# Patient Record
Sex: FEMALE | Race: OTHER | HISPANIC OR LATINO | Employment: OTHER | URBAN - METROPOLITAN AREA
[De-identification: names, ages, dates, MRNs, and addresses within clinical notes are randomized per-mention and may not be internally consistent; named-entity substitution may affect disease eponyms.]

---

## 2018-09-05 ENCOUNTER — OFFICE VISIT (OUTPATIENT)
Dept: INTERNAL MEDICINE | Facility: CLINIC | Age: 68
End: 2018-09-05
Payer: MEDICARE

## 2018-09-05 ENCOUNTER — HOSPITAL ENCOUNTER (OUTPATIENT)
Dept: RADIOLOGY | Facility: HOSPITAL | Age: 68
Discharge: HOME | End: 2018-09-05
Attending: INTERNAL MEDICINE
Payer: MEDICARE

## 2018-09-05 VITALS
SYSTOLIC BLOOD PRESSURE: 130 MMHG | DIASTOLIC BLOOD PRESSURE: 76 MMHG | HEART RATE: 80 BPM | WEIGHT: 149.69 LBS | HEIGHT: 62 IN | OXYGEN SATURATION: 97 % | BODY MASS INDEX: 27.55 KG/M2

## 2018-09-05 DIAGNOSIS — I10 ESSENTIAL HYPERTENSION: ICD-10-CM

## 2018-09-05 DIAGNOSIS — E55.9 VITAMIN D INSUFFICIENCY: ICD-10-CM

## 2018-09-05 DIAGNOSIS — Z12.31 BREAST CANCER SCREENING BY MAMMOGRAM: ICD-10-CM

## 2018-09-05 DIAGNOSIS — R55 SYNCOPE, UNSPECIFIED SYNCOPE TYPE: ICD-10-CM

## 2018-09-05 DIAGNOSIS — D32.9 MENINGIOMA: ICD-10-CM

## 2018-09-05 DIAGNOSIS — M25.512 LEFT ANTERIOR SHOULDER PAIN: ICD-10-CM

## 2018-09-05 DIAGNOSIS — Z00.00 ANNUAL PHYSICAL EXAM: Primary | ICD-10-CM

## 2018-09-05 PROCEDURE — 73030 X-RAY EXAM OF SHOULDER: CPT | Mod: 26,LT,, | Performed by: RADIOLOGY

## 2018-09-05 PROCEDURE — 73030 X-RAY EXAM OF SHOULDER: CPT | Mod: TC,FY,PO,LT

## 2018-09-05 PROCEDURE — 99499 UNLISTED E&M SERVICE: CPT | Mod: S$GLB,,, | Performed by: INTERNAL MEDICINE

## 2018-09-05 PROCEDURE — 99204 OFFICE O/P NEW MOD 45 MIN: CPT | Mod: PBBFAC,25,PO | Performed by: INTERNAL MEDICINE

## 2018-09-05 PROCEDURE — 99999 PR PBB SHADOW E&M-NEW PATIENT-LVL IV: CPT | Mod: PBBFAC,,, | Performed by: INTERNAL MEDICINE

## 2018-09-05 PROCEDURE — 99214 OFFICE O/P EST MOD 30 MIN: CPT | Mod: S$PBB,,, | Performed by: INTERNAL MEDICINE

## 2018-09-05 RX ORDER — AMLODIPINE BESYLATE 5 MG/1
5 TABLET ORAL DAILY
COMMUNITY
End: 2018-09-27 | Stop reason: SDUPTHER

## 2018-09-05 RX ORDER — HYDROCHLOROTHIAZIDE 25 MG/1
25 TABLET ORAL DAILY
COMMUNITY
End: 2018-09-27 | Stop reason: SDUPTHER

## 2018-09-05 NOTE — PROGRESS NOTES
Portions of this note are generated with voice recognition software. Typographical errors may exist.       Patient Name:ARIEL MALLORY  Patient MRN:   [id number]    History of Present Illness   ================================================================  ARIEL MALLORY is a 68 y.o. female here for primary care visit for  Chief Complaint   Patient presents with    Establish Care    Shoulder Pain     Left        History reviewed. No pertinent past medical history.    History reviewed. No pertinent surgical history.    Review of patient's allergies indicates:   Allergen Reactions    Penicillins        Current Outpatient Medications on File Prior to Visit   Medication Sig Dispense Refill    amLODIPine (NORVASC) 5 MG tablet Take 5 mg by mouth once daily.      hydroCHLOROthiazide (HYDRODIURIL) 25 MG tablet Take 25 mg by mouth once daily.       No current facility-administered medications on file prior to visit.        History reviewed. No pertinent family history.    Social History     Socioeconomic History    Marital status:      Spouse name: Not on file    Number of children: Not on file    Years of education: Not on file    Highest education level: Not on file   Social Needs    Financial resource strain: Not on file    Food insecurity - worry: Not on file    Food insecurity - inability: Not on file    Transportation needs - medical: Not on file    Transportation needs - non-medical: Not on file   Occupational History    Not on file   Tobacco Use    Smoking status: Never Smoker    Smokeless tobacco: Never Used   Substance and Sexual Activity    Alcohol use: Yes     Alcohol/week: 1.2 oz     Types: 2 Cans of beer per week     Frequency: Monthly or less     Drinks per session: 1 or 2     Binge frequency: Never    Drug use: Not on file    Sexual activity: Not on file   Other Topics Concern    Not on file   Social History Narrative    Not on file       Social History      Substance and Sexual Activity   Sexual Activity Not on file         SUBJECTIVE:      About 2 months ago the patient was in her usual state of health walking from picking up her young grandson from .  She states that without any apparent warning she lost orthostatic tone and fell to the ground.  She does not remember having any associated tachycardia flushing symptoms nausea or diaphoresis.  She does not believe that she lost consciousness because she actually heard the point of impact when she hit the ground.  Patient states that she did have associated paralysis of any part of her body.  There is no dysarthria.  There is no vision problems.  She did have intense pain associated with the point of impact which included outstretched hand and her left shoulder.  She states that ever since she has had significant pain in the anterior shoulder with typical activities of daily living which was not present previously.  She now has difficulty doing any activity with her arm above the level of the shoulder or reaching backward.  She has been reluctant to use any analgesics medicine because she does not like to use medications in general.      The patient states that she has a significant history of a benign meningioma that was diagnosed around 2013 at [facility].  She denies significant problems with headaches in association with the recent event that occurred.  She does not have a history of cardiac dysrhythmias.  She does have a history of hypertension which she has been treating with 2 blood pressure medications.    The patient has a diagnosis of a possible transient ischemic attack many years ago.  She believes that this was addressed more than 15 years ago and there are no primary medical records of this incident.  Since this episode 2 months ago she has had no other symptoms of orthostasis syncope or presyncope.  The patient and the patient's daughter are very reluctant to pursue aggressive  evaluation of this event.  They want to prioritize the management of the left shoulder.      Medications Reviewed and Updated    Past medical, family, and social histories were reviewed and updated.    Review of Systems negative unless otherwise noted in history of present illness-  ROS      General ROS: negative  Psychological ROS: negative  ENT ROS: negative  Endocrine ROS: Negative  Allergy and Immunology ROS: negative  Pulmonary ROS: Negative  Cardiovascular ROS: negative  Gastrointestinal ROS: negative  Genito-Urinary ROS: negative  Musculoskeletal ROS: negative  Neurological ROS: negative  Dermatological ROS: negative      Allergic:    Review of patient's allergies indicates:   Allergen Reactions    Penicillins        OBJECTIVE:  BP: 130/76 Pulse: 80    Wt Readings from Last 3 Encounters:   09/05/18 67.9 kg (149 lb 11.1 oz)    Body mass index is 27.38 kg/m².  Previous Blood Pressure Readings :   BP Readings from Last 3 Encounters:   09/05/18 130/76       Physical Exam    GEN: healthy appearing  HEENT: sclera non-icteric, conjunctiva clear  CV: no peripheral edema.   Regular rate and rhythm.  No murmurs.  No carotid bruits.  PULM: breathing non-labored  ABD: supple. protuberant abdomen.   PSYCH: appropriate affect  MSK: able to rise from chair without assistance  Neurologic:  Cranial nerves 2-12 grossly intact.  5/5 strength bilateral extremities.  Normal tone.  Normal gait.  SKIN: normal skin turgor      Pertinent Labs Reviewed     ASSESSMENT/PLAN:    Annual physical exam    Syncope, unspecified syncope type.Etiology unclear. Not controlled. Further evaluation warranted.  Recommendations as below or as written on After Visit Summary.   -     CBC auto differential; Future; Expected date: 09/05/2018  -     MRI Brain (Tumor with Perfusion) W W/O Contrast (XPD); Future; Expected date: 09/05/2018    Meningioma.This is a Chronic problem. The problem is unclear regarding stability. Detailed evaluation of outside  radiology from 2013 and 2014 at [facility] indicating a CTA of the neck and head that was essentially normal.  There was a small meningioma located near the pituitary gland.The patient's meningioma measured at maximum dimension 1.6 cm.  The patient had pituitary labs that were within normal limits at the time.  The the patient was due to get a repeat MRI for active surveillance but this never happened.The risk of medical complications is moderate. Treatment/diagnostic recommendations are to modify the diagnostic/treatment plan as follows in addition to instructions noted on the After Visit Summary. The patient advised if symptoms change or intensify to seek medical care.   -     MRI Brain (Tumor with Perfusion) W W/O Contrast (XPD); Future; Expected date: 09/05/2018    Left anterior shoulder pain.Etiology unclear. Not controlled. Further evaluation warranted.  Recommendations as below or as written on After Visit Summary.   -     X-Ray Shoulder Trauma 3 view Left; Future; Expected date: 09/05/2018  -     Ambulatory referral to Orthopedics    Essential hypertensin.Condition stable.  Counseling given today on self-care measures. Plan to monitor clinically. Continue current medical plan.   -     Comprehensive metabolic panel; Standing  -     Lipid panel; Standing    Vitamin D insufficiency  -     Vitamin D; Standing    Breast cancer screening by mammogram  -     Mammo Digital Screening Bilat with CAD; Standing          Future Appointments   Date Time Provider Department Center   9/10/2018  3:40 PM Janet Spears PA-C [facility]THO [place] [facility]   9/14/2018  8:00 AM [facility] MAMMO1 [facility] MAMMO [place] [facility]   10/4/2018 10:00 AM Emigdio Salomon Jr., MD [facility] ORTHO [place] [facility]   10/8/2018  3:00 PM Antolin Verduzco MD [facility]       Antolin Verduzco  9/9/2018  6:59 PM

## 2018-09-06 ENCOUNTER — TELEPHONE (OUTPATIENT)
Dept: INTERNAL MEDICINE | Facility: CLINIC | Age: 68
End: 2018-09-06

## 2018-09-06 NOTE — TELEPHONE ENCOUNTER
Spoke with patient for possible brain MRI.  Patient is not claustrophobic.  Patient will call Mrs Ava Damon our referral coordinator to set this appointment up at her convenience.

## 2018-09-06 NOTE — TELEPHONE ENCOUNTER
----- Message from Valeria Allen LPN sent at 9/5/2018  7:40 PM CDT -----      ----- Message -----  From: Antolin Verduzco MD  Sent: 9/5/2018   7:18 PM  To: [facility] Staff      Please contact the patient or the patient's daughter to recommend MRI of the head.  The patient had an episode 2 months ago when she passed out.  She has a history of a small tumor in the brain that was diagnosed in 2014 and it is possible that even if the tumor is benign if it has grown it could produce a seizure and this could explain why she passed out.  The only way to know if the tumor has grown since 2014 is to repeat an MRI.  If the patient is claustrophobic please let me know so I can change the orders to include sedation.

## 2018-09-07 ENCOUNTER — TELEPHONE (OUTPATIENT)
Dept: ORTHOPEDICS | Facility: CLINIC | Age: 68
End: 2018-09-07

## 2018-09-07 NOTE — TELEPHONE ENCOUNTER
Spoke with pt regarding 9/10 appt . I was able to get pt in for 3:40 appt .   ----- Message from Angelica Best sent at 9/7/2018  4:26 PM CDT -----  Contact: [phone]/ pts daughter Julisa  Called in returning your call. Julisa would like to know if appt that's available for Monday is after 3:30, as that is the only time she's available to bring pt. Please call and advise.

## 2018-09-10 ENCOUNTER — OFFICE VISIT (OUTPATIENT)
Dept: ORTHOPEDICS | Facility: CLINIC | Age: 68
End: 2018-09-10
Payer: MEDICARE

## 2018-09-10 VITALS — HEIGHT: 62 IN | BODY MASS INDEX: 27.42 KG/M2 | WEIGHT: 149 LBS

## 2018-09-10 DIAGNOSIS — M75.41 IMPINGEMENT SYNDROME OF RIGHT SHOULDER: Primary | ICD-10-CM

## 2018-09-10 PROCEDURE — 99999 PR PBB SHADOW E&M-EST. PATIENT-LVL III: CPT | Mod: PBBFAC,,, | Performed by: ORTHOPAEDIC SURGERY

## 2018-09-10 PROCEDURE — 1101F PT FALLS ASSESS-DOCD LE1/YR: CPT | Mod: CPTII,,, | Performed by: ORTHOPAEDIC SURGERY

## 2018-09-10 PROCEDURE — 99213 OFFICE O/P EST LOW 20 MIN: CPT | Mod: PBBFAC,25,PO | Performed by: ORTHOPAEDIC SURGERY

## 2018-09-10 PROCEDURE — 20610 DRAIN/INJ JOINT/BURSA W/O US: CPT | Mod: S$PBB,RT,, | Performed by: ORTHOPAEDIC SURGERY

## 2018-09-10 PROCEDURE — 99204 OFFICE O/P NEW MOD 45 MIN: CPT | Mod: S$PBB,25,, | Performed by: ORTHOPAEDIC SURGERY

## 2018-09-10 PROCEDURE — 20610 DRAIN/INJ JOINT/BURSA W/O US: CPT | Mod: PBBFAC,PN | Performed by: ORTHOPAEDIC SURGERY

## 2018-09-10 RX ORDER — TRIAMCINOLONE ACETONIDE 40 MG/ML
40 INJECTION, SUSPENSION INTRA-ARTICULAR; INTRAMUSCULAR
Status: COMPLETED | OUTPATIENT
Start: 2018-09-10 | End: 2018-09-10

## 2018-09-10 RX ADMIN — TRIAMCINOLONE ACETONIDE 40 MG: 40 INJECTION, SUSPENSION INTRA-ARTICULAR; INTRAMUSCULAR at 05:09

## 2018-09-10 NOTE — PROCEDURES
Procedures   PROCEDURE NOTE:  I have explained the risks, benefits, and alternatives of the procedure in detail.  The patient voices understanding and all questions have been answered.  The patient agrees to proceed as planned, consents to injection. Pause for timeout. After a sterile prep of the skin performed in the normal fashion, the left shoulder is injected from the posterior approach using a 22 gauge needle with a combination of 2cc 1% lidocaine and 40 mg of kenalog. The patient is cautioned and immediate relief of pain is secondary to the local anesthetic and will be temporary.  After the anesthetic wears off there may be a increase in pain that may last for a few hours or a few days and they should use ice to help alleviate this flair up of pain.

## 2018-09-14 ENCOUNTER — HOSPITAL ENCOUNTER (OUTPATIENT)
Dept: RADIOLOGY | Facility: HOSPITAL | Age: 68
Discharge: HOME | End: 2018-09-14
Attending: INTERNAL MEDICINE
Payer: MEDICARE

## 2018-09-14 ENCOUNTER — HOSPITAL ENCOUNTER (OUTPATIENT)
Dept: RADIOLOGY | Facility: HOSPITAL | Age: 68
Discharge: HOME | End: 2018-09-14
Attending: ORTHOPAEDIC SURGERY
Payer: MEDICARE

## 2018-09-14 VITALS — WEIGHT: 149 LBS | BODY MASS INDEX: 27.42 KG/M2 | HEIGHT: 62 IN

## 2018-09-14 DIAGNOSIS — M75.41 IMPINGEMENT SYNDROME OF RIGHT SHOULDER: ICD-10-CM

## 2018-09-14 DIAGNOSIS — Z12.31 BREAST CANCER SCREENING BY MAMMOGRAM: ICD-10-CM

## 2018-09-14 PROCEDURE — 77063 BREAST TOMOSYNTHESIS BI: CPT | Mod: TC

## 2018-09-14 PROCEDURE — 73221 MRI JOINT UPR EXTREM W/O DYE: CPT | Mod: TC,LT

## 2018-09-14 PROCEDURE — 77067 SCR MAMMO BI INCL CAD: CPT | Mod: 26,,, | Performed by: RADIOLOGY

## 2018-09-14 PROCEDURE — 77063 BREAST TOMOSYNTHESIS BI: CPT | Mod: 26,,, | Performed by: RADIOLOGY

## 2018-09-14 PROCEDURE — 73221 MRI JOINT UPR EXTREM W/O DYE: CPT | Mod: 26,LT,, | Performed by: RADIOLOGY

## 2018-09-17 ENCOUNTER — TELEPHONE (OUTPATIENT)
Dept: ORTHOPEDICS | Facility: CLINIC | Age: 68
End: 2018-09-17

## 2018-09-17 ENCOUNTER — PATIENT MESSAGE (OUTPATIENT)
Dept: ORTHOPEDICS | Facility: HOSPITAL | Age: 68
End: 2018-09-17

## 2018-09-17 NOTE — TELEPHONE ENCOUNTER
----- Message from Janet Spears PA-C sent at 9/17/2018  7:56 AM CDT -----  Regarding: MRI Results  This pt has a full thickness rotator cuff tear and will need surgery.   Can you please make an apt for her with Dr. Salomon as soon as possible.     Thx

## 2018-09-17 NOTE — TELEPHONE ENCOUNTER
----- Message from Janet Spears PA-C sent at 9/17/2018  7:59 AM CDT -----  Also can you please have Soha call the pt (Wol[place] speaking only) to let her know that her MRI was positive for rotator cuff tear and will be scheduled with Dr. Salomon.    Thanks.

## 2018-09-17 NOTE — TELEPHONE ENCOUNTER
Called and spoke with daughter because pt speaks minimal english. Appt date and time set, Daughter made aware.

## 2018-09-24 ENCOUNTER — TELEPHONE (OUTPATIENT)
Dept: ORTHOPEDICS | Facility: CLINIC | Age: 68
End: 2018-09-24

## 2018-09-24 NOTE — TELEPHONE ENCOUNTER
----- Message from Yareli Laguerre sent at 9/24/2018  3:32 PM CDT -----  Contact: daughter/Julisa  [phone]  Pt daughter is returning your call   Please call and advise

## 2018-09-24 NOTE — TELEPHONE ENCOUNTER
I spoke with the patients daughter and rescheduled the patients appointment. She was made aware of date, time and location.

## 2018-09-24 NOTE — TELEPHONE ENCOUNTER
----- Message from Joyce Patrick sent at 9/24/2018 12:54 PM CDT -----  Contact: [phone]  Patient daughter requested to speak with the nurse because she needs to reschedule the appt that was for today but needs something sooner than next availability. Please call.

## 2018-09-26 ENCOUNTER — PATIENT MESSAGE (OUTPATIENT)
Dept: INTERNAL MEDICINE | Facility: CLINIC | Age: 68
End: 2018-09-26

## 2018-09-27 RX ORDER — HYDROCHLOROTHIAZIDE 25 MG/1
25 TABLET ORAL DAILY
Qty: 90 TABLET | Refills: 1 | Status: SHIPPED | OUTPATIENT
Start: 2018-09-27 | End: 2019-03-27 | Stop reason: SDUPTHER

## 2018-09-27 RX ORDER — AMLODIPINE BESYLATE 5 MG/1
5 TABLET ORAL DAILY
Qty: 90 TABLET | Refills: 3 | Status: SHIPPED | OUTPATIENT
Start: 2018-09-27 | End: 2019-03-27 | Stop reason: SDUPTHER

## 2018-10-08 ENCOUNTER — OFFICE VISIT (OUTPATIENT)
Dept: INTERNAL MEDICINE | Facility: CLINIC | Age: 68
End: 2018-10-08
Payer: MEDICARE

## 2018-10-08 VITALS
HEIGHT: 62 IN | WEIGHT: 149.5 LBS | OXYGEN SATURATION: 97 % | SYSTOLIC BLOOD PRESSURE: 136 MMHG | HEART RATE: 82 BPM | BODY MASS INDEX: 27.51 KG/M2 | DIASTOLIC BLOOD PRESSURE: 74 MMHG

## 2018-10-08 DIAGNOSIS — D32.9 BENIGN MENINGIOMA: ICD-10-CM

## 2018-10-08 DIAGNOSIS — Z23 NEED FOR IMMUNIZATION AGAINST INFLUENZA: ICD-10-CM

## 2018-10-08 DIAGNOSIS — Z11.59 ENCOUNTER FOR HEPATITIS C SCREENING TEST FOR LOW RISK PATIENT: ICD-10-CM

## 2018-10-08 DIAGNOSIS — M75.112 INCOMPLETE TEAR OF LEFT ROTATOR CUFF: ICD-10-CM

## 2018-10-08 DIAGNOSIS — Z12.11 COLON CANCER SCREENING: ICD-10-CM

## 2018-10-08 DIAGNOSIS — H54.7 DECREASED VISUAL ACUITY: Primary | ICD-10-CM

## 2018-10-08 PROCEDURE — 99213 OFFICE O/P EST LOW 20 MIN: CPT | Mod: PBBFAC,PO,25 | Performed by: INTERNAL MEDICINE

## 2018-10-08 PROCEDURE — 1101F PT FALLS ASSESS-DOCD LE1/YR: CPT | Mod: CPTII,,, | Performed by: INTERNAL MEDICINE

## 2018-10-08 PROCEDURE — 99214 OFFICE O/P EST MOD 30 MIN: CPT | Mod: S$PBB,,, | Performed by: INTERNAL MEDICINE

## 2018-10-08 PROCEDURE — 99999 PR PBB SHADOW E&M-EST. PATIENT-LVL III: CPT | Mod: PBBFAC,,, | Performed by: INTERNAL MEDICINE

## 2018-10-08 PROCEDURE — 90662 IIV NO PRSV INCREASED AG IM: CPT | Mod: PBBFAC,PO

## 2018-10-08 NOTE — PROGRESS NOTES
Portions of this note are generated with voice recognition software. Typographical errors may exist.     SUBJECTIVE:    This is a/an 68 y.o. female here for primary care visit for  Chief Complaint   Patient presents with    Shoulder Pain     Patient states that after she received injection in the shoulder she had significant resolution in pain and increased mobility in her left shoulder better than any other treatment she has received in the past.  She has a limited understanding of the result on her MRI and is requesting detailed explanation in [place].  She understands to be careful with her left shoulder and to report any significant persisting pain symptoms that may recur in the left shoulder.    The patient states that she has been reluctant to pursue MRI of the brain because she recently completed an MRI of the shoulder and is dealing with the medical payments for that MRI.  Moreover, she feels that she does not have headaches almost ever and she feels that if she is asymptomatic she would like to forego screening MRI for enlargement of meningioma.  Her last MRI was in 2014.    Although she is not having headaches she has had gradual worsening in visual acuity for over a year.  She has not been seen by an optometrist or ophthalmologist for more than 2 years.  The patient agrees that she will get this evaluated as soon as possible as a part of her evaluation for whether she needs a repeat MRI of the brain    The patient states that she has never completed colonoscopy.  Detailed informed consent and the patient agrees to pursue this testing    Medications Reviewed and Updated    Past medical, family, and social histories were reviewed and updated.    Review of Systems negative unless otherwise noted in history of present illness-  ROS    General ROS: negative  Psychological ROS: negative  ENT ROS: negative  Endocrine ROS: Negative  Allergy and Immunology ROS: negative  Cardiovascular ROS: negative  Pulmonary  ROS: Negative  Gastrointestinal ROS: negative  Genito-Urinary ROS: negative  Musculoskeletal ROS: negative  Neurological ROS: negative        Allergic:    Review of patient's allergies indicates:   Allergen Reactions    Penicillins        OBJECTIVE:  BP: 136/74 Pulse: 82    Wt Readings from Last 3 Encounters:   10/08/18 67.8 kg (149 lb 7.6 oz)   09/14/18 67.6 kg (149 lb)   09/10/18 67.6 kg (149 lb)    Body mass index is 27.34 kg/m².  Previous Blood Pressure Readings :   BP Readings from Last 3 Encounters:   10/08/18 136/74   09/05/18 130/76       Physical Exam    GEN: No apparent distress  HEENT: sclera non-icteric, conjunctiva clear  CV: no peripheral edema  PULM: breathing non-labored  ABD: Obese, protuberant abdomen.  PSYCH: appropriate affect  MSK: able to rise from chair without assistance  SKIN: normal skin turgor    Pertinent Labs Reviewed       ASSESSMENT/PLAN:    Decreased visual acuity.Etiology unclear. Not controlled. Further evaluation warranted.  Recommendations as below or as written on After Visit Summary.   -     Ambulatory Referral to Optometry    Benign meningioma.Condition stable.  Counseling given today on self-care measures. Plan to monitor clinically. Continue current medical plan.     Incomplete tear of left rotator cuff.Condition stable.  Counseling given today on self-care measures. Plan to monitor clinically. Continue current medical plan.     Need for immunization against influenza  -     Influenza - High Dose (65+) (PF) (IM)    Colon cancer screening  -     Case request GI: COLONOSCOPY    Encounter for hepatitis C screening test for low risk patient  -     Hepatitis C antibody; Future; Expected date: 10/08/2018          Future Appointments   Date Time Provider Department Center   10/11/2018  4:00 PM Emigdio Salomon Jr., MD [facility]THO [place] [facility]   10/19/2018  3:30 PM Marcell Lozano OD [facility] OPTOMTY [place]   2/8/2019 10:00 AM [facility] [place]   2/12/2019  1:40 PM  Antolin Verduzco MD [facility]       Antolin Verduzco  10/8/2018  3:38 PM

## 2018-10-11 ENCOUNTER — OFFICE VISIT (OUTPATIENT)
Dept: ORTHOPEDICS | Facility: CLINIC | Age: 68
End: 2018-10-11
Payer: MEDICARE

## 2018-10-11 VITALS — HEIGHT: 62 IN | WEIGHT: 149 LBS | BODY MASS INDEX: 27.42 KG/M2

## 2018-10-11 DIAGNOSIS — M75.122 COMPLETE TEAR OF LEFT ROTATOR CUFF: ICD-10-CM

## 2018-10-11 PROCEDURE — 99999 PR PBB SHADOW E&M-EST. PATIENT-LVL III: CPT | Mod: PBBFAC,,, | Performed by: ORTHOPAEDIC SURGERY

## 2018-10-11 PROCEDURE — 99213 OFFICE O/P EST LOW 20 MIN: CPT | Mod: PBBFAC,PN | Performed by: ORTHOPAEDIC SURGERY

## 2018-10-11 PROCEDURE — 1101F PT FALLS ASSESS-DOCD LE1/YR: CPT | Mod: CPTII,,, | Performed by: ORTHOPAEDIC SURGERY

## 2018-10-11 PROCEDURE — 99214 OFFICE O/P EST MOD 30 MIN: CPT | Mod: S$PBB,,, | Performed by: ORTHOPAEDIC SURGERY

## 2018-10-11 NOTE — LETTER
October 11, 2018      Antolin Verduzco MD  [address] [address]  [place] [address]           [facility]  [address] [place] [address] [address]  [place] [address]  Phone: [phone]          Patient: Julisa Jensen   MR Number: [id number]   YOB: 1950   Date of Visit: 10/11/2018       Dear Dr. Antolin Verduzco:    Thank you for referring Julisa Jensen to me for evaluation. Attached you will find relevant portions of my assessment and plan of care.    If you have questions, please do not hesitate to call me. I look forward to following Julisa Jensen along with you.    Sincerely,    Emigdio Salomon Jr., MD    Enclosure  CC:  No Recipients    If you would like to receive this communication electronically, please contact [email or web address] or [phone] to request more information on [employer] Link access.    For providers and/or their staff who would like to refer a patient to [facility], please contact us through our one-stop-shop provider referral line, [facility], at [phone].    If you feel you have received this communication in error or would no longer like to receive these types of communications, please e-mail [email or web address]

## 2018-10-17 ENCOUNTER — TELEPHONE (OUTPATIENT)
Dept: GASTROENTEROLOGY | Facility: CLINIC | Age: 68
End: 2018-10-17

## 2018-10-17 NOTE — TELEPHONE ENCOUNTER
Attempted to contact patient about scheduling a Colonoscopy. Patient stated that she will give office a call back.

## 2018-11-02 ENCOUNTER — OFFICE VISIT (OUTPATIENT)
Dept: OPTOMETRY | Facility: CLINIC | Age: 68
End: 2018-11-02
Payer: MEDICARE

## 2018-11-02 DIAGNOSIS — H52.4 HYPEROPIA WITH PRESBYOPIA, RIGHT: ICD-10-CM

## 2018-11-02 DIAGNOSIS — H40.013 OAG (OPEN ANGLE GLAUCOMA) SUSPECT, LOW RISK, BILATERAL: Primary | ICD-10-CM

## 2018-11-02 DIAGNOSIS — H25.13 NUCLEAR SCLEROSIS OF BOTH EYES: ICD-10-CM

## 2018-11-02 DIAGNOSIS — H52.01 HYPEROPIA WITH PRESBYOPIA, RIGHT: ICD-10-CM

## 2018-11-02 DIAGNOSIS — H52.02 HYPEROPIA WITH ASTIGMATISM AND PRESBYOPIA, LEFT: ICD-10-CM

## 2018-11-02 DIAGNOSIS — H52.4 HYPEROPIA WITH ASTIGMATISM AND PRESBYOPIA, LEFT: ICD-10-CM

## 2018-11-02 DIAGNOSIS — H52.202 HYPEROPIA WITH ASTIGMATISM AND PRESBYOPIA, LEFT: ICD-10-CM

## 2018-11-02 PROCEDURE — 92004 COMPRE OPH EXAM NEW PT 1/>: CPT | Mod: S$GLB,,, | Performed by: OPTOMETRIST

## 2018-11-02 PROCEDURE — 99999 PR PBB SHADOW E&M-EST. PATIENT-LVL II: CPT | Mod: PBBFAC,,, | Performed by: OPTOMETRIST

## 2018-11-02 PROCEDURE — 92015 DETERMINE REFRACTIVE STATE: CPT | Mod: S$GLB,,, | Performed by: OPTOMETRIST

## 2018-11-02 NOTE — PROGRESS NOTES
HPI     Pt here for concerns about ocular health.  Referred by PCP, Dr. Antolin Verduzco at [facility] for decreased VA.    Pt c/o of blurry vision at near.  Eye pain: 0/10  Pt denies double vision, tearing, itchiness, dryness, and photophobia.    Eye meds:  None    Last edited by Comfort Vogel on 11/2/2018  3:47 PM. (History)        ROS     Negative for: Constitutional, Gastrointestinal, Neurological, Skin,   Genitourinary, Musculoskeletal, HENT, Endocrine, Cardiovascular, Eyes,   Respiratory, Psychiatric, Allergic/Imm, Heme/Lymph    Last edited by Marcell Lozano, OD on 11/2/2018  4:05 PM. (History)        Assessment /Plan     For exam results, see Encounter Report.    OAG (open angle glaucoma) suspect, low risk, bilateral    Hyperopia with presbyopia, right    Hyperopia with astigmatism and presbyopia, left    Nuclear sclerosis of both eyes    1. Large C/d. RTC 2 weeks IOP/pachy/OCT/HVF.   2-3. SRx updated.   4. Mildly visually significant. Monitor.

## 2018-11-02 NOTE — LETTER
November 2, 2018      Antolin Verduzco MD  [address] [facility] [address]           [facility] [facility]  [address] [facility] [address]  Phone: [phone]  Fax: [phone]          Patient: Julisa Jensen   MR Number: [id number]   YOB: 1950   Date of Visit: 11/2/2018       Dear Dr. Antolin Verduzco:    Thank you for referring Julisa Jensen to me for evaluation. Attached you will find relevant portions of my assessment and plan of care.    If you have questions, please do not hesitate to call me. I look forward to following Julisa Jensen along with you.    Sincerely,    Marcell Lozano, OD    Enclosure  CC:  No Recipients    If you would like to receive this communication electronically, please contact [email or web address] or [phone] to request more information on [employer] Link access.    For providers and/or their staff who would like to refer a patient to [facility], please contact us through our one-stop-shop provider referral line, [facility], at [phone].    If you feel you have received this communication in error or would no longer like to receive these types of communications, please e-mail [email or web address]

## 2018-11-06 ENCOUNTER — PATIENT MESSAGE (OUTPATIENT)
Dept: INTERNAL MEDICINE | Facility: CLINIC | Age: 68
End: 2018-11-06

## 2018-11-20 ENCOUNTER — TELEPHONE (OUTPATIENT)
Dept: OPTOMETRY | Facility: CLINIC | Age: 68
End: 2018-11-20

## 2018-11-20 ENCOUNTER — CLINICAL SUPPORT (OUTPATIENT)
Dept: OPHTHALMOLOGY | Facility: CLINIC | Age: 68
End: 2018-11-20
Payer: MEDICARE

## 2018-11-20 ENCOUNTER — OFFICE VISIT (OUTPATIENT)
Dept: OPTOMETRY | Facility: CLINIC | Age: 68
End: 2018-11-20
Payer: MEDICARE

## 2018-11-20 DIAGNOSIS — H40.013 OAG (OPEN ANGLE GLAUCOMA) SUSPECT, LOW RISK, BILATERAL: Primary | ICD-10-CM

## 2018-11-20 DIAGNOSIS — H40.013 OAG (OPEN ANGLE GLAUCOMA) SUSPECT, LOW RISK, BILATERAL: ICD-10-CM

## 2018-11-20 PROCEDURE — 76514 ECHO EXAM OF EYE THICKNESS: CPT | Mod: S$GLB,,, | Performed by: OPTOMETRIST

## 2018-11-20 PROCEDURE — 92012 INTRM OPH EXAM EST PATIENT: CPT | Mod: S$GLB,,, | Performed by: OPTOMETRIST

## 2018-11-20 PROCEDURE — 99999 PR PBB SHADOW E&M-EST. PATIENT-LVL II: CPT | Mod: PBBFAC,,, | Performed by: OPTOMETRIST

## 2018-11-20 PROCEDURE — 92133 CPTRZD OPH DX IMG PST SGM ON: CPT | Mod: S$GLB,,, | Performed by: OPTOMETRIST

## 2018-11-20 PROCEDURE — 92083 EXTENDED VISUAL FIELD XM: CPT | Mod: S$GLB,,, | Performed by: OPTOMETRIST

## 2018-11-20 NOTE — PROGRESS NOTES
HPI     DLS: 11/02/2018 Dr. Lozano    Patient here for a 2 week follow up/ IOP check. Patient notes no changes   in her vision since her last visit.     Last edited by Ivy Dominguez on 11/20/2018 11:09 AM. (History)        ROS     Negative for: Constitutional, Gastrointestinal, Neurological, Skin,   Genitourinary, Musculoskeletal, HENT, Endocrine, Cardiovascular, Eyes,   Respiratory, Psychiatric, Allergic/Imm, Heme/Lymph    Last edited by Marcell Lozano, OD on 11/20/2018 11:31 AM. (History)        Assessment /Plan     For exam results, see Encounter Report.    OAG (open angle glaucoma) suspect, low risk, bilateral      1. Neg Fhx. Last IOP 18 OD, OS. Today's IOP 19 OD, 18 OS. C/d 0.75 OD, OS w/ 11/2/18 DFE. Pachy 523 OD, 529 OS.   11/20/18 OCT WNL OU  11/20/18 HVF low reliability OU, sup arcuate OD, early sup defect and inf scattered defect OS. First time test taker  Educated the pt and daughter on findings. No need for tx at this time. Pt shows understanding. Re-test VF and IOP in 3-4 mo.

## 2018-11-20 NOTE — TELEPHONE ENCOUNTER
Patient was in clinic today with her daughter. She was unable to wait to make follow up appointment. Patient needs a 3 month IOP check and HVF repeat at the [facility]. Please schedule a 24-2 HVF repeat and IOP check with Dr. Lozano when her daughter calls the clinic to schedule.

## 2018-11-21 ENCOUNTER — TELEPHONE (OUTPATIENT)
Dept: OPHTHALMOLOGY | Facility: CLINIC | Age: 68
End: 2018-11-21

## 2018-11-21 NOTE — TELEPHONE ENCOUNTER
----- Message from Lilia Savage sent at 11/21/2018  1:06 PM CST -----  Contact: Julisa Jensen(Daughter)  Needs Advice    Reason for call:Pt called to f/u with Dr. Lozano as requested.        Communication Preference:[phone]    Additional Information:

## 2018-11-28 ENCOUNTER — TELEPHONE (OUTPATIENT)
Dept: GASTROENTEROLOGY | Facility: CLINIC | Age: 68
End: 2018-11-28

## 2018-11-28 ENCOUNTER — PATIENT MESSAGE (OUTPATIENT)
Dept: SURGERY | Facility: HOSPITAL | Age: 68
End: 2018-11-28

## 2019-01-08 ENCOUNTER — TELEPHONE (OUTPATIENT)
Dept: GASTROENTEROLOGY | Facility: CLINIC | Age: 69
End: 2019-01-08

## 2019-01-08 NOTE — TELEPHONE ENCOUNTER
GOLYTELY Instructions    You are scheduled for a colonoscopy with Dr. Granados on 3/6/2019 at [facility]  To ensure that your test is accurate and complete, you MUST follow these instructions listed below.  If you have any questions, please call our office at [phone].  Plan on being at the hospital for your procedure for 3-4 hours.    1.  Follow a CLEAR LIQUID DIET for the entire day before your scheduled colonoscopy.  This means no solid food the entire day starting when you wake.  You may have as much of the clear liquids as you want throughout the day.   CLEAR LIQUID DIET:   - Avoid Red, Orange, Purple, and/or Blue food coloring   - NO DAIRY   - You can have:  Coffee with sugar (no creamer), tea, water, soda, apple or white grape juice, chicken or beef broth/bouillon (no meat, noodles, or veggies), green/yellow popsicles, green/yellow Jell-O, lemonade.    2.  MIX GOLYTELY/COLYTE/NULYTELY (all names for same product) WITH ONE (1) GALLON OF WATER.  YOU MAY ADD A FLAVOR PACKET OR YELLOW/GREEN POWDER DRINK MIX TO THIS.  PUT IN REFRIGERATOR.  This is easier to drink if this solution is cold, so you can mix the solution one day ahead of time and place in the refrigerator prior to drinking.  You have to drink the solution within 24-36 hours of mixing it.  Do NOT put this solution over ice.  It IS ok to drink with a straw.    3. AT 5 PM THE DAY BEFORE YOUR COLONOSCOPY, DRINK ONE (1) 8 OUNCE GLASS OF MIXTURE EVERY 10 MINUTES UNTIL HALF OF THE GALLON IS CONSUMED.  Keep this mixture cold and in refrigerator as much as you can while drinking it.  Place the remaining half of mixture in the refrigerator when you finish the first half.    4.  The endoscopy department will call you 2 days before your colonoscopy to tell you the exact time to arrive, AND to tell you the exact time to drink the 2nd portion of your prep (which will be FIVE HOURS BEFORE YOUR ARRIVAL TIME).  At this time given to you, DRINK ONE (1) 8 OUNCE  GLASS OF MIXTURE EVERY 10 MINUTES UNTIL THE OTHER HALF IS CONSUMED. Keep the mixture cold while you are drinking it. Once this is complete, you may not have ANYTHING else by mouth!      5.  You must have someone with you to DRIVE YOU HOME since you will be receiving IV sedation for the colonoscopy.    6.  It is ok to take your heart, blood pressure, and seizure medications in the morning of your test with a SIP of water.  Hold other medications until after your procedure.  Do NOT have anything else to eat or drink the morning of your colonoscopy.  It is ok to brush your teeth.    7.  If you are on blood thinners THAT YOU HAVE BEEN INSTRUCTED TO HOLD BY YOUR DOCTOR FOR THIS PROCEDURE, then do NOT take this the morning of your colonoscopy.  Do NOT stop these medications on your own, they must be approved to be held by your doctor.  Your colonoscopy can NOT be done if you are on these medications.  Examples of blood thinners include: Coumadin, Aggrenox, Plavix, Pradaxa, Reapro, Pletal, Xarelto, Ticagrelor, Brilinta, Eliquis, and high dose aspirin (325 mg).  You do not have to stop baby aspirin 81 mg.    8.  IF YOU ARE DIABETIC:  NO INSULIN OR ORAL MEDICATIONS THE MORNING OF THE COLONOSCOPY.  TAKE ONLY HALF THE DOSE OF YOUR INSULIN THE DAY BEFORE THE COLONOSCOPY.  DO NOT TAKE ANY ORAL DIABETIC MEDICATIONS THE DAY BEFORE THE COLONOSCOPY.  IF YOU ARE AN INSULIN DEPENDENT DIABETIC WITH UNSTABLE BLOOD SUGARDS, NOTIFY YOUR PRIMARY CARE PHYSICIAN FOR INSTRUCTIONS.

## 2019-01-11 ENCOUNTER — PATIENT MESSAGE (OUTPATIENT)
Dept: ORTHOPEDICS | Facility: CLINIC | Age: 69
End: 2019-01-11

## 2019-02-04 ENCOUNTER — OFFICE VISIT (OUTPATIENT)
Dept: ORTHOPEDICS | Facility: CLINIC | Age: 69
End: 2019-02-04
Payer: MEDICARE

## 2019-02-04 ENCOUNTER — HOSPITAL ENCOUNTER (OUTPATIENT)
Dept: RADIOLOGY | Facility: HOSPITAL | Age: 69
Discharge: HOME | End: 2019-02-04
Attending: ORTHOPAEDIC SURGERY
Payer: MEDICARE

## 2019-02-04 VITALS — WEIGHT: 149 LBS | BODY MASS INDEX: 27.42 KG/M2 | HEIGHT: 62 IN

## 2019-02-04 DIAGNOSIS — M25.511 RIGHT SHOULDER PAIN, UNSPECIFIED CHRONICITY: Primary | ICD-10-CM

## 2019-02-04 DIAGNOSIS — M25.511 ACUTE PAIN OF RIGHT SHOULDER: ICD-10-CM

## 2019-02-04 DIAGNOSIS — M25.511 RIGHT SHOULDER PAIN, UNSPECIFIED CHRONICITY: ICD-10-CM

## 2019-02-04 PROCEDURE — 20610 PR DRAIN/INJECT LARGE JOINT/BURSA: ICD-10-PCS | Mod: RT,S$GLB,, | Performed by: ORTHOPAEDIC SURGERY

## 2019-02-04 PROCEDURE — 99213 PR OFFICE/OUTPT VISIT, EST, LEVL III, 20-29 MIN: ICD-10-PCS | Mod: 25,S$GLB,, | Performed by: ORTHOPAEDIC SURGERY

## 2019-02-04 PROCEDURE — 99213 OFFICE O/P EST LOW 20 MIN: CPT | Mod: 25,S$GLB,, | Performed by: ORTHOPAEDIC SURGERY

## 2019-02-04 PROCEDURE — 73030 XR SHOULDER COMPLETE 2 OR MORE VIEWS RIGHT: ICD-10-PCS | Mod: 26,RT,, | Performed by: RADIOLOGY

## 2019-02-04 PROCEDURE — 1101F PR PT FALLS ASSESS DOC 0-1 FALLS W/OUT INJ PAST YR: ICD-10-PCS | Mod: CPTII,S$GLB,, | Performed by: ORTHOPAEDIC SURGERY

## 2019-02-04 PROCEDURE — 1101F PT FALLS ASSESS-DOCD LE1/YR: CPT | Mod: CPTII,S$GLB,, | Performed by: ORTHOPAEDIC SURGERY

## 2019-02-04 PROCEDURE — 20610 DRAIN/INJ JOINT/BURSA W/O US: CPT | Mod: RT,S$GLB,, | Performed by: ORTHOPAEDIC SURGERY

## 2019-02-04 PROCEDURE — 99999 PR PBB SHADOW E&M-EST. PATIENT-LVL III: ICD-10-PCS | Mod: PBBFAC,,, | Performed by: ORTHOPAEDIC SURGERY

## 2019-02-04 PROCEDURE — 73030 X-RAY EXAM OF SHOULDER: CPT | Mod: 26,RT,, | Performed by: RADIOLOGY

## 2019-02-04 PROCEDURE — 99999 PR PBB SHADOW E&M-EST. PATIENT-LVL III: CPT | Mod: PBBFAC,,, | Performed by: ORTHOPAEDIC SURGERY

## 2019-02-04 PROCEDURE — 73030 X-RAY EXAM OF SHOULDER: CPT | Mod: TC,PN,RT

## 2019-02-04 RX ORDER — TRIAMCINOLONE ACETONIDE 40 MG/ML
40 INJECTION, SUSPENSION INTRA-ARTICULAR; INTRAMUSCULAR
Status: COMPLETED | OUTPATIENT
Start: 2019-02-04 | End: 2019-02-04

## 2019-02-04 RX ADMIN — TRIAMCINOLONE ACETONIDE 40 MG: 40 INJECTION, SUSPENSION INTRA-ARTICULAR; INTRAMUSCULAR at 04:02

## 2019-02-04 NOTE — PROGRESS NOTES
HISTORY OF PRESENT ILLNESS:  Ms. Jensen seen previously for left shoulder   symptoms related to rotator cuff tear.  She is doing much better with the left   shoulder.  She actually canceled her surgery because she was doing so well, but   now she is back with the opposite right shoulder.  She reports pain in the   shoulder and difficulty with elevation.    No recent trauma reported, but she does do a lot of lifting with her grandchild.    PHYSICAL EXAMINATION:  LEFT SHOULDER:  No tenderness.  Full range of motion without pain.  RIGHT SHOULDER:  Demonstrates a positive impingement sign.  Rotator cuff   strength intact.    X-RAYS:  AP and lateral right shoulder demonstrates some spurring at the   anterolateral acromion.    IMPRESSION:  1.  Left shoulder rotator cuff tear, improved symptomatically.  2.  Right shoulder impingement.    PLAN:  The patient would like try an injection for the right shoulder.  After   pause for timeout, she identified the right shoulder, injected with Kenalog 40   mg, 2 mL Xylocaine, sterile technique, tolerated the procedure well without   complication.    Recommend she continue with anti-inflammatory medication by mouth and follow up   in 2-3 months.      GRISELDA  dd: 02/04/2019 16:57:50 (CST)  td: 02/05/2019 10:15:13 (CST)  Doc ID   #[id number]  Job ID #[id number]    CC:

## 2019-02-08 ENCOUNTER — LAB VISIT (OUTPATIENT)
Dept: LAB | Facility: HOSPITAL | Age: 69
End: 2019-02-08
Attending: INTERNAL MEDICINE
Payer: MEDICARE

## 2019-02-08 DIAGNOSIS — I10 ESSENTIAL HYPERTENSION: ICD-10-CM

## 2019-02-08 DIAGNOSIS — E55.9 VITAMIN D INSUFFICIENCY: ICD-10-CM

## 2019-02-08 DIAGNOSIS — Z11.59 ENCOUNTER FOR HEPATITIS C SCREENING TEST FOR LOW RISK PATIENT: ICD-10-CM

## 2019-02-08 LAB
25(OH)D3+25(OH)D2 SERPL-MCNC: 21 NG/ML
ALBUMIN SERPL BCP-MCNC: 4 G/DL
ALP SERPL-CCNC: 80 U/L
ALT SERPL W/O P-5'-P-CCNC: 27 U/L
ANION GAP SERPL CALC-SCNC: 9 MMOL/L
AST SERPL-CCNC: 21 U/L
BILIRUB SERPL-MCNC: 0.4 MG/DL
BUN SERPL-MCNC: 14 MG/DL
CALCIUM SERPL-MCNC: 9.8 MG/DL
CHLORIDE SERPL-SCNC: 103 MMOL/L
CO2 SERPL-SCNC: 30 MMOL/L
CREAT SERPL-MCNC: 0.7 MG/DL
EST. GFR  (AFRICAN AMERICAN): >60 ML/MIN/1.73 M^2
EST. GFR  (NON AFRICAN AMERICAN): >60 ML/MIN/1.73 M^2
GLUCOSE SERPL-MCNC: 78 MG/DL
POTASSIUM SERPL-SCNC: 3.6 MMOL/L
PROT SERPL-MCNC: 8.3 G/DL
SODIUM SERPL-SCNC: 142 MMOL/L

## 2019-02-08 PROCEDURE — 36415 COLL VENOUS BLD VENIPUNCTURE: CPT | Mod: PO

## 2019-02-08 PROCEDURE — 80053 COMPREHEN METABOLIC PANEL: CPT

## 2019-02-08 PROCEDURE — 82306 VITAMIN D 25 HYDROXY: CPT

## 2019-02-08 PROCEDURE — 86803 HEPATITIS C AB TEST: CPT

## 2019-02-11 ENCOUNTER — PATIENT MESSAGE (OUTPATIENT)
Dept: INTERNAL MEDICINE | Facility: CLINIC | Age: 69
End: 2019-02-11

## 2019-02-11 ENCOUNTER — OFFICE VISIT (OUTPATIENT)
Dept: FAMILY MEDICINE | Facility: CLINIC | Age: 69
End: 2019-02-11
Payer: MEDICARE

## 2019-02-11 ENCOUNTER — TELEPHONE (OUTPATIENT)
Dept: FAMILY MEDICINE | Facility: CLINIC | Age: 69
End: 2019-02-11

## 2019-02-11 VITALS
HEIGHT: 62 IN | TEMPERATURE: 99 F | SYSTOLIC BLOOD PRESSURE: 128 MMHG | HEART RATE: 95 BPM | DIASTOLIC BLOOD PRESSURE: 84 MMHG | BODY MASS INDEX: 27.99 KG/M2 | WEIGHT: 152.13 LBS | OXYGEN SATURATION: 96 %

## 2019-02-11 DIAGNOSIS — J11.1 FLU: Primary | ICD-10-CM

## 2019-02-11 DIAGNOSIS — R79.89 LOW VITAMIN D LEVEL: Primary | ICD-10-CM

## 2019-02-11 LAB — HCV AB SERPL QL IA: NEGATIVE

## 2019-02-11 PROCEDURE — 99999 PR PBB SHADOW E&M-EST. PATIENT-LVL III: ICD-10-PCS | Mod: PBBFAC,,, | Performed by: NURSE PRACTITIONER

## 2019-02-11 PROCEDURE — 3074F PR MOST RECENT SYSTOLIC BLOOD PRESSURE < 130 MM HG: ICD-10-PCS | Mod: CPTII,S$GLB,, | Performed by: NURSE PRACTITIONER

## 2019-02-11 PROCEDURE — 3079F PR MOST RECENT DIASTOLIC BLOOD PRESSURE 80-89 MM HG: ICD-10-PCS | Mod: CPTII,S$GLB,, | Performed by: NURSE PRACTITIONER

## 2019-02-11 PROCEDURE — 3074F SYST BP LT 130 MM HG: CPT | Mod: CPTII,S$GLB,, | Performed by: NURSE PRACTITIONER

## 2019-02-11 PROCEDURE — 1101F PR PT FALLS ASSESS DOC 0-1 FALLS W/OUT INJ PAST YR: ICD-10-PCS | Mod: CPTII,S$GLB,, | Performed by: NURSE PRACTITIONER

## 2019-02-11 PROCEDURE — 99213 PR OFFICE/OUTPT VISIT, EST, LEVL III, 20-29 MIN: ICD-10-PCS | Mod: S$GLB,,, | Performed by: NURSE PRACTITIONER

## 2019-02-11 PROCEDURE — 99999 PR PBB SHADOW E&M-EST. PATIENT-LVL III: CPT | Mod: PBBFAC,,, | Performed by: NURSE PRACTITIONER

## 2019-02-11 PROCEDURE — 3079F DIAST BP 80-89 MM HG: CPT | Mod: CPTII,S$GLB,, | Performed by: NURSE PRACTITIONER

## 2019-02-11 PROCEDURE — 99213 OFFICE O/P EST LOW 20 MIN: CPT | Mod: S$GLB,,, | Performed by: NURSE PRACTITIONER

## 2019-02-11 PROCEDURE — 1101F PT FALLS ASSESS-DOCD LE1/YR: CPT | Mod: CPTII,S$GLB,, | Performed by: NURSE PRACTITIONER

## 2019-02-11 RX ORDER — ERGOCALCIFEROL 1.25 MG/1
50000 CAPSULE ORAL
Qty: 12 CAPSULE | Refills: 3 | Status: SHIPPED | OUTPATIENT
Start: 2019-02-11 | End: 2019-03-27

## 2019-02-11 RX ORDER — AZITHROMYCIN 250 MG/1
250 TABLET, FILM COATED ORAL DAILY
Qty: 6 TABLET | Refills: 0 | Status: SHIPPED | OUTPATIENT
Start: 2019-02-11 | End: 2019-03-27

## 2019-02-11 RX ORDER — PROMETHAZINE HYDROCHLORIDE AND DEXTROMETHORPHAN HYDROBROMIDE 6.25; 15 MG/5ML; MG/5ML
5-10 SYRUP ORAL NIGHTLY PRN
Qty: 120 ML | Refills: 0 | Status: SHIPPED | OUTPATIENT
Start: 2019-02-11 | End: 2019-02-21

## 2019-02-11 RX ORDER — BENZONATATE 200 MG/1
200 CAPSULE ORAL NIGHTLY
Qty: 30 CAPSULE | Refills: 0 | Status: SHIPPED | OUTPATIENT
Start: 2019-02-11 | End: 2019-05-28

## 2019-02-11 NOTE — TELEPHONE ENCOUNTER
Spoke to patient inform that Dr. Verduzco does not have any appointments intel March 07, 2019. Appointment was made for 02/11/2019. Patient voices understanding.

## 2019-02-12 NOTE — TELEPHONE ENCOUNTER
Spoke to patient and inform of low vitamin D and to  medication at the pharmacy. Patient voices understanding.

## 2019-02-13 NOTE — PROGRESS NOTES
Subjective:       Patient ID: Julisa Jensen is a 68 y.o. female here for evaluation of cough, fever and chills      Chief Complaint: Sinusitis (x 4 days) and Cough    Sinusitis   This is a new problem. The current episode started in the past 7 days. The problem has been gradually worsening since onset. The maximum temperature recorded prior to her arrival was 101 - 101.9 F. The fever has been present for 3 to 4 days. The pain is moderate. Associated symptoms include chills, congestion, coughing, a hoarse voice and a sore throat. Past treatments include nothing.     Review of Systems   Constitutional: Positive for chills.   HENT: Positive for congestion, hoarse voice and sore throat.    Respiratory: Positive for cough.        Objective:      Physical Exam   Constitutional: She is oriented to person, place, and time. She appears ill. No distress.   HENT:   Head: Normocephalic and atraumatic.   Nose: Mucosal edema and rhinorrhea present. Right sinus exhibits no maxillary sinus tenderness and no frontal sinus tenderness. Left sinus exhibits no maxillary sinus tenderness and no frontal sinus tenderness.   Mouth/Throat: Mucous membranes are dry. Posterior oropharyngeal erythema present.   Eyes: Right eye exhibits no discharge. Left eye exhibits no discharge.   Cardiovascular: Normal rate and regular rhythm.   Pulmonary/Chest: Effort normal. No accessory muscle usage. No tachypnea. No respiratory distress. She has rhonchi.   Abdominal: Soft. Bowel sounds are normal.   Musculoskeletal: She exhibits no edema.   Neurological: She is alert and oriented to person, place, and time.   Skin: Skin is warm and dry. Capillary refill takes less than 2 seconds. She is not diaphoretic.   Psychiatric: She has a normal mood and affect.       Assessment:       1. Flu        Plan:       Julisa was seen today for sinusitis and cough.    Diagnoses and all orders for this visit:      Patient with influenza contact developed symptoms  of influenza 4 days ago. POCT not performed as she is out of the window for antiviral treatment.   Follow up with PCP if symptoms fail to improve or worsen in the next 7 days       Flu  -     benzonatate (TESSALON) 200 MG capsule; Take 1 capsule (200 mg total) by mouth every 8 hours as needed for cough  -     promethazine-dextromethorphan (PROMETHAZINE-DM) 6.25-15 mg/5 mL Syrp; Take 5-10 mLs by mouth nightly as needed.  -     azithromycin (Z-ELANA) 250 MG tablet; Take 1 tablet (250 mg total) by mouth once daily. 2 tabs on day 1 then 1 tab on days 2-5

## 2019-03-01 ENCOUNTER — TELEPHONE (OUTPATIENT)
Dept: ENDOSCOPY | Facility: HOSPITAL | Age: 69
End: 2019-03-01

## 2019-03-07 ENCOUNTER — TELEPHONE (OUTPATIENT)
Dept: GASTROENTEROLOGY | Facility: CLINIC | Age: 69
End: 2019-03-07

## 2019-03-07 NOTE — TELEPHONE ENCOUNTER
Attempted to contact patient about rescheduling her Colonoscopy. Left patient a message to give office a call back.

## 2019-03-27 ENCOUNTER — OFFICE VISIT (OUTPATIENT)
Dept: INTERNAL MEDICINE | Facility: CLINIC | Age: 69
End: 2019-03-27
Payer: MEDICARE

## 2019-03-27 VITALS
BODY MASS INDEX: 27.75 KG/M2 | OXYGEN SATURATION: 97 % | DIASTOLIC BLOOD PRESSURE: 80 MMHG | HEIGHT: 62 IN | WEIGHT: 150.81 LBS | SYSTOLIC BLOOD PRESSURE: 128 MMHG | HEART RATE: 79 BPM

## 2019-03-27 DIAGNOSIS — Z23 NEED FOR VACCINATION AGAINST STREPTOCOCCUS PNEUMONIAE USING PNEUMOCOCCAL CONJUGATE VACCINE 13: ICD-10-CM

## 2019-03-27 DIAGNOSIS — S76.311A STRAIN OF RIGHT HAMSTRING, INITIAL ENCOUNTER: ICD-10-CM

## 2019-03-27 DIAGNOSIS — Z12.11 ENCOUNTER FOR FECAL IMMUNOCHEMICAL TEST SCREENING: ICD-10-CM

## 2019-03-27 DIAGNOSIS — I10 ESSENTIAL HYPERTENSION: Primary | ICD-10-CM

## 2019-03-27 DIAGNOSIS — E55.9 VITAMIN D INSUFFICIENCY: ICD-10-CM

## 2019-03-27 PROCEDURE — 90670 PNEUMOCOCCAL CONJUGATE VACCINE 13-VALENT LESS THAN 5YO & GREATER THAN: ICD-10-PCS | Mod: S$GLB,,, | Performed by: INTERNAL MEDICINE

## 2019-03-27 PROCEDURE — 3079F PR MOST RECENT DIASTOLIC BLOOD PRESSURE 80-89 MM HG: ICD-10-PCS | Mod: CPTII,S$GLB,, | Performed by: INTERNAL MEDICINE

## 2019-03-27 PROCEDURE — 99999 PR PBB SHADOW E&M-EST. PATIENT-LVL IV: CPT | Mod: PBBFAC,,, | Performed by: INTERNAL MEDICINE

## 2019-03-27 PROCEDURE — 99214 PR OFFICE/OUTPT VISIT, EST, LEVL IV, 30-39 MIN: ICD-10-PCS | Mod: 25,S$GLB,, | Performed by: INTERNAL MEDICINE

## 2019-03-27 PROCEDURE — 3079F DIAST BP 80-89 MM HG: CPT | Mod: CPTII,S$GLB,, | Performed by: INTERNAL MEDICINE

## 2019-03-27 PROCEDURE — 90670 PCV13 VACCINE IM: CPT | Mod: S$GLB,,, | Performed by: INTERNAL MEDICINE

## 2019-03-27 PROCEDURE — 3074F PR MOST RECENT SYSTOLIC BLOOD PRESSURE < 130 MM HG: ICD-10-PCS | Mod: CPTII,S$GLB,, | Performed by: INTERNAL MEDICINE

## 2019-03-27 PROCEDURE — 99999 PR PBB SHADOW E&M-EST. PATIENT-LVL IV: ICD-10-PCS | Mod: PBBFAC,,, | Performed by: INTERNAL MEDICINE

## 2019-03-27 PROCEDURE — 1101F PR PT FALLS ASSESS DOC 0-1 FALLS W/OUT INJ PAST YR: ICD-10-PCS | Mod: CPTII,S$GLB,, | Performed by: INTERNAL MEDICINE

## 2019-03-27 PROCEDURE — 99214 OFFICE O/P EST MOD 30 MIN: CPT | Mod: 25,S$GLB,, | Performed by: INTERNAL MEDICINE

## 2019-03-27 PROCEDURE — G0009 ADMIN PNEUMOCOCCAL VACCINE: HCPCS | Mod: S$GLB,,, | Performed by: INTERNAL MEDICINE

## 2019-03-27 PROCEDURE — G0009 PNEUMOCOCCAL CONJUGATE VACCINE 13-VALENT LESS THAN 5YO & GREATER THAN: ICD-10-PCS | Mod: S$GLB,,, | Performed by: INTERNAL MEDICINE

## 2019-03-27 PROCEDURE — 99499 UNLISTED E&M SERVICE: CPT | Mod: S$GLB,,, | Performed by: INTERNAL MEDICINE

## 2019-03-27 PROCEDURE — 3074F SYST BP LT 130 MM HG: CPT | Mod: CPTII,S$GLB,, | Performed by: INTERNAL MEDICINE

## 2019-03-27 PROCEDURE — 99499 RISK ADDL DX/OHS AUDIT: ICD-10-PCS | Mod: S$GLB,,, | Performed by: INTERNAL MEDICINE

## 2019-03-27 PROCEDURE — 1101F PT FALLS ASSESS-DOCD LE1/YR: CPT | Mod: CPTII,S$GLB,, | Performed by: INTERNAL MEDICINE

## 2019-03-27 RX ORDER — AMLODIPINE BESYLATE 5 MG/1
5 TABLET ORAL DAILY
Qty: 90 TABLET | Refills: 1 | Status: SHIPPED | OUTPATIENT
Start: 2019-03-27 | End: 2020-03-09

## 2019-03-27 RX ORDER — HYDROCHLOROTHIAZIDE 25 MG/1
25 TABLET ORAL DAILY
Qty: 90 TABLET | Refills: 1 | Status: SHIPPED | OUTPATIENT
Start: 2019-03-27 | End: 2019-12-12 | Stop reason: SDUPTHER

## 2019-03-27 RX ORDER — ACETAMINOPHEN 500 MG
1 TABLET ORAL DAILY
COMMUNITY
Start: 2019-03-27 | End: 2019-12-10

## 2019-03-27 NOTE — PATIENT INSTRUCTIONS
Recommendations for today    We recommend that you start physical therapy to help with muscle strain.    We recommend that you take 2000 units of vitamin-D once daily.

## 2019-03-28 ENCOUNTER — LAB VISIT (OUTPATIENT)
Dept: LAB | Facility: HOSPITAL | Age: 69
End: 2019-03-28
Attending: INTERNAL MEDICINE
Payer: MEDICARE

## 2019-03-28 DIAGNOSIS — Z12.11 ENCOUNTER FOR FECAL IMMUNOCHEMICAL TEST SCREENING: ICD-10-CM

## 2019-03-28 PROCEDURE — 82274 ASSAY TEST FOR BLOOD FECAL: CPT

## 2019-03-28 NOTE — PROGRESS NOTES
Portions of this note are generated with voice recognition software. Typographical errors may exist.     SUBJECTIVE:    This is a/an 68 y.o. female here for primary care visit for  Chief Complaint   Patient presents with    Follow-up     Patient states that for at least the past 2 weeks she has been having soreness along the posterior aspect of her right thigh.  She can't think of any specific traumatic activity that might have brought this on or any other mechanical forces.  The pain is focal and does not seem to be radiating from the hip for the lower back.  She does not have a history of sciatica or significant lumbosacral spondylosis.  No significant motor sensory or coordination deficits affecting the extremity.  Self care measures have been minimal.    Patient states that she has not had any problems with unusual headaches headaches on a recurring basis nausea or progressive change in vision.  She continues to maintain that she wants to defer MRI surveillance of known meningioma opting for more conservative watchful monitoring.  Patient understands that if she develops any of the symptoms listed above that she will contact the clinic immediately for repeat neuro imaging    Patient states that she has no history of colon polyp.  She opts for doing fecal occult testing in low colonoscopy    Medications Reviewed and Updated    Past medical, family, and social histories were reviewed and updated.    Review of Systems negative unless otherwise noted in history of present illness-  ROS    General ROS: negative  Psychological ROS: negative  ENT ROS: negative  Endocrine ROS: Negative  Allergy and Immunology ROS: negative  Cardiovascular ROS: negative  Pulmonary ROS: Negative  Gastrointestinal ROS: negative  Genito-Urinary ROS: negative  Musculoskeletal ROS: negative      Allergic:    Review of patient's allergies indicates:   Allergen Reactions    Penicillins        OBJECTIVE:  BP: 128/80 Pulse: 79    Wt Readings  from Last 3 Encounters:   03/27/19 68.4 kg (150 lb 12.7 oz)   02/11/19 69 kg (152 lb 1.9 oz)   02/04/19 67.6 kg (149 lb)    Body mass index is 27.58 kg/m².  Previous Blood Pressure Readings :   BP Readings from Last 3 Encounters:   03/27/19 128/80   02/11/19 128/84   10/08/18 136/74       Physical Exam    GEN: No apparent distress  HEENT: sclera non-icteric, conjunctiva clear  CV: no peripheral edema regular rate and rhythm. No murmurs.  PULM: breathing non-labored  ABD:  protuberant abdomen.  PSYCH: appropriate affect  MSK: able to rise from chair without assistance.  Point tenderness along the structures of the right hamstring.  SKIN: normal skin turgor    Pertinent Labs Reviewed       ASSESSMENT/PLAN:    Essential hypertension.Condition stable.  Counseling given today on self-care measures. Plan to monitor clinically. Continue current medical plan.   -     amLODIPine (NORVASC) 5 MG tablet; Take 1 tablet (5 mg total) by mouth once daily.  Dispense: 90 tablet; Refill: 1  -     hydroCHLOROthiazide (HYDRODIURIL) 25 MG tablet; Take 1 tablet (25 mg total) by mouth once daily.  Dispense: 90 tablet; Refill: 1    Need for vaccination against Streptococcus pneumoniae using pneumococcal conjugate vaccine 13  -     (In Office Administered) Pneumococcal Conjugate Vaccine (13 Valent) (IM)    Strain of right hamstring, initial encounter.Condition not optimally controlled. Detailed counseling on self care measures. Plan to monitor clinically in addition to plan below or as listed on After Visit Summary.   -     Ambulatory Referral to Physical/Occupational Therapy    Vitamin D insufficiency  -     cholecalciferol, vitamin D3, (D3-2000) 2,000 unit Cap; Take 1 capsule (2,000 Units total) by mouth once daily.    Encounter for fecal immunochemical test screening  -     Fecal Immunochemical Test (iFOBT); Future; Expected date: 03/27/2019          Future Appointments   Date Time Provider Department Center   4/18/2019  3:00 PM Em[facility] RAE  Aime Oh MD [facility] ORTHO [facility]   9/26/2019  8:00 AM LAB, [facility]   9/30/2019  3:00 PM Antolin Verduzco MD [facility] [place] [place]       Antolin Verduzco  3/31/2019  2:21 PM

## 2019-03-29 LAB — HEMOCCULT STL QL IA: NEGATIVE

## 2019-04-08 ENCOUNTER — PES CALL (OUTPATIENT)
Dept: ADMINISTRATIVE | Facility: CLINIC | Age: 69
End: 2019-04-08

## 2019-05-27 ENCOUNTER — TELEPHONE (OUTPATIENT)
Dept: INTERNAL MEDICINE | Facility: CLINIC | Age: 69
End: 2019-05-27

## 2019-05-27 NOTE — TELEPHONE ENCOUNTER
Spoke to patient and inform that appointment was scheduled for 05/28/2019. Patient voices understanding.

## 2019-05-28 ENCOUNTER — HOSPITAL ENCOUNTER (OUTPATIENT)
Dept: RADIOLOGY | Facility: HOSPITAL | Age: 69
Discharge: HOME | End: 2019-05-28
Attending: INTERNAL MEDICINE
Payer: MEDICARE

## 2019-05-28 ENCOUNTER — OFFICE VISIT (OUTPATIENT)
Dept: INTERNAL MEDICINE | Facility: CLINIC | Age: 69
End: 2019-05-28
Payer: MEDICARE

## 2019-05-28 VITALS
HEIGHT: 62 IN | BODY MASS INDEX: 28.11 KG/M2 | SYSTOLIC BLOOD PRESSURE: 142 MMHG | DIASTOLIC BLOOD PRESSURE: 78 MMHG | HEART RATE: 84 BPM | WEIGHT: 152.75 LBS | OXYGEN SATURATION: 96 %

## 2019-05-28 DIAGNOSIS — E55.9 VITAMIN D INSUFFICIENCY: ICD-10-CM

## 2019-05-28 DIAGNOSIS — Z78.0 ASYMPTOMATIC POSTMENOPAUSAL ESTROGEN DEFICIENCY: ICD-10-CM

## 2019-05-28 DIAGNOSIS — S80.01XA CONTUSION OF RIGHT KNEE, INITIAL ENCOUNTER: Primary | ICD-10-CM

## 2019-05-28 DIAGNOSIS — S80.01XA CONTUSION OF RIGHT KNEE, INITIAL ENCOUNTER: ICD-10-CM

## 2019-05-28 PROCEDURE — 73564 XR KNEE COMP 4 OR MORE VIEWS RIGHT: ICD-10-PCS | Mod: 26,RT,, | Performed by: RADIOLOGY

## 2019-05-28 PROCEDURE — 99999 PR PBB SHADOW E&M-EST. PATIENT-LVL IV: ICD-10-PCS | Mod: PBBFAC,,, | Performed by: INTERNAL MEDICINE

## 2019-05-28 PROCEDURE — 3077F SYST BP >= 140 MM HG: CPT | Mod: CPTII,S$GLB,, | Performed by: INTERNAL MEDICINE

## 2019-05-28 PROCEDURE — 3077F PR MOST RECENT SYSTOLIC BLOOD PRESSURE >= 140 MM HG: ICD-10-PCS | Mod: CPTII,S$GLB,, | Performed by: INTERNAL MEDICINE

## 2019-05-28 PROCEDURE — 99999 PR PBB SHADOW E&M-EST. PATIENT-LVL IV: CPT | Mod: PBBFAC,,, | Performed by: INTERNAL MEDICINE

## 2019-05-28 PROCEDURE — 73564 X-RAY EXAM KNEE 4 OR MORE: CPT | Mod: 26,RT,, | Performed by: RADIOLOGY

## 2019-05-28 PROCEDURE — 1101F PT FALLS ASSESS-DOCD LE1/YR: CPT | Mod: CPTII,S$GLB,, | Performed by: INTERNAL MEDICINE

## 2019-05-28 PROCEDURE — 99213 PR OFFICE/OUTPT VISIT, EST, LEVL III, 20-29 MIN: ICD-10-PCS | Mod: S$GLB,,, | Performed by: INTERNAL MEDICINE

## 2019-05-28 PROCEDURE — 99213 OFFICE O/P EST LOW 20 MIN: CPT | Mod: S$GLB,,, | Performed by: INTERNAL MEDICINE

## 2019-05-28 PROCEDURE — 1101F PR PT FALLS ASSESS DOC 0-1 FALLS W/OUT INJ PAST YR: ICD-10-PCS | Mod: CPTII,S$GLB,, | Performed by: INTERNAL MEDICINE

## 2019-05-28 PROCEDURE — 73564 X-RAY EXAM KNEE 4 OR MORE: CPT | Mod: TC,FY,PO,RT

## 2019-05-28 PROCEDURE — 3078F PR MOST RECENT DIASTOLIC BLOOD PRESSURE < 80 MM HG: ICD-10-PCS | Mod: CPTII,S$GLB,, | Performed by: INTERNAL MEDICINE

## 2019-05-28 PROCEDURE — 3078F DIAST BP <80 MM HG: CPT | Mod: CPTII,S$GLB,, | Performed by: INTERNAL MEDICINE

## 2019-05-28 NOTE — PATIENT INSTRUCTIONS
Recommendations for today    Continue the ergo calciferol vitamin-D until it is completed.  We will then follow-up on vitamin-D levels at the next visit.

## 2019-05-28 NOTE — PROGRESS NOTES
Portions of this note are generated with voice recognition software. Typographical errors may exist.     SUBJECTIVE:    This is a/an 69 y.o. female here for primary care visit for  Chief Complaint   Patient presents with    Fall     x2w     Knee Pain     About 2 weeks ago the patient had a ground level fall due to mechanical factors.  She was wearing high heels and was wearing a garment that made it difficult to move.  Patient states that as a result she had a ground level fall on to heart floor.  States that there were significant bruising along the medial aspect of the calf muscle right lower extremity and bruising along the anterior aspect of the knee.  Patient was able to bear weight on the leg.  Patient states that over the last 2 weeks the bruising has improved significantly but the point tenderness along the anterior knee continues.  It does not cause pain in light she applies direct pressure to the bruise.  She is otherwise able to accomplish activities of daily living independently.    Patient states that she has not been taking a vitamin-D supplement.  She still has some remaining ergo calciferol tablets.    Medications Reviewed and Updated    Past medical, family, and social histories were reviewed and updated.    Review of Systems negative unless otherwise noted in history of present illness-  ROS    General ROS: negative  Endocrine ROS: Negative  Allergy and Immunology ROS: negative  Cardiovascular ROS: negative  Pulmonary ROS: Negative  Gastrointestinal ROS: negative  Genito-Urinary ROS: negative  Musculoskeletal ROS: negative  Neurological ROS: negative  Dermatological ROS: negative        Allergic:    Review of patient's allergies indicates:   Allergen Reactions    Penicillins        OBJECTIVE:  BP: (!) 142/78 Pulse: 84    Wt Readings from Last 3 Encounters:   05/28/19 69.3 kg (152 lb 12.5 oz)   03/27/19 68.4 kg (150 lb 12.7 oz)   02/11/19 69 kg (152 lb 1.9 oz)    Body mass index is 27.94  kg/m².  Previous Blood Pressure Readings :   BP Readings from Last 3 Encounters:   05/28/19 (!) 142/78   03/27/19 128/80   02/11/19 128/84       Physical Exam    GEN: No apparent distress  HEENT: sclera non-icteric, conjunctiva clear  CV: no peripheral edema  PULM: breathing non-labored  ABD: non, protuberant abdomen.  PSYCH: appropriate affect  MSK: able to rise from chair without assistance  - bruising right tibial plateau.  - resolving ecchymoses along the medial aspect of the calf muscle right lower extremity  - active and passive range of motion of the knee within normal limits without pain  SKIN: normal skin turgor    Pertinent Labs Reviewed       ASSESSMENT/PLAN:    Contusion of right knee, initial encounter.Condition improving.  Counseling on self-care measures today.  Continue with current plan in addition to recommendations written on After Visit Summary.   -     X-Ray Knee Complete 4 Or More Views Right; Future; Expected date: 05/28/2019    Vitamin D insufficiency.Condition not optimally controlled. Detailed counseling on self care measures. Plan to monitor clinically in addition to plan below or as listed on After Visit Summary.     Asymptomatic postmenopausal estrogen deficiencyFurther evaluation warranted.  Recommendations as below or as written on After Visit Summary.   -     DXA Bone Density Spine And Hip; Future; Expected date: 05/28/2019          Future Appointments   Date Time Provider Department Center   9/26/2019  8:30 AM APPOINTMENT LAB, [facility]   9/26/2019  9:00 AM [facility] DEXA1 LIMIT 350 LBS [facility] BMD [facility]   9/30/2019  3:00 PM Antolin Verduzco MD [facility]       Antolin Verduzco  5/28/2019  10:34 AM

## 2019-07-09 ENCOUNTER — TELEPHONE (OUTPATIENT)
Dept: INTERNAL MEDICINE | Facility: CLINIC | Age: 69
End: 2019-07-09

## 2019-07-09 NOTE — TELEPHONE ENCOUNTER
Spoke with pt's daughter who states that pt has been feeling fatigue more lately. Pt's daughter is requesting a sooner appointment. Pt's daughter was informed of recent change in clinic hours. Pt is scheduled on 7/29/19. Understanding voiced.

## 2019-07-09 NOTE — TELEPHONE ENCOUNTER
----- Message from Jayden Best sent at 7/9/2019 12:57 PM CDT -----  Contact: Julisa (daughter)/[phone]  Patient's daughter called to speak with your office about her mother's current condition.    Please call [phone] to discuss today.

## 2019-07-29 ENCOUNTER — PATIENT MESSAGE (OUTPATIENT)
Dept: INTERNAL MEDICINE | Facility: CLINIC | Age: 69
End: 2019-07-29

## 2019-07-29 ENCOUNTER — OFFICE VISIT (OUTPATIENT)
Dept: INTERNAL MEDICINE | Facility: CLINIC | Age: 69
End: 2019-07-29
Payer: MEDICARE

## 2019-07-29 VITALS
DIASTOLIC BLOOD PRESSURE: 76 MMHG | HEART RATE: 70 BPM | BODY MASS INDEX: 25.93 KG/M2 | HEIGHT: 64 IN | OXYGEN SATURATION: 96 % | SYSTOLIC BLOOD PRESSURE: 138 MMHG | WEIGHT: 151.88 LBS

## 2019-07-29 DIAGNOSIS — R10.31 RLQ ABDOMINAL PAIN: Primary | ICD-10-CM

## 2019-07-29 DIAGNOSIS — A09 TRAVELER'S DIARRHEA: ICD-10-CM

## 2019-07-29 DIAGNOSIS — K21.9 GASTROESOPHAGEAL REFLUX DISEASE WITHOUT ESOPHAGITIS: ICD-10-CM

## 2019-07-29 DIAGNOSIS — F33.9 RECURRENT DEPRESSION: ICD-10-CM

## 2019-07-29 PROCEDURE — 99999 PR PBB SHADOW E&M-EST. PATIENT-LVL III: CPT | Mod: PBBFAC,,, | Performed by: INTERNAL MEDICINE

## 2019-07-29 PROCEDURE — 1101F PT FALLS ASSESS-DOCD LE1/YR: CPT | Mod: CPTII,S$GLB,, | Performed by: INTERNAL MEDICINE

## 2019-07-29 PROCEDURE — 3075F SYST BP GE 130 - 139MM HG: CPT | Mod: CPTII,S$GLB,, | Performed by: INTERNAL MEDICINE

## 2019-07-29 PROCEDURE — 3078F DIAST BP <80 MM HG: CPT | Mod: CPTII,S$GLB,, | Performed by: INTERNAL MEDICINE

## 2019-07-29 PROCEDURE — 3075F PR MOST RECENT SYSTOLIC BLOOD PRESS GE 130-139MM HG: ICD-10-PCS | Mod: CPTII,S$GLB,, | Performed by: INTERNAL MEDICINE

## 2019-07-29 PROCEDURE — 3078F PR MOST RECENT DIASTOLIC BLOOD PRESSURE < 80 MM HG: ICD-10-PCS | Mod: CPTII,S$GLB,, | Performed by: INTERNAL MEDICINE

## 2019-07-29 PROCEDURE — 1101F PR PT FALLS ASSESS DOC 0-1 FALLS W/OUT INJ PAST YR: ICD-10-PCS | Mod: CPTII,S$GLB,, | Performed by: INTERNAL MEDICINE

## 2019-07-29 PROCEDURE — 99999 PR PBB SHADOW E&M-EST. PATIENT-LVL III: ICD-10-PCS | Mod: PBBFAC,,, | Performed by: INTERNAL MEDICINE

## 2019-07-29 PROCEDURE — 99214 OFFICE O/P EST MOD 30 MIN: CPT | Mod: S$GLB,,, | Performed by: INTERNAL MEDICINE

## 2019-07-29 PROCEDURE — 99214 PR OFFICE/OUTPT VISIT, EST, LEVL IV, 30-39 MIN: ICD-10-PCS | Mod: S$GLB,,, | Performed by: INTERNAL MEDICINE

## 2019-07-29 RX ORDER — OMEPRAZOLE 20 MG/1
20 CAPSULE, DELAYED RELEASE ORAL DAILY PRN
Qty: 30 CAPSULE | Refills: 0 | Status: SHIPPED | OUTPATIENT
Start: 2019-07-29 | End: 2019-10-09 | Stop reason: SDUPTHER

## 2019-07-29 NOTE — PROGRESS NOTES
Portions of this note are generated with voice recognition software. Typographical errors may exist.     SUBJECTIVE:    This is a/an 69 y.o. female here for primary care visit for  Chief Complaint   Patient presents with    Abdominal Pain     right side x1w    Fatigue     Patient states that for the past week she has been having problems with depressed mood and lack of interest to do most activities outside of the home.  There is no psychosocial situation that the patient can't describe that has contributed to this depressed mood.  States that she has been prone to episodes like this in the past but they have been brief.  She has anxiety about this lasting longer but has never been on pharmacotherapy for depression episodes and is very reluctant to pursue pharmacotherapy today.  She denies suicidal ideation.    The patient completed the following standardized mental health symptom questionnaire(s) PHQ9. Results where reviewed and recommendations made to the patient based on these results. The questionnaire(s) are scanned into the EPIC media tab.     Patient states that 2 weeks ago she had a few days of right lower quadrant abdominal pain. Associated with increased intestinal gas.  She does have a daily bowel movement.  Denies constipation.     Patient states that when she was visiting [facility] she had 3 days of severe gastroenteritis with nausea vomiting diarrhea and fever.  States that she did recover after the 3rd day so she did not seek medical evaluation in [facility].  States that she has had atypical course with regard to reflux since coming back home.  This is not typical for her.  Self care measures have been limited    Medications Reviewed and Updated    Past medical, family, and social histories were reviewed and updated.    Review of Systems negative unless otherwise noted in history of present illness-  ROS    General ROS: negative  Psychological ROS: negative  ENT ROS: negative  Endocrine ROS:  Negative  Allergy and Immunology ROS: negative  Cardiovascular ROS: negative  Pulmonary ROS: Negative  Gastrointestinal ROS: negative  Genito-Urinary ROS: negative  Musculoskeletal ROS: negative      Allergic:    Review of patient's allergies indicates:   Allergen Reactions    Penicillins        OBJECTIVE:  BP: 138/76 Pulse: 70    Wt Readings from Last 3 Encounters:   07/29/19 68.9 kg (151 lb 14.4 oz)   05/28/19 69.3 kg (152 lb 12.5 oz)   03/27/19 68.4 kg (150 lb 12.7 oz)    Body mass index is 26.07 kg/m².  Previous Blood Pressure Readings :   BP Readings from Last 3 Encounters:   07/29/19 138/76   05/28/19 (!) 142/78   03/27/19 128/80       Physical Exam    GEN: No apparent distress  HEENT: sclera non-icteric, conjunctiva clear  CV: no peripheral edema  PULM: breathing non-labored  ABD: non, protuberant abdomen.  Shifting tenderness.  Right lower quadrant than left upper quadrant.  Not consistent.  Supple.  PSYCH: appropriate affect  MSK: able to rise from chair without assistance  SKIN: normal skin turgor    Pertinent Labs Reviewed       ASSESSMENT/PLAN:    RLQ abdominal pain.Condition improving.  Counseling on self-care measures today.  Continue with current plan in addition to recommendations written on After Visit Summary.    - likely IBS or post-diarrhea lactose intolerance    Recurrent depression.Condition not optimally controlled. Detailed counseling on self care measures. Plan to monitor clinically in addition to plan below or as listed on After Visit Summary.    - RTC sooner for worsening symptoms    Gastroesophageal reflux disease without esophagitis.Condition not optimally controlled. Detailed counseling on self care measures. Plan to monitor clinically in addition to plan below or as listed on After Visit Summary.   -     omeprazole (PRILOSEC) 20 MG capsule; Take 1 capsule (20 mg total) by mouth daily as needed.  Dispense: 30 capsule; Refill: 0    Traveler's diarrhea.Condition improving.  Counseling on  self-care measures today.  Continue with current plan in addition to recommendations written on After Visit Summary.       Future Appointments   Date Time Provider Department Center   8/26/2019  9:30 AM LAB, [facility] [place]   8/29/2019  9:20 AM Antolin Verduzco MD [facility] [place]   9/26/2019  8:30 AM [facility]POINTMENT [facility]   9/26/2019  9:00 AM [facility] DEXA1 LIMIT 350 LBS [facility]   9/30/2019  3:00 PM Antolin Verduzco MD [facility] [place]       Antolin Verduzco  7/29/2019  12:14 PM

## 2019-08-26 ENCOUNTER — LAB VISIT (OUTPATIENT)
Dept: LAB | Facility: HOSPITAL | Age: 69
End: 2019-08-26
Attending: INTERNAL MEDICINE
Payer: MEDICARE

## 2019-08-26 DIAGNOSIS — I10 ESSENTIAL HYPERTENSION: ICD-10-CM

## 2019-08-26 LAB
ALBUMIN SERPL BCP-MCNC: 4 G/DL (ref 3.5–5.2)
ALP SERPL-CCNC: 86 U/L (ref 55–135)
ALT SERPL W/O P-5'-P-CCNC: 34 U/L (ref 10–44)
ANION GAP SERPL CALC-SCNC: 12 MMOL/L (ref 8–16)
AST SERPL-CCNC: 26 U/L (ref 10–40)
BILIRUB SERPL-MCNC: 0.3 MG/DL (ref 0.1–1)
BUN SERPL-MCNC: 11 MG/DL (ref 8–23)
CALCIUM SERPL-MCNC: 9 MG/DL (ref 8.7–10.5)
CHLORIDE SERPL-SCNC: 106 MMOL/L (ref 95–110)
CO2 SERPL-SCNC: 25 MMOL/L (ref 23–29)
CREAT SERPL-MCNC: 0.7 MG/DL (ref 0.5–1.4)
EST. GFR  (AFRICAN AMERICAN): >60 ML/MIN/1.73 M^2
EST. GFR  (NON AFRICAN AMERICAN): >60 ML/MIN/1.73 M^2
GLUCOSE SERPL-MCNC: 89 MG/DL (ref 70–110)
POTASSIUM SERPL-SCNC: 3.6 MMOL/L (ref 3.5–5.1)
PROT SERPL-MCNC: 7.8 G/DL (ref 6–8.4)
SODIUM SERPL-SCNC: 143 MMOL/L (ref 136–145)

## 2019-08-26 PROCEDURE — 80053 COMPREHEN METABOLIC PANEL: CPT

## 2019-08-26 PROCEDURE — 36415 COLL VENOUS BLD VENIPUNCTURE: CPT | Mod: PO

## 2019-08-29 ENCOUNTER — OFFICE VISIT (OUTPATIENT)
Dept: INTERNAL MEDICINE | Facility: CLINIC | Age: 69
End: 2019-08-29
Payer: MEDICARE

## 2019-08-29 ENCOUNTER — LAB VISIT (OUTPATIENT)
Dept: LAB | Facility: HOSPITAL | Age: 69
End: 2019-08-29
Attending: INTERNAL MEDICINE
Payer: MEDICARE

## 2019-08-29 VITALS
TEMPERATURE: 98 F | SYSTOLIC BLOOD PRESSURE: 118 MMHG | DIASTOLIC BLOOD PRESSURE: 74 MMHG | BODY MASS INDEX: 27.19 KG/M2 | WEIGHT: 153.44 LBS | HEIGHT: 63 IN | HEART RATE: 73 BPM

## 2019-08-29 DIAGNOSIS — R00.2 HEART PALPITATIONS: ICD-10-CM

## 2019-08-29 DIAGNOSIS — K58.9 IRRITABLE BOWEL SYNDROME, UNSPECIFIED TYPE: ICD-10-CM

## 2019-08-29 DIAGNOSIS — F32.9 REACTIVE DEPRESSION: ICD-10-CM

## 2019-08-29 DIAGNOSIS — R00.2 HEART PALPITATIONS: Primary | ICD-10-CM

## 2019-08-29 LAB
IGA SERPL-MCNC: 444 MG/DL (ref 40–350)
TSH SERPL DL<=0.005 MIU/L-ACNC: 1.62 UIU/ML (ref 0.4–4)

## 2019-08-29 PROCEDURE — 93005 EKG 12-LEAD: ICD-10-PCS | Mod: S$GLB,,, | Performed by: INTERNAL MEDICINE

## 2019-08-29 PROCEDURE — 99213 OFFICE O/P EST LOW 20 MIN: CPT | Mod: S$GLB,,, | Performed by: INTERNAL MEDICINE

## 2019-08-29 PROCEDURE — 99999 PR PBB SHADOW E&M-EST. PATIENT-LVL III: CPT | Mod: PBBFAC,,, | Performed by: INTERNAL MEDICINE

## 2019-08-29 PROCEDURE — 1101F PT FALLS ASSESS-DOCD LE1/YR: CPT | Mod: CPTII,S$GLB,, | Performed by: INTERNAL MEDICINE

## 2019-08-29 PROCEDURE — 99499 RISK ADDL DX/OHS AUDIT: ICD-10-PCS | Mod: S$GLB,,, | Performed by: INTERNAL MEDICINE

## 2019-08-29 PROCEDURE — 3078F DIAST BP <80 MM HG: CPT | Mod: CPTII,S$GLB,, | Performed by: INTERNAL MEDICINE

## 2019-08-29 PROCEDURE — 93010 EKG 12-LEAD: ICD-10-PCS | Mod: S$GLB,,, | Performed by: INTERNAL MEDICINE

## 2019-08-29 PROCEDURE — 84443 ASSAY THYROID STIM HORMONE: CPT

## 2019-08-29 PROCEDURE — 3074F SYST BP LT 130 MM HG: CPT | Mod: CPTII,S$GLB,, | Performed by: INTERNAL MEDICINE

## 2019-08-29 PROCEDURE — 93005 ELECTROCARDIOGRAM TRACING: CPT | Mod: S$GLB,,, | Performed by: INTERNAL MEDICINE

## 2019-08-29 PROCEDURE — 99213 PR OFFICE/OUTPT VISIT, EST, LEVL III, 20-29 MIN: ICD-10-PCS | Mod: S$GLB,,, | Performed by: INTERNAL MEDICINE

## 2019-08-29 PROCEDURE — 3074F PR MOST RECENT SYSTOLIC BLOOD PRESSURE < 130 MM HG: ICD-10-PCS | Mod: CPTII,S$GLB,, | Performed by: INTERNAL MEDICINE

## 2019-08-29 PROCEDURE — 3078F PR MOST RECENT DIASTOLIC BLOOD PRESSURE < 80 MM HG: ICD-10-PCS | Mod: CPTII,S$GLB,, | Performed by: INTERNAL MEDICINE

## 2019-08-29 PROCEDURE — 83516 IMMUNOASSAY NONANTIBODY: CPT

## 2019-08-29 PROCEDURE — 82784 ASSAY IGA/IGD/IGG/IGM EACH: CPT

## 2019-08-29 PROCEDURE — 99999 PR PBB SHADOW E&M-EST. PATIENT-LVL III: ICD-10-PCS | Mod: PBBFAC,,, | Performed by: INTERNAL MEDICINE

## 2019-08-29 PROCEDURE — 93010 ELECTROCARDIOGRAM REPORT: CPT | Mod: S$GLB,,, | Performed by: INTERNAL MEDICINE

## 2019-08-29 PROCEDURE — 99499 UNLISTED E&M SERVICE: CPT | Mod: S$GLB,,, | Performed by: INTERNAL MEDICINE

## 2019-08-29 PROCEDURE — 1101F PR PT FALLS ASSESS DOC 0-1 FALLS W/OUT INJ PAST YR: ICD-10-PCS | Mod: CPTII,S$GLB,, | Performed by: INTERNAL MEDICINE

## 2019-08-29 NOTE — PATIENT INSTRUCTIONS
Consejos para tratar la intolerancia a la lactosa    Muchas personas tienen intolerancia a la lactosa, es decir, tienen tom dificultad para digerir la lactosa, el azúcar que se encuentra en la leche y los productos lácteos. La lactosa sin digerir no hace daño, ravinder puede provocar síntomas molestos. Afortunadamente, es posible reducir estos síntomas si se limita la ingestión de lactosa. También puede francisco javier un suplemento de lactasa antes de consumir productos lácteos.   Encuentre yañez límite  Muchas personas con intolerancia a la lactosa piensan que no pueden consumir ningún producto lácteo. Ravinder en realidad es posible comer o beber pequeñas cantidades de lácteos sin tener síntomas. Para encontrar yañez propio límite, tome nota de lo que come y abdelrahman, así chen de los síntomas que experimenta. De esta forma podrá descubrir cuáles son los productos lácteos que tolera y en qué cantidades.  Consejos para evitar los síntomas  · Elija productos lácteos con bajo contenido de lactosa o sin lactosa.  · Consuma alimentos con cultivos activos, tales chen yogurt. Los cultivos activos facilitan la digestión de la lactosa.  · Cuando consuma productos lácteos, combínelos con otros alimentos para reducir los síntomas.  · Cuando cocine, use jugo de frutas en lugar de leche.  · [place] tabletas de enzima lactasa cuando ingiera productos lácteos.  · Evite ingerir muchos alimentos con alto contenido de lactosa (chen leche, mantequilla y helado) a la vez.  Ingiera otros alimentos ricos en calcio  Al consumir menos productos lácteos, consumirá menos calcio. Consulte a yañez médico si debe francisco javier suplementos. Además, trate de consumir más alimentos no lácteos, ricos en calcio, tales chen:  · Brócoli, col, nick caitlin, col china, hojas de nabo  · Pescados con huesos comestibles (salmón o kay enlatadas)  · Brotes de alfalfa y de soya  · Tofu, frijoles de soya, frijoles rojos y blancos  · Almendras, semillas de ajonjolí  · Jugo de naranja, bebidas  de soya y arroz, con calcio añadido  · Naranjas  Pruebe sustitutos no lácteos  Lácteos Sustitutos   Leche, crema Bebida de soya, bebida de arroz, sustituto de crema   Queso Queso de tofu (soya), algunos quesos maduros   Mantequilla, margarina Margarina sin leche, aceite vegetal   Helado Sorbetes de fruta, helados de jugo         Yañez cuerpo necesita vitamina D para utilizar el calcio. Puede obtener vitamina D comiendo alimentos que contienen vitamina D, tales chen salmón, atún o huevos. Asimismo, hable con yañez médico acerca de francisco javier un suplemento de vitamina D.  Date Last Reviewed: 3/27/2014  © 8423-8844 [employer]. [address] [place] [address], [place] [address]. Todos los derechos reservados. Esta información no pretende sustituir la atención médica profesional. Sólo yañez médico puede diagnosticar y tratar un problema de mercedez.

## 2019-08-29 NOTE — PROGRESS NOTES
Portions of this note are generated with voice recognition software. Typographical errors may exist.     SUBJECTIVE:    This is a/an 69 y.o. female here for primary care visit for  Chief Complaint   Patient presents with    Depression     Patient states that she was having intention motion all reaction at the last visit because her adult son was moving away from home.  States that she has perhaps too strong of a bond or dependence on her children for her sense of identity and self worth.  This was the 1st time that her adult son moved way from home and she took it very difficult.  Patient states that she is now concerned about a recurring situation where she feels like her heart will be pounding for a few seconds.  It has woken her up from sleep on a few occasions and has also happen during the daytime.  Patient states that she does suffer with anxiety.  Has not had other symptoms in association with these episodes such as nausea vomiting orthostasis or loss of consciousness.  No associated headache.  Patient has a history of syncope many years ago which she relates to a small CVA but there are not primary medical records of this evaluation.  She is known to have a stable meningioma and has been reluctant to pursue routine radiologic monitoring of the meningioma    Patient states that she is also frustrated with the recurrence of abdominal bloating and excess gas.  She has Florence type for bowel movements on most days.  States that she has not paid attention whether lactose containing products correspond with worsening symptoms.  It is not clear if she has been tested for celiac    Medications Reviewed and Updated    Past medical, family, and social histories were reviewed and updated.    Review of Systems negative unless otherwise noted in history of present illness-  ROS    General ROS: negative  Psychological ROS: negative  ENT ROS: negative  Endocrine ROS: Negative  Allergy and Immunology ROS:  negative  Cardiovascular ROS: negative  Pulmonary ROS: Negative  Gastrointestinal ROS: negative  Genito-Urinary ROS: negative  Musculoskeletal ROS: negative  Neurological ROS: negative  Dermatological ROS: negative        Allergic:    Review of patient's allergies indicates:   Allergen Reactions    Penicillins        OBJECTIVE:  BP: 118/74 Pulse: 73 Temp: 98 °F (36.7 °C)  Wt Readings from Last 3 Encounters:   08/29/19 69.6 kg (153 lb 7 oz)   07/29/19 68.9 kg (151 lb 14.4 oz)   05/28/19 69.3 kg (152 lb 12.5 oz)    Body mass index is 27.18 kg/m².  Previous Blood Pressure Readings :   BP Readings from Last 3 Encounters:   08/29/19 118/74   07/29/19 138/76   05/28/19 (!) 142/78       Physical Exam    GEN: No apparent distress  HEENT: sclera non-icteric, conjunctiva clear  CV: no peripheral edema regular rate and rhythm. No murmurs.  No carotid bruits.  PULM: breathing non-labored  ABD: non, protuberant abdomen.  PSYCH: appropriate affect  MSK: able to rise from chair without assistance  SKIN: normal skin turgor    Pertinent Labs Reviewed       ASSESSMENT/PLAN:    Heart palpitations.Condition stable. Likely anxiety. Risk factors for heart disease reviewed. Estimated to be low. Counseling given today on self-care measures. Plan to monitor clinically. Continue current medical plan.   -     EKG 12-lead; Future  -     TSH; Standing    Irritable bowel syndrome, unspecified type.Etiology unclear. Not controlled. Further evaluation warranted.  Recommendations as below or as written on After Visit Summary.   -     Tissue transglutaminase, IgA; Future; Expected date: 08/29/2019  -     IgA; Future; Expected date: 08/29/2019    Reactive depression.Condition improving.  Counseling on self-care measures today.  Continue with current plan in addition to recommendations written on After Visit Summary.       Future Appointments   Date Time Provider Department Center   9/26/2019  8:30 AM APPOINTMENT LAB, [facility] LAB [facility]    9/26/2019  9:00 AM [facility] DEXA1 LIMIT 350 LBS [facility] BMD [facility]   9/30/2019  3:00 PM Antolin Verduzco MD [facility]       Antolin Verduzco  8/29/2019  10:11 AM

## 2019-09-03 ENCOUNTER — PATIENT MESSAGE (OUTPATIENT)
Dept: INTERNAL MEDICINE | Facility: CLINIC | Age: 69
End: 2019-09-03

## 2019-09-03 LAB — TTG IGA SER-ACNC: 8 UNITS

## 2019-09-06 NOTE — PROGRESS NOTES
"Subjective:      Patient ID: Julisa Jensen is a 68 y.o. female.    Chief Complaint: Pain of the Left Shoulder      HPI: Julisa Jensen is here for initial visit with complaints of left shoulder pain for 6 months duration.  Patient reports she had a fall about 6 months ago onto her right shoulder and has had pain ever since.  Patient reports pain is worse at night especially with with pulling the covers.  She also has pain and limited motion with reaching behind her back.  Other than the above symptoms, the patient denies any decrease in range of motion. She has been taking 1 Aleve at night with moderate relief.  She denies any numbness and tingling.    No past medical history on file.    Current Outpatient Medications:     amLODIPine (NORVASC) 5 MG tablet, Take 5 mg by mouth once daily., Disp: , Rfl:     hydroCHLOROthiazide (HYDRODIURIL) 25 MG tablet, Take 25 mg by mouth once daily., Disp: , Rfl:   No current facility-administered medications for this visit.   Review of patient's allergies indicates:   Allergen Reactions    Penicillins        Ht 5' 2" (1.575 m)   Wt 67.6 kg (149 lb)   BMI 27.25 kg/m²     Review of Systems   Constitution: Negative for chills and fever.   Cardiovascular: Negative for chest pain and palpitations.   Respiratory: Negative for shortness of breath and wheezing.    Skin: Negative for poor wound healing and rash.   Musculoskeletal: Positive for falls (Remote) and joint pain. Negative for muscle weakness.   Gastrointestinal: Negative for nausea and vomiting.   Genitourinary: Negative for dysuria and hematuria.   Neurological: Negative for headaches, light-headedness, seizures and tremors.   Psychiatric/Behavioral: Negative for altered mental status.   Allergic/Immunologic: Negative for environmental allergies and persistent infections.         Objective:    Ortho Exam       Left shoulder  Skin: No rashes or lesions on exposed areas.  Atrophy: none noted.  Tenderness to " palpation:  Anterior.  AROM (deg): abduction-100, flexion-180, rotation- unrestricted, painful rotation- absent.  Rotator cuff: limited by pain, Impingement test- positive.  Cross arm adduction test- negative.  Instability testing: negative.   Distal neuro: normal, no muscle wasting or atrophy.  Pulses: Positive peripheral pulses.     GEN: Well developed, well nourished female. AAOX3. No acute distress.   Normocephalic, atraumatic.   ALAYNA  Breathing unlabored.  Mood and affect appropriate.     Assessment:     Imaging:  I personally reviewed the left shoulder radiographs from 09/05/2018 which reveal some irregularity at the greater tuberosity and mild degenerative changes.      1. Impingement syndrome of right shoulder          Plan:       Explained the nature of the problem to the patient and her daughter.   I recommended and performed corticosteroid injection of the left shoulder.  I also recommended we evaluate the rotator cuff for possible tear with an MRI.  I will call our use [facility] to inform the patient of her MRI results.   The patient will likely need physical therapy in the future, we will evaluate this at her next visit.  Continue Aleve at night.    Orders Placed This Encounter    MRI Shoulder Without Contrast Left    triamcinolone acetonide injection 40 mg     Follow-up in about 4 weeks (around 10/8/2018).         20

## 2019-09-25 ENCOUNTER — PATIENT MESSAGE (OUTPATIENT)
Dept: INTERNAL MEDICINE | Facility: CLINIC | Age: 69
End: 2019-09-25

## 2019-09-30 ENCOUNTER — TELEPHONE (OUTPATIENT)
Dept: INTERNAL MEDICINE | Facility: CLINIC | Age: 69
End: 2019-09-30

## 2019-09-30 NOTE — TELEPHONE ENCOUNTER
----- Message from Staci Nelson sent at 9/30/2019  2:03 PM CDT -----  Contact: Pt daughter Julisa Egan is requesting a callback at [phone] need to know if pt should keep the appt since she r/s her labs does she still need to come in for f/u visit today

## 2019-10-09 ENCOUNTER — HOSPITAL ENCOUNTER (OUTPATIENT)
Dept: RADIOLOGY | Facility: HOSPITAL | Age: 69
Discharge: HOME | End: 2019-10-09
Attending: INTERNAL MEDICINE
Payer: MEDICARE

## 2019-10-09 ENCOUNTER — OFFICE VISIT (OUTPATIENT)
Dept: INTERNAL MEDICINE | Facility: CLINIC | Age: 69
End: 2019-10-09
Payer: MEDICARE

## 2019-10-09 VITALS
WEIGHT: 150.38 LBS | HEART RATE: 86 BPM | HEIGHT: 63 IN | SYSTOLIC BLOOD PRESSURE: 130 MMHG | DIASTOLIC BLOOD PRESSURE: 70 MMHG | OXYGEN SATURATION: 98 % | BODY MASS INDEX: 26.64 KG/M2

## 2019-10-09 DIAGNOSIS — K21.9 GASTROESOPHAGEAL REFLUX DISEASE WITHOUT ESOPHAGITIS: ICD-10-CM

## 2019-10-09 DIAGNOSIS — M25.561 MEDIAL KNEE PAIN, RIGHT: ICD-10-CM

## 2019-10-09 DIAGNOSIS — M25.561 MEDIAL KNEE PAIN, RIGHT: Primary | ICD-10-CM

## 2019-10-09 PROCEDURE — 3075F SYST BP GE 130 - 139MM HG: CPT | Mod: CPTII,S$GLB,, | Performed by: INTERNAL MEDICINE

## 2019-10-09 PROCEDURE — 1101F PT FALLS ASSESS-DOCD LE1/YR: CPT | Mod: CPTII,S$GLB,, | Performed by: INTERNAL MEDICINE

## 2019-10-09 PROCEDURE — 1101F PR PT FALLS ASSESS DOC 0-1 FALLS W/OUT INJ PAST YR: ICD-10-PCS | Mod: CPTII,S$GLB,, | Performed by: INTERNAL MEDICINE

## 2019-10-09 PROCEDURE — 73564 XR KNEE COMP 4 OR MORE VIEWS RIGHT: ICD-10-PCS | Mod: 26,RT,, | Performed by: RADIOLOGY

## 2019-10-09 PROCEDURE — 99214 PR OFFICE/OUTPT VISIT, EST, LEVL IV, 30-39 MIN: ICD-10-PCS | Mod: S$GLB,,, | Performed by: INTERNAL MEDICINE

## 2019-10-09 PROCEDURE — 99214 OFFICE O/P EST MOD 30 MIN: CPT | Mod: S$GLB,,, | Performed by: INTERNAL MEDICINE

## 2019-10-09 PROCEDURE — 99999 PR PBB SHADOW E&M-EST. PATIENT-LVL IV: ICD-10-PCS | Mod: PBBFAC,,, | Performed by: INTERNAL MEDICINE

## 2019-10-09 PROCEDURE — 73564 X-RAY EXAM KNEE 4 OR MORE: CPT | Mod: TC,FY,PO,RT

## 2019-10-09 PROCEDURE — 3078F PR MOST RECENT DIASTOLIC BLOOD PRESSURE < 80 MM HG: ICD-10-PCS | Mod: CPTII,S$GLB,, | Performed by: INTERNAL MEDICINE

## 2019-10-09 PROCEDURE — 3078F DIAST BP <80 MM HG: CPT | Mod: CPTII,S$GLB,, | Performed by: INTERNAL MEDICINE

## 2019-10-09 PROCEDURE — 3075F PR MOST RECENT SYSTOLIC BLOOD PRESS GE 130-139MM HG: ICD-10-PCS | Mod: CPTII,S$GLB,, | Performed by: INTERNAL MEDICINE

## 2019-10-09 PROCEDURE — 73564 X-RAY EXAM KNEE 4 OR MORE: CPT | Mod: 26,RT,, | Performed by: RADIOLOGY

## 2019-10-09 PROCEDURE — 99999 PR PBB SHADOW E&M-EST. PATIENT-LVL IV: CPT | Mod: PBBFAC,,, | Performed by: INTERNAL MEDICINE

## 2019-10-09 RX ORDER — OMEPRAZOLE 20 MG/1
20 CAPSULE, DELAYED RELEASE ORAL DAILY PRN
Qty: 30 CAPSULE | Refills: 1 | Status: SHIPPED | OUTPATIENT
Start: 2019-10-09 | End: 2019-12-03

## 2019-10-09 RX ORDER — NAPROXEN 500 MG/1
500 TABLET ORAL 2 TIMES DAILY PRN
Qty: 60 TABLET | Refills: 1 | Status: SHIPPED | OUTPATIENT
Start: 2019-10-09 | End: 2020-06-18 | Stop reason: SDUPTHER

## 2019-10-09 NOTE — PROGRESS NOTES
Portions of this note are generated with voice recognition software. Typographical errors may exist.     SUBJECTIVE:    This is a/an 69 y.o. female here for primary care visit for  Chief Complaint   Patient presents with    Follow-up     Patient states that she was in her usual state of health until May 2019 when she had a significant injury to her right knee.  Prior to this injury she did not have very much difficulty with recurring pain in the right knee.  Patient states that she was in a parking lot and she had a twisting motion and fell to the ground.  She had significant abrasions to her extremities and the right knee was a significant point of impact.  She had significant effusion for about 20 days.  She had difficulty ambulating.  Patient states that since then she has not brought this to medical attention because recurring pain was only episodic but now it is every evening.  When she starts the day there is not significant pain but as the day goes on there is significant anterior and medial pain. In the evening time when she is recumbent the pain can sometimes be intolerable.  She denies any locking or giving out based on today's conversation.  Self care measures have been minimal.  She simply limits daytime activities when symptoms are more significant.    Patient has a history of recurring gastritis and would like to have PPI medication to better tolerate NSAID medication.      Medications Reviewed and Updated    Past medical, family, and social histories were reviewed and updated.    Review of Systems negative unless otherwise noted in history of present illness-  ROS    General ROS: negative  Psychological ROS: negative  Endocrine ROS: Negative  Allergy and Immunology ROS: negative  Cardiovascular ROS: negative  Pulmonary ROS: Negative  Gastrointestinal ROS: negative  Genito-Urinary ROS: negative  Musculoskeletal ROS: negative      Allergic:    Review of patient's allergies indicates:   Allergen  Reactions    Penicillins        OBJECTIVE:  BP: 130/70 Pulse: 86    Wt Readings from Last 3 Encounters:   10/09/19 68.2 kg (150 lb 5.7 oz)   08/29/19 69.6 kg (153 lb 7 oz)   07/29/19 68.9 kg (151 lb 14.4 oz)    Body mass index is 26.63 kg/m².  Previous Blood Pressure Readings :   BP Readings from Last 3 Encounters:   10/09/19 130/70   08/29/19 118/74   07/29/19 138/76       Physical Exam    GEN: No apparent distress  HEENT: sclera non-icteric, conjunctiva clear  CV: no peripheral edema  PULM: breathing non-labored  ABD: non protuberant abdomen.  PSYCH: appropriate affect  MSK: able to rise from chair without assistance  - no significant effusion or warmth to the joint at this time  - significant point tenderness medial joint line  - significant point tenderness overlying the anterior portion corresponding to the medial meniscus.  - passive range of motion smoothe without crepitation.  SKIN: normal skin turgor    Pertinent Labs Reviewed       ASSESSMENT/PLAN:    Medial knee pain, right.Condition not optimally controlled. Detailed counseling on self care measures. Plan to monitor clinically in addition to plan below or as listed on After Visit Summary.   -     naproxen (NAPROSYN) 500 MG tablet; Take 1 tablet (500 mg total) by mouth 2 (two) times daily as needed (arthritis).  Dispense: 60 tablet; Refill: 1  -     X-Ray Knee Complete 4 Or More Views Right; Future; Expected date: 10/09/2019  -     Ambulatory referral/consult to Orthopedics    Gastroesophageal reflux disease without esophagitis.Condition stable.  Counseling given today on self-care measures. Plan to monitor clinically. Continue current medical plan.   -     omeprazole (PRILOSEC) 20 MG capsule; Take 1 capsule (20 mg total) by mouth daily as needed (gastritis).  Dispense: 30 capsule; Refill: 1          Future Appointments   Date Time Provider Department Center   10/9/2019  9:45 AM [facility] XR1 500 LB LIMIT [facility] XRAY [place]   10/17/2019  8:30 AM Hank WASHBURN  MD [facility] ORTHO [facility]   10/17/2019 10:00 AM [facility] DEXA1 LIMIT 350 LBS [facility] BMD [place] [facility]   12/10/2019  8:40 AM Antolin Verduzco MD [facility]       Antolin Verduzco  10/9/2019  9:29 AM

## 2019-10-15 ENCOUNTER — PATIENT OUTREACH (OUTPATIENT)
Dept: ADMINISTRATIVE | Facility: OTHER | Age: 69
End: 2019-10-15

## 2019-10-17 ENCOUNTER — OFFICE VISIT (OUTPATIENT)
Dept: ORTHOPEDICS | Facility: CLINIC | Age: 69
End: 2019-10-17
Payer: MEDICARE

## 2019-10-17 ENCOUNTER — HOSPITAL ENCOUNTER (OUTPATIENT)
Dept: RADIOLOGY | Facility: HOSPITAL | Age: 69
Discharge: HOME | End: 2019-10-17
Attending: INTERNAL MEDICINE
Payer: MEDICARE

## 2019-10-17 VITALS — BODY MASS INDEX: 26.58 KG/M2 | HEIGHT: 63 IN | WEIGHT: 150 LBS

## 2019-10-17 DIAGNOSIS — Z78.0 ASYMPTOMATIC POSTMENOPAUSAL ESTROGEN DEFICIENCY: ICD-10-CM

## 2019-10-17 DIAGNOSIS — M17.11 PRIMARY OSTEOARTHRITIS OF RIGHT KNEE: Primary | ICD-10-CM

## 2019-10-17 PROCEDURE — 77080 DXA BONE DENSITY AXIAL: CPT | Mod: TC

## 2019-10-17 PROCEDURE — 77080 DEXA BONE DENSITY SPINE HIP: ICD-10-PCS | Mod: 26,,, | Performed by: RADIOLOGY

## 2019-10-17 PROCEDURE — 20610 DRAIN/INJ JOINT/BURSA W/O US: CPT | Mod: RT,S$GLB,, | Performed by: ORTHOPAEDIC SURGERY

## 2019-10-17 PROCEDURE — 77080 DXA BONE DENSITY AXIAL: CPT | Mod: 26,,, | Performed by: RADIOLOGY

## 2019-10-17 PROCEDURE — 1101F PR PT FALLS ASSESS DOC 0-1 FALLS W/OUT INJ PAST YR: ICD-10-PCS | Mod: CPTII,S$GLB,, | Performed by: ORTHOPAEDIC SURGERY

## 2019-10-17 PROCEDURE — 99213 OFFICE O/P EST LOW 20 MIN: CPT | Mod: 25,S$GLB,, | Performed by: ORTHOPAEDIC SURGERY

## 2019-10-17 PROCEDURE — 99999 PR PBB SHADOW E&M-EST. PATIENT-LVL III: CPT | Mod: PBBFAC,,, | Performed by: ORTHOPAEDIC SURGERY

## 2019-10-17 PROCEDURE — 20610 PR DRAIN/INJECT LARGE JOINT/BURSA: ICD-10-PCS | Mod: RT,S$GLB,, | Performed by: ORTHOPAEDIC SURGERY

## 2019-10-17 PROCEDURE — 1101F PT FALLS ASSESS-DOCD LE1/YR: CPT | Mod: CPTII,S$GLB,, | Performed by: ORTHOPAEDIC SURGERY

## 2019-10-17 PROCEDURE — 99999 PR PBB SHADOW E&M-EST. PATIENT-LVL III: ICD-10-PCS | Mod: PBBFAC,,, | Performed by: ORTHOPAEDIC SURGERY

## 2019-10-17 PROCEDURE — 99213 PR OFFICE/OUTPT VISIT, EST, LEVL III, 20-29 MIN: ICD-10-PCS | Mod: 25,S$GLB,, | Performed by: ORTHOPAEDIC SURGERY

## 2019-10-17 RX ORDER — TRIAMCINOLONE ACETONIDE 40 MG/ML
40 INJECTION, SUSPENSION INTRA-ARTICULAR; INTRAMUSCULAR
Status: COMPLETED | OUTPATIENT
Start: 2019-10-17 | End: 2019-10-17

## 2019-10-17 RX ADMIN — TRIAMCINOLONE ACETONIDE 40 MG: 40 INJECTION, SUSPENSION INTRA-ARTICULAR; INTRAMUSCULAR at 09:10

## 2019-10-17 NOTE — PROGRESS NOTES
Subjective:      Patient ID: Julisa Jensen is a 69 y.o. female.    Chief Complaint: Pain of the Right Knee    HPI     They have experienced problems with their right knee over the past 6 months. The patient denies relevant history of injury/aggravation. Pain is located medially Associated symptoms include NA.  Symptoms are aggravated by no particular activity.. They have been treated with NA.   Symptoms have recently stayed the same. Ambulation reportedly has not been impaired. Self care ADLs are not painful.     Review of Systems   Constitution: Negative for fever and weight loss.   HENT: Negative for congestion.    Eyes: Negative for visual disturbance.   Cardiovascular: Negative for chest pain.   Respiratory: Negative for shortness of breath.    Hematologic/Lymphatic: Negative for bleeding problem. Does not bruise/bleed easily.   Skin: Negative for poor wound healing.   Musculoskeletal: Positive for joint pain.   Gastrointestinal: Negative for abdominal pain.   Genitourinary: Negative for dysuria.   Neurological: Negative for seizures.   Psychiatric/Behavioral: Negative for altered mental status.   Allergic/Immunologic: Negative for persistent infections.         Objective:      Ortho/SPM Exam        Right knee    [unfilled]    The patient is not in acute distress.   Sclerae normal  Body habitus is normal.  Respiratory distress:  none   The patient walks without a limp.  Hip irritability  negative.   The skin over the knee is intact.  Knee effusion trace  Tendernes is located medially  Range of motion- Flexion 130 deg, Extension 0 deg,   Ligament laxity exam:   MCL 1+   Lachman 0   Post sag  0    LCL 0  Patellar apprehension negative.  Popliteal cyst negative  Patellar crepitation absent.  Flexion/pinch negative  Pulses DP present, PT present.  Motor intact  Sensory normal    Radiographic review shows moderate medial narrowing in the right knee.    Assessment:       Encounter Diagnosis   Name Primary?     Primary osteoarthritis of right knee Yes          The patient's objective symptoms and objective findings are moderate in severity.  Plan:       Julisa was seen today for pain.    Diagnoses and all orders for this visit:    Primary osteoarthritis of right knee        I explained my diagnostic impression and the reasoning behind it in detail, using layman's terms.  Models and/or pictures were used to help in the explanation.    Tylenol usage recommended    Icing recommended    Injection recommended and consent given    I explained the potential role of surgery in the treatment of this condition to the patient.  They understand that if nonsurgical measures do not adequately control symptoms, surgery will be considered in the future.

## 2019-10-17 NOTE — LETTER
October 17, 2019      Antolin Verduzco MD  [address] [facility]  [place] [address]           [place] [facility]  [address]  [facility] [address]  Phone: [phone]          Patient: Julisa Jensen   MR Number: [id number]   YOB: 1950   Date of Visit: 10/17/2019       Dear Dr. Antolin Verduzco:    Thank you for referring Julisa Jensen to me for evaluation. Attached you will find relevant portions of my assessment and plan of care.    If you have questions, please do not hesitate to call me. I look forward to following Julisa Jensen along with you.    Sincerely,    Hank Thomas MD    Enclosure  CC:  No Recipients    If you would like to receive this communication electronically, please contact [email or web address] or [phone] to request more information on [facility] access.    For providers and/or their staff who would like to refer a patient to [facility], please contact us through our one-stop-shop provider referral line, [facility], at [phone].    If you feel you have received this communication in error or would no longer like to receive these types of communications, please e-mail [email or web address]

## 2019-12-03 ENCOUNTER — OFFICE VISIT (OUTPATIENT)
Dept: INTERNAL MEDICINE | Facility: CLINIC | Age: 69
End: 2019-12-03
Payer: MEDICARE

## 2019-12-03 VITALS
DIASTOLIC BLOOD PRESSURE: 80 MMHG | SYSTOLIC BLOOD PRESSURE: 120 MMHG | WEIGHT: 150.81 LBS | HEIGHT: 61 IN | OXYGEN SATURATION: 96 % | HEART RATE: 81 BPM | BODY MASS INDEX: 28.47 KG/M2 | TEMPERATURE: 98 F

## 2019-12-03 DIAGNOSIS — J06.9 UPPER RESPIRATORY TRACT INFECTION, UNSPECIFIED TYPE: Primary | ICD-10-CM

## 2019-12-03 DIAGNOSIS — I10 ESSENTIAL HYPERTENSION: ICD-10-CM

## 2019-12-03 PROBLEM — M25.511 ACUTE PAIN OF RIGHT SHOULDER: Status: RESOLVED | Noted: 2019-02-04 | Resolved: 2019-12-03

## 2019-12-03 PROCEDURE — 1126F PR PAIN SEVERITY QUANTIFIED, NO PAIN PRESENT: ICD-10-PCS | Mod: S$GLB,,, | Performed by: INTERNAL MEDICINE

## 2019-12-03 PROCEDURE — 99214 PR OFFICE/OUTPT VISIT, EST, LEVL IV, 30-39 MIN: ICD-10-PCS | Mod: S$GLB,,, | Performed by: INTERNAL MEDICINE

## 2019-12-03 PROCEDURE — 3079F PR MOST RECENT DIASTOLIC BLOOD PRESSURE 80-89 MM HG: ICD-10-PCS | Mod: CPTII,S$GLB,, | Performed by: INTERNAL MEDICINE

## 2019-12-03 PROCEDURE — 1159F MED LIST DOCD IN RCRD: CPT | Mod: S$GLB,,, | Performed by: INTERNAL MEDICINE

## 2019-12-03 PROCEDURE — 3074F SYST BP LT 130 MM HG: CPT | Mod: CPTII,S$GLB,, | Performed by: INTERNAL MEDICINE

## 2019-12-03 PROCEDURE — 1126F AMNT PAIN NOTED NONE PRSNT: CPT | Mod: S$GLB,,, | Performed by: INTERNAL MEDICINE

## 2019-12-03 PROCEDURE — 1101F PT FALLS ASSESS-DOCD LE1/YR: CPT | Mod: CPTII,S$GLB,, | Performed by: INTERNAL MEDICINE

## 2019-12-03 PROCEDURE — 3079F DIAST BP 80-89 MM HG: CPT | Mod: CPTII,S$GLB,, | Performed by: INTERNAL MEDICINE

## 2019-12-03 PROCEDURE — 1159F PR MEDICATION LIST DOCUMENTED IN MEDICAL RECORD: ICD-10-PCS | Mod: S$GLB,,, | Performed by: INTERNAL MEDICINE

## 2019-12-03 PROCEDURE — 99999 PR PBB SHADOW E&M-EST. PATIENT-LVL III: ICD-10-PCS | Mod: PBBFAC,,, | Performed by: INTERNAL MEDICINE

## 2019-12-03 PROCEDURE — 99999 PR PBB SHADOW E&M-EST. PATIENT-LVL III: CPT | Mod: PBBFAC,,, | Performed by: INTERNAL MEDICINE

## 2019-12-03 PROCEDURE — 1101F PR PT FALLS ASSESS DOC 0-1 FALLS W/OUT INJ PAST YR: ICD-10-PCS | Mod: CPTII,S$GLB,, | Performed by: INTERNAL MEDICINE

## 2019-12-03 PROCEDURE — 99214 OFFICE O/P EST MOD 30 MIN: CPT | Mod: S$GLB,,, | Performed by: INTERNAL MEDICINE

## 2019-12-03 PROCEDURE — 3074F PR MOST RECENT SYSTOLIC BLOOD PRESSURE < 130 MM HG: ICD-10-PCS | Mod: CPTII,S$GLB,, | Performed by: INTERNAL MEDICINE

## 2019-12-03 RX ORDER — METHYLPREDNISOLONE 4 MG/1
TABLET ORAL
Qty: 1 PACKAGE | Refills: 0 | Status: SHIPPED | OUTPATIENT
Start: 2019-12-03 | End: 2019-12-10

## 2019-12-03 RX ORDER — PROMETHAZINE HYDROCHLORIDE AND CODEINE PHOSPHATE 6.25; 1 MG/5ML; MG/5ML
5 SOLUTION ORAL EVERY 4 HOURS PRN
Qty: 120 ML | Refills: 0 | Status: SHIPPED | OUTPATIENT
Start: 2019-12-03 | End: 2019-12-10

## 2019-12-04 ENCOUNTER — PATIENT MESSAGE (OUTPATIENT)
Dept: INTERNAL MEDICINE | Facility: CLINIC | Age: 69
End: 2019-12-04

## 2019-12-04 NOTE — PROGRESS NOTES
Subjective:       Patient ID: Julisa Jensen is a 69 y.o. female.    Chief Complaint: Sinusitis and Cough    HPI  Pt with 3 days of nasal congestion, facial pain, coryza, prod cough, chills, myalgias, malaise.  No SOB.  No N/V/D.   had the flu recently.  Review of Systems   All other systems reviewed and are negative.      Objective:      Physical Exam   Constitutional: She appears well-developed.   HENT:   Head: Normocephalic.   Mouth/Throat: Oropharynx is clear and moist.   Ethmoid sinuses tender   Eyes: EOM are normal.   Neck: Normal range of motion.   Cardiovascular: Normal rate, regular rhythm, normal heart sounds and intact distal pulses.   Pulmonary/Chest: Effort normal and breath sounds normal.   Abdominal: Bowel sounds are normal.   Musculoskeletal: Normal range of motion. She exhibits no edema.   Lymphadenopathy:     She has no cervical adenopathy.   Neurological: She is alert. No cranial nerve deficit. She exhibits normal muscle tone. Coordination normal.   Skin: Skin is warm and dry.   Psychiatric: She has a normal mood and affect. Her behavior is normal.   Vitals reviewed.      Assessment:       1. Upper respiratory tract infection, unspecified type    2. Essential hypertension        Plan:       Julisa was seen today for sinusitis and cough.    Diagnoses and all orders for this visit:    Upper respiratory tract infection, unspecified type  -     methylPREDNISolone (MEDROL DOSEPACK) 4 mg tablet; use as directed  -     promethazine-codeine 6.25-10 mg/5 ml (PHENERGAN WITH CODEINE) 6.25-10 mg/5 mL syrup; Take 5 mLs by mouth every 4 (four) hours as needed for Cough.    Essential hypertension   Well-cont    No follow-ups on file.

## 2019-12-05 ENCOUNTER — OFFICE VISIT (OUTPATIENT)
Dept: FAMILY MEDICINE | Facility: CLINIC | Age: 69
End: 2019-12-05
Payer: MEDICARE

## 2019-12-05 ENCOUNTER — TELEPHONE (OUTPATIENT)
Dept: INTERNAL MEDICINE | Facility: CLINIC | Age: 69
End: 2019-12-05

## 2019-12-05 VITALS
WEIGHT: 149.69 LBS | BODY MASS INDEX: 28.26 KG/M2 | HEIGHT: 61 IN | OXYGEN SATURATION: 95 % | SYSTOLIC BLOOD PRESSURE: 122 MMHG | TEMPERATURE: 100 F | HEART RATE: 92 BPM | DIASTOLIC BLOOD PRESSURE: 76 MMHG

## 2019-12-05 DIAGNOSIS — J06.9 UPPER RESPIRATORY TRACT INFECTION, UNSPECIFIED TYPE: Primary | ICD-10-CM

## 2019-12-05 DIAGNOSIS — I10 ESSENTIAL HYPERTENSION: ICD-10-CM

## 2019-12-05 PROCEDURE — 99499 RISK ADDL DX/OHS AUDIT: ICD-10-PCS | Mod: S$GLB,,, | Performed by: FAMILY MEDICINE

## 2019-12-05 PROCEDURE — 99999 PR PBB SHADOW E&M-EST. PATIENT-LVL III: ICD-10-PCS | Mod: PBBFAC,,, | Performed by: FAMILY MEDICINE

## 2019-12-05 PROCEDURE — 1125F AMNT PAIN NOTED PAIN PRSNT: CPT | Mod: S$GLB,,, | Performed by: FAMILY MEDICINE

## 2019-12-05 PROCEDURE — 1101F PR PT FALLS ASSESS DOC 0-1 FALLS W/OUT INJ PAST YR: ICD-10-PCS | Mod: CPTII,S$GLB,, | Performed by: FAMILY MEDICINE

## 2019-12-05 PROCEDURE — 99499 UNLISTED E&M SERVICE: CPT | Mod: S$GLB,,, | Performed by: FAMILY MEDICINE

## 2019-12-05 PROCEDURE — 1159F MED LIST DOCD IN RCRD: CPT | Mod: S$GLB,,, | Performed by: FAMILY MEDICINE

## 2019-12-05 PROCEDURE — 3074F SYST BP LT 130 MM HG: CPT | Mod: CPTII,S$GLB,, | Performed by: FAMILY MEDICINE

## 2019-12-05 PROCEDURE — 3078F DIAST BP <80 MM HG: CPT | Mod: CPTII,S$GLB,, | Performed by: FAMILY MEDICINE

## 2019-12-05 PROCEDURE — 3078F PR MOST RECENT DIASTOLIC BLOOD PRESSURE < 80 MM HG: ICD-10-PCS | Mod: CPTII,S$GLB,, | Performed by: FAMILY MEDICINE

## 2019-12-05 PROCEDURE — 3074F PR MOST RECENT SYSTOLIC BLOOD PRESSURE < 130 MM HG: ICD-10-PCS | Mod: CPTII,S$GLB,, | Performed by: FAMILY MEDICINE

## 2019-12-05 PROCEDURE — 1101F PT FALLS ASSESS-DOCD LE1/YR: CPT | Mod: CPTII,S$GLB,, | Performed by: FAMILY MEDICINE

## 2019-12-05 PROCEDURE — 99214 OFFICE O/P EST MOD 30 MIN: CPT | Mod: S$GLB,,, | Performed by: FAMILY MEDICINE

## 2019-12-05 PROCEDURE — 1159F PR MEDICATION LIST DOCUMENTED IN MEDICAL RECORD: ICD-10-PCS | Mod: S$GLB,,, | Performed by: FAMILY MEDICINE

## 2019-12-05 PROCEDURE — 99999 PR PBB SHADOW E&M-EST. PATIENT-LVL III: CPT | Mod: PBBFAC,,, | Performed by: FAMILY MEDICINE

## 2019-12-05 PROCEDURE — 1125F PR PAIN SEVERITY QUANTIFIED, PAIN PRESENT: ICD-10-PCS | Mod: S$GLB,,, | Performed by: FAMILY MEDICINE

## 2019-12-05 PROCEDURE — 99214 PR OFFICE/OUTPT VISIT, EST, LEVL IV, 30-39 MIN: ICD-10-PCS | Mod: S$GLB,,, | Performed by: FAMILY MEDICINE

## 2019-12-05 RX ORDER — FLUTICASONE PROPIONATE 50 MCG
2 SPRAY, SUSPENSION (ML) NASAL DAILY
Qty: 16 G | Refills: 1 | Status: SHIPPED | OUTPATIENT
Start: 2019-12-05 | End: 2020-01-04

## 2019-12-05 NOTE — TELEPHONE ENCOUNTER
----- Message from Kasia Palacios sent at 12/5/2019  9:28 AM CST -----  Contact: Daughter Julisa [phone]  Type:  Same Day Appointment Request    Caller is requesting a same day appointment.  Caller declined first available appointment listed below.    Name of Caller:Pt's daughter   When is the first available appointment? 01-28-20  Symptoms:Fever and cough got worst   Best Call Back Number:[phone]  Additional Information:

## 2019-12-05 NOTE — PROGRESS NOTES
Subjective:       Patient ID: Julisa Jensen is a 69 y.o. female.    Chief Complaint: Cough; URI; Fever; and Headache    HPI   68 yo female presents c/o cough. Pt seen by Dr. Mcneal 12/3/19 and is not improving. Pt notes cough disturbs her sleep. Pt notes cough is productive of white sputum. Notes subjective warmth and chills. No ear pain or sore throat. Pt notes chest pain with coughing. Pt has been taking Prednisone and Phenergan with codeine cough syrup. Pt is concerned that she has not improved since 12/3/19.   Review of Systems   Constitutional:        See HPI   HENT:        See HPI   Respiratory: Positive for cough. Negative for shortness of breath.    Cardiovascular: Negative for chest pain.   Gastrointestinal: Negative for abdominal pain.       Objective:      Physical Exam   Constitutional: She appears well-developed and well-nourished. No distress.   HENT:   Head: Normocephalic and atraumatic.   Mouth/Throat: No oropharyngeal exudate.   Eyes: Right eye exhibits no discharge. Left eye exhibits no discharge.   Neck: Normal range of motion. Neck supple. No JVD present. No thyromegaly present.   Cardiovascular: Normal rate, regular rhythm and normal heart sounds.   Pulmonary/Chest: Effort normal and breath sounds normal. She has no wheezes. She has no rales.   Abdominal: Soft. Bowel sounds are normal. She exhibits no mass. There is no tenderness.   Lymphadenopathy:     She has no cervical adenopathy.   Skin: Skin is warm and dry. She is not diaphoretic.   Vitals reviewed.      Assessment:       See plan  Plan:       Upper respiratory tract infection, unspecified type  -    Start  fluticasone propionate (FLONASE) 50 mcg/actuation nasal spray; 2 sprays (100 mcg total) by Each Nostril route once daily.  Dispense: 16 g; Refill: 1  -    Continue cough syrup and Prednisone as prescribed by Dr. Mcneal    Essential hypertension: Stable    F/U with Dr. Verduzco if symptoms worsen or do not improve within 7-14  days.

## 2019-12-10 ENCOUNTER — OFFICE VISIT (OUTPATIENT)
Dept: INTERNAL MEDICINE | Facility: CLINIC | Age: 69
End: 2019-12-10
Payer: MEDICARE

## 2019-12-10 ENCOUNTER — HOSPITAL ENCOUNTER (OUTPATIENT)
Dept: RADIOLOGY | Facility: HOSPITAL | Age: 69
Discharge: HOME | End: 2019-12-10
Attending: INTERNAL MEDICINE
Payer: MEDICARE

## 2019-12-10 VITALS
DIASTOLIC BLOOD PRESSURE: 70 MMHG | HEART RATE: 95 BPM | OXYGEN SATURATION: 98 % | HEIGHT: 61 IN | SYSTOLIC BLOOD PRESSURE: 110 MMHG | BODY MASS INDEX: 27.93 KG/M2 | WEIGHT: 147.94 LBS

## 2019-12-10 DIAGNOSIS — J18.9 COMMUNITY ACQUIRED PNEUMONIA, UNSPECIFIED LATERALITY: Primary | ICD-10-CM

## 2019-12-10 DIAGNOSIS — J18.9 COMMUNITY ACQUIRED PNEUMONIA, UNSPECIFIED LATERALITY: ICD-10-CM

## 2019-12-10 PROCEDURE — 1101F PR PT FALLS ASSESS DOC 0-1 FALLS W/OUT INJ PAST YR: ICD-10-PCS | Mod: CPTII,S$GLB,, | Performed by: INTERNAL MEDICINE

## 2019-12-10 PROCEDURE — 3078F DIAST BP <80 MM HG: CPT | Mod: CPTII,S$GLB,, | Performed by: INTERNAL MEDICINE

## 2019-12-10 PROCEDURE — 1126F PR PAIN SEVERITY QUANTIFIED, NO PAIN PRESENT: ICD-10-PCS | Mod: S$GLB,,, | Performed by: INTERNAL MEDICINE

## 2019-12-10 PROCEDURE — 3074F PR MOST RECENT SYSTOLIC BLOOD PRESSURE < 130 MM HG: ICD-10-PCS | Mod: CPTII,S$GLB,, | Performed by: INTERNAL MEDICINE

## 2019-12-10 PROCEDURE — 99213 OFFICE O/P EST LOW 20 MIN: CPT | Mod: 25,S$GLB,, | Performed by: INTERNAL MEDICINE

## 2019-12-10 PROCEDURE — 1159F MED LIST DOCD IN RCRD: CPT | Mod: S$GLB,,, | Performed by: INTERNAL MEDICINE

## 2019-12-10 PROCEDURE — 1126F AMNT PAIN NOTED NONE PRSNT: CPT | Mod: S$GLB,,, | Performed by: INTERNAL MEDICINE

## 2019-12-10 PROCEDURE — 3078F PR MOST RECENT DIASTOLIC BLOOD PRESSURE < 80 MM HG: ICD-10-PCS | Mod: CPTII,S$GLB,, | Performed by: INTERNAL MEDICINE

## 2019-12-10 PROCEDURE — 1159F PR MEDICATION LIST DOCUMENTED IN MEDICAL RECORD: ICD-10-PCS | Mod: S$GLB,,, | Performed by: INTERNAL MEDICINE

## 2019-12-10 PROCEDURE — 71046 X-RAY EXAM CHEST 2 VIEWS: CPT | Mod: 26,,, | Performed by: RADIOLOGY

## 2019-12-10 PROCEDURE — 94640 PR INHAL RX, AIRWAY OBST/DX SPUTUM INDUCT: ICD-10-PCS | Mod: S$GLB,,, | Performed by: INTERNAL MEDICINE

## 2019-12-10 PROCEDURE — 71046 X-RAY EXAM CHEST 2 VIEWS: CPT | Mod: TC,FY,PO

## 2019-12-10 PROCEDURE — 99999 PR PBB SHADOW E&M-EST. PATIENT-LVL III: CPT | Mod: PBBFAC,,, | Performed by: INTERNAL MEDICINE

## 2019-12-10 PROCEDURE — 71046 XR CHEST PA AND LATERAL: ICD-10-PCS | Mod: 26,,, | Performed by: RADIOLOGY

## 2019-12-10 PROCEDURE — 3074F SYST BP LT 130 MM HG: CPT | Mod: CPTII,S$GLB,, | Performed by: INTERNAL MEDICINE

## 2019-12-10 PROCEDURE — 94640 AIRWAY INHALATION TREATMENT: CPT | Mod: S$GLB,,, | Performed by: INTERNAL MEDICINE

## 2019-12-10 PROCEDURE — 1101F PT FALLS ASSESS-DOCD LE1/YR: CPT | Mod: CPTII,S$GLB,, | Performed by: INTERNAL MEDICINE

## 2019-12-10 PROCEDURE — 99999 PR PBB SHADOW E&M-EST. PATIENT-LVL III: ICD-10-PCS | Mod: PBBFAC,,, | Performed by: INTERNAL MEDICINE

## 2019-12-10 PROCEDURE — 99213 PR OFFICE/OUTPT VISIT, EST, LEVL III, 20-29 MIN: ICD-10-PCS | Mod: 25,S$GLB,, | Performed by: INTERNAL MEDICINE

## 2019-12-10 RX ORDER — ALBUTEROL SULFATE 0.83 MG/ML
2.5 SOLUTION RESPIRATORY (INHALATION)
Status: COMPLETED | OUTPATIENT
Start: 2019-12-10 | End: 2019-12-10

## 2019-12-10 RX ORDER — PROMETHAZINE HYDROCHLORIDE AND DEXTROMETHORPHAN HYDROBROMIDE 6.25; 15 MG/5ML; MG/5ML
5 SYRUP ORAL NIGHTLY PRN
Qty: 118 ML | Refills: 0 | Status: SHIPPED | OUTPATIENT
Start: 2019-12-10 | End: 2019-12-20

## 2019-12-10 RX ORDER — DOXYCYCLINE 100 MG/1
100 CAPSULE ORAL 2 TIMES DAILY
Qty: 14 CAPSULE | Refills: 0 | Status: SHIPPED | OUTPATIENT
Start: 2019-12-10 | End: 2019-12-17

## 2019-12-10 RX ADMIN — ALBUTEROL SULFATE 2.5 MG: 0.83 SOLUTION RESPIRATORY (INHALATION) at 09:12

## 2019-12-10 NOTE — PATIENT INSTRUCTIONS
Recomendaciones para hoy    Comience antibiótico doxiciclina lo antes posible. Si la tos no ha comenzado a mejorar para el viernes, debe comunicarse con la clínica de inmediato para que podamos considerar cambiar el plan de tratamiento.    Recommendations for today    Start antibiotic doxycycline as soon as possible.  If coughing has not started to improve by Friday morning you should contact the clinic immediately so that we can consider changing the treatment plan.

## 2019-12-10 NOTE — PROGRESS NOTES
Portions of this note are generated with voice recognition software. Typographical errors may exist.     SUBJECTIVE:    This is a/an 69 y.o. female here for primary care visit for  Chief Complaint   Patient presents with    Follow-up     2 month     Patient states that she has been ill since December 1st.  States that her symptoms and especially her cough have not improved despite taking prescribed medications after to urgent care visits.  States that she is still coughing severely.  Significant sputum production.  She is feeling progressive fatigue.  No fevers or chills.  No dyspnea with typical activities.      Medications Reviewed and Updated    Past medical, family, and social histories were reviewed and updated.    Review of Systems negative unless otherwise noted in history of present illness-  ROS    General ROS: negative  Psychological ROS: negative  ENT ROS: negative  Endocrine ROS: Negative  Allergy and Immunology ROS: negative  Cardiovascular ROS: negative  Pulmonary ROS: Negative  Gastrointestinal ROS: negative  Genito-Urinary ROS: negative  Musculoskeletal ROS: negative        Allergic:    Review of patient's allergies indicates:   Allergen Reactions    Penicillins        OBJECTIVE:  BP: 110/70 Pulse: 95    Wt Readings from Last 3 Encounters:   12/10/19 67.1 kg (147 lb 14.9 oz)   12/05/19 67.9 kg (149 lb 11.1 oz)   12/03/19 68.4 kg (150 lb 12.7 oz)    Body mass index is 27.95 kg/m².  Previous Blood Pressure Readings :   BP Readings from Last 3 Encounters:   12/10/19 110/70   12/05/19 122/76   12/03/19 120/80       Physical Exam    GEN: No apparent distress  HEENT: sclera non-icteric, conjunctiva clear  CV: no peripheral edema regular rate and rhythm  PULM: breathing non-labored crackles and coarse breath sounds right posterior lung base.  ABD:  protuberant abdomen.  PSYCH: appropriate affect  MSK: able to rise from chair without assistance  SKIN: normal skin turgor    Pertinent Labs Reviewed        ASSESSMENT/PLAN:    Community acquired pneumonia, unspecified laterality.Condition not optimally controlled. Detailed counseling on self care measures. Plan to monitor clinically in addition to plan below or as listed on After Visit Summary.   -     doxycycline (MONODOX) 100 MG capsule; Take 1 capsule (100 mg total) by mouth 2 (two) times daily. for 7 days  Dispense: 14 capsule; Refill: 0  -     X-Ray Chest PA And Lateral; Future; Expected date: 12/10/2019  -     albuterol nebulizer solution 2.5 mg  -     promethazine-dextromethorphan (PROMETHAZINE-DM) 6.25-15 mg/5 mL Syrp; Take 5 mLs by mouth nightly as needed (cough).  Dispense: 118 mL; Refill: 0          Future Appointments   Date Time Provider Department Center   1/17/2020  9:00 AM Hank Thomas MD [facility]THO [facility]   2/4/2020  8:40 AM Antolin Verduzco MD [facility]       Antolin Verduzco  12/10/2019  12:19 PM

## 2019-12-12 ENCOUNTER — PATIENT MESSAGE (OUTPATIENT)
Dept: INTERNAL MEDICINE | Facility: CLINIC | Age: 69
End: 2019-12-12

## 2019-12-12 DIAGNOSIS — I10 ESSENTIAL HYPERTENSION: ICD-10-CM

## 2019-12-13 ENCOUNTER — PATIENT MESSAGE (OUTPATIENT)
Dept: INTERNAL MEDICINE | Facility: CLINIC | Age: 69
End: 2019-12-13

## 2019-12-13 RX ORDER — MOXIFLOXACIN HYDROCHLORIDE 400 MG/1
400 TABLET ORAL DAILY
Qty: 5 TABLET | Refills: 0 | Status: SHIPPED | OUTPATIENT
Start: 2019-12-13 | End: 2019-12-18

## 2019-12-13 RX ORDER — HYDROCHLOROTHIAZIDE 25 MG/1
TABLET ORAL
Qty: 90 TABLET | Refills: 1 | Status: SHIPPED | OUTPATIENT
Start: 2019-12-13 | End: 2020-06-10

## 2019-12-13 NOTE — TELEPHONE ENCOUNTER
Spoke with daughter who informed me her mother has only taking three dosages of medication . I  Advised her I would send a message to  and contact her if there are to be any changes . Daughter voices understanding

## 2019-12-13 NOTE — TELEPHONE ENCOUNTER
----- Message from Roseann Valles sent at 12/13/2019  9:25 AM CST -----  Contact: Julisa(daughter)-[phone]  Julisa(daughter)-[phone] is requesting a callback concerning doxycycline (MONODOX) 100 MG capsule is making her dizzy and would like to know what else she can take. Please call

## 2020-01-29 ENCOUNTER — TELEPHONE (OUTPATIENT)
Dept: INTERNAL MEDICINE | Facility: CLINIC | Age: 70
End: 2020-01-29

## 2020-01-29 NOTE — TELEPHONE ENCOUNTER
Spoke with patient who informed me she is feeling much better . She will just wait until her upcoming appointment.

## 2020-02-04 ENCOUNTER — OFFICE VISIT (OUTPATIENT)
Dept: INTERNAL MEDICINE | Facility: CLINIC | Age: 70
End: 2020-02-04
Payer: MEDICARE

## 2020-02-04 ENCOUNTER — TELEPHONE (OUTPATIENT)
Dept: INTERNAL MEDICINE | Facility: CLINIC | Age: 70
End: 2020-02-04

## 2020-02-04 ENCOUNTER — LAB VISIT (OUTPATIENT)
Dept: LAB | Facility: HOSPITAL | Age: 70
End: 2020-02-04
Attending: INTERNAL MEDICINE
Payer: MEDICARE

## 2020-02-04 VITALS
SYSTOLIC BLOOD PRESSURE: 138 MMHG | WEIGHT: 140.88 LBS | HEIGHT: 61 IN | DIASTOLIC BLOOD PRESSURE: 68 MMHG | BODY MASS INDEX: 26.6 KG/M2 | OXYGEN SATURATION: 97 % | HEART RATE: 84 BPM

## 2020-02-04 DIAGNOSIS — Z23 NEED FOR IMMUNIZATION AGAINST INFLUENZA: ICD-10-CM

## 2020-02-04 DIAGNOSIS — R10.31 RIGHT LOWER QUADRANT ABDOMINAL PAIN: Primary | ICD-10-CM

## 2020-02-04 DIAGNOSIS — I10 ESSENTIAL HYPERTENSION: ICD-10-CM

## 2020-02-04 DIAGNOSIS — R10.31 RIGHT LOWER QUADRANT ABDOMINAL PAIN: ICD-10-CM

## 2020-02-04 LAB
ALBUMIN SERPL BCP-MCNC: 3.8 G/DL (ref 3.5–5.2)
ALP SERPL-CCNC: 86 U/L (ref 55–135)
ALT SERPL W/O P-5'-P-CCNC: 23 U/L (ref 10–44)
ANION GAP SERPL CALC-SCNC: 7 MMOL/L (ref 8–16)
AST SERPL-CCNC: 20 U/L (ref 10–40)
BASOPHILS # BLD AUTO: 0.06 K/UL (ref 0–0.2)
BASOPHILS NFR BLD: 0.9 % (ref 0–1.9)
BILIRUB SERPL-MCNC: 0.3 MG/DL (ref 0.1–1)
BUN SERPL-MCNC: 12 MG/DL (ref 8–23)
CALCIUM SERPL-MCNC: 9.5 MG/DL (ref 8.7–10.5)
CHLORIDE SERPL-SCNC: 104 MMOL/L (ref 95–110)
CHOLEST SERPL-MCNC: 166 MG/DL (ref 120–199)
CHOLEST/HDLC SERPL: 3.5 {RATIO} (ref 2–5)
CO2 SERPL-SCNC: 31 MMOL/L (ref 23–29)
CREAT SERPL-MCNC: 0.7 MG/DL (ref 0.5–1.4)
CRP SERPL-MCNC: 3.3 MG/L (ref 0–8.2)
DIFFERENTIAL METHOD: ABNORMAL
EOSINOPHIL # BLD AUTO: 0.2 K/UL (ref 0–0.5)
EOSINOPHIL NFR BLD: 3.1 % (ref 0–8)
ERYTHROCYTE [DISTWIDTH] IN BLOOD BY AUTOMATED COUNT: 13.2 % (ref 11.5–14.5)
ERYTHROCYTE [SEDIMENTATION RATE] IN BLOOD BY WESTERGREN METHOD: 17 MM/HR (ref 0–36)
EST. GFR  (AFRICAN AMERICAN): >60 ML/MIN/1.73 M^2
EST. GFR  (NON AFRICAN AMERICAN): >60 ML/MIN/1.73 M^2
GLUCOSE SERPL-MCNC: 87 MG/DL (ref 70–110)
HCT VFR BLD AUTO: 41.6 % (ref 37–48.5)
HDLC SERPL-MCNC: 47 MG/DL (ref 40–75)
HDLC SERPL: 28.3 % (ref 20–50)
HGB BLD-MCNC: 12.8 G/DL (ref 12–16)
IMM GRANULOCYTES # BLD AUTO: 0.02 K/UL (ref 0–0.04)
IMM GRANULOCYTES NFR BLD AUTO: 0.3 % (ref 0–0.5)
LDLC SERPL CALC-MCNC: 94.4 MG/DL (ref 63–159)
LYMPHOCYTES # BLD AUTO: 2.5 K/UL (ref 1–4.8)
LYMPHOCYTES NFR BLD: 37.8 % (ref 18–48)
MCH RBC QN AUTO: 28 PG (ref 27–31)
MCHC RBC AUTO-ENTMCNC: 30.8 G/DL (ref 32–36)
MCV RBC AUTO: 91 FL (ref 82–98)
MONOCYTES # BLD AUTO: 0.4 K/UL (ref 0.3–1)
MONOCYTES NFR BLD: 6 % (ref 4–15)
NEUTROPHILS # BLD AUTO: 3.4 K/UL (ref 1.8–7.7)
NEUTROPHILS NFR BLD: 51.9 % (ref 38–73)
NONHDLC SERPL-MCNC: 119 MG/DL
NRBC BLD-RTO: 0 /100 WBC
PLATELET # BLD AUTO: 343 K/UL (ref 150–350)
PMV BLD AUTO: 11.1 FL (ref 9.2–12.9)
POTASSIUM SERPL-SCNC: 3.2 MMOL/L (ref 3.5–5.1)
PROT SERPL-MCNC: 8 G/DL (ref 6–8.4)
RBC # BLD AUTO: 4.57 M/UL (ref 4–5.4)
SODIUM SERPL-SCNC: 142 MMOL/L (ref 136–145)
TRIGL SERPL-MCNC: 123 MG/DL (ref 30–150)
WBC # BLD AUTO: 6.5 K/UL (ref 3.9–12.7)

## 2020-02-04 PROCEDURE — 1159F PR MEDICATION LIST DOCUMENTED IN MEDICAL RECORD: ICD-10-PCS | Mod: S$GLB,,, | Performed by: INTERNAL MEDICINE

## 2020-02-04 PROCEDURE — 80053 COMPREHEN METABOLIC PANEL: CPT

## 2020-02-04 PROCEDURE — 99213 PR OFFICE/OUTPT VISIT, EST, LEVL III, 20-29 MIN: ICD-10-PCS | Mod: 25,S$GLB,, | Performed by: INTERNAL MEDICINE

## 2020-02-04 PROCEDURE — 85652 RBC SED RATE AUTOMATED: CPT

## 2020-02-04 PROCEDURE — 85025 COMPLETE CBC W/AUTO DIFF WBC: CPT

## 2020-02-04 PROCEDURE — 90662 IIV NO PRSV INCREASED AG IM: CPT | Mod: S$GLB,,, | Performed by: INTERNAL MEDICINE

## 2020-02-04 PROCEDURE — 3078F DIAST BP <80 MM HG: CPT | Mod: CPTII,S$GLB,, | Performed by: INTERNAL MEDICINE

## 2020-02-04 PROCEDURE — 99999 PR PBB SHADOW E&M-EST. PATIENT-LVL III: CPT | Mod: PBBFAC,,, | Performed by: INTERNAL MEDICINE

## 2020-02-04 PROCEDURE — 1159F MED LIST DOCD IN RCRD: CPT | Mod: S$GLB,,, | Performed by: INTERNAL MEDICINE

## 2020-02-04 PROCEDURE — G0008 FLU VACCINE - HIGH DOSE (65+) PRESERVATIVE FREE IM: ICD-10-PCS | Mod: S$GLB,,, | Performed by: INTERNAL MEDICINE

## 2020-02-04 PROCEDURE — 80061 LIPID PANEL: CPT

## 2020-02-04 PROCEDURE — 1101F PT FALLS ASSESS-DOCD LE1/YR: CPT | Mod: CPTII,S$GLB,, | Performed by: INTERNAL MEDICINE

## 2020-02-04 PROCEDURE — 36415 COLL VENOUS BLD VENIPUNCTURE: CPT | Mod: PO

## 2020-02-04 PROCEDURE — 99999 PR PBB SHADOW E&M-EST. PATIENT-LVL III: ICD-10-PCS | Mod: PBBFAC,,, | Performed by: INTERNAL MEDICINE

## 2020-02-04 PROCEDURE — 90662 FLU VACCINE - HIGH DOSE (65+) PRESERVATIVE FREE IM: ICD-10-PCS | Mod: S$GLB,,, | Performed by: INTERNAL MEDICINE

## 2020-02-04 PROCEDURE — 3075F PR MOST RECENT SYSTOLIC BLOOD PRESS GE 130-139MM HG: ICD-10-PCS | Mod: CPTII,S$GLB,, | Performed by: INTERNAL MEDICINE

## 2020-02-04 PROCEDURE — 99213 OFFICE O/P EST LOW 20 MIN: CPT | Mod: 25,S$GLB,, | Performed by: INTERNAL MEDICINE

## 2020-02-04 PROCEDURE — 86140 C-REACTIVE PROTEIN: CPT

## 2020-02-04 PROCEDURE — G0008 ADMIN INFLUENZA VIRUS VAC: HCPCS | Mod: S$GLB,,, | Performed by: INTERNAL MEDICINE

## 2020-02-04 PROCEDURE — 3075F SYST BP GE 130 - 139MM HG: CPT | Mod: CPTII,S$GLB,, | Performed by: INTERNAL MEDICINE

## 2020-02-04 PROCEDURE — 3078F PR MOST RECENT DIASTOLIC BLOOD PRESSURE < 80 MM HG: ICD-10-PCS | Mod: CPTII,S$GLB,, | Performed by: INTERNAL MEDICINE

## 2020-02-04 PROCEDURE — 1101F PR PT FALLS ASSESS DOC 0-1 FALLS W/OUT INJ PAST YR: ICD-10-PCS | Mod: CPTII,S$GLB,, | Performed by: INTERNAL MEDICINE

## 2020-02-04 RX ORDER — DICYCLOMINE HYDROCHLORIDE 10 MG/1
10 CAPSULE ORAL
Qty: 30 CAPSULE | Refills: 0 | Status: SHIPPED | OUTPATIENT
Start: 2020-02-04 | End: 2020-03-05

## 2020-02-04 NOTE — PROGRESS NOTES
Portions of this note are generated with voice recognition software. Typographical errors may exist.     SUBJECTIVE:    This is a/an 69 y.o. female here for primary care visit for  Chief Complaint   Patient presents with    Follow-up     1 mo       patient states that she continues to have relapsing remitting pain in the abdomen right abdomen right lower quadrant and sometimes right upper quadrant.  She reiterates that she has had a cholecystectomy many years ago.  Ovaries intact.  States that the pain at times has been severe in the past 7 days.  Seems to be relieved by bowel movements.  She has 1 bowel movement daily.  Often [place] 5.  No associated fevers chills or body aches.  Eliminating lactose from the diet has not helped her symptoms.  She understands that recent screening did not support the diagnosis of celiac disease.  She has not had abdominal imaging in many years for this relapsing and remitting problem.      Medications Reviewed and Updated    Past medical, family, and social histories were reviewed and updated.    Review of Systems negative unless otherwise noted in history of present illness-  ROS    General ROS: negative  Psychological ROS: negative  ENT ROS: negative  Endocrine ROS: Negative  Allergy and Immunology ROS: negative  Cardiovascular ROS: negative  Pulmonary ROS: Negative  Gastrointestinal ROS: negative  Genito-Urinary ROS: negative  Musculoskeletal ROS: negative    Allergic:    Review of patient's allergies indicates:   Allergen Reactions    Penicillins        OBJECTIVE:  BP: 138/68 Pulse: 84    Wt Readings from Last 3 Encounters:   02/04/20 63.9 kg (140 lb 14 oz)   12/10/19 67.1 kg (147 lb 14.9 oz)   12/05/19 67.9 kg (149 lb 11.1 oz)    Body mass index is 26.62 kg/m².  Previous Blood Pressure Readings :   BP Readings from Last 3 Encounters:   02/04/20 138/68   12/10/19 110/70   12/05/19 122/76       Physical Exam    GEN: No apparent distress  HEENT: sclera non-icteric, conjunctiva  clear  CV: no peripheral edema  PULM: breathing non-labored  ABD: non, protuberant abdomen.  Negative Herrera sign.  Moderate right lower quadrant tenderness to deep palpation.  Supple overlying abdomen  PSYCH: appropriate affect  MSK: able to rise from chair without assistance  SKIN: normal skin turgor    Pertinent Labs Reviewed       ASSESSMENT/PLAN:    Right lower quadrant abdominal pain.Etiology unclear. Not controlled. Further evaluation warranted.  Recommendations as below or as written on After Visit Summary.   -     CT Abdomen Pelvis With Contrast; Future; Expected date: 02/04/2020  -     Sedimentation rate; Future; Expected date: 02/04/2020  -     C-reactive protein; Future; Expected date: 02/04/2020  -     CBC auto differential; Future; Expected date: 02/04/2020    Need for immunization against influenza  -     Influenza - High Dose (65+) (PF) (IM)    Other orders  -     dicyclomine (BENTYL) 10 MG capsule; Take 1 capsule (10 mg total) by mouth 4 (four) times daily before meals and nightly.  Dispense: 30 capsule; Refill: 0          Future Appointments   Date Time Provider Department Center   2/5/2020  1:00 PM [facility] CT1 LIMIT 400 LBS [facility] CT SCAN [facility]   3/11/2020 11:00 AM Antolin Verduzco MD [facility]       Antolin Verduzco  2/4/2020  12:37 PM

## 2020-02-05 ENCOUNTER — HOSPITAL ENCOUNTER (OUTPATIENT)
Dept: RADIOLOGY | Facility: HOSPITAL | Age: 70
Discharge: HOME | End: 2020-02-05
Attending: INTERNAL MEDICINE
Payer: MEDICARE

## 2020-02-05 ENCOUNTER — PATIENT MESSAGE (OUTPATIENT)
Dept: INTERNAL MEDICINE | Facility: CLINIC | Age: 70
End: 2020-02-05

## 2020-02-05 DIAGNOSIS — R93.3 ABNORMAL CT SCAN, COLON: Primary | ICD-10-CM

## 2020-02-05 DIAGNOSIS — R10.31 RLQ ABDOMINAL PAIN: ICD-10-CM

## 2020-02-05 DIAGNOSIS — R10.31 RIGHT LOWER QUADRANT ABDOMINAL PAIN: ICD-10-CM

## 2020-02-05 LAB
CREAT SERPL-MCNC: 0.6 MG/DL (ref 0.5–1.4)
SAMPLE: NORMAL

## 2020-02-05 PROCEDURE — 25500020 PHARM REV CODE 255: Performed by: INTERNAL MEDICINE

## 2020-02-05 PROCEDURE — 74177 CT ABD & PELVIS W/CONTRAST: CPT | Mod: 26,,, | Performed by: RADIOLOGY

## 2020-02-05 PROCEDURE — 74177 CT ABDOMEN PELVIS WITH CONTRAST: ICD-10-PCS | Mod: 26,,, | Performed by: RADIOLOGY

## 2020-02-05 PROCEDURE — 74177 CT ABD & PELVIS W/CONTRAST: CPT | Mod: TC

## 2020-02-05 RX ADMIN — IOHEXOL 75 ML: 350 INJECTION, SOLUTION INTRAVENOUS at 03:02

## 2020-02-05 RX ADMIN — IOHEXOL 1000 ML: 9 SOLUTION ORAL at 01:02

## 2020-02-05 RX ADMIN — IOHEXOL 50 ML: 350 INJECTION, SOLUTION INTRAVENOUS at 03:02

## 2020-02-06 ENCOUNTER — PATIENT MESSAGE (OUTPATIENT)
Dept: INTERNAL MEDICINE | Facility: CLINIC | Age: 70
End: 2020-02-06

## 2020-02-07 ENCOUNTER — PATIENT MESSAGE (OUTPATIENT)
Dept: INTERNAL MEDICINE | Facility: CLINIC | Age: 70
End: 2020-02-07

## 2020-02-18 ENCOUNTER — PATIENT MESSAGE (OUTPATIENT)
Dept: INTERNAL MEDICINE | Facility: CLINIC | Age: 70
End: 2020-02-18

## 2020-03-09 DIAGNOSIS — I10 ESSENTIAL HYPERTENSION: ICD-10-CM

## 2020-03-09 RX ORDER — AMLODIPINE BESYLATE 5 MG/1
TABLET ORAL
Qty: 90 TABLET | Refills: 1 | Status: SHIPPED | OUTPATIENT
Start: 2020-03-09 | End: 2020-09-11 | Stop reason: SDUPTHER

## 2020-03-10 ENCOUNTER — PATIENT MESSAGE (OUTPATIENT)
Dept: FAMILY MEDICINE | Facility: CLINIC | Age: 70
End: 2020-03-10

## 2020-03-12 ENCOUNTER — TELEPHONE (OUTPATIENT)
Dept: GASTROENTEROLOGY | Facility: CLINIC | Age: 70
End: 2020-03-12

## 2020-05-04 ENCOUNTER — TELEPHONE (OUTPATIENT)
Dept: INTERNAL MEDICINE | Facility: CLINIC | Age: 70
End: 2020-05-04

## 2020-05-21 ENCOUNTER — PATIENT MESSAGE (OUTPATIENT)
Dept: INTERNAL MEDICINE | Facility: CLINIC | Age: 70
End: 2020-05-21

## 2020-05-28 DIAGNOSIS — Z01.812 PRE-PROCEDURE LAB EXAM: Primary | ICD-10-CM

## 2020-05-28 NOTE — TELEPHONE ENCOUNTER
Endoscopy Scheduling Questionnaire:     1. Have you been admitted overnight to the hospital in the past 3 months? no  2. Have you had a cardiac stent placed in the past 12 months? no  3. Have you had a stroke or heart attack in the past 6 months? no  4. Have you had any chest pain in the past 3 months? no      If so, have you been evaluated by your PCP or Cardiologist? no  5. Do you take any blood thinners? no  6. Have you been diagnosed with Diverticulitis within the past 3 months? no  7. Are you on dialysis or have Kidney Disease? no   8. Are you diabetic? no Do you have an insulin pump?   9. Do you have any other health issues that you feel might limit your ability to safely have the procedure and/or sedation? no  10. Is the patient over 79 yo? no     If yes, has the patient been seen by their PCP or GI in the last 6 months? yes  11. Family History of Colon Cancer? no         If yes, relationship/age?  12. Previous Colonoscopy Procedure? no         If yes, when/where  13. History of Colon Cancer? no  14.  History of Colon Polyps? no  15. Have you had a FIT Test in the last year? yes                 -I have reviewed the patient's medications and allergies.       is not on high risk medication,   A Medication /Cardiac Clearance request  has been sent to   -I have verified the pharmacy information. The prep being used is SUPREP. The patient's prep instructions were sent by [employer]/ mail.

## 2020-05-28 NOTE — LETTER
[facility]  [address]  [place] [address]  Phone: [phone]     Please have Covid 19 test done on Sunday 6/21/2020 at the [facility] on [facility]. Between 9 and 11AM    SUPREP Instructions    You are scheduled for a colonoscopy with Dr. Finch on June 23,2020 at [address] [facility]. 2nd floor Endoscopy.    To ensure that your test is accurate and complete, you MUST follow these instructions listed below.  If you have any questions, please call our office at [phone].  Plan on being at the hospital for your procedure for 3-4 hours.    1.  Follow a CLEAR LIQUID DIET for the entire day before your scheduled colonoscopy.  This means no solid food the entire day starting when you wake.  You may have as much of the clear liquids as you want throughout the day.   CLEAR LIQUID DIET:   - Avoid Red, Orange, Purple, and/or Blue food coloring   - NO DAIRY   - You can have:  Coffee with sugar (no creamer), tea, water, soda, apple or white grape juice, chicken or beef broth/bouillon (no meat, noodles, or veggies), green/yellow popsicles, green/yellow Jell-O, lemonade.    2.  AT 5 pm the evening before your colonoscopy, POUR ONE (1) BOTTLE OF SUPREP INTO THE MIXING CONTAINER, PROVIDED INSIDE THE BOX.  ADD WATER TO THE LINE ON THE CONTAINER AND MIX IT WELL.  DRINK THE ENTIRE CONTAINER AND THEN DRINK TWO (2) MORE CONTAINERS OF WATER OVER THE NEXT 1 HOUR.  This is sometimes easier to drink if this solution is cold, so you can mix the solution 20 minutes ahead of time and place in the refrigerator prior to drinking.  You have to drink the solution within 30-45 minutes of mixing it.  Do NOT put this solution over ice.  It IS ok to drink with a straw.    3.  The endoscopy department will call you 1 day before your colonoscopy to tell you the exact time to arrive, AND to tell you the exact time to drink the 2nd portion of your prep (which will be FIVE HOURS BEFORE YOUR ARRIVAL  TIME).  At this time given to you, POUR ONE (1) BOTTLE OF SUPREP INTO THE MIXING CONTAINER, PROVIDED INSIDE THE BOX.  ADD WATER TO THE LINE ON THE CONTAINER AND MIX IT WELL.  DRINK THE ENTIRE CONTAINER AND THEN DRINK TWO (2) MORE CONTAINERS OF WATER OVER THE NEXT 1 HOUR.  This is sometimes easier to drink if this solution is cold, so you can mix the solution 20 minutes ahead of time and place in the refrigerator prior to drinking.  You have to drink the solution within 30-45 minutes of mixing it.  Do NOT put this solution over ice.  It IS ok to drink with a straw.  Once this is complete, you may not have ANYTHING else by mouth!    4.  You must have someone with you to DRIVE YOU HOME since you will be receiving IV sedation for the colonoscopy.    5.  It is ok to take your heart, blood pressure, and seizure medications in the morning of your test with a SIP of water.  Hold other medications until after your procedure.  Do NOT have anything else to eat or drink the morning of your colonoscopy.  It is ok to brush your teeth.    6.  If you are on blood thinners THAT YOU HAVE BEEN INSTRUCTED TO HOLD BY YOUR DOCTOR FOR THIS PROCEDURE, then do NOT take this the morning of your colonoscopy.  Do NOT stop these medications on your own, they must be approved to be held by your doctor.  Your colonoscopy can NOT be done if you are on these medications.  Examples of blood thinners include: Coumadin, Aggrenox, Plavix, Pradaxa, Reapro, Pletal, Xarelto, Ticagrelor, Brilinta, Eliquis, and high dose aspirin (325 mg).  You do not have to stop baby aspirin 81 mg.    7.  IF YOU ARE DIABETIC:  NO INSULIN OR ORAL MEDICATIONS THE MORNING OF THE COLONOSCOPY.  TAKE ONLY HALF THE DOSE OF YOUR INSULIN THE DAY BEFORE THE COLONOSCOPY.  DO NOT TAKE ANY ORAL DIABETIC MEDICATIONS THE DAY BEFORE THE COLONOSCOPY.  IF YOU ARE AN INSULIN DEPENDENT DIABETIC WITH UNSTABLE BLOOD SUGARS, NOTIFY YOUR PRIMARY CARE PHYSICIAN FOR INSTRUCTIONS.

## 2020-05-28 NOTE — TELEPHONE ENCOUNTER
SUPREP Instructions    You are scheduled for a colonoscopy with Dr. Finch on June 23,2020 at [address] [facility]. 2nd floor Endoscopy.    To ensure that your test is accurate and complete, you MUST follow these instructions listed below.  If you have any questions, please call our office at [phone].  Plan on being at the hospital for your procedure for 3-4 hours.    1.  Follow a CLEAR LIQUID DIET for the entire day before your scheduled colonoscopy.  This means no solid food the entire day starting when you wake.  You may have as much of the clear liquids as you want throughout the day.   CLEAR LIQUID DIET:   - Avoid Red, Orange, Purple, and/or Blue food coloring   - NO DAIRY   - You can have:  Coffee with sugar (no creamer), tea, water, soda, apple or white grape juice, chicken or beef broth/bouillon (no meat, noodles, or veggies), green/yellow popsicles, green/yellow Jell-O, lemonade.    2.  AT 5 pm the evening before your colonoscopy, POUR ONE (1) BOTTLE OF SUPREP INTO THE MIXING CONTAINER, PROVIDED INSIDE THE BOX.  ADD WATER TO THE LINE ON THE CONTAINER AND MIX IT WELL.  DRINK THE ENTIRE CONTAINER AND THEN DRINK TWO (2) MORE CONTAINERS OF WATER OVER THE NEXT 1 HOUR.  This is sometimes easier to drink if this solution is cold, so you can mix the solution 20 minutes ahead of time and place in the refrigerator prior to drinking.  You have to drink the solution within 30-45 minutes of mixing it.  Do NOT put this solution over ice.  It IS ok to drink with a straw.    3.  The endoscopy department will call you 1 day before your colonoscopy to tell you the exact time to arrive, AND to tell you the exact time to drink the 2nd portion of your prep (which will be FIVE HOURS BEFORE YOUR ARRIVAL TIME).  At this time given to you, POUR ONE (1) BOTTLE OF SUPREP INTO THE MIXING CONTAINER, PROVIDED INSIDE THE BOX.  ADD WATER TO THE LINE ON THE CONTAINER AND MIX IT WELL.  DRINK THE ENTIRE CONTAINER AND THEN DRINK TWO  (2) MORE CONTAINERS OF WATER OVER THE NEXT 1 HOUR.  This is sometimes easier to drink if this solution is cold, so you can mix the solution 20 minutes ahead of time and place in the refrigerator prior to drinking.  You have to drink the solution within 30-45 minutes of mixing it.  Do NOT put this solution over ice.  It IS ok to drink with a straw.  Once this is complete, you may not have ANYTHING else by mouth!    4.  You must have someone with you to DRIVE YOU HOME since you will be receiving IV sedation for the colonoscopy.    5.  It is ok to take your heart, blood pressure, and seizure medications in the morning of your test with a SIP of water.  Hold other medications until after your procedure.  Do NOT have anything else to eat or drink the morning of your colonoscopy.  It is ok to brush your teeth.    6.  If you are on blood thinners THAT YOU HAVE BEEN INSTRUCTED TO HOLD BY YOUR DOCTOR FOR THIS PROCEDURE, then do NOT take this the morning of your colonoscopy.  Do NOT stop these medications on your own, they must be approved to be held by your doctor.  Your colonoscopy can NOT be done if you are on these medications.  Examples of blood thinners include: Coumadin, Aggrenox, Plavix, Pradaxa, Reapro, Pletal, Xarelto, Ticagrelor, Brilinta, Eliquis, and high dose aspirin (325 mg).  You do not have to stop baby aspirin 81 mg.    7.  IF YOU ARE DIABETIC:  NO INSULIN OR ORAL MEDICATIONS THE MORNING OF THE COLONOSCOPY.  TAKE ONLY HALF THE DOSE OF YOUR INSULIN THE DAY BEFORE THE COLONOSCOPY.  DO NOT TAKE ANY ORAL DIABETIC MEDICATIONS THE DAY BEFORE THE COLONOSCOPY.  IF YOU ARE AN INSULIN DEPENDENT DIABETIC WITH UNSTABLE BLOOD SUGARS, NOTIFY YOUR PRIMARY CARE PHYSICIAN FOR INSTRUCTIONS.

## 2020-06-04 ENCOUNTER — PATIENT MESSAGE (OUTPATIENT)
Dept: INTERNAL MEDICINE | Facility: CLINIC | Age: 70
End: 2020-06-04

## 2020-06-04 DIAGNOSIS — M25.569 KNEE PAIN, UNSPECIFIED CHRONICITY, UNSPECIFIED LATERALITY: Primary | ICD-10-CM

## 2020-06-10 RX ORDER — SODIUM, POTASSIUM,MAG SULFATES 17.5-3.13G
1 SOLUTION, RECONSTITUTED, ORAL ORAL DAILY
Qty: 1 KIT | Refills: 0 | Status: SHIPPED | OUTPATIENT
Start: 2020-06-10 | End: 2020-06-12

## 2020-06-11 ENCOUNTER — CLINICAL SUPPORT (OUTPATIENT)
Dept: REHABILITATION | Facility: HOSPITAL | Age: 70
End: 2020-06-11
Attending: INTERNAL MEDICINE
Payer: MEDICARE

## 2020-06-11 DIAGNOSIS — R26.89 IMPAIRED GAIT AND MOBILITY: ICD-10-CM

## 2020-06-11 DIAGNOSIS — R29.898 DECREASED STRENGTH OF LOWER EXTREMITY: ICD-10-CM

## 2020-06-11 DIAGNOSIS — M25.569 KNEE PAIN, UNSPECIFIED CHRONICITY, UNSPECIFIED LATERALITY: ICD-10-CM

## 2020-06-11 DIAGNOSIS — M25.561 ACUTE PAIN OF RIGHT KNEE: ICD-10-CM

## 2020-06-11 PROCEDURE — 97161 PT EVAL LOW COMPLEX 20 MIN: CPT | Mod: PN

## 2020-06-11 NOTE — PLAN OF CARE
"[facility]  Physical Therapy Initial Evaluation    Name: Julisa Jensen  Clinic Number: [id number]    Therapy Diagnosis:   Encounter Diagnoses   Name Primary?    Knee pain, unspecified chronicity, unspecified laterality     Acute pain of right knee     Impaired gait and mobility     Decreased strength of lower extremity      Physician: Antolin Verduzco*    Physician Orders: PT Eval and Treat  Medical Diagnosis from Referral: M25.569 (ICD-10-CM) - Knee pain, unspecified chronicity, unspecified laterality  Evaluation Date: 6/11/2020  Authorization Period Expiration: 8/11/2020  Plan of Care Expiration: 7/17/2020  Visit # / Visits authorized: 1/12  FOTO: 1/10    Visit:  82.88  Total: 82.88    Time In: 8:45 AM  Time Out: 9:30 AM  Total Billable Time: 45 minutes (Low Complexity Evaluation)    Precautions: Standard    Subjective     Translation assistance provided by Martramírez interpretor ID #: [id number] - Meena    Date of onset: DOS: ~15 days ago    History of current condition - Julisa reports: she has been exercising every morning at the park and walking ~3 miles everyday. States that since she's started exercising she's been having pain on the inside of her right knee. Describes the pain as a "very strong pain" that causes her to hear cracking in her knee when she moves to the right side or twists.        No past medical history on file.  Julisa Jensen  has no past surgical history on file.    Julisa has a current medication list which includes the following prescription(s): amlodipine, hydrochlorothiazide, naproxen, and sodium,potassium,mag sulfates.    Review of patient's allergies indicates:   Allergen Reactions    Penicillins         Imaging: None on file    Prior Therapy: No  Social History: Lives with their spouse; 1 story home with 5 steps to enter with handrails  Occupation: Retired  Prior Level of Function: Independent  Current Level of Function: " "Independent with pain, unable to continue performing exercising and walking due to pain    Pain:  Current 6/10, worst 9/10, best 4/10   Location: right medial knee   Description: Aching  Aggravating Factors: Walking and Exercising  Easing Factors: rest    Pts goals: "To get rid of the pain, because I did not have it before."    Objective     Posture: In standing patient stands with majority of her weight shifted to her left side. Knee brace donned.  Palpation: Patient tender to palpation along medial aspect of right knee, near semimembranosus and semitendinosus insertion, along medial joint line, and medial gastroc head    Knee AROM/PROM Right Left Pain/Dysfunction with Movement      Knee Flexion 133/135* 140/145 Pain at medial aspect of R knee   Knee Extension 0/-1 0/0    *denotes pain with movement    L/E Strength w/ MicroFET Muscle Ciro Dynamometer Right Left Pain/Dysfunction with Movement   (approx 4 sec hold w/ max contraction)   Hip Flexion 14.4 kg  15.3 kg     Hip Abduction 11.2 kg  14.9 kg     Quadriceps 15.6 kg  17.8 kg     Hamstrings 14.4 kg  13.9 kg         Gait Analysis: Increased stance time on RLE with limp favoring RLE, compression knee brace donned    CMS Impairment/Limitation/Restriction for Knee FOTO Survey    Therapist reviewed FOTO scores for Julisa Jensen on 6/11/2020.   FOTO documents entered into [employer] - see Media section.    Current Limitation Score: 46%  Predicted Limitation Score: 29%         TREATMENT     No treatment performed today. Patient to begin the following exercises next session:    Long-sit Hamstring stretch  Quad Sets:   SLR:   Sidelying Hip ABD:   Mini Squats  Standing Hip ABD  Standing Hip EXT  Standing Heel Raises        Home Exercises and Patient Education Provided    Education provided:   - Importance and role of physical therapy  - Proper body mechanics when performing HEP  - Importance of icing and elevation    Written Home Exercises Provided: yes.  Exercises " were reviewed and Julisa was able to demonstrate them prior to the end of the session.  Julisa demonstrated good  understanding of the education provided.     See EMR under Patient Instructions for exercises provided 6/11/2020.    Assessment     Julisa is a 70 y.o. female referred to outpatient Physical Therapy with a medical diagnosis of knee pain with unspecified chronicity and unspecified laterality. Pt presents to PT with pain, decreased right knee ROM, decreased strength of bilateral lower extremities, poor posture, impaired balance and gait, and functional deficits with walking and exercising. Patient recently began exercising and walking outside which caused her current knee pain. Currently wearing a compression knee brace that is assisting in pain alleviation. As a result of pain, patient is not participating in exercises or long walks. Pt would benefit from skilled PT consisting of manual therapy including STM/MFR right  knee, patellar mobs, knee flex/ext mobs/stretching; therapeutic exercise including LE strengthening/stretching, postural education, balance and gait training, and modalities prn to address limitations and increase functional mobility.      Pt prognosis is Good.   Pt will benefit from skilled outpatient Physical Therapy to address the deficits stated above and in the chart below, provide pt/family education, and to maximize pt's level of independence.     Plan of care discussed with patient: Yes  Pt's spiritual, cultural and educational needs considered and patient is agreeable to the plan of care and goals as stated below:     Anticipated Barriers for therapy: Language barrier    Medical Necessity is demonstrated by the following  History  Co-morbidities and personal factors that may impact the plan of care Co-morbidities:   HTN, Meningioma, Hx of left rotator cuff    Personal Factors:   no deficits     low   Examination  Body Structures and Functions, activity limitations and participation  restrictions that may impact the plan of care Body Regions:   lower extremities    Body Systems:    ROM  strength  gait    Participation Restrictions:   See co-morbidities    Activity limitations:   Learning and applying knowledge  no deficits    General Tasks and Commands  undertaking multiple tasks    Communication  communicating with/receiving spoken language    Mobility  walking  exercising    Self care  no deficits    Domestic Life  doing house work (cleaning house, washing dishes, laundry)    Interactions/Relationships  no deficits    Life Areas  no deficits    Community and Social Life  community life  recreation and leisure         low   Clinical Presentation evolving clinical presentation with changing clinical characteristics low   Decision Making/ Complexity Score: low     Goals:  Short Term Goals:    1. Pt will be independent with HEP supplement PT in improving functional mobility.  2. Pt will improve RLE strength to at least 75% of LLE strength as measured via MicroFet handheld dynamometer in order to improve functional mobility    Long Term Goals:  1. Pt will be independent with updated HEP supplement PT in improving functional mobility.  2. Pt will improve R LE strength to at least 90% of LLE strength as measured via MicroFet handheld dynamometer in order to improve functional mobility  3. Pt will improve R knee AROM to at least 140 degrees of flexion in order to improve gait and ability to perform ADLs  4. Pt will improve FOTO knee survey score to </= 29% limited in order to demo improved functional mobility  5. Pt will return to exercising outdoors and walking 3 miles with minimal pain.      Plan     Plan of care Certification: 6/11/2020 to 7/17/2020.    Outpatient Physical Therapy 2 times weekly for 4 weeks to include the following interventions: Gait Training, Manual Therapy, Moist Heat/ Ice, Neuromuscular Re-ed, Orthotic Management and Training, Patient Education, Therapeutic Activites and  Therapeutic Exercise, ASTYM, Kinesiotape, FDN prn, and Kinesiotape prn.     Mary Jo Britton, PT, DPT

## 2020-06-11 NOTE — PATIENT INSTRUCTIONS
Estiramiento de los isquiotibiales (sentado)    Siéntese sobre tom superficie plana elevada donde pueda apoyar la pierna afectada sobre meng, chen tom arce de tratamiento, un sofá o tom cama.      Mientras mantienes la rodilla extendida, inclínate lentamente hacia adelante y alcanza las ahsan hacia el pie hasta que se sienta un estiramiento suave a lo colt de la parte posterior de la rodilla/muslo. Sostenga brissa 30 segundos y luego vuelva a la posición inicial y repita 3 veces en cada pierna.       Fortalecimiento: Set de cuadriceps    Tense los músculos en la parte superior de los muslos empujando las rodillas hacia la superficie. Aguanta 5 segundos.  Repetir 10 veces right pierna por juego. Haz 2 series por sesión. Haz 2 sesiones por día.      Elevación de pierna recta        Con la pierna extendida, la otra pierna doblada, levante la pierna extendida hasta que las rodillas queden uniformes. Lentamente bajar.  Repetir 10 veces each leg per set.   Hacer dos series por sesión. Hacer dos sesiones por día.      ABDUCCIÓN: Supina - Tablero de polvo (Activo - Asistencia)         En posicion de lado derecho (Right)/Recostada a la izquierda (Left), con las piernas rectas. Mueva la pierna izquierda hacia un lado y vuelva lentamente.  Repetir 10 veces each leg per set.   Hacer dos series por sesión. Hacer dos sesiones por día.      Mini Squats    Párese en la encimera y espere por seguridad. Dobla ambas rodillas para tom mini sentadilla. Recuerda mantener el pecho arriba y aferrarte para obtener apoyo. Repita para 3 conjuntos de 10: un total de 30 repeticiones.       Fortalecimiento: Abducción de cadera        Mientras está de pie, levante la pierna hacia un lado. Mantenga la rodilla extendida y mantenga los dedos de los dedos apuntando hacia adelante todo el tiempo.    Use los brazos chen apoyo si es necesario para el equilibrio y la seguridad.   Repita 10 veces por juego. Realice 3 conjuntos por sesión. Annie 2  sesiones por día.       Fortalecimiento: Extensión de cadera        Mientras está de pie, equilibre en tom pierna y mueva la otra pierna en tom dirección hacia atrás. No balancee la pierna. Realizar movimientos suaves y controlados. Mantenga yañez tronco estable y sin arquear brissa el movimiento. Use los brazos chen apoyo si es necesario para el equilibrio y la seguridad.  Repita 10 veces por juego. Realice 3 conjuntos por sesión. Annie 2 sesiones por día.      Elevación del talón en pie: Bilateral      Apyarse en tom encimera por seguridad. Levántate sobre bolas de pies.  Repita 10 veces por juego. Realice 3 conjuntos por sesión. Annie 2 sesiones por día.

## 2020-06-15 ENCOUNTER — CLINICAL SUPPORT (OUTPATIENT)
Dept: REHABILITATION | Facility: HOSPITAL | Age: 70
End: 2020-06-15
Attending: INTERNAL MEDICINE
Payer: MEDICARE

## 2020-06-15 DIAGNOSIS — R29.898 DECREASED STRENGTH OF LOWER EXTREMITY: ICD-10-CM

## 2020-06-15 DIAGNOSIS — M25.561 ACUTE PAIN OF RIGHT KNEE: ICD-10-CM

## 2020-06-15 DIAGNOSIS — R26.89 IMPAIRED GAIT AND MOBILITY: ICD-10-CM

## 2020-06-15 PROCEDURE — 97110 THERAPEUTIC EXERCISES: CPT | Mod: PN,CQ

## 2020-06-15 NOTE — PROGRESS NOTES
"  Physical Therapy Treatment Note     Name: Julisa Jensen  Clinic Number: [id number]    Therapy Diagnosis:   Encounter Diagnoses   Name Primary?    Acute pain of right knee     Impaired gait and mobility     Decreased strength of lower extremity      Physician: Antolin Verduzco*    Visit Date: 6/15/2020    Physician Orders: PT Eval and Treat  Medical Diagnosis from Referral: M25.569 (ICD-10-CM) - Knee pain, unspecified chronicity, unspecified laterality  Evaluation Date: 6/11/2020  Authorization Period Expiration: 8/11/2020  Plan of Care Expiration: 7/17/2020  Visit # / Visits authorized: 2/12  FOTO: 2/10    Visit:  30.32  Total: 113.20     Time In: 9:25 am  Time Out: 9:55 am  Total Billable Time: 20 minutes    Precautions: Standard    Subjective     Pt reports: (through interpretor) having burning/shooting pain in the medial aspect of her right knee.  Patient reports her pain is worse at night, 9/10 but during the day it's not so bad, 6/10.  She was compliant with home exercise program.  Response to previous treatment: sore.  Functional change: not at this time.    Pain: 6/10  Location: right knee      Objective     Julisa received therapeutic exercises to develop strength, endurance and ROM for 20 minutes including:    Long-sit Hamstring stretch 5 x 10"  Quad Sets: 5" x 10  SLR: 2 x 10  Sidelying Hip ABD: 2 x 10  Mini Squats 2 x 10  Standing Hip ABD not today  Standing Hip EXT not today  Standing Heel Raises not today    Julisa received cold pack for 10 minutes to right knee.    Home Exercises Provided and Patient Education Provided     Education provided:   - apply cold pack as needed for pain.    Written Home Exercises Provided: Patient instructed to cont prior HEP.  Exercises were reviewed and Julisa was able to demonstrate them prior to the end of the session.  Julisa demonstrated good  understanding of the education provided.     See EMR under Patient Instructions for exercises provided prior " visit.    Assessment     Patient did not complete all of her exercises due to arriving 25 minutes late.  Julisa is progressing well towards her goals.   Pt prognosis is Good.     Pt will continue to benefit from skilled outpatient physical therapy to address the deficits listed in the problem list box on initial evaluation, provide pt/family education and to maximize pt's level of independence in the home and community environment.     Pt's spiritual, cultural and educational needs considered and pt agreeable to plan of care and goals.     Anticipated barriers to physical therapy: Language barrier     Goals:  Short Term Goals:     1. Pt will be independent with HEP supplement PT in improving functional mobility.  2. Pt will improve RLE strength to at least 75% of LLE strength as measured via MicroFet handheld dynamometer in order to improve functional mobility     Long Term Goals:  1. Pt will be independent with updated HEP supplement PT in improving functional mobility.  2. Pt will improve R LE strength to at least 90% of LLE strength as measured via MicroFet handheld dynamometer in order to improve functional mobility  3. Pt will improve R knee AROM to at least 140 degrees of flexion in order to improve gait and ability to perform ADLs  4. Pt will improve FOTO knee survey score to </= 29% limited in order to demo improved functional mobility  5. Pt will return to exercising outdoors and walking 3 miles with minimal pain.    Plan     Continue with Plan Of Care and progress toward PT goals.    John Beck, PTA

## 2020-06-19 ENCOUNTER — TELEPHONE (OUTPATIENT)
Dept: ENDOSCOPY | Facility: HOSPITAL | Age: 70
End: 2020-06-19

## 2020-06-19 NOTE — TELEPHONE ENCOUNTER
Spoke with patient about arrival time @. 0730    Prep instructions reviewed: the day before the procedure, follow a clear liquid diet all day, then start the first 1/2 of prep at 5pm and take 2nd 1/2 of prep @.0230  Pt must be completely NPO when prep completed @. 0430             Medications: Do not take Insulin or oral diabetic medications the day of the procedure.  Take as prescribed: heart, seizure and blood pressure medication in the morning with a sip of water (less than an ounce).  Take any breathing medications and bring inhalers to hospital with you Leave all valuables and jewelry at home.     Wear comfortable clothes to procedure to change into hospital gown You cannot drive for 24 hours after your procedure because you will receive sedation for your procedure to make you comfortable.  A ride must be provided at discharge.       Jefry Pedroza ID No  [id number]

## 2020-06-21 ENCOUNTER — CLINICAL SUPPORT (OUTPATIENT)
Dept: URGENT CARE | Facility: CLINIC | Age: 70
End: 2020-06-21
Payer: MEDICARE

## 2020-06-21 VITALS — TEMPERATURE: 99 F

## 2020-06-21 DIAGNOSIS — Z01.812 PRE-PROCEDURE LAB EXAM: ICD-10-CM

## 2020-06-21 PROCEDURE — U0003 INFECTIOUS AGENT DETECTION BY NUCLEIC ACID (DNA OR RNA); SEVERE ACUTE RESPIRATORY SYNDROME CORONAVIRUS 2 (SARS-COV-2) (CORONAVIRUS DISEASE [COVID-19]), AMPLIFIED PROBE TECHNIQUE, MAKING USE OF HIGH THROUGHPUT TECHNOLOGIES AS DESCRIBED BY CMS-2020-01-R: HCPCS

## 2020-06-21 NOTE — PROGRESS NOTES
Subjective:       Patient ID: Julisa Jensen is a 70 y.o. female.    Vitals:  temperature is 98.7 °F (37.1 °C).     Chief Complaint: Labs Only    Patient coming for covid 19    Other  This is a new problem. The current episode started today. Pertinent negatives include no arthralgias, chest pain, chills, congestion, coughing, fatigue, fever, headaches, joint swelling, myalgias, nausea, rash, sore throat, vertigo, vomiting or weakness.       Constitution: Negative for chills, fatigue and fever.   HENT: Negative for congestion and sore throat.    Neck: Negative for painful lymph nodes.   Cardiovascular: Negative for chest pain and leg swelling.   Eyes: Negative for double vision and blurred vision.   Respiratory: Negative for cough and shortness of breath.    Gastrointestinal: Negative for nausea, vomiting and diarrhea.   Genitourinary: Negative for dysuria, frequency, urgency and history of kidney stones.   Musculoskeletal: Negative for joint pain, joint swelling, muscle cramps and muscle ache.   Skin: Negative for color change, pale, rash and bruising.   Allergic/Immunologic: Negative for seasonal allergies.   Neurological: Negative for dizziness, history of vertigo, light-headedness, passing out and headaches.   Hematologic/Lymphatic: Negative for swollen lymph nodes.   Psychiatric/Behavioral: Negative for nervous/anxious, sleep disturbance and depression. The patient is not nervous/anxious.        Objective:      Physical Exam      Assessment:       1. Pre-procedure lab exam        Plan:         Pre-procedure lab exam  -     COVID-19 Routine Screening

## 2020-06-23 ENCOUNTER — ANESTHESIA (OUTPATIENT)
Dept: ENDOSCOPY | Facility: HOSPITAL | Age: 70
End: 2020-06-23
Payer: MEDICARE

## 2020-06-23 ENCOUNTER — ANESTHESIA EVENT (OUTPATIENT)
Dept: ENDOSCOPY | Facility: HOSPITAL | Age: 70
End: 2020-06-23
Payer: MEDICARE

## 2020-06-23 ENCOUNTER — HOSPITAL ENCOUNTER (OUTPATIENT)
Facility: HOSPITAL | Age: 70
Discharge: HOME | End: 2020-06-23
Attending: INTERNAL MEDICINE | Admitting: INTERNAL MEDICINE
Payer: MEDICARE

## 2020-06-23 VITALS
BODY MASS INDEX: 25.76 KG/M2 | HEART RATE: 86 BPM | HEIGHT: 62 IN | TEMPERATURE: 98 F | SYSTOLIC BLOOD PRESSURE: 115 MMHG | DIASTOLIC BLOOD PRESSURE: 62 MMHG | RESPIRATION RATE: 14 BRPM | OXYGEN SATURATION: 99 % | WEIGHT: 140 LBS

## 2020-06-23 DIAGNOSIS — Z12.11 SCREENING FOR MALIGNANT NEOPLASM OF COLON: ICD-10-CM

## 2020-06-23 LAB — SARS-COV-2 RNA RESP QL NAA+PROBE: NOT DETECTED

## 2020-06-23 PROCEDURE — 88305 TISSUE EXAM BY PATHOLOGIST: CPT | Performed by: PATHOLOGY

## 2020-06-23 PROCEDURE — 27201012 HC FORCEPS, HOT/COLD, DISP: Performed by: INTERNAL MEDICINE

## 2020-06-23 PROCEDURE — 45380 COLONOSCOPY AND BIOPSY: CPT | Mod: PT,,, | Performed by: INTERNAL MEDICINE

## 2020-06-23 PROCEDURE — 88305 TISSUE EXAM BY PATHOLOGIST: CPT | Mod: 26,,, | Performed by: PATHOLOGY

## 2020-06-23 PROCEDURE — 45380 PR COLONOSCOPY,BIOPSY: ICD-10-PCS | Mod: PT,,, | Performed by: INTERNAL MEDICINE

## 2020-06-23 PROCEDURE — 25000003 PHARM REV CODE 250: Performed by: NURSE ANESTHETIST, CERTIFIED REGISTERED

## 2020-06-23 PROCEDURE — 37000008 HC ANESTHESIA 1ST 15 MINUTES: Performed by: INTERNAL MEDICINE

## 2020-06-23 PROCEDURE — 45380 COLONOSCOPY AND BIOPSY: CPT | Performed by: INTERNAL MEDICINE

## 2020-06-23 PROCEDURE — 37000009 HC ANESTHESIA EA ADD 15 MINS: Performed by: INTERNAL MEDICINE

## 2020-06-23 PROCEDURE — 63600175 PHARM REV CODE 636 W HCPCS: Performed by: NURSE ANESTHETIST, CERTIFIED REGISTERED

## 2020-06-23 PROCEDURE — 88305 TISSUE EXAM BY PATHOLOGIST: ICD-10-PCS | Mod: 26,,, | Performed by: PATHOLOGY

## 2020-06-23 PROCEDURE — 25000003 PHARM REV CODE 250: Performed by: INTERNAL MEDICINE

## 2020-06-23 RX ORDER — SODIUM CHLORIDE 9 MG/ML
INJECTION, SOLUTION INTRAVENOUS CONTINUOUS
Status: DISCONTINUED | OUTPATIENT
Start: 2020-06-23 | End: 2020-06-23 | Stop reason: HOSPADM

## 2020-06-23 RX ORDER — SODIUM CHLORIDE 0.9 % (FLUSH) 0.9 %
10 SYRINGE (ML) INJECTION
Status: DISCONTINUED | OUTPATIENT
Start: 2020-06-23 | End: 2020-06-23 | Stop reason: HOSPADM

## 2020-06-23 RX ORDER — PROPOFOL 10 MG/ML
VIAL (ML) INTRAVENOUS
Status: DISCONTINUED | OUTPATIENT
Start: 2020-06-23 | End: 2020-06-23

## 2020-06-23 RX ORDER — PROPOFOL 10 MG/ML
INJECTION, EMULSION INTRAVENOUS CONTINUOUS PRN
Status: DISCONTINUED | OUTPATIENT
Start: 2020-06-23 | End: 2020-06-23

## 2020-06-23 RX ORDER — LIDOCAINE HYDROCHLORIDE 20 MG/ML
INJECTION, SOLUTION EPIDURAL; INFILTRATION; INTRACAUDAL; PERINEURAL
Status: DISCONTINUED | OUTPATIENT
Start: 2020-06-23 | End: 2020-06-23

## 2020-06-23 RX ADMIN — PROPOFOL 60 MG: 10 INJECTION, EMULSION INTRAVENOUS at 08:06

## 2020-06-23 RX ADMIN — PROPOFOL 150 MCG/KG/MIN: 10 INJECTION, EMULSION INTRAVENOUS at 08:06

## 2020-06-23 RX ADMIN — SODIUM CHLORIDE: 0.9 INJECTION, SOLUTION INTRAVENOUS at 08:06

## 2020-06-23 RX ADMIN — LIDOCAINE HYDROCHLORIDE 80 MG: 20 INJECTION, SOLUTION EPIDURAL; INFILTRATION; INTRACAUDAL; PERINEURAL at 08:06

## 2020-06-23 NOTE — DISCHARGE INSTRUCTIONS
Pruebas para la detección de cáncer colorrectal    El cáncer colorrectal (cáncer en el colon o el recto) es la principal causa de muerte por cáncer en los [place]. Sin embargo, no tiene por qué ser así. Cuando lucie cáncer se detecta y se extirpa tempranamente, las probabilidades de lograr tom recuperación completa son muy altas. Dado que el cáncer colorrectal no suele causar síntomas en jenelle primeras [place], es importante que se chase las pruebas para detectarlo. Es todavía más crucial si usted tiene factores de riesgo que le predisponen a tener esta enfermedad. Aprenda más sobre el cáncer colorrectal y jenelle factores de riesgo, y hable con yañez proveedor de atención médica sobre las pruebas de detección. [place] podría salvarle la shameka.  Factores de riesgo del cáncer colorrectal  Yñaez riesgo de tener cáncer colorrectal aumenta si usted:  · Tiene más de 50 años de edad  · Tiene antecedentes familiares o personales de cáncer colorrectal o pólipos colorrectales  · Tiene antecedentes personales de diabetes tipo 2, enfermedad de Crohn o colitis ulcerativa  · Tiene un síndrome genético heredado chen el síndrome de [place] (también conocido chen cáncer colorrectal hereditario sin poliposis, CCHSP) o poliposis adenomatosa familiar  · Tiene mucho exceso de peso  · No es físicamente activo  · Fuma  · Leatha gran cantidad de alcohol  · Come mucha carne michaelle o procesada  El colon y el recto  Los desechos de la comida que usted come llegan al colon después de pasar por el intestino farias. En yañez recorrido por el colon, los desperdicios (las heces) van perdiendo agua y haciéndose más sólidos. Los músculos del intestino los van empujando hacia el sigmoide, que es la última sección del colon. Desde ahí, las heces pasan al recto, donde permanecen hasta que el cuerpo está listo para expulsarlas mediante un movimiento del intestino.  ¿Cómo se desarrolla el cáncer?  Los pólipos son masas de tejido que se na en el revestimiento interior  del colon o del recto. En yañez mayoría, los pólipos son benignos, es decir, no cancerosos. Sin embargo, con el tiempo, los pólipos pueden volverse malignos (cancerosos). [place] sucede cuando las células de los pólipos comienzan a crecer de manera anormal. Con el tiempo, las células malignas van invadiendo el colon y el recto cada vez más. También es posible que el cáncer se extienda hacia los órganos cercanos o hacia los ganglios linfáticos u otras partes del cuerpo. Si se encuentran y se quitan los pólipos, es posible evitar, incluso, que el cáncer llegue a formarse.  Vale pruebas de detección  Las pruebas de detección tienen la finalidad de determinar si existe un problema de mercedez antes de que aparezcan los síntomas. Anni vael pruebas de detección del cáncer colorrectal, yañez proveedor de atención médica le hará preguntas sobre vale antecedentes clínicos y un examen, y ordenará tom o varias pruebas.  Antecedentes y examen  En los antecedentes de mercedez y el examen so hace lo siguiente:  · Historial médico. Yañez proveedor de atención médica le preguntará sobre yañez historial médico. Dígale si usted o alguien de yañez michelle carver tenido cáncer de colon o pólipos. También mencione cualquier problema de mercedez que haya tenido.  · Tacto rectal. Anni un examen de tacto rectal, el proveedor de atención médica le insertará un dedo enguantado y lubricado en el recto. El procedimiento no duele y lleva menos de un minuto. Los proveedores de atención médica concuerdan en que esta evaluación, por sí sai, no es suficiente para detectar si hay cáncer colorrectal.  Opciones de pruebas de detección  Prueba de carlos oculta en las heces o prueba inmunoquímica fecal  Estas pruebas permiten determinar si hay carlos fecal que no se ve a simple vista. La presencia de carlos oculta puede ser señal de pólipos o cáncer de colon. Se analiza tom pequeña muestra de heces en un laboratorio, para darrion si contiene carlos. La mayoría de las veces, el  paciente junta la muestra en yañez casa usando un equipo provisto por el médico. Siga atentamente las instrucciones que indican cómo usarlo. Probablemente deba evitar ciertos alimentos y medicamentos antes de la prueba, según le indiquen.  Enema de bario con medio de contraste  Esta prueba utiliza jimbo X para obtener imágenes de la totalidad del colon y el recto. Se requiere tom preparación del intestino el día anterior a la prueba para limpiar el colon y el recto. Para preparar los intestinos, deberá seguir tom dieta líquida con enemas o laxantes александр. Estará despierto brissa la prueba, amina es posible que le den medicamentos que le ayudarán a relajarse. Al comienzo de la prueba, un radiólogo (proveedor de atención médica que se especializa en pruebas de diagnóstico por imágenes) le inserta un tubo farias en el recto. Cathy tubo se usa para llenar el colon con un líquido de contraste (bario) y aire, lo que puede ser incómodo para algunas personas. El líquido ayuda a que el colon se jane claramente en las radiografías. Dado que, en esta prueba, se perfecto radiografías (jimbo X), usted quedará expuesto a tom pequeña cantidad de radiación.  Colonoscopia virtual  Lucie examen también se conoce chen colonografía por tomografía computarizada. Para lucie examen, se usa tom serie de radiografías para crear tom imagen tridimensional del colon y del recto. Se requiere tom preparación del intestino el día anterior a la prueba para limpiar yañez colon. Yañez proveedor de atención médica le indicará cómo hacerlo. Brissa el procedimiento, usted deberá acostarse en tom suzanna que forma parte de tom máquina de radiografías especial, llamada máquina de TC (tomografía computarizada). Se le colocará un tubo pequeño en el recto para llenarlo de aire junto con el colon. [place] puede ser incómodo para algunas personas. Luego la suzanna entrará en la máquina y se tomarán las radiografías de yañez colon y recto. Tom computadora combinará estas  radiografías para crear tom imagen tridimensional. Dado que, en esta prueba, se perfecto radiografías (jimbo X), usted quedará expuesto a tom pequeña cantidad de radiación.  Exámenes de exploración  Hay dos tipos de exámenes de exploración:  · Colonoscopia. Esta prueba puede usarse para detectar y remover pólipos en cualquier parte del colon o el recto. El día antes del examen, deberá preparar jenelle intestinos con tom dieta líquida más un enema o tom solución laxante vargas. [place] se hace para limpiar yañez colon. Le darán instrucciones sobre cómo hacerlo. Robert antes del examen, le darán un medicamento para adormecerle. Luego, se utilizará un tubo colt y flexible con brittany, llamado colonoscopio, que le introducirán suavemente en el recto hasta recorrer todo el colon. Las imágenes de yañez colon se verán en tom pantalla de video. Si se encuentra algún pólipo, se yury y se envía a un laboratorio para que lo analicen. Si no es posible extirparlo, se donald tom muestra del tejido y el pólipo podría extirparse más adelante con tom cirugía. Usted deberá venir acompañado de alguien que lo lleve a la casa después de la prueba.  · Sigmoidoscopia. Esta prueba es similar a la colonoscopia, amina se concentra sólo en el colon sigmoide y el recto. Al igual que con la colonoscopia, debe preparar jenelle intestinos el día anterior al examen. Podría no ser necesario que la preparación sea tan completa chen la de la colonoscopia. Usted permanecerá despierto anni el procedimiento, amina es posible que le den medicamentos para ayudarle a relajarse. Anni la prueba, el proveedor de atención médica guía un tubo farias, flexible y con brittany (llamado sigmoidoscopio) por el recto y la parte inferior del colon. Las imágenes se muestran en un monitor. Los pólipos se extirpan, si es posible, y se envían a un laboratorio para examinarlos.  La colonoscopia es la única prueba de detección que le permite a yañez proveedor de atención médica darrion todo el colon y el  recto. Esta prueba también permite que yañez proveedor de atención médica extraiga todos los trozos de tejido que necesiten analizarse en un laboratorio. Si se encuentra algo sospechoso mediante cualquiera de las  pruebas, probablemente deberá hacerse tom colonoscopia.  Cuándo debe llamar al proveedor de atención médica después de tom prueba  Llame a yañez proveedor de atención médica si tiene alguno de estos síntomas después de tom prueba de detección:  · Sangrado  · Fiebre por encima de los 101°F (38.8°C)  · Dolor abdominal  · Vómito  Date Last Reviewed: 11/4/2015  © 0031-5890 [employer]. [address] [place] [id number]. Todos los derechos reservados. Esta información no pretende sustituir la atención médica profesional. Sólo yañez médico puede diagnosticar y tratar un problema de mercedez.

## 2020-06-23 NOTE — [employer] PATIENT INSTRUCTIONS
Discharge Summary/Instructions after an Endoscopic Procedure  Patient Name: Julisa Jensen  Patient MRN: [id number]  Patient YOB: 1950 Tuesday, June 23, 2020  Madelyn Finch MD  Your health is very important to us during the Covid Crisis. Following your   procedure today, you will receive a daily text for 2 weeks asking about   signs or symptoms of Covid 19.  Please respond to this text when you   receive it so we can follow up and keep you as safe as possible.   RESTRICTIONS:  During your procedure today, you received medications for sedation.  These   medications may affect your judgment, balance and coordination.  Therefore,   for 24 hours, you have the following restrictions:   - DO NOT drive a car, operate machinery, make legal/financial decisions,   sign important papers or drink alcohol.    ACTIVITY:  Today: no heavy lifting, straining or running due to procedural   sedation/anesthesia.  The following day: return to full activity including work.  DIET:  Eat and drink normally unless instructed otherwise.     TREATMENT FOR COMMON SIDE EFFECTS:  - Mild abdominal pain, nausea, belching, bloating or excessive gas:  rest,   eat lightly and use a heating pad.  - Sore Throat: treat with throat lozenges and/or gargle with warm salt   water.  - Because air was used during the procedure, expelling large amounts of air   from your rectum or belching is normal.  - If a bowel prep was taken, you may not have a bowel movement for 1-3 days.    This is normal.  SYMPTOMS TO WATCH FOR AND REPORT TO YOUR PHYSICIAN:  1. Abdominal pain or bloating, other than gas cramps.  2. Chest pain.  3. Back pain.  4. Signs of infection such as: chills or fever occurring within 24 hours   after the procedure.  5. Rectal bleeding, which would show as bright red, maroon, or black stools.   (A tablespoon of blood from the rectum is not serious, especially if   hemorrhoids are present.)  6. Vomiting.  7. Weakness or dizziness.  GO  DIRECTLY TO THE NEAREST EMERGENCY ROOM IF YOU HAVE ANY OF THE FOLLOWING:      Difficulty breathing              Chills and/or fever over 101 F   Persistent vomiting and/or vomiting blood   Severe abdominal pain   Severe chest pain   Black, tarry stools   Bleeding- more than one tablespoon   Any other symptom or condition that you feel may need urgent attention  Your doctor recommends these additional instructions:  If any biopsies were taken, your doctors clinic will contact you in 1 to 2   weeks with any results.  - Discharge patient to home (via wheelchair).   - Patient has a contact number available for emergencies.  The signs and   symptoms of potential delayed complications were discussed with the   patient.  Return to normal activities tomorrow.  Written discharge   instructions were provided to the patient.   - Resume previous diet.   - Continue present medications.   - Await pathology results.   - Repeat colonoscopy date to be determined after pending pathology results   are reviewed for surveillance.  For questions, problems or results please call your physician - Madelyn Finch MD.  EMERGENCY PHONE NUMBER: [phone],  LAB RESULTS: [phone]  IF A COMPLICATION OR EMERGENCY SITUATION ARISES AND YOU ARE UNABLE TO REACH   YOUR PHYSICIAN - GO DIRECTLY TO THE EMERGENCY ROOM.  Madelyn Finch MD  6/23/2020 9:03:41 AM  This report has been verified and signed electronically.  [employer]

## 2020-06-23 NOTE — ANESTHESIA POSTPROCEDURE EVALUATION
Anesthesia Post Evaluation    Patient: Julisa Jensen    Procedure(s) Performed: Procedure(s) (LRB):  COLONOSCOPY (N/A)    Final Anesthesia Type: general    Patient location during evaluation: PACU  Patient participation: Yes- Able to Participate  Level of consciousness: awake and alert, oriented and awake  Post-procedure vital signs: reviewed and stable  Pain management: adequate  Airway patency: patent    PONV status at discharge: No PONV  Anesthetic complications: no      Cardiovascular status: blood pressure returned to baseline  Respiratory status: unassisted and room air  Hydration status: euvolemic  Follow-up not needed.          Vitals Value Taken Time   /62 06/23/20 0927   Temp 36.6 °C (97.9 °F) 06/23/20 0927   Pulse 86 06/23/20 0927   Resp 14 06/23/20 0927   SpO2 99 % 06/23/20 0927         Event Time   Out of Recovery 09:39:00         Pain/Milly Score: Milly Score: 10 (6/23/2020  9:27 AM)

## 2020-06-23 NOTE — H&P
[facility]  Gastroenterology  H&P    Patient Name: Julisa Jensen  MRN: [id number]  Admission Date: 6/23/2020  Code Status: Full Code    Attending Provider: Madelyn Finch MD   Primary Care Physician: Antolin Verduzco MD  Principal Problem:<principal problem not specified>    Subjective:     History of Present Illness: Colon cancer screening    Past Medical History:   Diagnosis Date    Hypertension        History reviewed. No pertinent surgical history.    Review of patient's allergies indicates:   Allergen Reactions    Penicillins      Family History     Problem Relation (Age of Onset)    Breast cancer Cousin        Tobacco Use    Smoking status: Never Smoker    Smokeless tobacco: Never Used   Substance and Sexual Activity    Alcohol use: Yes     Alcohol/week: 2.0 standard drinks     Types: 2 Cans of beer per week     Frequency: Monthly or less     Drinks per session: 1 or 2     Binge frequency: Never     Comment: occasiional    Drug use: Not on file    Sexual activity: Not on file     Review of Systems   Constitutional: Negative for appetite change, chills and fever.   HENT: Negative for postnasal drip and trouble swallowing.    Eyes: Negative for pain and redness.   Respiratory: Negative for cough, choking, chest tightness and shortness of breath.    Cardiovascular: Negative for chest pain and leg swelling.   Gastrointestinal: Negative for abdominal distention, abdominal pain, anal bleeding, blood in stool, constipation, diarrhea, nausea, rectal pain and vomiting.   Endocrine: Negative for cold intolerance and heat intolerance.   Genitourinary: Negative for difficulty urinating and hematuria.   Musculoskeletal: Negative for arthralgias and back pain.   Skin: Negative for color change and pallor.   Allergic/Immunologic: Negative for environmental allergies and food allergies.   Neurological: Negative for dizziness and light-headedness.   Hematological: Negative for  adenopathy. Does not bruise/bleed easily.   Psychiatric/Behavioral: Negative for agitation and behavioral problems.     Objective:     Vital Signs (Most Recent):  Temp: 98.1 °F (36.7 °C) (06/23/20 0806)  Pulse: 88 (06/23/20 0806)  Resp: 18 (06/23/20 0806)  BP: (!) 152/75 (06/23/20 0806)  SpO2: 98 % (06/23/20 0806) Vital Signs (24h Range):  Temp:  [98.1 °F (36.7 °C)] 98.1 °F (36.7 °C)  Pulse:  [88] 88  Resp:  [18] 18  SpO2:  [98 %] 98 %  BP: (152)/(75) 152/75     Weight: 63.5 kg (140 lb) (06/23/20 0806)  Body mass index is 25.61 kg/m².    No intake or output data in the 24 hours ending 06/23/20 0825    Lines/Drains/Airways     Peripheral Intravenous Line                 Peripheral IV - Single Lumen 06/23/20 0812 20 G Right Hand less than 1 day                Physical Exam  Vitals signs and nursing note reviewed.   Constitutional:       General: She is not in acute distress.     Appearance: She is well-developed. She is not diaphoretic.   HENT:      Head: Normocephalic and atraumatic.   Eyes:      General: No scleral icterus.     Conjunctiva/sclera: Conjunctivae normal.   Neck:      Musculoskeletal: Normal range of motion and neck supple.      Thyroid: No thyromegaly.      Trachea: No tracheal deviation.   Cardiovascular:      Rate and Rhythm: Normal rate and regular rhythm.      Heart sounds: No murmur. No friction rub. No gallop.    Pulmonary:      Effort: Pulmonary effort is normal. No respiratory distress.      Breath sounds: Normal breath sounds. No wheezing.   Abdominal:      General: Bowel sounds are normal. There is no distension.      Palpations: Abdomen is soft.      Tenderness: There is no abdominal tenderness.   Musculoskeletal:      Right wrist: She exhibits normal range of motion and no tenderness.      Left wrist: She exhibits normal range of motion and no tenderness.   Lymphadenopathy:      Head:      Right side of head: No submental or submandibular adenopathy.      Left side of head: No submental or  submandibular adenopathy.   Skin:     General: Skin is warm and dry.      Findings: No erythema or rash.   Neurological:      Mental Status: She is alert and oriented to person, place, and time.   Psychiatric:         Mood and Affect: Mood normal.         Behavior: Behavior normal.         Significant Labs: reviewed    Significant Imaging:   reviewed    Assessment/Plan:     Active Diagnoses:    Diagnosis Date Noted POA    Screening for malignant neoplasm of colon [Z12.11] 06/23/2020 Not Applicable      Problems Resolved During this Admission:     Plan  1. Colonoscopy    Madelyn Finch MD  Gastroenterology  [facility]

## 2020-06-23 NOTE — TRANSFER OF CARE
"Anesthesia Transfer of Care Note    Patient: Julisa Jensen    Procedure(s) Performed: Procedure(s) (LRB):  COLONOSCOPY (N/A)    Patient location: GI    Anesthesia Type: general    Transport from OR: Transported from OR on 6-10 L/min O2 by face mask with adequate spontaneous ventilation    Post pain: adequate analgesia    Post assessment: no apparent anesthetic complications and tolerated procedure well    Post vital signs: stable    Level of consciousness: responds to stimulation    Nausea/Vomiting: no nausea/vomiting    Complications: none    Transfer of care protocol was followed      Last vitals:   Visit Vitals  /60 (BP Location: Left arm, Patient Position: Lying)   Pulse 74   Temp 98.2 °F (36.8 °C) (Temporal)   Resp 19   Ht 5' 2" (1.575 m)   Wt 63.5 kg (140 lb)   SpO2 98%   Breastfeeding No   BMI 25.61 kg/m²     "

## 2020-06-23 NOTE — PLAN OF CARE
Yair Silva #[id number] used to review discharge instructions with the patient. Patient verbalized understanding and stated that she had no questions at this time. Patient VSS and no complaints of pain or discomfort noted at this time.

## 2020-06-23 NOTE — ANESTHESIA PREPROCEDURE EVALUATION
06/23/2020  Julisa Jensen is a 70 y.o., female.    Anesthesia Evaluation     I have reviewed the Nursing Notes.    I have reviewed the Medications.     Review of Systems  Anesthesia Hx:  No problems with previous Anesthesia Denies Hx of Anesthetic complications Denies Family Hx of Anesthesia complications.    Social:  Non-Smoker, No Alcohol Use    Hematology/Oncology:  Hematology Normal   Oncology Normal     EENT/Dental:EENT/Dental Normal   Cardiovascular:   Exercise tolerance: good Hypertension    Pulmonary:  Pulmonary Normal    Renal/:  Renal/ Normal     Hepatic/GI:  Hepatic/GI Normal    Musculoskeletal:  Musculoskeletal Normal    Neurological:  Neurology Normal    Endocrine:  Endocrine Normal        Physical Exam  General:  Well nourished    Airway/Jaw/Neck:  Airway Findings: Mouth Opening: Normal Tongue: Normal  General Airway Assessment: Adult  Mallampati: II  TM Distance: Normal, at least 6 cm  Jaw/Neck Findings:     Neck ROM: Normal ROM      Dental:  Dental Findings: In tact        Mental Status:  Mental Status Findings:  Cooperative, Alert and Oriented         Anesthesia Plan  Type of Anesthesia, risks & benefits discussed:  Anesthesia Type:  MAC  Patient's Preference: MAC  Intra-op Monitoring Plan:   Intra-op Monitoring Plan Comments:   Post Op Pain Control Plan:   Post Op Pain Control Plan Comments:   Induction:   IV  Beta Blocker:         Informed Consent: Patient understands risks and agrees with Anesthesia plan.  Questions answered. Anesthesia consent signed with patient.  ASA Score: 2     Day of Surgery Review of History & Physical:            Ready For Surgery From Anesthesia Perspective.

## 2020-06-26 LAB
FINAL PATHOLOGIC DIAGNOSIS: NORMAL
GROSS: NORMAL
MICROSCOPIC EXAM: NORMAL

## 2020-06-29 ENCOUNTER — CLINICAL SUPPORT (OUTPATIENT)
Dept: REHABILITATION | Facility: HOSPITAL | Age: 70
End: 2020-06-29
Attending: INTERNAL MEDICINE
Payer: MEDICARE

## 2020-06-29 DIAGNOSIS — R29.898 DECREASED STRENGTH OF LOWER EXTREMITY: ICD-10-CM

## 2020-06-29 DIAGNOSIS — M25.561 ACUTE PAIN OF RIGHT KNEE: ICD-10-CM

## 2020-06-29 DIAGNOSIS — R26.89 IMPAIRED GAIT AND MOBILITY: ICD-10-CM

## 2020-06-29 PROCEDURE — 97140 MANUAL THERAPY 1/> REGIONS: CPT | Mod: PN

## 2020-06-29 PROCEDURE — 97110 THERAPEUTIC EXERCISES: CPT | Mod: PN

## 2020-06-29 NOTE — PROGRESS NOTES
"  Physical Therapy Treatment Note     Name: Julisa Jensen  Clinic Number: [id number]    Therapy Diagnosis:   Encounter Diagnoses   Name Primary?    Acute pain of right knee     Impaired gait and mobility     Decreased strength of lower extremity      Physician: Antolin Verduzco*    Visit Date: 6/29/2020    Physician Orders: PT Eval and Treat  Medical Diagnosis from Referral: M25.569 (ICD-10-CM) - Knee pain, unspecified chronicity, unspecified laterality  Evaluation Date: 6/11/2020  Authorization Period Expiration: 8/11/2020  Plan of Care Expiration: 7/17/2020  Visit # / Visits authorized: 3/12  FOTO: 3/10    Visit:  88.10  Total: 201.30     Time In: 7:35AM (pt arrived 5 min late to appointment)  Time Out: 8:19AM  Total Billable Time: 44 minutes (2 TE, 1 manual)    Precautions: Standard    Subjective   Pt reports: (through interpretor, Sha) She is feeling much better since last visit. On Saturday night, she had significant pain in her medial right knee, but it has improved since then. She also reports her HEP is going well and does not have any questions about it.  She was compliant with home exercise program.  Response to previous treatment: No adverse response  Functional change: None reported    Pain: 0/10  Location: right knee      Objective     Julisa received therapeutic exercises to develop strength, endurance and ROM for 34 minutes including:    -- Quad Sets: 5" x 10  -- SLR: 2 x 10 (R only)  -- Sidelying Hip ABD: 2 x 10 (R only)  -- SL clamshells (no band) 2 x 10 (R only)  -- Mini Squats 2 x 10  -- Standing Hip ABD 2 x 10 B  -- Standing Hip EXT 2 x 10 B  -- Standing Heel Raises 3 x 10 B    Julisa received the following manual therapy techniques: Joint mobilizations, gentle PROM, and manual stretching were applied to the RLE to address pain and decreased ROM for 10 minutes, including:    -- PROM with gentle overpressure (flexion/extension)  -- Manual HS stretch 3 x 30"  -- Grade II-III " "tibiofemoral mobilizations into flexion/extension with rotational components; gentle patellar mobilizations    Home Exercises Provided and Patient Education Provided     Education provided:   - Continue HEP  - Review general POC and purpose of BLE strengthening    Written Home Exercises Provided: Patient instructed to cont prior HEP.  Exercises were reviewed and Julisa was able to demonstrate them prior to the end of the session.  Julisa demonstrated good  understanding of the education provided.     See EMR under Patient Instructions for exercises provided prior visit.    Assessment     Julisa tolerated program well today. Min-mod verbal and tactile cuing needed for correct body mechanics during exercise completion, particularly for mini squats. Pt receptive to cuing. Per self-report, pt responded well to manual therapy with no pain increase; reports manual therapy is "relaxing" through . Pt remains appropriate for PT services in order to progress toward goals and improve functional mobility.     Julisa is progressing well towards her goals.   Pt prognosis is Good.     Pt will continue to benefit from skilled outpatient physical therapy to address the deficits listed in the problem list box on initial evaluation, provide pt/family education and to maximize pt's level of independence in the home and community environment.     Pt's spiritual, cultural and educational needs considered and pt agreeable to plan of care and goals.     Anticipated barriers to physical therapy: Language barrier     Goals:  Short Term Goals:     1. Pt will be independent with HEP supplement PT in improving functional mobility. (PROGRESSING, NOT MET)  2. Pt will improve RLE strength to at least 75% of LLE strength as measured via MicroFet handheld dynamometer in order to improve functional mobility (PROGRESSING, NOT MET)     Long Term Goals:  1. Pt will be independent with updated HEP supplement PT in improving functional " mobility. (PROGRESSING, NOT MET)  2. Pt will improve R LE strength to at least 90% of LLE strength as measured via MicroFet handheld dynamometer in order to improve functional mobility (PROGRESSING, NOT MET)  3. Pt will improve R knee AROM to at least 140 degrees of flexion in order to improve gait and ability to perform ADLs (PROGRESSING, NOT MET)  4. Pt will improve FOTO knee survey score to </= 29% limited in order to demo improved functional mobility (PROGRESSING, NOT MET)  5. Pt will return to exercising outdoors and walking 3 miles with minimal pain. (PROGRESSING, NOT MET)    Plan     Continue with Plan Of Care and progress toward PT goals. Monitor response to today's session.    CLEO NORIEGA, PT

## 2020-07-01 ENCOUNTER — PATIENT MESSAGE (OUTPATIENT)
Dept: GASTROENTEROLOGY | Facility: CLINIC | Age: 70
End: 2020-07-01

## 2020-07-01 ENCOUNTER — CLINICAL SUPPORT (OUTPATIENT)
Dept: REHABILITATION | Facility: HOSPITAL | Age: 70
End: 2020-07-01
Attending: INTERNAL MEDICINE
Payer: MEDICARE

## 2020-07-01 DIAGNOSIS — R29.898 DECREASED STRENGTH OF LOWER EXTREMITY: ICD-10-CM

## 2020-07-01 DIAGNOSIS — R26.89 IMPAIRED GAIT AND MOBILITY: ICD-10-CM

## 2020-07-01 DIAGNOSIS — M25.561 ACUTE PAIN OF RIGHT KNEE: ICD-10-CM

## 2020-07-01 PROCEDURE — 97140 MANUAL THERAPY 1/> REGIONS: CPT | Mod: PN,CQ

## 2020-07-01 PROCEDURE — 97110 THERAPEUTIC EXERCISES: CPT | Mod: PN,CQ

## 2020-07-01 NOTE — PROGRESS NOTES
"  Physical Therapy Treatment Note     Name: Julisa Jensen  Clinic Number: [id number]    Therapy Diagnosis:   Encounter Diagnoses   Name Primary?    Acute pain of right knee     Impaired gait and mobility     Decreased strength of lower extremity      Physician: Antolin Verduzco*    Visit Date: 7/1/2020    Physician Orders: PT Eval and Treat  Medical Diagnosis from Referral: M25.569 (ICD-10-CM) - Knee pain, unspecified chronicity, unspecified laterality  Evaluation Date: 6/11/2020  Authorization Period Expiration: 8/11/2020  Plan of Care Expiration: 7/17/2020  Visit # / Visits authorized: 4/12  FOTO: 4/10    Visit:  88.10  Total: 289.40     Time In: 10:00 AM    Time Out: 10:45 AM  Total Billable Time: 45 minutes (2 TE, 1 manual)    Precautions: Standard    Subjective   Pt reports: Agreeable to PT session. " My right knee is the problem"  She was compliant with home exercise program.  Response to previous treatment: No adverse response  Functional change: None reported    Pain: 0/10 NOT RATED  Location: right knee , posterior/ medial aspect    Objective     Julisa received therapeutic exercises to develop strength, endurance and ROM for 35 minutes including:    -- Quad Sets: 5" x 10  -- SLR: 2 x 10 (R only)  -- Sidelying Hip ABD: 2 x 10 (R only)  -- SL clamshells (no band) 2 x 10 (R only)  -- Mini Squats 2 x 10  -- Standing Hip ABD 2 x 10 B  -- Standing Hip EXT 2 x 10 B  -- Standing Heel Raises 3 x 10 B    Julisa received the following manual therapy techniques: Joint mobilizations, gentle PROM, and manual stretching were applied to the RLE to address pain and decreased ROM for 10 minutes, including:    -- PROM with gentle overpressure (flexion/extension)  NOT PERFORMED TODAY  -- Manual HS stretch 3 x 30"  -- Grade II-III tibiofemoral mobilizations into flexion/extension with rotational components; gentle patellar mobilizations  -- IASTM (Hawk ) medial knee/ subpatellar  Home Exercises Provided " and Patient Education Provided     Education provided:   - Continue HEP  - Review general POC and purpose of BLE strengthening    Written Home Exercises Provided: Patient instructed to cont prior HEP.  Exercises were reviewed and Julisa was able to demonstrate them prior to the end of the session.  Julisa demonstrated good  understanding of the education provided.     See EMR under Patient Instructions for exercises provided prior visit.    Assessment     Pt tolerated session with no reports of increased pain in R knee. She responded well to manual therapy today. Some verbal instructions provided to keep count and for rest breaks between reps.   Julisa is progressing well towards her goals.   Pt prognosis is Good.     Pt will continue to benefit from skilled outpatient physical therapy to address the deficits listed in the problem list box on initial evaluation, provide pt/family education and to maximize pt's level of independence in the home and community environment.     Pt's spiritual, cultural and educational needs considered and pt agreeable to plan of care and goals.     Anticipated barriers to physical therapy: Language barrier     Goals:  Short Term Goals:     1. Pt will be independent with HEP supplement PT in improving functional mobility. (PROGRESSING, NOT MET)  2. Pt will improve RLE strength to at least 75% of LLE strength as measured via MicroFet handheld dynamometer in order to improve functional mobility (PROGRESSING, NOT MET)     Long Term Goals:  1. Pt will be independent with updated HEP supplement PT in improving functional mobility. (PROGRESSING, NOT MET)  2. Pt will improve R LE strength to at least 90% of LLE strength as measured via MicroFet handheld dynamometer in order to improve functional mobility (PROGRESSING, NOT MET)  3. Pt will improve R knee AROM to at least 140 degrees of flexion in order to improve gait and ability to perform ADLs (PROGRESSING, NOT MET)  4. Pt will improve FOTO  knee survey score to </= 29% limited in order to demo improved functional mobility (PROGRESSING, NOT MET)  5. Pt will return to exercising outdoors and walking 3 miles with minimal pain. (PROGRESSING, NOT MET)    Plan     Advance PT as per POC, Monitor response to session     Julien Domínguez, PTA

## 2020-07-02 ENCOUNTER — PATIENT MESSAGE (OUTPATIENT)
Dept: GASTROENTEROLOGY | Facility: CLINIC | Age: 70
End: 2020-07-02

## 2020-08-28 ENCOUNTER — OFFICE VISIT (OUTPATIENT)
Dept: FAMILY MEDICINE | Facility: CLINIC | Age: 70
End: 2020-08-28
Payer: MEDICARE

## 2020-08-28 ENCOUNTER — LAB VISIT (OUTPATIENT)
Dept: LAB | Facility: HOSPITAL | Age: 70
End: 2020-08-28
Attending: NURSE PRACTITIONER
Payer: MEDICARE

## 2020-08-28 VITALS
SYSTOLIC BLOOD PRESSURE: 124 MMHG | DIASTOLIC BLOOD PRESSURE: 60 MMHG | HEART RATE: 82 BPM | WEIGHT: 148.81 LBS | TEMPERATURE: 99 F | OXYGEN SATURATION: 96 % | BODY MASS INDEX: 27.22 KG/M2

## 2020-08-28 DIAGNOSIS — I10 ESSENTIAL HYPERTENSION: ICD-10-CM

## 2020-08-28 DIAGNOSIS — M15.2 BOUCHARD'S NODES (WITH ARTHROPATHY): ICD-10-CM

## 2020-08-28 DIAGNOSIS — M79.642 BILATERAL HAND PAIN: ICD-10-CM

## 2020-08-28 DIAGNOSIS — M25.511 BILATERAL SHOULDER PAIN, UNSPECIFIED CHRONICITY: Primary | ICD-10-CM

## 2020-08-28 DIAGNOSIS — M25.512 BILATERAL SHOULDER PAIN, UNSPECIFIED CHRONICITY: Primary | ICD-10-CM

## 2020-08-28 DIAGNOSIS — M79.641 BILATERAL HAND PAIN: ICD-10-CM

## 2020-08-28 DIAGNOSIS — E66.3 OVERWEIGHT (BMI 25.0-29.9): ICD-10-CM

## 2020-08-28 LAB
ALBUMIN SERPL BCP-MCNC: 3.8 G/DL (ref 3.5–5.2)
ALP SERPL-CCNC: 84 U/L (ref 55–135)
ALT SERPL W/O P-5'-P-CCNC: 24 U/L (ref 10–44)
ANION GAP SERPL CALC-SCNC: 9 MMOL/L (ref 8–16)
AST SERPL-CCNC: 22 U/L (ref 10–40)
BASOPHILS # BLD AUTO: 0.05 K/UL (ref 0–0.2)
BASOPHILS NFR BLD: 0.8 % (ref 0–1.9)
BILIRUB SERPL-MCNC: 0.3 MG/DL (ref 0.1–1)
BUN SERPL-MCNC: 14 MG/DL (ref 8–23)
CALCIUM SERPL-MCNC: 9.4 MG/DL (ref 8.7–10.5)
CHLORIDE SERPL-SCNC: 104 MMOL/L (ref 95–110)
CO2 SERPL-SCNC: 29 MMOL/L (ref 23–29)
CREAT SERPL-MCNC: 0.7 MG/DL (ref 0.5–1.4)
CRP SERPL-MCNC: 2 MG/L (ref 0–8.2)
DIFFERENTIAL METHOD: NORMAL
EOSINOPHIL # BLD AUTO: 0.1 K/UL (ref 0–0.5)
EOSINOPHIL NFR BLD: 1.1 % (ref 0–8)
ERYTHROCYTE [DISTWIDTH] IN BLOOD BY AUTOMATED COUNT: 12.8 % (ref 11.5–14.5)
ERYTHROCYTE [SEDIMENTATION RATE] IN BLOOD BY WESTERGREN METHOD: 21 MM/HR (ref 0–36)
EST. GFR  (AFRICAN AMERICAN): >60 ML/MIN/1.73 M^2
EST. GFR  (NON AFRICAN AMERICAN): >60 ML/MIN/1.73 M^2
GLUCOSE SERPL-MCNC: 95 MG/DL (ref 70–110)
HCT VFR BLD AUTO: 40.2 % (ref 37–48.5)
HGB BLD-MCNC: 13 G/DL (ref 12–16)
IMM GRANULOCYTES # BLD AUTO: 0.02 K/UL (ref 0–0.04)
IMM GRANULOCYTES NFR BLD AUTO: 0.3 % (ref 0–0.5)
LYMPHOCYTES # BLD AUTO: 2.6 K/UL (ref 1–4.8)
LYMPHOCYTES NFR BLD: 42.7 % (ref 18–48)
MCH RBC QN AUTO: 28.4 PG (ref 27–31)
MCHC RBC AUTO-ENTMCNC: 32.3 G/DL (ref 32–36)
MCV RBC AUTO: 88 FL (ref 82–98)
MONOCYTES # BLD AUTO: 0.3 K/UL (ref 0.3–1)
MONOCYTES NFR BLD: 5.4 % (ref 4–15)
NEUTROPHILS # BLD AUTO: 3 K/UL (ref 1.8–7.7)
NEUTROPHILS NFR BLD: 49.7 % (ref 38–73)
NRBC BLD-RTO: 0 /100 WBC
PLATELET # BLD AUTO: 320 K/UL (ref 150–350)
PMV BLD AUTO: 11.3 FL (ref 9.2–12.9)
POTASSIUM SERPL-SCNC: 3.5 MMOL/L (ref 3.5–5.1)
PROT SERPL-MCNC: 7.9 G/DL (ref 6–8.4)
RBC # BLD AUTO: 4.57 M/UL (ref 4–5.4)
RHEUMATOID FACT SERPL-ACNC: 12 IU/ML (ref 0–15)
SODIUM SERPL-SCNC: 142 MMOL/L (ref 136–145)
URATE SERPL-MCNC: 4.4 MG/DL (ref 2.4–5.7)
WBC # BLD AUTO: 6.09 K/UL (ref 3.9–12.7)

## 2020-08-28 PROCEDURE — 99999 PR PBB SHADOW E&M-EST. PATIENT-LVL IV: CPT | Mod: PBBFAC,,, | Performed by: NURSE PRACTITIONER

## 2020-08-28 PROCEDURE — 99214 OFFICE O/P EST MOD 30 MIN: CPT | Mod: S$GLB,,, | Performed by: NURSE PRACTITIONER

## 2020-08-28 PROCEDURE — 3078F PR MOST RECENT DIASTOLIC BLOOD PRESSURE < 80 MM HG: ICD-10-PCS | Mod: CPTII,S$GLB,, | Performed by: NURSE PRACTITIONER

## 2020-08-28 PROCEDURE — 1125F PR PAIN SEVERITY QUANTIFIED, PAIN PRESENT: ICD-10-PCS | Mod: S$GLB,,, | Performed by: NURSE PRACTITIONER

## 2020-08-28 PROCEDURE — 86431 RHEUMATOID FACTOR QUANT: CPT

## 2020-08-28 PROCEDURE — 84550 ASSAY OF BLOOD/URIC ACID: CPT

## 2020-08-28 PROCEDURE — 80053 COMPREHEN METABOLIC PANEL: CPT

## 2020-08-28 PROCEDURE — 36415 COLL VENOUS BLD VENIPUNCTURE: CPT | Mod: PO

## 2020-08-28 PROCEDURE — 85025 COMPLETE CBC W/AUTO DIFF WBC: CPT

## 2020-08-28 PROCEDURE — 99499 UNLISTED E&M SERVICE: CPT | Mod: S$GLB,,, | Performed by: NURSE PRACTITIONER

## 2020-08-28 PROCEDURE — 3074F SYST BP LT 130 MM HG: CPT | Mod: CPTII,S$GLB,, | Performed by: NURSE PRACTITIONER

## 2020-08-28 PROCEDURE — 3078F DIAST BP <80 MM HG: CPT | Mod: CPTII,S$GLB,, | Performed by: NURSE PRACTITIONER

## 2020-08-28 PROCEDURE — 86038 ANTINUCLEAR ANTIBODIES: CPT

## 2020-08-28 PROCEDURE — 99499 RISK ADDL DX/OHS AUDIT: ICD-10-PCS | Mod: S$GLB,,, | Performed by: NURSE PRACTITIONER

## 2020-08-28 PROCEDURE — 1159F PR MEDICATION LIST DOCUMENTED IN MEDICAL RECORD: ICD-10-PCS | Mod: S$GLB,,, | Performed by: NURSE PRACTITIONER

## 2020-08-28 PROCEDURE — 99214 PR OFFICE/OUTPT VISIT, EST, LEVL IV, 30-39 MIN: ICD-10-PCS | Mod: S$GLB,,, | Performed by: NURSE PRACTITIONER

## 2020-08-28 PROCEDURE — 1159F MED LIST DOCD IN RCRD: CPT | Mod: S$GLB,,, | Performed by: NURSE PRACTITIONER

## 2020-08-28 PROCEDURE — 3074F PR MOST RECENT SYSTOLIC BLOOD PRESSURE < 130 MM HG: ICD-10-PCS | Mod: CPTII,S$GLB,, | Performed by: NURSE PRACTITIONER

## 2020-08-28 PROCEDURE — 86140 C-REACTIVE PROTEIN: CPT

## 2020-08-28 PROCEDURE — 85652 RBC SED RATE AUTOMATED: CPT

## 2020-08-28 PROCEDURE — 1101F PT FALLS ASSESS-DOCD LE1/YR: CPT | Mod: CPTII,S$GLB,, | Performed by: NURSE PRACTITIONER

## 2020-08-28 PROCEDURE — 1125F AMNT PAIN NOTED PAIN PRSNT: CPT | Mod: S$GLB,,, | Performed by: NURSE PRACTITIONER

## 2020-08-28 PROCEDURE — 3008F PR BODY MASS INDEX (BMI) DOCUMENTED: ICD-10-PCS | Mod: CPTII,S$GLB,, | Performed by: NURSE PRACTITIONER

## 2020-08-28 PROCEDURE — 1101F PR PT FALLS ASSESS DOC 0-1 FALLS W/OUT INJ PAST YR: ICD-10-PCS | Mod: CPTII,S$GLB,, | Performed by: NURSE PRACTITIONER

## 2020-08-28 PROCEDURE — 3008F BODY MASS INDEX DOCD: CPT | Mod: CPTII,S$GLB,, | Performed by: NURSE PRACTITIONER

## 2020-08-28 PROCEDURE — 99999 PR PBB SHADOW E&M-EST. PATIENT-LVL IV: ICD-10-PCS | Mod: PBBFAC,,, | Performed by: NURSE PRACTITIONER

## 2020-08-28 RX ORDER — IBUPROFEN 400 MG/1
400 TABLET ORAL 3 TIMES DAILY
Qty: 30 TABLET | Refills: 2 | Status: SHIPPED | OUTPATIENT
Start: 2020-08-28 | End: 2021-06-17

## 2020-08-28 NOTE — LETTER
August 28, 2020      [facility]  [address]  [place] [address]  Phone: [phone]  Fax: [phone]       Patient: Julisa Jensen   YOB: 1950  Date of Visit: 08/28/2020    To Whom It May Concern:    Jorden Jensen was seen by me at [facility] on 08/28/2020. She may return to work on 8/28/2020 with no restrictions. If you have any questions or concerns, or if I can be of further assistance, please do not hesitate to contact me.    Sincerely,        Elizabeth Dickens NP

## 2020-08-28 NOTE — PROGRESS NOTES
Subjective:       Patient ID: Julisa Jensen is a 70 y.o. female.    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 She presents today for urgent care visit with c/o bilateral shoulder pain and bilateral hand pain/swelling. PMH includes    Patient Active Problem List:     Meningioma     Complete tear of left rotator cuff     OAG (open angle glaucoma) suspect, low risk, bilateral     Essential hypertension     Acute pain of right knee     Impaired gait and mobility     Decreased strength of lower extremity    VSS today. Denies fever, chills, chest pain, SOB, dyspnea, or palpitations. Denies numbness/tingling, decreased sensation or weakness to affected extremities. Patient reports she has suffered with this shoulder pain in the past and has been seen by Ortho for steroid injections. In 6/10 pain to shoulders now but worsened with activity/movement. Has not tried any home treatments. Pain radiates down both arms. Also having bilateral hand pain with mild swelling to distal IP joints. Patient requesting to see Ortho for more steroid injections. See more below.    Arm Pain   The incident occurred more than 1 week ago. There was no injury mechanism. The pain is present in the left shoulder and right shoulder. The quality of the pain is described as aching. The pain radiates to the right arm and left arm. The pain is at a severity of 6/10. The pain is moderate. The pain has been fluctuating since the incident. Pertinent negatives include no chest pain, muscle weakness, numbness or tingling. The symptoms are aggravated by movement and lifting. She has tried nothing for the symptoms.   Hand Pain   The incident occurred more than 1 week ago. There was no injury mechanism. The pain is present in the left fingers, left hand, right fingers and right hand. The quality of the pain is described as aching. The pain does not radiate.  The pain is at a severity of 6/10. The pain is moderate. The pain has been fluctuating since the incident. Pertinent negatives include no chest pain, muscle weakness, numbness or tingling. The symptoms are aggravated by palpation and movement. Treatments tried: hand exercises. The treatment provided mild relief.     Review of Systems   Constitutional: Negative for chills, fatigue and fever.   HENT: Negative for sore throat.    Eyes: Negative for visual disturbance.   Respiratory: Negative for cough, chest tightness and shortness of breath.    Cardiovascular: Negative for chest pain, palpitations and leg swelling.   Gastrointestinal: Negative for abdominal pain, constipation, diarrhea, nausea and vomiting.   Genitourinary: Negative for difficulty urinating.   Musculoskeletal: Positive for arthralgias and joint swelling. Negative for gait problem and neck stiffness.   Integumentary:  Negative for rash.   Neurological: Negative for dizziness, tingling, weakness, numbness and headaches.         Objective:      Physical Exam  Vitals signs reviewed.   Constitutional:       General: She is not in acute distress.     Appearance: Normal appearance. She is well-developed, well-groomed and overweight.   HENT:      Head: Normocephalic and atraumatic.      Right Ear: Hearing, tympanic membrane, ear canal and external ear normal. No drainage.      Left Ear: Hearing, tympanic membrane, ear canal and external ear normal. No drainage.      Nose: Nose normal. No septal deviation.      Mouth/Throat:      Mouth: Mucous membranes are not pale. No oral lesions.      Pharynx: Uvula midline. No oropharyngeal exudate.   Eyes:      General: Lids are normal.         Right eye: No discharge.         Left eye: No discharge.      Conjunctiva/sclera: Conjunctivae normal.      Pupils: Pupils are equal, round, and reactive to light.   Neck:      Musculoskeletal: Full passive range of motion without pain, normal range of motion and neck supple. No  neck rigidity.      Vascular: No carotid bruit or JVD.      Trachea: Trachea normal. No tracheal deviation.   Cardiovascular:      Rate and Rhythm: Normal rate and regular rhythm.      Pulses: Normal pulses.           Carotid pulses are 2+ on the right side and 2+ on the left side.       Radial pulses are 2+ on the right side and 2+ on the left side.        Dorsalis pedis pulses are 2+ on the right side and 2+ on the left side.        Posterior tibial pulses are 2+ on the right side and 2+ on the left side.      Heart sounds: Normal heart sounds, S1 normal and S2 normal. No murmur.   Pulmonary:      Effort: Pulmonary effort is normal. No respiratory distress.      Breath sounds: Normal breath sounds. No wheezing or rhonchi.   Abdominal:      General: Bowel sounds are normal. There is no distension.      Palpations: Abdomen is soft.      Tenderness: There is no abdominal tenderness.   Musculoskeletal:         General: Swelling and tenderness present.      Right shoulder: She exhibits decreased range of motion, tenderness and pain.      Left shoulder: She exhibits decreased range of motion, tenderness and pain. She exhibits no swelling.      Right lower leg: No edema.      Left lower leg: No edema.      Comments: Swelling and tenderness to distal IP joints of both hands   Skin:     General: Skin is warm and dry.      Capillary Refill: Capillary refill takes less than 2 seconds.      Coloration: Skin is not pale.   Neurological:      Mental Status: She is alert and oriented to person, place, and time. She is not disoriented.      Motor: No abnormal muscle tone.      Coordination: Coordination normal.      Gait: Gait normal.   Psychiatric:         Mood and Affect: Mood normal.         Speech: Speech normal.         Behavior: Behavior normal. Behavior is cooperative.         Thought Content: Thought content normal.         Assessment:       1. Bilateral shoulder pain, unspecified chronicity    2. Bilateral hand pain     3. Jane's nodes (with arthropathy)    4. Essential hypertension    5. Overweight (BMI 25.0-29.9)        Plan:       Julisa was seen today for arm pain and hand pain.    Diagnoses and all orders for this visit:    Bilateral shoulder pain, unspecified chronicity  -     Ambulatory referral/consult to Orthopedics; Future  -     ibuprofen (ADVIL,MOTRIN) 400 MG tablet; Take 1 tablet (400 mg total) by mouth 3 (three) times daily.    Bilateral hand pain  -     Uric acid; Future  -     ibuprofen (ADVIL,MOTRIN) 400 MG tablet; Take 1 tablet (400 mg total) by mouth 3 (three) times daily.    Jane's nodes (with arthropathy)  -     C-reactive protein; Future  -     Sedimentation rate; Future  -     YAMINI Screen w/Reflex; Future  -     Rheumatoid factor; Future  -     Uric acid; Future    Essential hypertension  -     CBC auto differential; Future  -     Comprehensive metabolic panel; Future    Overweight (BMI 25.0-29.9)      - Labs ordered today  - Rx sent to preferred pharmacy  - Follow up with Orthopedics  - RTC in 2 weeks for a follow-up and lab review, or sooner if needed

## 2020-08-31 LAB — ANA SER QL IF: NORMAL

## 2020-09-11 ENCOUNTER — OFFICE VISIT (OUTPATIENT)
Dept: FAMILY MEDICINE | Facility: CLINIC | Age: 70
End: 2020-09-11
Payer: MEDICARE

## 2020-09-11 VITALS
TEMPERATURE: 97 F | SYSTOLIC BLOOD PRESSURE: 120 MMHG | BODY MASS INDEX: 27.3 KG/M2 | OXYGEN SATURATION: 97 % | HEART RATE: 75 BPM | DIASTOLIC BLOOD PRESSURE: 68 MMHG | WEIGHT: 148.38 LBS | HEIGHT: 62 IN

## 2020-09-11 DIAGNOSIS — Z79.899 ENCOUNTER FOR MEDICATION REVIEW: ICD-10-CM

## 2020-09-11 DIAGNOSIS — Z71.2 ENCOUNTER TO DISCUSS TEST RESULTS: ICD-10-CM

## 2020-09-11 DIAGNOSIS — E66.3 OVERWEIGHT (BMI 25.0-29.9): ICD-10-CM

## 2020-09-11 DIAGNOSIS — I10 ESSENTIAL HYPERTENSION: Primary | ICD-10-CM

## 2020-09-11 PROCEDURE — 3078F DIAST BP <80 MM HG: CPT | Mod: CPTII,S$GLB,, | Performed by: NURSE PRACTITIONER

## 2020-09-11 PROCEDURE — 1159F PR MEDICATION LIST DOCUMENTED IN MEDICAL RECORD: ICD-10-PCS | Mod: S$GLB,,, | Performed by: NURSE PRACTITIONER

## 2020-09-11 PROCEDURE — 3008F PR BODY MASS INDEX (BMI) DOCUMENTED: ICD-10-PCS | Mod: CPTII,S$GLB,, | Performed by: NURSE PRACTITIONER

## 2020-09-11 PROCEDURE — 99214 PR OFFICE/OUTPT VISIT, EST, LEVL IV, 30-39 MIN: ICD-10-PCS | Mod: S$GLB,,, | Performed by: NURSE PRACTITIONER

## 2020-09-11 PROCEDURE — 3008F BODY MASS INDEX DOCD: CPT | Mod: CPTII,S$GLB,, | Performed by: NURSE PRACTITIONER

## 2020-09-11 PROCEDURE — 99999 PR PBB SHADOW E&M-EST. PATIENT-LVL III: ICD-10-PCS | Mod: PBBFAC,,, | Performed by: NURSE PRACTITIONER

## 2020-09-11 PROCEDURE — 1101F PT FALLS ASSESS-DOCD LE1/YR: CPT | Mod: CPTII,S$GLB,, | Performed by: NURSE PRACTITIONER

## 2020-09-11 PROCEDURE — 99999 PR PBB SHADOW E&M-EST. PATIENT-LVL III: CPT | Mod: PBBFAC,,, | Performed by: NURSE PRACTITIONER

## 2020-09-11 PROCEDURE — 1101F PR PT FALLS ASSESS DOC 0-1 FALLS W/OUT INJ PAST YR: ICD-10-PCS | Mod: CPTII,S$GLB,, | Performed by: NURSE PRACTITIONER

## 2020-09-11 PROCEDURE — 1159F MED LIST DOCD IN RCRD: CPT | Mod: S$GLB,,, | Performed by: NURSE PRACTITIONER

## 2020-09-11 PROCEDURE — 3078F PR MOST RECENT DIASTOLIC BLOOD PRESSURE < 80 MM HG: ICD-10-PCS | Mod: CPTII,S$GLB,, | Performed by: NURSE PRACTITIONER

## 2020-09-11 PROCEDURE — 99214 OFFICE O/P EST MOD 30 MIN: CPT | Mod: S$GLB,,, | Performed by: NURSE PRACTITIONER

## 2020-09-11 PROCEDURE — 1126F AMNT PAIN NOTED NONE PRSNT: CPT | Mod: S$GLB,,, | Performed by: NURSE PRACTITIONER

## 2020-09-11 PROCEDURE — 1126F PR PAIN SEVERITY QUANTIFIED, NO PAIN PRESENT: ICD-10-PCS | Mod: S$GLB,,, | Performed by: NURSE PRACTITIONER

## 2020-09-11 PROCEDURE — 3074F SYST BP LT 130 MM HG: CPT | Mod: CPTII,S$GLB,, | Performed by: NURSE PRACTITIONER

## 2020-09-11 PROCEDURE — 3074F PR MOST RECENT SYSTOLIC BLOOD PRESSURE < 130 MM HG: ICD-10-PCS | Mod: CPTII,S$GLB,, | Performed by: NURSE PRACTITIONER

## 2020-09-11 RX ORDER — HYDROCHLOROTHIAZIDE 25 MG/1
25 TABLET ORAL DAILY
Qty: 90 TABLET | Refills: 1 | Status: SHIPPED | OUTPATIENT
Start: 2020-09-11 | End: 2020-10-14

## 2020-09-11 RX ORDER — AMLODIPINE BESYLATE 5 MG/1
5 TABLET ORAL DAILY
Qty: 90 TABLET | Refills: 1 | Status: SHIPPED | OUTPATIENT
Start: 2020-09-11 | End: 2021-06-23 | Stop reason: SDUPTHER

## 2020-09-11 NOTE — PROGRESS NOTES
Subjective:       Patient ID: Julisa Jensen is a 70 y.o. female.    Chief Complaint: Results (of labs)    This is a 69 yo female patient, formerly of Dr. Verduzco, who is known to me. She presents today for medication and lab review. PMH includes    Patient Active Problem List:     Meningioma     Complete tear of left rotator cuff     OAG (open angle glaucoma) suspect, low risk, bilateral     Essential hypertension     Impaired gait and mobility     Decreased strength of lower extremity    VSS today. Patient was seen by me on 8/28/20 with c/o bilateral hand pain and bilateral shoulder pain, both of which have mostlly resolved. Reviewed labs which were all normal. Patient reports she eats a well-balanced diet and is limiting her salt intake. Also drinking plenty water and staying active. No issues or concerns today.         Review of Systems   Constitutional: Negative for chills, fatigue and fever.   HENT: Negative for sore throat and trouble swallowing.    Eyes: Negative for visual disturbance.   Respiratory: Negative for cough, chest tightness and shortness of breath.    Cardiovascular: Negative for chest pain, palpitations and leg swelling.   Gastrointestinal: Negative for diarrhea, nausea and vomiting.   Endocrine: Negative for polydipsia, polyphagia and polyuria.   Genitourinary: Negative for difficulty urinating.   Musculoskeletal: Positive for arthralgias (chronic; intermittent). Negative for gait problem and neck stiffness.   Integumentary:  Negative for rash.   Neurological: Negative for dizziness, weakness, numbness, headaches, disturbances in coordination and coordination difficulties.         Objective:      Physical Exam  Vitals signs reviewed.   Constitutional:       General: She is not in acute distress.     Appearance: Normal appearance. She is well-developed, well-groomed and overweight.   HENT:      Head: Normocephalic and atraumatic.      Right Ear: Hearing, tympanic membrane, ear canal  and external ear normal. No drainage.      Left Ear: Hearing, tympanic membrane, ear canal and external ear normal. No drainage.      Nose: Nose normal. No septal deviation.      Mouth/Throat:      Mouth: Mucous membranes are not pale. No oral lesions.      Pharynx: Uvula midline.   Eyes:      General: Lids are normal.         Right eye: No discharge.         Left eye: No discharge.   Neck:      Musculoskeletal: Full passive range of motion without pain, normal range of motion and neck supple. No neck rigidity.      Vascular: No carotid bruit or JVD.      Trachea: Trachea normal. No tracheal deviation.   Cardiovascular:      Rate and Rhythm: Normal rate and regular rhythm.      Pulses: Normal pulses.           Carotid pulses are 2+ on the right side and 2+ on the left side.       Radial pulses are 2+ on the right side and 2+ on the left side.        Dorsalis pedis pulses are 2+ on the right side and 2+ on the left side.      Heart sounds: Normal heart sounds, S1 normal and S2 normal. No murmur.   Pulmonary:      Effort: Pulmonary effort is normal. No respiratory distress.      Breath sounds: Normal breath sounds. No wheezing or rhonchi.   Abdominal:      General: Bowel sounds are normal. There is no distension.      Palpations: Abdomen is soft.      Tenderness: There is no abdominal tenderness.   Musculoskeletal: Normal range of motion.      Right lower leg: No edema.      Left lower leg: No edema.      Comments: Swelling still noted to distal IP joints of both hands, no longer TTP   Skin:     General: Skin is warm and dry.      Capillary Refill: Capillary refill takes less than 2 seconds.      Coloration: Skin is not pale.   Neurological:      Mental Status: She is alert and oriented to person, place, and time. She is not disoriented.      Motor: No abnormal muscle tone.      Coordination: Coordination normal.      Gait: Gait normal.   Psychiatric:         Mood and Affect: Mood normal.         Speech: Speech  normal.         Behavior: Behavior normal. Behavior is cooperative.         Thought Content: Thought content normal.         Assessment:       1. Essential hypertension    2. Encounter to discuss test results    3. Encounter for medication review    4. Overweight (BMI 25.0-29.9)        Plan:       Julisa was seen today for results.    Diagnoses and all orders for this visit:    Essential hypertension  -     amLODIPine (NORVASC) 5 MG tablet; Take 1 tablet (5 mg total) by mouth once daily.  -     hydroCHLOROthiazide (HYDRODIURIL) 25 MG tablet; Take 1 tablet (25 mg total) by mouth once daily.    Encounter to discuss test results    Encounter for medication review    Overweight (BMI 25.0-29.9)        - Discussed recent lab results  - Refills sent to preferred pharmacy  - Encouraged patient to continue to eat a low salt/low fat diet and discussed importance of engaging in physical activity at least 5x/week for a minimum of 30 min/day  - Will follow-up with Orthopedics  - Due to establish care with Dr. Stafford later this month  - RTC as needed

## 2020-09-16 ENCOUNTER — DOCUMENTATION ONLY (OUTPATIENT)
Dept: REHABILITATION | Facility: HOSPITAL | Age: 70
End: 2020-09-16

## 2020-09-16 DIAGNOSIS — R29.898 DECREASED STRENGTH OF LOWER EXTREMITY: ICD-10-CM

## 2020-09-16 DIAGNOSIS — R26.89 IMPAIRED GAIT AND MOBILITY: Primary | ICD-10-CM

## 2020-09-16 NOTE — PROGRESS NOTES
Pt was evaluated on 6/11/2020 and was seen 4 times for PT. Pt has not attended PT since 7/1/2020. Pt was given HEP. Current status is unknown. Pt to be d/c'd at this time.

## 2020-09-18 ENCOUNTER — TELEPHONE (OUTPATIENT)
Dept: ORTHOPEDICS | Facility: CLINIC | Age: 70
End: 2020-09-18

## 2020-09-18 DIAGNOSIS — M25.511 BILATERAL SHOULDER PAIN, UNSPECIFIED CHRONICITY: Primary | ICD-10-CM

## 2020-09-18 DIAGNOSIS — M25.512 BILATERAL SHOULDER PAIN, UNSPECIFIED CHRONICITY: Primary | ICD-10-CM

## 2020-09-30 ENCOUNTER — PATIENT OUTREACH (OUTPATIENT)
Dept: ADMINISTRATIVE | Facility: HOSPITAL | Age: 70
End: 2020-09-30

## 2020-10-14 ENCOUNTER — OFFICE VISIT (OUTPATIENT)
Dept: FAMILY MEDICINE | Facility: CLINIC | Age: 70
End: 2020-10-14
Payer: MEDICARE

## 2020-10-14 VITALS
OXYGEN SATURATION: 98 % | DIASTOLIC BLOOD PRESSURE: 80 MMHG | BODY MASS INDEX: 27.1 KG/M2 | TEMPERATURE: 98 F | SYSTOLIC BLOOD PRESSURE: 127 MMHG | HEART RATE: 64 BPM | HEIGHT: 62 IN | WEIGHT: 147.25 LBS

## 2020-10-14 DIAGNOSIS — R53.1 WEAKNESS: ICD-10-CM

## 2020-10-14 DIAGNOSIS — M25.561 MEDIAL KNEE PAIN, RIGHT: ICD-10-CM

## 2020-10-14 DIAGNOSIS — Z23 NEED FOR PNEUMOCOCCAL VACCINATION: ICD-10-CM

## 2020-10-14 DIAGNOSIS — Z12.31 ENCOUNTER FOR SCREENING MAMMOGRAM FOR MALIGNANT NEOPLASM OF BREAST: ICD-10-CM

## 2020-10-14 DIAGNOSIS — I10 ESSENTIAL HYPERTENSION: Primary | ICD-10-CM

## 2020-10-14 DIAGNOSIS — K90.9 INTESTINAL MALABSORPTION, UNSPECIFIED TYPE: ICD-10-CM

## 2020-10-14 DIAGNOSIS — Z23 NEEDS FLU SHOT: ICD-10-CM

## 2020-10-14 PROCEDURE — G0008 FLU VACCINE - QUADRIVALENT - ADJUVANTED: ICD-10-PCS | Mod: S$GLB,,, | Performed by: INTERNAL MEDICINE

## 2020-10-14 PROCEDURE — 3079F PR MOST RECENT DIASTOLIC BLOOD PRESSURE 80-89 MM HG: ICD-10-PCS | Mod: CPTII,S$GLB,, | Performed by: INTERNAL MEDICINE

## 2020-10-14 PROCEDURE — 90694 FLU VACCINE - QUADRIVALENT - ADJUVANTED: ICD-10-PCS | Mod: S$GLB,,, | Performed by: INTERNAL MEDICINE

## 2020-10-14 PROCEDURE — 99999 PR PBB SHADOW E&M-EST. PATIENT-LVL V: ICD-10-PCS | Mod: PBBFAC,,, | Performed by: INTERNAL MEDICINE

## 2020-10-14 PROCEDURE — 93005 EKG 12-LEAD: ICD-10-PCS | Mod: S$GLB,,, | Performed by: INTERNAL MEDICINE

## 2020-10-14 PROCEDURE — 3074F PR MOST RECENT SYSTOLIC BLOOD PRESSURE < 130 MM HG: ICD-10-PCS | Mod: CPTII,S$GLB,, | Performed by: INTERNAL MEDICINE

## 2020-10-14 PROCEDURE — 93010 EKG 12-LEAD: ICD-10-PCS | Mod: S$GLB,,, | Performed by: INTERNAL MEDICINE

## 2020-10-14 PROCEDURE — 99999 PR PBB SHADOW E&M-EST. PATIENT-LVL V: CPT | Mod: PBBFAC,,, | Performed by: INTERNAL MEDICINE

## 2020-10-14 PROCEDURE — 1101F PR PT FALLS ASSESS DOC 0-1 FALLS W/OUT INJ PAST YR: ICD-10-PCS | Mod: CPTII,S$GLB,, | Performed by: INTERNAL MEDICINE

## 2020-10-14 PROCEDURE — 99214 PR OFFICE/OUTPT VISIT, EST, LEVL IV, 30-39 MIN: ICD-10-PCS | Mod: 25,S$GLB,, | Performed by: INTERNAL MEDICINE

## 2020-10-14 PROCEDURE — 93005 ELECTROCARDIOGRAM TRACING: CPT | Mod: S$GLB,,, | Performed by: INTERNAL MEDICINE

## 2020-10-14 PROCEDURE — 90694 VACC AIIV4 NO PRSRV 0.5ML IM: CPT | Mod: S$GLB,,, | Performed by: INTERNAL MEDICINE

## 2020-10-14 PROCEDURE — 1159F MED LIST DOCD IN RCRD: CPT | Mod: S$GLB,,, | Performed by: INTERNAL MEDICINE

## 2020-10-14 PROCEDURE — 1159F PR MEDICATION LIST DOCUMENTED IN MEDICAL RECORD: ICD-10-PCS | Mod: S$GLB,,, | Performed by: INTERNAL MEDICINE

## 2020-10-14 PROCEDURE — 3008F BODY MASS INDEX DOCD: CPT | Mod: CPTII,S$GLB,, | Performed by: INTERNAL MEDICINE

## 2020-10-14 PROCEDURE — 99499 RISK ADDL DX/OHS AUDIT: ICD-10-PCS | Mod: S$GLB,,, | Performed by: INTERNAL MEDICINE

## 2020-10-14 PROCEDURE — 99214 OFFICE O/P EST MOD 30 MIN: CPT | Mod: 25,S$GLB,, | Performed by: INTERNAL MEDICINE

## 2020-10-14 PROCEDURE — 3008F PR BODY MASS INDEX (BMI) DOCUMENTED: ICD-10-PCS | Mod: CPTII,S$GLB,, | Performed by: INTERNAL MEDICINE

## 2020-10-14 PROCEDURE — 1101F PT FALLS ASSESS-DOCD LE1/YR: CPT | Mod: CPTII,S$GLB,, | Performed by: INTERNAL MEDICINE

## 2020-10-14 PROCEDURE — G0008 ADMIN INFLUENZA VIRUS VAC: HCPCS | Mod: S$GLB,,, | Performed by: INTERNAL MEDICINE

## 2020-10-14 PROCEDURE — 93010 ELECTROCARDIOGRAM REPORT: CPT | Mod: S$GLB,,, | Performed by: INTERNAL MEDICINE

## 2020-10-14 PROCEDURE — 1126F PR PAIN SEVERITY QUANTIFIED, NO PAIN PRESENT: ICD-10-PCS | Mod: S$GLB,,, | Performed by: INTERNAL MEDICINE

## 2020-10-14 PROCEDURE — 99499 UNLISTED E&M SERVICE: CPT | Mod: S$GLB,,, | Performed by: INTERNAL MEDICINE

## 2020-10-14 PROCEDURE — 1126F AMNT PAIN NOTED NONE PRSNT: CPT | Mod: S$GLB,,, | Performed by: INTERNAL MEDICINE

## 2020-10-14 PROCEDURE — 3079F DIAST BP 80-89 MM HG: CPT | Mod: CPTII,S$GLB,, | Performed by: INTERNAL MEDICINE

## 2020-10-14 PROCEDURE — 3074F SYST BP LT 130 MM HG: CPT | Mod: CPTII,S$GLB,, | Performed by: INTERNAL MEDICINE

## 2020-10-14 RX ORDER — ASPIRIN 81 MG/1
81 TABLET ORAL DAILY
Qty: 30 TABLET | Refills: 11 | Status: SHIPPED | OUTPATIENT
Start: 2020-10-14 | End: 2023-05-09

## 2020-10-14 RX ORDER — NAPROXEN 500 MG/1
500 TABLET ORAL 2 TIMES DAILY PRN
Qty: 60 TABLET | Refills: 1 | Status: SHIPPED | OUTPATIENT
Start: 2020-10-14 | End: 2022-02-15

## 2020-10-14 RX ORDER — HYDROCHLOROTHIAZIDE 25 MG/1
12.5 TABLET ORAL DAILY
Qty: 90 TABLET | Refills: 1 | Status: SHIPPED | OUTPATIENT
Start: 2020-10-14 | End: 2021-06-23 | Stop reason: SDUPTHER

## 2020-10-14 NOTE — PROGRESS NOTES
Subjective:       Patient ID: Julisa Jensen is a 70 y.o. female.    Chief Complaint: Establish Care      The patient is here to get establishe with new PCP. Reports that yesterday presented and episode of sudden weakness, feeling tired, no focalizing signs, chest pain, palpitations, or nausea. She lay down and drank water with improvement. Later in the morning felt she was getting tired again and drank more water. She had breakfast before symptoms appeared. Reports she is active and walks routinely at a faster pace, around 3 miles,  with no limitation other than knee pains. Denies fever, chills, weight changes, leg swelling, SOB, or changes in urine. Takes her medications every morning. Also reports knees and hand pains associated to arthritis and antiinflammatories usually help.     Hypertension  This is a chronic problem. The current episode started more than 1 year ago. The problem is unchanged. The problem is controlled. Pertinent negatives include no chest pain, headaches, palpitations or shortness of breath. Agents associated with hypertension include NSAIDs. Risk factors for coronary artery disease include obesity. Past treatments include calcium channel blockers and diuretics. The current treatment provides significant improvement. Hypertensive end-organ damage includes CVA.     Review of Systems   Constitutional: Negative for activity change, appetite change, chills, fatigue, fever and unexpected weight change.   HENT: Negative for nasal congestion, ear pain, hearing loss, rhinorrhea and sore throat.    Eyes: Negative for redness and visual disturbance.   Respiratory: Negative for cough and shortness of breath.    Cardiovascular: Negative for chest pain, palpitations, leg swelling and claudication.   Gastrointestinal: Negative for abdominal pain, change in bowel habit, constipation, diarrhea, nausea, vomiting and change in bowel habit.   Endocrine: Negative for cold intolerance, heat intolerance,  polydipsia, polyphagia and polyuria.   Genitourinary: Negative for bladder incontinence, difficulty urinating and dysuria.   Neurological: Positive for weakness (Generalized tiredness.) and light-headedness. Negative for dizziness, numbness, headaches and memory loss.   Hematological: Does not bruise/bleed easily.   Psychiatric/Behavioral: Negative for sleep disturbance and suicidal ideas. The patient is not nervous/anxious.       Past Medical History:   Diagnosis Date    Hypertension     Stroke        Past Surgical History:   Procedure Laterality Date    COLONOSCOPY N/A 6/23/2020    Procedure: COLONOSCOPY;  Surgeon: Madelyn Finch MD;  Location: [facility];  Service: Endoscopy;  Laterality: N/A;       Family History   Problem Relation Age of Onset    Breast cancer Cousin        Social History     Socioeconomic History    Marital status:      Spouse name: Not on file    Number of children: Not on file    Years of education: Not on file    Highest education level: Not on file   Occupational History    Not on file   Social Needs    Financial resource strain: Not on file    Food insecurity     Worry: Not on file     Inability: Not on file    Transportation needs     Medical: Not on file     Non-medical: Not on file   Tobacco Use    Smoking status: Never Smoker    Smokeless tobacco: Never Used   Substance and Sexual Activity    Alcohol use: Yes     Alcohol/week: 2.0 standard drinks     Types: 2 Cans of beer per week     Frequency: Monthly or less     Drinks per session: 1 or 2     Binge frequency: Never     Comment: occasiional    Drug use: Not on file    Sexual activity: Not on file   Lifestyle    Physical activity     Days per week: Not on file     Minutes per session: Not on file    Stress: Not on file   Relationships    Social connections     Talks on phone: Not on file     Gets together: Not on file     Attends Orthodoxy service: Not on file     Active member of club or organization: Not  "on file     Attends meetings of clubs or organizations: Not on file     Relationship status: Not on file   Other Topics Concern    Not on file   Social History Narrative    Not on file       Current Outpatient Medications   Medication Sig Dispense Refill    amLODIPine (NORVASC) 5 MG tablet Take 1 tablet (5 mg total) by mouth once daily. 90 tablet 1    hydroCHLOROthiazide (HYDRODIURIL) 25 MG tablet Take 1 tablet (25 mg total) by mouth once daily. 90 tablet 1    aspirin (ECOTRIN) 81 MG EC tablet Take 1 tablet (81 mg total) by mouth once daily. 30 tablet 11    ibuprofen (ADVIL,MOTRIN) 400 MG tablet Take 1 tablet (400 mg total) by mouth 3 (three) times daily. (Patient not taking: Reported on 10/14/2020) 30 tablet 2    naproxen (NAPROSYN) 500 MG tablet Take 1 tablet (500 mg total) by mouth 2 (two) times daily as needed (arthritis). (Patient not taking: Reported on 10/14/2020) 60 tablet 1     No current facility-administered medications for this visit.        Review of patient's allergies indicates:   Allergen Reactions    Penicillins          Objective:       Last 3 sets of Vitals    Vitals - 1 value per visit 8/28/2020 9/11/2020 10/14/2020   SYSTOLIC 124 120 110   DIASTOLIC 60 68 66   PULSE 82 75 73   TEMPERATURE 98.6 97.2 98.3   RESPIRATIONS - - -   SPO2 96 97 98   Weight (lb) 148.81 148.37 147.27   Weight (kg) 67.5 67.3 66.8   HEIGHT - 5' 2" 5' 2"   BODY MASS INDEX 27.22 27.14 26.94   VISIT REPORT - - -   Pain Score  6 0 0   Physical Exam  Constitutional:       General: She is not in acute distress.     Appearance: Normal appearance. She is obese.   HENT:      Head: Normocephalic.      Right Ear: Tympanic membrane, ear canal and external ear normal.      Left Ear: Tympanic membrane, ear canal and external ear normal.      Nose: Nose normal.      Mouth/Throat:      Mouth: Mucous membranes are moist.      Pharynx: Oropharynx is clear.   Eyes:      General: No scleral icterus.     Extraocular Movements: " Extraocular movements intact.      Conjunctiva/sclera: Conjunctivae normal.      Pupils: Pupils are equal, round, and reactive to light.   Neck:      Musculoskeletal: Neck supple.      Vascular: No carotid bruit.   Cardiovascular:      Rate and Rhythm: Normal rate and regular rhythm.      Pulses: Normal pulses.      Heart sounds: Normal heart sounds.   Pulmonary:      Effort: Pulmonary effort is normal.      Breath sounds: Normal breath sounds.   Abdominal:      General: Bowel sounds are normal. There is no distension.      Palpations: Abdomen is soft. There is no mass.      Tenderness: There is no abdominal tenderness.   Musculoskeletal: Normal range of motion.         General: No swelling.      Right lower leg: No edema.      Left lower leg: No edema.   Lymphadenopathy:      Cervical: No cervical adenopathy.   Skin:     General: Skin is warm.      Findings: Bruising (Right medial ankle from trauma yesterday.) present.   Neurological:      General: No focal deficit present.      Mental Status: She is alert and oriented to person, place, and time.      Cranial Nerves: No cranial nerve deficit.      Motor: No weakness.      Gait: Gait normal.   Psychiatric:         Mood and Affect: Mood normal.         Behavior: Behavior normal.           CBC:  Recent Labs   Lab Result Units 08/28/20  1028   WBC K/uL 6.09   RBC M/uL 4.57   Hemoglobin g/dL 13.0   Hematocrit % 40.2   Platelets K/uL 320   Mean Corpuscular Volume fL 88   Mean Corpuscular Hemoglobin pg 28.4   Mean Corpuscular Hemoglobin Conc g/dL 32.3     CMP:  Recent Labs   Lab Result Units 08/28/20  1028   Glucose mg/dL 95   Calcium mg/dL 9.4   Albumin g/dL 3.8   Total Protein g/dL 7.9   Sodium mmol/L 142   Potassium mmol/L 3.5   CO2 mmol/L 29   Chloride mmol/L 104   BUN, Bld mg/dL 14   Alkaline Phosphatase U/L 84   ALT U/L 24   AST U/L 22   Total Bilirubin mg/dL 0.3       ECG. Normal sinus rhythm with no acute ischemic changes.  Orthostatic VS.   BP supine- 144/86,  sitting- 135/89, standing 127/89 (HR with no significant changes).  Assessment:       1. Essential hypertension    2. Weakness    3. Needs flu shot    4. Need for pneumococcal vaccination    5. Encounter for screening mammogram for malignant neoplasm of breast    6. Intestinal malabsorption, unspecified type         Plan:       Julisa was seen today for establish care.    Diagnoses and all orders for this visit:    Essential hypertension  -     Orthostatic vital signs  -     Lipid Panel; Future  -     Ambulatory referral/consult to Ophthalmology; Future  -     hydroCHLOROthiazide (HYDRODIURIL) 25 MG tablet; Take 0.5 tablets (12.5 mg total) by mouth once daily.    Weakness  -     Possibly associated to mild volume depletion. Electrolytes could be associated as labs with mild low potassium.  - Noted hx of stroke in the past and meningioma. On Aspirin. No statin as lipids appeared stable in the past.  - IN OFFICE EKG 12-LEAD (to Muse)-  Wnl. No exertional symptoms.  -     TSH; Future  -     Vitamin D; Future  -     CBC auto differential; Future  -     Lipid Panel; Future  -     Comprehensive Metabolic Panel; Future- Noted low normal potassium in the past.  -     Magnesium; Future  - Mildly orthostatic and will decrease HCTZ to 1/2 the tablet.   - Reevaluate in 3 weeks.    Needs flu shot- Ordered.    Need for pneumococcal vaccination- Ordered.    Encounter for screening mammogram for malignant neoplasm of breast  -     Mammo Digital Screening Bilat; Future    Intestinal malabsorption, unspecified type   -     Vitamin D; Future- Hx of low vit D.    Medial knee pain, right  -     naproxen (NAPROSYN) 500 MG tablet; Take 1 tablet (500 mg total) by mouth 2 (two) times daily as needed (arthritis pain).    Other orders  -     aspirin (ECOTRIN) 81 MG EC tablet; Take 1 tablet (81 mg total) by mouth once daily.  -     Influenza (FLUAD) - Quadrivalent (Adjuvanted) *Preferred* (65+) (PF)

## 2020-10-15 ENCOUNTER — LAB VISIT (OUTPATIENT)
Dept: LAB | Facility: HOSPITAL | Age: 70
End: 2020-10-15
Attending: INTERNAL MEDICINE
Payer: MEDICARE

## 2020-10-15 DIAGNOSIS — R53.1 WEAKNESS: ICD-10-CM

## 2020-10-15 DIAGNOSIS — K90.9 INTESTINAL MALABSORPTION, UNSPECIFIED TYPE: ICD-10-CM

## 2020-10-15 DIAGNOSIS — I10 ESSENTIAL HYPERTENSION: ICD-10-CM

## 2020-10-15 LAB
25(OH)D3+25(OH)D2 SERPL-MCNC: 25 NG/ML (ref 30–96)
ALBUMIN SERPL BCP-MCNC: 4.1 G/DL (ref 3.5–5.2)
ALP SERPL-CCNC: 85 U/L (ref 55–135)
ALT SERPL W/O P-5'-P-CCNC: 29 U/L (ref 10–44)
ANION GAP SERPL CALC-SCNC: 7 MMOL/L (ref 8–16)
AST SERPL-CCNC: 25 U/L (ref 10–40)
BASOPHILS # BLD AUTO: 0.05 K/UL (ref 0–0.2)
BASOPHILS NFR BLD: 0.9 % (ref 0–1.9)
BILIRUB SERPL-MCNC: 0.6 MG/DL (ref 0.1–1)
BUN SERPL-MCNC: 13 MG/DL (ref 8–23)
CALCIUM SERPL-MCNC: 9.2 MG/DL (ref 8.7–10.5)
CHLORIDE SERPL-SCNC: 103 MMOL/L (ref 95–110)
CHOLEST SERPL-MCNC: 165 MG/DL (ref 120–199)
CHOLEST/HDLC SERPL: 3.2 {RATIO} (ref 2–5)
CO2 SERPL-SCNC: 30 MMOL/L (ref 23–29)
CREAT SERPL-MCNC: 0.7 MG/DL (ref 0.5–1.4)
DIFFERENTIAL METHOD: NORMAL
EOSINOPHIL # BLD AUTO: 0.1 K/UL (ref 0–0.5)
EOSINOPHIL NFR BLD: 2 % (ref 0–8)
ERYTHROCYTE [DISTWIDTH] IN BLOOD BY AUTOMATED COUNT: 12.9 % (ref 11.5–14.5)
EST. GFR  (AFRICAN AMERICAN): >60 ML/MIN/1.73 M^2
EST. GFR  (NON AFRICAN AMERICAN): >60 ML/MIN/1.73 M^2
GLUCOSE SERPL-MCNC: 98 MG/DL (ref 70–110)
HCT VFR BLD AUTO: 39.1 % (ref 37–48.5)
HDLC SERPL-MCNC: 52 MG/DL (ref 40–75)
HDLC SERPL: 31.5 % (ref 20–50)
HGB BLD-MCNC: 12.9 G/DL (ref 12–16)
IMM GRANULOCYTES # BLD AUTO: 0.02 K/UL (ref 0–0.04)
IMM GRANULOCYTES NFR BLD AUTO: 0.4 % (ref 0–0.5)
LDLC SERPL CALC-MCNC: 95.8 MG/DL (ref 63–159)
LYMPHOCYTES # BLD AUTO: 1.9 K/UL (ref 1–4.8)
LYMPHOCYTES NFR BLD: 34.6 % (ref 18–48)
MAGNESIUM SERPL-MCNC: 2.1 MG/DL (ref 1.6–2.6)
MCH RBC QN AUTO: 28.5 PG (ref 27–31)
MCHC RBC AUTO-ENTMCNC: 33 G/DL (ref 32–36)
MCV RBC AUTO: 87 FL (ref 82–98)
MONOCYTES # BLD AUTO: 0.5 K/UL (ref 0.3–1)
MONOCYTES NFR BLD: 8.5 % (ref 4–15)
NEUTROPHILS # BLD AUTO: 2.9 K/UL (ref 1.8–7.7)
NEUTROPHILS NFR BLD: 53.6 % (ref 38–73)
NONHDLC SERPL-MCNC: 113 MG/DL
NRBC BLD-RTO: 0 /100 WBC
PLATELET # BLD AUTO: 306 K/UL (ref 150–350)
PMV BLD AUTO: 11.2 FL (ref 9.2–12.9)
POTASSIUM SERPL-SCNC: 3.3 MMOL/L (ref 3.5–5.1)
PROT SERPL-MCNC: 8 G/DL (ref 6–8.4)
RBC # BLD AUTO: 4.52 M/UL (ref 4–5.4)
SODIUM SERPL-SCNC: 140 MMOL/L (ref 136–145)
TRIGL SERPL-MCNC: 86 MG/DL (ref 30–150)
TSH SERPL DL<=0.005 MIU/L-ACNC: 2.38 UIU/ML (ref 0.4–4)
WBC # BLD AUTO: 5.4 K/UL (ref 3.9–12.7)

## 2020-10-15 PROCEDURE — 83735 ASSAY OF MAGNESIUM: CPT

## 2020-10-15 PROCEDURE — 80053 COMPREHEN METABOLIC PANEL: CPT

## 2020-10-15 PROCEDURE — 85025 COMPLETE CBC W/AUTO DIFF WBC: CPT

## 2020-10-15 PROCEDURE — 82306 VITAMIN D 25 HYDROXY: CPT

## 2020-10-15 PROCEDURE — 36415 COLL VENOUS BLD VENIPUNCTURE: CPT | Mod: PO

## 2020-10-15 PROCEDURE — 80061 LIPID PANEL: CPT

## 2020-10-15 PROCEDURE — 84443 ASSAY THYROID STIM HORMONE: CPT

## 2020-10-27 ENCOUNTER — PATIENT OUTREACH (OUTPATIENT)
Dept: ADMINISTRATIVE | Facility: HOSPITAL | Age: 70
End: 2020-10-27

## 2020-11-10 ENCOUNTER — PATIENT OUTREACH (OUTPATIENT)
Dept: ADMINISTRATIVE | Facility: OTHER | Age: 70
End: 2020-11-10

## 2020-11-10 NOTE — PROGRESS NOTES
Care Everywhere: updated  Immunization: updated, links delay   Health Maintenance: updated  Media Review:   Legacy Review:   Order placed:   Upcoming appts:mammogram 11/12

## 2020-11-12 ENCOUNTER — OFFICE VISIT (OUTPATIENT)
Dept: OPTOMETRY | Facility: CLINIC | Age: 70
End: 2020-11-12
Payer: MEDICARE

## 2020-11-12 ENCOUNTER — HOSPITAL ENCOUNTER (OUTPATIENT)
Dept: RADIOLOGY | Facility: HOSPITAL | Age: 70
Discharge: HOME | End: 2020-11-12
Attending: INTERNAL MEDICINE
Payer: MEDICARE

## 2020-11-12 DIAGNOSIS — Z12.31 ENCOUNTER FOR SCREENING MAMMOGRAM FOR MALIGNANT NEOPLASM OF BREAST: ICD-10-CM

## 2020-11-12 DIAGNOSIS — I10 ESSENTIAL HYPERTENSION: ICD-10-CM

## 2020-11-12 DIAGNOSIS — H40.013 OAG (OPEN ANGLE GLAUCOMA) SUSPECT, LOW RISK, BILATERAL: Primary | ICD-10-CM

## 2020-11-12 DIAGNOSIS — H52.03 HYPEROPIA WITH ASTIGMATISM AND PRESBYOPIA, BILATERAL: ICD-10-CM

## 2020-11-12 DIAGNOSIS — H52.203 HYPEROPIA WITH ASTIGMATISM AND PRESBYOPIA, BILATERAL: ICD-10-CM

## 2020-11-12 DIAGNOSIS — H25.13 SENILE NUCLEAR SCLEROSIS, BILATERAL: ICD-10-CM

## 2020-11-12 DIAGNOSIS — H52.4 HYPEROPIA WITH ASTIGMATISM AND PRESBYOPIA, BILATERAL: ICD-10-CM

## 2020-11-12 PROCEDURE — 92014 PR EYE EXAM, EST PATIENT,COMPREHESV: ICD-10-PCS | Mod: S$GLB,,, | Performed by: OPTOMETRIST

## 2020-11-12 PROCEDURE — 1101F PR PT FALLS ASSESS DOC 0-1 FALLS W/OUT INJ PAST YR: ICD-10-PCS | Mod: CPTII,S$GLB,, | Performed by: OPTOMETRIST

## 2020-11-12 PROCEDURE — 1126F PR PAIN SEVERITY QUANTIFIED, NO PAIN PRESENT: ICD-10-PCS | Mod: S$GLB,,, | Performed by: OPTOMETRIST

## 2020-11-12 PROCEDURE — 99999 PR PBB SHADOW E&M-EST. PATIENT-LVL III: CPT | Mod: PBBFAC,,, | Performed by: OPTOMETRIST

## 2020-11-12 PROCEDURE — 92014 COMPRE OPH EXAM EST PT 1/>: CPT | Mod: S$GLB,,, | Performed by: OPTOMETRIST

## 2020-11-12 PROCEDURE — 1101F PT FALLS ASSESS-DOCD LE1/YR: CPT | Mod: CPTII,S$GLB,, | Performed by: OPTOMETRIST

## 2020-11-12 PROCEDURE — 92015 DETERMINE REFRACTIVE STATE: CPT | Mod: S$GLB,,, | Performed by: OPTOMETRIST

## 2020-11-12 PROCEDURE — 1126F AMNT PAIN NOTED NONE PRSNT: CPT | Mod: S$GLB,,, | Performed by: OPTOMETRIST

## 2020-11-12 PROCEDURE — 92015 PR REFRACTION: ICD-10-PCS | Mod: S$GLB,,, | Performed by: OPTOMETRIST

## 2020-11-12 PROCEDURE — 3288F FALL RISK ASSESSMENT DOCD: CPT | Mod: CPTII,S$GLB,, | Performed by: OPTOMETRIST

## 2020-11-12 PROCEDURE — 99999 PR PBB SHADOW E&M-EST. PATIENT-LVL III: ICD-10-PCS | Mod: PBBFAC,,, | Performed by: OPTOMETRIST

## 2020-11-12 PROCEDURE — 3288F PR FALLS RISK ASSESSMENT DOCUMENTED: ICD-10-PCS | Mod: CPTII,S$GLB,, | Performed by: OPTOMETRIST

## 2020-11-12 RX ORDER — AMLODIPINE AND BENAZEPRIL HYDROCHLORIDE 5; 10 MG/1; MG/1
1 CAPSULE ORAL
Status: ON HOLD | COMMUNITY
End: 2021-03-08 | Stop reason: HOSPADM

## 2020-11-12 NOTE — LETTER
November 12, 2020      Sandra Stafford MD  [address]  [address]  [place] [address]           [facility] 1st Fl  [address] [facility].  [place] [address]  Phone: [phone]  Fax: [phone]          Patient: Julisa Jensen   MR Number: [id number]   YOB: 1950   Date of Visit: 11/12/2020       Dear Dr. Sandra Stafford:    Thank you for referring Julisa Jensen to me for evaluation. Attached you will find relevant portions of my assessment and plan of care.    If you have questions, please do not hesitate to call me. I look forward to following Julisa Jensen along with you.    Sincerely,    Marcell Lozano, OD    Enclosure  CC:  No Recipients    If you would like to receive this communication electronically, please contact [email or web address] or [phone] to request more information on [employer] Link access.    For providers and/or their staff who would like to refer a patient to [facility], please contact us through our one-stop-shop provider referral line, [facility], at [phone].    If you feel you have received this communication in error or would no longer like to receive these types of communications, please e-mail [email or web address]

## 2020-11-12 NOTE — PROGRESS NOTES
HPI     BESSY 11/2018  Patient was due to come back in 3 or 4 months for HVF and   IOP check but did not return.      BESSY: 11/2018  Chief complaint (CC): Glasses about 2 yr. Old and patient has some trouble   reading but distance seems fine.  Glasses? +  Contacts? -  H/o eye surgery, injections or laser: -  H/o eye injury: -  Known eye conditions? Cataracts, glaucoma suspect  Family h/o eye conditions? -  Eye gtts? -      (-) Flashes (-)  Floaters (-) Mucous   (-)  Tearing (-) Itching (-) Burning   (-) Headaches (-) Eye Pain/discomfort (-) Irritation   (-)  Redness (-) Double vision (+) Blurry vision    Diabetic?no  A1c? no      Last edited by Marcell Lozano, OD on 11/12/2020  9:52 AM. (History)            Assessment /Plan     For exam results, see Encounter Report.    OAG (open angle glaucoma) suspect, low risk, bilateral  -     Ofrd Visual Field - OU - Extended - Both Eyes; Future  -     Posterior Segment OCT Optic Nerve- Both eyes; Future    Essential hypertension  -     Ambulatory referral/consult to Ophthalmology    Senile nuclear sclerosis, bilateral    Hyperopia with astigmatism and presbyopia, bilateral      1. Neg Fhx. IOP 20 OD, 19 OS. Last IOP 19 OD, 18 OS. C/d 0.75 OD, OS.  Pachy 523 OD, 529 OS. Pt has failed to f/u from 11/2018 to 11/2020 11/20/18 OCT WNL OU  11/20/18 HVF low reliability OU, sup arcuate OD, early sup defect and inf scattered defect OS. First time test taker  Educated the pt and daughter on findings. No need for tx at this time. Pt shows understanding.   RTC 1 mo IOP/OCt/HVF.   2. BP control.   3. Visually significant. Pt reports no improvement in VA w/SRx. No night glare complaints. Referral to Dr Shaikh.  4. Hold SRx.          3/5/2021 Pt would like Rx released.

## 2020-11-16 ENCOUNTER — OFFICE VISIT (OUTPATIENT)
Dept: FAMILY MEDICINE | Facility: CLINIC | Age: 70
End: 2020-11-16
Payer: MEDICARE

## 2020-11-16 ENCOUNTER — PATIENT MESSAGE (OUTPATIENT)
Dept: FAMILY MEDICINE | Facility: CLINIC | Age: 70
End: 2020-11-16

## 2020-11-16 VITALS
HEIGHT: 62 IN | BODY MASS INDEX: 27.26 KG/M2 | HEART RATE: 84 BPM | DIASTOLIC BLOOD PRESSURE: 68 MMHG | TEMPERATURE: 98 F | WEIGHT: 148.13 LBS | OXYGEN SATURATION: 98 % | SYSTOLIC BLOOD PRESSURE: 120 MMHG

## 2020-11-16 DIAGNOSIS — I10 ESSENTIAL HYPERTENSION: ICD-10-CM

## 2020-11-16 DIAGNOSIS — Z23 NEED FOR PNEUMOCOCCAL VACCINATION: ICD-10-CM

## 2020-11-16 DIAGNOSIS — Z12.31 ENCOUNTER FOR SCREENING MAMMOGRAM FOR MALIGNANT NEOPLASM OF BREAST: ICD-10-CM

## 2020-11-16 DIAGNOSIS — Z23 NEED FOR DIPHTHERIA-TETANUS-PERTUSSIS (TDAP) VACCINE: ICD-10-CM

## 2020-11-16 DIAGNOSIS — Z23 NEED FOR SHINGLES VACCINE: ICD-10-CM

## 2020-11-16 DIAGNOSIS — E55.9 VITAMIN D DEFICIENCY: ICD-10-CM

## 2020-11-16 DIAGNOSIS — E87.6 HYPOKALEMIA: ICD-10-CM

## 2020-11-16 DIAGNOSIS — R53.1 WEAKNESS: Primary | ICD-10-CM

## 2020-11-16 PROCEDURE — 1101F PT FALLS ASSESS-DOCD LE1/YR: CPT | Mod: CPTII,S$GLB,, | Performed by: INTERNAL MEDICINE

## 2020-11-16 PROCEDURE — G0009 TDAP VACCINE GREATER THAN OR EQUAL TO 7YO IM: ICD-10-PCS | Mod: 59,S$GLB,, | Performed by: INTERNAL MEDICINE

## 2020-11-16 PROCEDURE — 3288F FALL RISK ASSESSMENT DOCD: CPT | Mod: CPTII,S$GLB,, | Performed by: INTERNAL MEDICINE

## 2020-11-16 PROCEDURE — 3074F PR MOST RECENT SYSTOLIC BLOOD PRESSURE < 130 MM HG: ICD-10-PCS | Mod: CPTII,S$GLB,, | Performed by: INTERNAL MEDICINE

## 2020-11-16 PROCEDURE — 90732 PNEUMOCOCCAL POLYSACCHARIDE VACCINE 23-VALENT =>2YO SQ IM: ICD-10-PCS | Mod: S$GLB,,, | Performed by: INTERNAL MEDICINE

## 2020-11-16 PROCEDURE — 99999 PR PBB SHADOW E&M-EST. PATIENT-LVL IV: ICD-10-PCS | Mod: PBBFAC,,, | Performed by: INTERNAL MEDICINE

## 2020-11-16 PROCEDURE — 3008F BODY MASS INDEX DOCD: CPT | Mod: CPTII,S$GLB,, | Performed by: INTERNAL MEDICINE

## 2020-11-16 PROCEDURE — G0009 ADMIN PNEUMOCOCCAL VACCINE: HCPCS | Mod: 59,S$GLB,, | Performed by: INTERNAL MEDICINE

## 2020-11-16 PROCEDURE — 99214 PR OFFICE/OUTPT VISIT, EST, LEVL IV, 30-39 MIN: ICD-10-PCS | Mod: 25,S$GLB,, | Performed by: INTERNAL MEDICINE

## 2020-11-16 PROCEDURE — 1126F AMNT PAIN NOTED NONE PRSNT: CPT | Mod: S$GLB,,, | Performed by: INTERNAL MEDICINE

## 2020-11-16 PROCEDURE — 3288F PR FALLS RISK ASSESSMENT DOCUMENTED: ICD-10-PCS | Mod: CPTII,S$GLB,, | Performed by: INTERNAL MEDICINE

## 2020-11-16 PROCEDURE — 3078F PR MOST RECENT DIASTOLIC BLOOD PRESSURE < 80 MM HG: ICD-10-PCS | Mod: CPTII,S$GLB,, | Performed by: INTERNAL MEDICINE

## 2020-11-16 PROCEDURE — 1159F PR MEDICATION LIST DOCUMENTED IN MEDICAL RECORD: ICD-10-PCS | Mod: S$GLB,,, | Performed by: INTERNAL MEDICINE

## 2020-11-16 PROCEDURE — 3078F DIAST BP <80 MM HG: CPT | Mod: CPTII,S$GLB,, | Performed by: INTERNAL MEDICINE

## 2020-11-16 PROCEDURE — 1126F PR PAIN SEVERITY QUANTIFIED, NO PAIN PRESENT: ICD-10-PCS | Mod: S$GLB,,, | Performed by: INTERNAL MEDICINE

## 2020-11-16 PROCEDURE — 1101F PR PT FALLS ASSESS DOC 0-1 FALLS W/OUT INJ PAST YR: ICD-10-PCS | Mod: CPTII,S$GLB,, | Performed by: INTERNAL MEDICINE

## 2020-11-16 PROCEDURE — 1159F MED LIST DOCD IN RCRD: CPT | Mod: S$GLB,,, | Performed by: INTERNAL MEDICINE

## 2020-11-16 PROCEDURE — 90732 PPSV23 VACC 2 YRS+ SUBQ/IM: CPT | Mod: S$GLB,,, | Performed by: INTERNAL MEDICINE

## 2020-11-16 PROCEDURE — 90471 IMMUNIZATION ADMIN: CPT | Mod: S$GLB,,, | Performed by: INTERNAL MEDICINE

## 2020-11-16 PROCEDURE — 90715 TDAP VACCINE GREATER THAN OR EQUAL TO 7YO IM: ICD-10-PCS | Mod: S$GLB,,, | Performed by: INTERNAL MEDICINE

## 2020-11-16 PROCEDURE — 99214 OFFICE O/P EST MOD 30 MIN: CPT | Mod: 25,S$GLB,, | Performed by: INTERNAL MEDICINE

## 2020-11-16 PROCEDURE — 3008F PR BODY MASS INDEX (BMI) DOCUMENTED: ICD-10-PCS | Mod: CPTII,S$GLB,, | Performed by: INTERNAL MEDICINE

## 2020-11-16 PROCEDURE — 90471 PNEUMOCOCCAL POLYSACCHARIDE VACCINE 23-VALENT =>2YO SQ IM: ICD-10-PCS | Mod: S$GLB,,, | Performed by: INTERNAL MEDICINE

## 2020-11-16 PROCEDURE — 90715 TDAP VACCINE 7 YRS/> IM: CPT | Mod: S$GLB,,, | Performed by: INTERNAL MEDICINE

## 2020-11-16 PROCEDURE — 3074F SYST BP LT 130 MM HG: CPT | Mod: CPTII,S$GLB,, | Performed by: INTERNAL MEDICINE

## 2020-11-16 PROCEDURE — 99999 PR PBB SHADOW E&M-EST. PATIENT-LVL IV: CPT | Mod: PBBFAC,,, | Performed by: INTERNAL MEDICINE

## 2020-11-16 RX ORDER — ERGOCALCIFEROL 1.25 MG/1
50000 CAPSULE ORAL
Qty: 30 CAPSULE | Refills: 3 | Status: SHIPPED | OUTPATIENT
Start: 2020-11-16 | End: 2021-06-23 | Stop reason: SDUPTHER

## 2020-11-16 RX ORDER — ZOSTER VACCINE RECOMBINANT, ADJUVANTED 50 MCG/0.5
0.5 KIT INTRAMUSCULAR ONCE
Qty: 1 EACH | Refills: 0 | Status: SHIPPED | OUTPATIENT
Start: 2020-11-16 | End: 2020-11-16

## 2020-11-16 NOTE — PATIENT INSTRUCTIONS
"  Hipocalemia [Hypokalemia]  "Hipocalemia" significa que tiene un bajo nivel de potasio en la carlos. [place] ocurre con mayor frecuencia en los pacientes que perfecto diuréticos (diuretics [pastillas para orinar más] [water pills]). También puede presentarse debido a episodios александр de vómito (vomiting) o diarrea (diarrhea).  Un damian leve no suele causar síntomas. Sólo se lo detecta mediante allyn prueba de carlos. Las pérdidas más graves de potasio provocan allyn debilidad generalizada, calambres en los músculos o el abdomen, palpitaciones (el corazón late en forma rápida o irregular) y presión arterial baja.  Cuidados En [place]:  1) [place] los suplementos de potasio que le hayan recetado.  2) Coma alimentos ricos en potasio. El contenido más alto de potasio se encuentra en la alcachofa, las marjorie asadas, la espinaca, el melón [place], el melón honeydew, el bacalao, el hipogloso (halibut), el salmón y los ostiones (vieiras). Los frijoles perez, los frijoles blancos y los frijoles rojos son también buenas swift de potasio. Allyn cantidad crescencio se puede encontrar en el jugo de naranja, los plátanos (bananas), las zanahorias y el jugo de tomate.  3) Hay ciertos tipos de diuréticos (diurectics), jake chen Lasix (flurosemida [flurosemide]), con los que debe francisco javier suplementos de potasio todo el tiempo que esté tomando las pastillas diuréticas. Si está tomando un diurético, hable con yañez médico sobre la necesidad de francisco javier suplementos de potasio.  Programe allyn VISITA DE CONTROL con yañez médico, o según le indique nuestro personal médico, para repetir la prueba de carlos dentro de la próxima semana.  Busque Prontamente Atención Médica  si algo de lo siguiente ocurre:  -- Mayor debilidad.  -- Se siente mareado.  -- El corazón le late de manera irregular, tiene latidos adicionales o un ritmo cardíaco muy rápido.  -- Desmayo.  Date Last Reviewed: 7/26/2015  © 8872-2241 [employer] [address] [place] [address], [place] " [address]. Todos los derechos reservados. Esta información no pretende sustituir la atención médica profesional. Sólo yañez médico puede diagnosticar y tratar un problema de mercedez.

## 2020-11-16 NOTE — PROGRESS NOTES
Subjective:       Patient ID: Julisa Jensen is a 70 y.o. female.    Chief Complaint: Follow-up (3 f/u), Hypertension, and Dizziness      This is a 70-year-old female with history of hypertension that is here for follow-up of episodes of weakness.  Her HCTZ was decreased as she was reporting changes of orthostatic hypotension.  The patient reports with the changes in medication she has had no other episodes of weakness or dizziness.  She feels okay except she often feels tired.  She walks for exercising and eats and sleeps well.  She came some weight and finds it difficult to lose.  She watches her diet but does has late snacks.    Review of Systems   Constitutional: Positive for fatigue. Negative for appetite change and unexpected weight change.   HENT: Negative for nasal congestion.    Respiratory: Negative for shortness of breath.    Gastrointestinal: Negative for change in bowel habit and change in bowel habit.   Genitourinary: Negative for difficulty urinating.   Neurological: Negative for weakness and headaches.   Psychiatric/Behavioral: Negative for sleep disturbance.   All other systems reviewed and are negative.     Past Medical History:   Diagnosis Date    Cataract     Hypertension     Stroke        Past Surgical History:   Procedure Laterality Date    COLONOSCOPY N/A 6/23/2020    Procedure: COLONOSCOPY;  Surgeon: Madelyn Finch MD;  Location: [facility];  Service: Endoscopy;  Laterality: N/A;       Family History   Problem Relation Age of Onset    Breast cancer Cousin        Social History     Socioeconomic History    Marital status:      Spouse name: Not on file    Number of children: Not on file    Years of education: Not on file    Highest education level: Not on file   Occupational History    Not on file   Social Needs    Financial resource strain: Not on file    Food insecurity     Worry: Not on file     Inability: Not on file    Transportation needs     Medical: Not on  file     Non-medical: Not on file   Tobacco Use    Smoking status: Never Smoker    Smokeless tobacco: Never Used   Substance and Sexual Activity    Alcohol use: Yes     Alcohol/week: 2.0 standard drinks     Types: 2 Cans of beer per week     Frequency: Monthly or less     Drinks per session: 1 or 2     Binge frequency: Never     Comment: occasiional    Drug use: Not on file    Sexual activity: Not on file   Lifestyle    Physical activity     Days per week: Not on file     Minutes per session: Not on file    Stress: Not on file   Relationships    Social connections     Talks on phone: Not on file     Gets together: Not on file     Attends Synagogue service: Not on file     Active member of club or organization: Not on file     Attends meetings of clubs or organizations: Not on file     Relationship status: Not on file   Other Topics Concern    Not on file   Social History Narrative    Not on file       Current Outpatient Medications   Medication Sig Dispense Refill    amLODIPine (NORVASC) 5 MG tablet Take 1 tablet (5 mg total) by mouth once daily. 90 tablet 1    amlodipine-benazepril 5-10 mg (LOTREL) 5-10 mg per capsule Take 1 capsule by mouth.      aspirin (ECOTRIN) 81 MG EC tablet Take 1 tablet (81 mg total) by mouth once daily. 30 tablet 11    hydroCHLOROthiazide (HYDRODIURIL) 25 MG tablet Take 0.5 tablets (12.5 mg total) by mouth once daily. 90 tablet 1    ibuprofen (ADVIL,MOTRIN) 400 MG tablet Take 1 tablet (400 mg total) by mouth 3 (three) times daily. 30 tablet 2    naproxen (NAPROSYN) 500 MG tablet Take 1 tablet (500 mg total) by mouth 2 (two) times daily as needed (arthritis pain). 60 tablet 1    ergocalciferol (ERGOCALCIFEROL) 50,000 unit Cap Take 1 capsule (50,000 Units total) by mouth every 7 days. 30 capsule 3    varicella-zoster gE-AS01B, PF, (SHINGRIX, PF,) 50 mcg/0.5 mL injection Inject 0.5 mLs into the muscle once. for 1 dose 1 each 0     No current facility-administered medications  "for this visit.        Review of patient's allergies indicates:   Allergen Reactions    Penicillins          Objective:       Last 3 sets of Vitals    Vitals - 1 value per visit 10/14/2020 11/12/2020 11/16/2020   SYSTOLIC 127 - 120   DIASTOLIC 80 - 68   PULSE 64 - 84   TEMPERATURE 98.3 - 98.3   RESPIRATIONS - - -   SPO2 98 - 98   Weight (lb) 147.27 - 148.15   Weight (kg) 66.8 - 67.2   HEIGHT 5' 2" - 5' 2"   BODY MASS INDEX 26.94 - 27.1   VISIT REPORT - - -   Pain Score  0 0 0   Physical Exam  Constitutional:       General: She is not in acute distress.     Appearance: Normal appearance.   HENT:      Head: Normocephalic.      Nose: Nose normal.   Eyes:      General: No scleral icterus.     Extraocular Movements: Extraocular movements intact.      Conjunctiva/sclera: Conjunctivae normal.      Pupils: Pupils are equal, round, and reactive to light.   Neck:      Musculoskeletal: Neck supple.      Vascular: No carotid bruit.   Cardiovascular:      Rate and Rhythm: Normal rate and regular rhythm.      Pulses: Normal pulses.      Heart sounds: Normal heart sounds.   Pulmonary:      Effort: Pulmonary effort is normal.      Breath sounds: Normal breath sounds.   Abdominal:      General: Bowel sounds are normal. There is no distension.      Palpations: Abdomen is soft.   Musculoskeletal: Normal range of motion.         General: No swelling.   Lymphadenopathy:      Cervical: No cervical adenopathy.   Skin:     General: Skin is warm and dry.   Neurological:      General: No focal deficit present.      Mental Status: She is alert and oriented to person, place, and time.   Psychiatric:         Mood and Affect: Mood normal.         Behavior: Behavior normal.           CBC:  Recent Labs   Lab Result Units 08/28/20  1028 10/15/20  0817   WBC K/uL 6.09 5.40   RBC M/uL 4.57 4.52   Hemoglobin g/dL 13.0 12.9   Hematocrit % 40.2 39.1   Platelets K/uL 320 306   MCV fL 88 87   MCH pg 28.4 28.5   MCHC g/dL 32.3 33.0     CMP:  Recent Labs "   Lab Result Units 08/28/20  1028 10/15/20  0817   Glucose mg/dL 95 98   Calcium mg/dL 9.4 9.2   Albumin g/dL 3.8 4.1   Total Protein g/dL 7.9 8.0   Sodium mmol/L 142 140   Potassium mmol/L 3.5 3.3*   CO2 mmol/L 29 30*   Chloride mmol/L 104 103   BUN mg/dL 14 13   Alkaline Phosphatase U/L 84 85   ALT U/L 24 29   AST U/L 22 25   Total Bilirubin mg/dL 0.3 0.6       LIPIDS:  Recent Labs   Lab Result Units 10/15/20  0817   TSH uIU/mL 2.375   HDL mg/dL 52   Cholesterol mg/dL 165   Triglycerides mg/dL 86   LDL Cholesterol mg/dL 95.8   HDL/Cholesterol Ratio % 31.5   Non-HDL Cholesterol mg/dL 113   Total Cholesterol/HDL Ratio  3.2     TSH:  Recent Labs   Lab Result Units 10/15/20  0817   TSH uIU/mL 2.375             Assessment:       1. Weakness    2. Essential hypertension    3. Hypokalemia    4. Vitamin D deficiency    5. Encounter for screening mammogram for malignant neoplasm of breast    6. Need for diphtheria-tetanus-pertussis (Tdap) vaccine    7. Need for shingles vaccine    8. Need for pneumococcal vaccination        Plan:       Julisa was seen today for follow-up, hypertension and dizziness.    Diagnoses and all orders for this visit:    Weakness   Improved with changes in medication.  Likely orthostatic changes with diuretics.  Noted hypokalemia but her labs were done before that changes in medications.  Encouraged exercise and and decreasing simple sugars or carbs.    Essential hypertension   Stable with changes in medications.  Continue amlodipine, benazepril, and HCTZ.    Hypokalemia  -     increase high potassium content foods in your diet.  -     Comprehensive Metabolic Panel; Future    Vitamin D deficiency   Start vitamin-D supplement.    Encounter for screening mammogram for malignant neoplasm of breast  -     Mammo Digital Screening Bilat; Future.  Missed appointment and prefers to have it at [place].    Need for diphtheria-tetanus-pertussis (Tdap) vaccine  -     (In Office Administered) Tdap  Vaccine    Need for shingles vaccine  -     varicella-zoster gE-AS01B, PF, (SHINGRIX, PF,) 50 mcg/0.5 mL injection; Inject 0.5 mLs into the muscle once. for 1 dose    Need for pneumococcal vaccination  -     (In Office Administered) Pneumococcal Polysaccharide Vaccine (23 Valent) (SQ/IM)    Other orders  -     ergocalciferol (ERGOCALCIFEROL) 50,000 unit Cap; Take 1 capsule (50,000 Units total) by mouth every 7 days.    Return to clinic in 8 weeks.

## 2020-11-16 NOTE — PROGRESS NOTES
Subjective:       Patient ID: Julisa Jensen is a 70 y.o. female.    Chief Complaint: Follow-up (3 f/u), Hypertension, and Dizziness      HPI  Review of Systems   Past Medical History:   Diagnosis Date    Cataract     Hypertension     Stroke        Past Surgical History:   Procedure Laterality Date    COLONOSCOPY N/A 6/23/2020    Procedure: COLONOSCOPY;  Surgeon: Madelyn Finch MD;  Location: [facility];  Service: Endoscopy;  Laterality: N/A;       Family History   Problem Relation Age of Onset    Breast cancer Cousin        Social History     Socioeconomic History    Marital status:      Spouse name: Not on file    Number of children: Not on file    Years of education: Not on file    Highest education level: Not on file   Occupational History    Not on file   Social Needs    Financial resource strain: Not on file    Food insecurity     Worry: Not on file     Inability: Not on file    Transportation needs     Medical: Not on file     Non-medical: Not on file   Tobacco Use    Smoking status: Never Smoker    Smokeless tobacco: Never Used   Substance and Sexual Activity    Alcohol use: Yes     Alcohol/week: 2.0 standard drinks     Types: 2 Cans of beer per week     Frequency: Monthly or less     Drinks per session: 1 or 2     Binge frequency: Never     Comment: occasiional    Drug use: Not on file    Sexual activity: Not on file   Lifestyle    Physical activity     Days per week: Not on file     Minutes per session: Not on file    Stress: Not on file   Relationships    Social connections     Talks on phone: Not on file     Gets together: Not on file     Attends Sikhism service: Not on file     Active member of club or organization: Not on file     Attends meetings of clubs or organizations: Not on file     Relationship status: Not on file   Other Topics Concern    Not on file   Social History Narrative    Not on file       Current Outpatient Medications   Medication Sig  "Dispense Refill    amLODIPine (NORVASC) 5 MG tablet Take 1 tablet (5 mg total) by mouth once daily. 90 tablet 1    amlodipine-benazepril 5-10 mg (LOTREL) 5-10 mg per capsule Take 1 capsule by mouth.      aspirin (ECOTRIN) 81 MG EC tablet Take 1 tablet (81 mg total) by mouth once daily. 30 tablet 11    hydroCHLOROthiazide (HYDRODIURIL) 25 MG tablet Take 0.5 tablets (12.5 mg total) by mouth once daily. 90 tablet 1    ibuprofen (ADVIL,MOTRIN) 400 MG tablet Take 1 tablet (400 mg total) by mouth 3 (three) times daily. 30 tablet 2    naproxen (NAPROSYN) 500 MG tablet Take 1 tablet (500 mg total) by mouth 2 (two) times daily as needed (arthritis pain). 60 tablet 1     No current facility-administered medications for this visit.        Review of patient's allergies indicates:   Allergen Reactions    Penicillins          Objective:       Last 3 sets of Vitals    Vitals - 1 value per visit 10/14/2020 11/12/2020 11/16/2020   SYSTOLIC 127 - 120   DIASTOLIC 80 - 68   PULSE 64 - 84   TEMPERATURE 98.3 - 98.3   RESPIRATIONS - - -   SPO2 98 - 98   Weight (lb) 147.27 - 148.15   Weight (kg) 66.8 - 67.2   HEIGHT 5' 2" - 5' 2"   BODY MASS INDEX 26.94 - 27.1   VISIT REPORT - - -   Pain Score  0 0 0   Physical Exam      CBC:  Recent Labs   Lab Result Units 08/28/20  1028 10/15/20  0817   WBC K/uL 6.09 5.40   RBC M/uL 4.57 4.52   Hemoglobin g/dL 13.0 12.9   Hematocrit % 40.2 39.1   Platelets K/uL 320 306   MCV fL 88 87   MCH pg 28.4 28.5   MCHC g/dL 32.3 33.0     CMP:  Recent Labs   Lab Result Units 08/28/20  1028 10/15/20  0817   Glucose mg/dL 95 98   Calcium mg/dL 9.4 9.2   Albumin g/dL 3.8 4.1   Total Protein g/dL 7.9 8.0   Sodium mmol/L 142 140   Potassium mmol/L 3.5 3.3*   CO2 mmol/L 29 30*   Chloride mmol/L 104 103   BUN mg/dL 14 13   Alkaline Phosphatase U/L 84 85   ALT U/L 24 29   AST U/L 22 25   Total Bilirubin mg/dL 0.3 0.6     URINALYSIS:  No results for input(s): COLORU, CLARITYU, SPECGRAV, PHUR, PROTEINUA, GLUCOSEU, " BILIRUBINCON, BLOODU, WBCU, RBCU, BACTERIA, MUCUS, NITRITE, LEUKOCYTESUR, UROBILINOGEN, HYALINECASTS in the last 2160 hours.   LIPIDS:  Recent Labs   Lab Result Units 10/15/20  0817   TSH uIU/mL 2.375   HDL mg/dL 52   Cholesterol mg/dL 165   Triglycerides mg/dL 86   LDL Cholesterol mg/dL 95.8   HDL/Cholesterol Ratio % 31.5   Non-HDL Cholesterol mg/dL 113   Total Cholesterol/HDL Ratio  3.2     TSH:  Recent Labs   Lab Result Units 10/15/20  0817   TSH uIU/mL 2.375       A1C:            Assessment:       1. Essential hypertension    2. Weakness        Plan:       Julisa was seen today for follow-up, hypertension and dizziness.    Diagnoses and all orders for this visit:    Essential hypertension    Weakness

## 2020-11-18 ENCOUNTER — PATIENT MESSAGE (OUTPATIENT)
Dept: FAMILY MEDICINE | Facility: CLINIC | Age: 70
End: 2020-11-18

## 2020-11-24 ENCOUNTER — TELEPHONE (OUTPATIENT)
Dept: FAMILY MEDICINE | Facility: CLINIC | Age: 70
End: 2020-11-24

## 2020-11-24 DIAGNOSIS — Z12.31 SCREENING MAMMOGRAM FOR HIGH-RISK PATIENT: Primary | ICD-10-CM

## 2020-11-27 ENCOUNTER — LAB VISIT (OUTPATIENT)
Dept: LAB | Facility: HOSPITAL | Age: 70
End: 2020-11-27
Attending: INTERNAL MEDICINE
Payer: MEDICARE

## 2020-11-27 DIAGNOSIS — E87.6 HYPOKALEMIA: ICD-10-CM

## 2020-11-27 LAB
ALBUMIN SERPL BCP-MCNC: 4 G/DL (ref 3.5–5.2)
ALP SERPL-CCNC: 87 U/L (ref 55–135)
ALT SERPL W/O P-5'-P-CCNC: 26 U/L (ref 10–44)
ANION GAP SERPL CALC-SCNC: 11 MMOL/L (ref 8–16)
AST SERPL-CCNC: 23 U/L (ref 10–40)
BILIRUB SERPL-MCNC: 0.3 MG/DL (ref 0.1–1)
BUN SERPL-MCNC: 11 MG/DL (ref 8–23)
CALCIUM SERPL-MCNC: 8.9 MG/DL (ref 8.7–10.5)
CHLORIDE SERPL-SCNC: 104 MMOL/L (ref 95–110)
CO2 SERPL-SCNC: 27 MMOL/L (ref 23–29)
CREAT SERPL-MCNC: 0.7 MG/DL (ref 0.5–1.4)
EST. GFR  (AFRICAN AMERICAN): >60 ML/MIN/1.73 M^2
EST. GFR  (NON AFRICAN AMERICAN): >60 ML/MIN/1.73 M^2
GLUCOSE SERPL-MCNC: 91 MG/DL (ref 70–110)
POTASSIUM SERPL-SCNC: 3.7 MMOL/L (ref 3.5–5.1)
PROT SERPL-MCNC: 8.1 G/DL (ref 6–8.4)
SODIUM SERPL-SCNC: 142 MMOL/L (ref 136–145)

## 2020-11-27 PROCEDURE — 36415 COLL VENOUS BLD VENIPUNCTURE: CPT

## 2020-11-27 PROCEDURE — 80053 COMPREHEN METABOLIC PANEL: CPT

## 2020-12-10 ENCOUNTER — HOSPITAL ENCOUNTER (OUTPATIENT)
Dept: RADIOLOGY | Facility: HOSPITAL | Age: 70
Discharge: HOME | End: 2020-12-10
Attending: INTERNAL MEDICINE
Payer: MEDICARE

## 2020-12-10 DIAGNOSIS — Z12.31 SCREENING MAMMOGRAM FOR HIGH-RISK PATIENT: ICD-10-CM

## 2020-12-10 PROCEDURE — 77067 MAMMO DIGITAL SCREENING BILAT WITH TOMO: ICD-10-PCS | Mod: 26,,, | Performed by: RADIOLOGY

## 2020-12-10 PROCEDURE — 77067 SCR MAMMO BI INCL CAD: CPT | Mod: TC

## 2020-12-10 PROCEDURE — 77067 SCR MAMMO BI INCL CAD: CPT | Mod: 26,,, | Performed by: RADIOLOGY

## 2020-12-10 PROCEDURE — 77063 BREAST TOMOSYNTHESIS BI: CPT | Mod: 26,,, | Performed by: RADIOLOGY

## 2020-12-10 PROCEDURE — 77063 MAMMO DIGITAL SCREENING BILAT WITH TOMO: ICD-10-PCS | Mod: 26,,, | Performed by: RADIOLOGY

## 2020-12-20 ENCOUNTER — CLINICAL SUPPORT (OUTPATIENT)
Dept: URGENT CARE | Facility: CLINIC | Age: 70
End: 2020-12-20
Payer: MEDICARE

## 2020-12-20 DIAGNOSIS — Z20.822 ENCOUNTER FOR LABORATORY TESTING FOR COVID-19 VIRUS: Primary | ICD-10-CM

## 2020-12-20 LAB
CTP QC/QA: YES
SARS-COV-2 RDRP RESP QL NAA+PROBE: NEGATIVE

## 2020-12-20 PROCEDURE — U0002: ICD-10-PCS | Mod: QW,S$GLB,, | Performed by: FAMILY MEDICINE

## 2020-12-20 PROCEDURE — U0002 COVID-19 LAB TEST NON-CDC: HCPCS | Mod: QW,S$GLB,, | Performed by: FAMILY MEDICINE

## 2021-01-05 ENCOUNTER — PATIENT MESSAGE (OUTPATIENT)
Dept: FAMILY MEDICINE | Facility: CLINIC | Age: 71
End: 2021-01-05

## 2021-01-26 ENCOUNTER — TELEPHONE (OUTPATIENT)
Dept: OPTOMETRY | Facility: CLINIC | Age: 71
End: 2021-01-26

## 2021-01-26 ENCOUNTER — PATIENT MESSAGE (OUTPATIENT)
Dept: OPTOMETRY | Facility: CLINIC | Age: 71
End: 2021-01-26

## 2021-01-27 ENCOUNTER — TELEPHONE (OUTPATIENT)
Dept: OPTOMETRY | Facility: CLINIC | Age: 71
End: 2021-01-27

## 2021-02-10 ENCOUNTER — PATIENT MESSAGE (OUTPATIENT)
Dept: OPTOMETRY | Facility: CLINIC | Age: 71
End: 2021-02-10

## 2021-02-26 ENCOUNTER — IMMUNIZATION (OUTPATIENT)
Dept: PHARMACY | Facility: CLINIC | Age: 71
End: 2021-02-26
Payer: MEDICARE

## 2021-02-26 DIAGNOSIS — Z23 NEED FOR VACCINATION: Primary | ICD-10-CM

## 2021-03-01 ENCOUNTER — PATIENT MESSAGE (OUTPATIENT)
Dept: FAMILY MEDICINE | Facility: CLINIC | Age: 71
End: 2021-03-01

## 2021-03-01 ENCOUNTER — NURSE TRIAGE (OUTPATIENT)
Dept: ADMINISTRATIVE | Facility: CLINIC | Age: 71
End: 2021-03-01

## 2021-03-03 ENCOUNTER — OFFICE VISIT (OUTPATIENT)
Dept: URGENT CARE | Facility: CLINIC | Age: 71
End: 2021-03-03
Payer: MEDICARE

## 2021-03-03 ENCOUNTER — PATIENT MESSAGE (OUTPATIENT)
Dept: ADMINISTRATIVE | Facility: OTHER | Age: 71
End: 2021-03-03

## 2021-03-03 VITALS
RESPIRATION RATE: 16 BRPM | HEIGHT: 62 IN | WEIGHT: 140 LBS | HEART RATE: 90 BPM | DIASTOLIC BLOOD PRESSURE: 74 MMHG | OXYGEN SATURATION: 95 % | BODY MASS INDEX: 25.76 KG/M2 | SYSTOLIC BLOOD PRESSURE: 114 MMHG | TEMPERATURE: 98 F

## 2021-03-03 DIAGNOSIS — U07.1 COVID-19: Primary | ICD-10-CM

## 2021-03-03 LAB
CTP QC/QA: YES
SARS-COV-2 RDRP RESP QL NAA+PROBE: POSITIVE

## 2021-03-03 PROCEDURE — 3008F BODY MASS INDEX DOCD: CPT | Mod: CPTII,S$GLB,, | Performed by: PHYSICIAN ASSISTANT

## 2021-03-03 PROCEDURE — U0002 COVID-19 LAB TEST NON-CDC: HCPCS | Mod: QW,S$GLB,, | Performed by: PHYSICIAN ASSISTANT

## 2021-03-03 PROCEDURE — 99214 OFFICE O/P EST MOD 30 MIN: CPT | Mod: S$GLB,,, | Performed by: PHYSICIAN ASSISTANT

## 2021-03-03 PROCEDURE — U0002: ICD-10-PCS | Mod: QW,S$GLB,, | Performed by: PHYSICIAN ASSISTANT

## 2021-03-03 PROCEDURE — 3008F PR BODY MASS INDEX (BMI) DOCUMENTED: ICD-10-PCS | Mod: CPTII,S$GLB,, | Performed by: PHYSICIAN ASSISTANT

## 2021-03-03 PROCEDURE — 99214 PR OFFICE/OUTPT VISIT, EST, LEVL IV, 30-39 MIN: ICD-10-PCS | Mod: S$GLB,,, | Performed by: PHYSICIAN ASSISTANT

## 2021-03-03 RX ORDER — ONDANSETRON 4 MG/1
4 TABLET, ORALLY DISINTEGRATING ORAL EVERY 6 HOURS PRN
Qty: 15 TABLET | Refills: 0 | Status: SHIPPED | OUTPATIENT
Start: 2021-03-03 | End: 2021-06-17

## 2021-03-04 ENCOUNTER — PATIENT MESSAGE (OUTPATIENT)
Dept: ADMINISTRATIVE | Facility: CLINIC | Age: 71
End: 2021-03-04

## 2021-03-04 ENCOUNTER — NURSE TRIAGE (OUTPATIENT)
Dept: ADMINISTRATIVE | Facility: CLINIC | Age: 71
End: 2021-03-04

## 2021-03-04 ENCOUNTER — PATIENT MESSAGE (OUTPATIENT)
Dept: ADMINISTRATIVE | Facility: OTHER | Age: 71
End: 2021-03-04

## 2021-03-04 ENCOUNTER — INFUSION (OUTPATIENT)
Dept: INFECTIOUS DISEASES | Facility: HOSPITAL | Age: 71
End: 2021-03-04
Attending: PHYSICIAN ASSISTANT
Payer: MEDICARE

## 2021-03-04 ENCOUNTER — OFFICE VISIT (OUTPATIENT)
Dept: URGENT CARE | Facility: CLINIC | Age: 71
End: 2021-03-04
Payer: MEDICARE

## 2021-03-04 ENCOUNTER — TELEPHONE (OUTPATIENT)
Dept: ADMINISTRATIVE | Facility: CLINIC | Age: 71
End: 2021-03-04

## 2021-03-04 ENCOUNTER — HOSPITAL ENCOUNTER (OUTPATIENT)
Facility: HOSPITAL | Age: 71
Discharge: HOME | End: 2021-03-08
Attending: EMERGENCY MEDICINE | Admitting: INTERNAL MEDICINE
Payer: MEDICARE

## 2021-03-04 VITALS
BODY MASS INDEX: 27.48 KG/M2 | RESPIRATION RATE: 16 BRPM | DIASTOLIC BLOOD PRESSURE: 59 MMHG | HEART RATE: 103 BPM | OXYGEN SATURATION: 90 % | WEIGHT: 140 LBS | SYSTOLIC BLOOD PRESSURE: 77 MMHG | HEIGHT: 60 IN | TEMPERATURE: 103 F

## 2021-03-04 VITALS
HEIGHT: 60 IN | DIASTOLIC BLOOD PRESSURE: 60 MMHG | OXYGEN SATURATION: 94 % | TEMPERATURE: 101 F | WEIGHT: 140 LBS | HEART RATE: 93 BPM | RESPIRATION RATE: 20 BRPM | SYSTOLIC BLOOD PRESSURE: 134 MMHG | BODY MASS INDEX: 27.48 KG/M2

## 2021-03-04 DIAGNOSIS — R09.02 HYPOXIA: ICD-10-CM

## 2021-03-04 DIAGNOSIS — J96.01 ACUTE HYPOXEMIC RESPIRATORY FAILURE DUE TO COVID-19: Primary | ICD-10-CM

## 2021-03-04 DIAGNOSIS — U07.1 ACUTE HYPOXEMIC RESPIRATORY FAILURE DUE TO COVID-19: Primary | ICD-10-CM

## 2021-03-04 DIAGNOSIS — U07.1 LAB TEST POSITIVE FOR DETECTION OF COVID-19 VIRUS: Primary | ICD-10-CM

## 2021-03-04 DIAGNOSIS — U07.1 SEPSIS DUE TO COVID-19: ICD-10-CM

## 2021-03-04 DIAGNOSIS — R50.9 FEVER, UNSPECIFIED FEVER CAUSE: ICD-10-CM

## 2021-03-04 DIAGNOSIS — I10 ESSENTIAL HYPERTENSION: ICD-10-CM

## 2021-03-04 DIAGNOSIS — U07.1 COVID-19: ICD-10-CM

## 2021-03-04 DIAGNOSIS — A41.89 SEPSIS DUE TO COVID-19: ICD-10-CM

## 2021-03-04 DIAGNOSIS — U07.1 COVID-19: Primary | ICD-10-CM

## 2021-03-04 DIAGNOSIS — E87.5 HYPERKALEMIA: ICD-10-CM

## 2021-03-04 LAB
ALBUMIN SERPL BCP-MCNC: 3.5 G/DL (ref 3.5–5.2)
ALP SERPL-CCNC: 53 U/L (ref 55–135)
ALT SERPL W/O P-5'-P-CCNC: 26 U/L (ref 10–44)
ANION GAP SERPL CALC-SCNC: 12 MMOL/L (ref 8–16)
AST SERPL-CCNC: 29 U/L (ref 10–40)
BASOPHILS # BLD AUTO: 0.02 K/UL (ref 0–0.2)
BASOPHILS NFR BLD: 0.4 % (ref 0–1.9)
BILIRUB SERPL-MCNC: 0.5 MG/DL (ref 0.1–1)
BUN SERPL-MCNC: 11 MG/DL (ref 8–23)
CALCIUM SERPL-MCNC: 8 MG/DL (ref 8.7–10.5)
CHLORIDE SERPL-SCNC: 95 MMOL/L (ref 95–110)
CK SERPL-CCNC: 153 U/L (ref 20–180)
CO2 SERPL-SCNC: 27 MMOL/L (ref 23–29)
CREAT SERPL-MCNC: 0.9 MG/DL (ref 0.5–1.4)
CRP SERPL-MCNC: 30.5 MG/L (ref 0–8.2)
DIFFERENTIAL METHOD: ABNORMAL
EOSINOPHIL # BLD AUTO: 0 K/UL (ref 0–0.5)
EOSINOPHIL NFR BLD: 0 % (ref 0–8)
ERYTHROCYTE [DISTWIDTH] IN BLOOD BY AUTOMATED COUNT: 12.2 % (ref 11.5–14.5)
EST. GFR  (AFRICAN AMERICAN): >60 ML/MIN/1.73 M^2
EST. GFR  (NON AFRICAN AMERICAN): >60 ML/MIN/1.73 M^2
FERRITIN SERPL-MCNC: 566 NG/ML (ref 20–300)
GLUCOSE SERPL-MCNC: 132 MG/DL (ref 70–110)
HCT VFR BLD AUTO: 34.4 % (ref 37–48.5)
HGB BLD-MCNC: 11.9 G/DL (ref 12–16)
IMM GRANULOCYTES # BLD AUTO: 0.01 K/UL (ref 0–0.04)
IMM GRANULOCYTES NFR BLD AUTO: 0.2 % (ref 0–0.5)
LACTATE SERPL-SCNC: 0.8 MMOL/L (ref 0.5–2.2)
LDH SERPL L TO P-CCNC: 208 U/L (ref 110–260)
LYMPHOCYTES # BLD AUTO: 0.8 K/UL (ref 1–4.8)
LYMPHOCYTES NFR BLD: 14.6 % (ref 18–48)
MAGNESIUM SERPL-MCNC: 2.1 MG/DL (ref 1.6–2.6)
MCH RBC QN AUTO: 28.5 PG (ref 27–31)
MCHC RBC AUTO-ENTMCNC: 34.6 G/DL (ref 32–36)
MCV RBC AUTO: 82 FL (ref 82–98)
MONOCYTES # BLD AUTO: 0.5 K/UL (ref 0.3–1)
MONOCYTES NFR BLD: 8.2 % (ref 4–15)
NEUTROPHILS # BLD AUTO: 4.2 K/UL (ref 1.8–7.7)
NEUTROPHILS NFR BLD: 76.6 % (ref 38–73)
NRBC BLD-RTO: 0 /100 WBC
PLATELET # BLD AUTO: 235 K/UL (ref 150–350)
PMV BLD AUTO: 10.2 FL (ref 9.2–12.9)
POTASSIUM SERPL-SCNC: 2.7 MMOL/L (ref 3.5–5.1)
PROCALCITONIN SERPL IA-MCNC: 0.09 NG/ML
PROT SERPL-MCNC: 7.5 G/DL (ref 6–8.4)
RBC # BLD AUTO: 4.18 M/UL (ref 4–5.4)
SODIUM SERPL-SCNC: 134 MMOL/L (ref 136–145)
TROPONIN I SERPL DL<=0.01 NG/ML-MCNC: <0.006 NG/ML (ref 0–0.03)
WBC # BLD AUTO: 5.47 K/UL (ref 3.9–12.7)

## 2021-03-04 PROCEDURE — 3008F BODY MASS INDEX DOCD: CPT | Mod: CPTII,S$GLB,, | Performed by: FAMILY MEDICINE

## 2021-03-04 PROCEDURE — 84484 ASSAY OF TROPONIN QUANT: CPT | Performed by: NURSE PRACTITIONER

## 2021-03-04 PROCEDURE — 93010 EKG 12-LEAD: ICD-10-PCS | Mod: ,,, | Performed by: INTERNAL MEDICINE

## 2021-03-04 PROCEDURE — 25000003 PHARM REV CODE 250

## 2021-03-04 PROCEDURE — 93010 ELECTROCARDIOGRAM REPORT: CPT | Mod: ,,, | Performed by: INTERNAL MEDICINE

## 2021-03-04 PROCEDURE — 3008F PR BODY MASS INDEX (BMI) DOCUMENTED: ICD-10-PCS | Mod: CPTII,S$GLB,, | Performed by: FAMILY MEDICINE

## 2021-03-04 PROCEDURE — 63600175 PHARM REV CODE 636 W HCPCS: Performed by: NURSE PRACTITIONER

## 2021-03-04 PROCEDURE — 25000003 PHARM REV CODE 250: Performed by: NURSE PRACTITIONER

## 2021-03-04 PROCEDURE — 99213 PR OFFICE/OUTPT VISIT, EST, LEVL III, 20-29 MIN: ICD-10-PCS | Mod: S$GLB,,, | Performed by: FAMILY MEDICINE

## 2021-03-04 PROCEDURE — 96361 HYDRATE IV INFUSION ADD-ON: CPT

## 2021-03-04 PROCEDURE — 82728 ASSAY OF FERRITIN: CPT | Performed by: NURSE PRACTITIONER

## 2021-03-04 PROCEDURE — 87040 BLOOD CULTURE FOR BACTERIA: CPT | Performed by: NURSE PRACTITIONER

## 2021-03-04 PROCEDURE — 96365 THER/PROPH/DIAG IV INF INIT: CPT

## 2021-03-04 PROCEDURE — 82607 VITAMIN B-12: CPT | Performed by: NURSE PRACTITIONER

## 2021-03-04 PROCEDURE — 63600175 PHARM REV CODE 636 W HCPCS

## 2021-03-04 PROCEDURE — 83605 ASSAY OF LACTIC ACID: CPT | Performed by: NURSE PRACTITIONER

## 2021-03-04 PROCEDURE — 85025 COMPLETE CBC W/AUTO DIFF WBC: CPT | Performed by: NURSE PRACTITIONER

## 2021-03-04 PROCEDURE — 93005 ELECTROCARDIOGRAM TRACING: CPT

## 2021-03-04 PROCEDURE — 80053 COMPREHEN METABOLIC PANEL: CPT | Performed by: NURSE PRACTITIONER

## 2021-03-04 PROCEDURE — 99213 OFFICE O/P EST LOW 20 MIN: CPT | Mod: S$GLB,,, | Performed by: FAMILY MEDICINE

## 2021-03-04 PROCEDURE — 86140 C-REACTIVE PROTEIN: CPT | Performed by: NURSE PRACTITIONER

## 2021-03-04 PROCEDURE — 83540 ASSAY OF IRON: CPT | Performed by: NURSE PRACTITIONER

## 2021-03-04 PROCEDURE — 82550 ASSAY OF CK (CPK): CPT | Performed by: NURSE PRACTITIONER

## 2021-03-04 PROCEDURE — M0239 BAMLANIVIMAB-XXXX INFUSION: HCPCS

## 2021-03-04 PROCEDURE — 83615 LACTATE (LD) (LDH) ENZYME: CPT | Performed by: NURSE PRACTITIONER

## 2021-03-04 PROCEDURE — 83735 ASSAY OF MAGNESIUM: CPT | Performed by: NURSE PRACTITIONER

## 2021-03-04 PROCEDURE — 99285 EMERGENCY DEPT VISIT HI MDM: CPT | Mod: 25

## 2021-03-04 PROCEDURE — 82746 ASSAY OF FOLIC ACID SERUM: CPT | Performed by: NURSE PRACTITIONER

## 2021-03-04 PROCEDURE — 84145 PROCALCITONIN (PCT): CPT | Performed by: NURSE PRACTITIONER

## 2021-03-04 RX ORDER — POTASSIUM CHLORIDE 7.45 MG/ML
10 INJECTION INTRAVENOUS ONCE
Status: COMPLETED | OUTPATIENT
Start: 2021-03-04 | End: 2021-03-04

## 2021-03-04 RX ORDER — ACETAMINOPHEN 325 MG/1
650 TABLET ORAL ONCE AS NEEDED
Status: DISCONTINUED | OUTPATIENT
Start: 2021-03-04 | End: 2021-06-23

## 2021-03-04 RX ORDER — SODIUM CHLORIDE 0.9 % (FLUSH) 0.9 %
10 SYRINGE (ML) INJECTION
Status: DISCONTINUED | OUTPATIENT
Start: 2021-03-04 | End: 2021-06-23

## 2021-03-04 RX ORDER — DIPHENHYDRAMINE HYDROCHLORIDE 50 MG/ML
25 INJECTION INTRAMUSCULAR; INTRAVENOUS ONCE AS NEEDED
Status: DISCONTINUED | OUTPATIENT
Start: 2021-03-04 | End: 2021-03-08 | Stop reason: HOSPADM

## 2021-03-04 RX ORDER — EPINEPHRINE 0.3 MG/.3ML
0.3 INJECTION SUBCUTANEOUS
Status: DISCONTINUED | OUTPATIENT
Start: 2021-03-04 | End: 2021-06-23

## 2021-03-04 RX ORDER — ONDANSETRON 4 MG/1
4 TABLET, ORALLY DISINTEGRATING ORAL ONCE AS NEEDED
Status: DISCONTINUED | OUTPATIENT
Start: 2021-03-04 | End: 2021-06-23

## 2021-03-04 RX ORDER — ALBUTEROL SULFATE 90 UG/1
2 AEROSOL, METERED RESPIRATORY (INHALATION)
Status: DISCONTINUED | OUTPATIENT
Start: 2021-03-04 | End: 2021-07-28 | Stop reason: CLARIF

## 2021-03-04 RX ADMIN — SODIUM CHLORIDE 700 MG: 9 INJECTION, SOLUTION INTRAVENOUS at 08:03

## 2021-03-04 RX ADMIN — POTASSIUM CHLORIDE 10 MEQ: 7.46 INJECTION, SOLUTION INTRAVENOUS at 10:03

## 2021-03-04 RX ADMIN — SODIUM CHLORIDE 500 ML: 0.9 INJECTION, SOLUTION INTRAVENOUS at 08:03

## 2021-03-04 RX ADMIN — POTASSIUM BICARBONATE 20 MEQ: 391 TABLET, EFFERVESCENT ORAL at 10:03

## 2021-03-05 ENCOUNTER — TELEPHONE (OUTPATIENT)
Dept: FAMILY MEDICINE | Facility: CLINIC | Age: 71
End: 2021-03-05

## 2021-03-05 ENCOUNTER — PATIENT MESSAGE (OUTPATIENT)
Dept: OPTOMETRY | Facility: CLINIC | Age: 71
End: 2021-03-05

## 2021-03-05 PROBLEM — A41.89 SEPSIS DUE TO COVID-19: Status: ACTIVE | Noted: 2021-03-04

## 2021-03-05 PROBLEM — U07.1 COVID-19: Status: ACTIVE | Noted: 2021-03-05

## 2021-03-05 PROBLEM — U07.1 SEPSIS DUE TO COVID-19: Status: ACTIVE | Noted: 2021-03-04

## 2021-03-05 PROBLEM — U07.1 ACUTE HYPOXEMIC RESPIRATORY FAILURE DUE TO COVID-19: Status: ACTIVE | Noted: 2021-03-05

## 2021-03-05 PROBLEM — J96.01 ACUTE HYPOXEMIC RESPIRATORY FAILURE DUE TO COVID-19: Status: ACTIVE | Noted: 2021-03-05

## 2021-03-05 LAB
25(OH)D3+25(OH)D2 SERPL-MCNC: 18 NG/ML (ref 30–96)
ALBUMIN SERPL BCP-MCNC: 3.1 G/DL (ref 3.5–5.2)
ALBUMIN SERPL BCP-MCNC: 3.1 G/DL (ref 3.5–5.2)
ALP SERPL-CCNC: 46 U/L (ref 55–135)
ALP SERPL-CCNC: 46 U/L (ref 55–135)
ALT SERPL W/O P-5'-P-CCNC: 30 U/L (ref 10–44)
ALT SERPL W/O P-5'-P-CCNC: 30 U/L (ref 10–44)
ANION GAP SERPL CALC-SCNC: 10 MMOL/L (ref 8–16)
ANION GAP SERPL CALC-SCNC: 10 MMOL/L (ref 8–16)
AST SERPL-CCNC: 30 U/L (ref 10–40)
AST SERPL-CCNC: 30 U/L (ref 10–40)
BASOPHILS # BLD AUTO: 0.01 K/UL (ref 0–0.2)
BASOPHILS NFR BLD: 0.2 % (ref 0–1.9)
BILIRUB SERPL-MCNC: 0.3 MG/DL (ref 0.1–1)
BILIRUB SERPL-MCNC: 0.3 MG/DL (ref 0.1–1)
BNP SERPL-MCNC: 20 PG/ML (ref 0–99)
BUN SERPL-MCNC: 8 MG/DL (ref 8–23)
BUN SERPL-MCNC: 8 MG/DL (ref 8–23)
CALCIUM SERPL-MCNC: 7.7 MG/DL (ref 8.7–10.5)
CALCIUM SERPL-MCNC: 7.7 MG/DL (ref 8.7–10.5)
CHLORIDE SERPL-SCNC: 103 MMOL/L (ref 95–110)
CHLORIDE SERPL-SCNC: 103 MMOL/L (ref 95–110)
CO2 SERPL-SCNC: 25 MMOL/L (ref 23–29)
CO2 SERPL-SCNC: 25 MMOL/L (ref 23–29)
CREAT SERPL-MCNC: 0.7 MG/DL (ref 0.5–1.4)
CREAT SERPL-MCNC: 0.7 MG/DL (ref 0.5–1.4)
CRP SERPL-MCNC: 52.1 MG/L (ref 0–8.2)
DIFFERENTIAL METHOD: ABNORMAL
EOSINOPHIL # BLD AUTO: 0 K/UL (ref 0–0.5)
EOSINOPHIL NFR BLD: 0 % (ref 0–8)
ERYTHROCYTE [DISTWIDTH] IN BLOOD BY AUTOMATED COUNT: 12.2 % (ref 11.5–14.5)
EST. GFR  (AFRICAN AMERICAN): >60 ML/MIN/1.73 M^2
EST. GFR  (AFRICAN AMERICAN): >60 ML/MIN/1.73 M^2
EST. GFR  (NON AFRICAN AMERICAN): >60 ML/MIN/1.73 M^2
EST. GFR  (NON AFRICAN AMERICAN): >60 ML/MIN/1.73 M^2
FOLATE SERPL-MCNC: 13.8 NG/ML (ref 4–24)
GLUCOSE SERPL-MCNC: 143 MG/DL (ref 70–110)
GLUCOSE SERPL-MCNC: 143 MG/DL (ref 70–110)
HCT VFR BLD AUTO: 34.4 % (ref 37–48.5)
HGB BLD-MCNC: 11.7 G/DL (ref 12–16)
IMM GRANULOCYTES # BLD AUTO: 0.02 K/UL (ref 0–0.04)
IMM GRANULOCYTES NFR BLD AUTO: 0.4 % (ref 0–0.5)
IRON SERPL-MCNC: 15 UG/DL (ref 30–160)
LYMPHOCYTES # BLD AUTO: 0.7 K/UL (ref 1–4.8)
LYMPHOCYTES NFR BLD: 15.5 % (ref 18–48)
MAGNESIUM SERPL-MCNC: 2.2 MG/DL (ref 1.6–2.6)
MCH RBC QN AUTO: 28.3 PG (ref 27–31)
MCHC RBC AUTO-ENTMCNC: 34 G/DL (ref 32–36)
MCV RBC AUTO: 83 FL (ref 82–98)
MONOCYTES # BLD AUTO: 0.1 K/UL (ref 0.3–1)
MONOCYTES NFR BLD: 2.7 % (ref 4–15)
NEUTROPHILS # BLD AUTO: 3.9 K/UL (ref 1.8–7.7)
NEUTROPHILS NFR BLD: 81.2 % (ref 38–73)
NRBC BLD-RTO: 0 /100 WBC
PHOSPHATE SERPL-MCNC: 1.5 MG/DL (ref 2.7–4.5)
PLATELET # BLD AUTO: 239 K/UL (ref 150–350)
PMV BLD AUTO: 10 FL (ref 9.2–12.9)
POCT GLUCOSE: 115 MG/DL (ref 70–110)
POCT GLUCOSE: 125 MG/DL (ref 70–110)
POCT GLUCOSE: 135 MG/DL (ref 70–110)
POCT GLUCOSE: 138 MG/DL (ref 70–110)
POTASSIUM SERPL-SCNC: 3.8 MMOL/L (ref 3.5–5.1)
POTASSIUM SERPL-SCNC: 3.8 MMOL/L (ref 3.5–5.1)
PROT SERPL-MCNC: 7.1 G/DL (ref 6–8.4)
PROT SERPL-MCNC: 7.1 G/DL (ref 6–8.4)
RBC # BLD AUTO: 4.14 M/UL (ref 4–5.4)
SATURATED IRON: 5 % (ref 20–50)
SODIUM SERPL-SCNC: 138 MMOL/L (ref 136–145)
SODIUM SERPL-SCNC: 138 MMOL/L (ref 136–145)
TOTAL IRON BINDING CAPACITY: 312 UG/DL (ref 250–450)
TRANSFERRIN SERPL-MCNC: 211 MG/DL (ref 200–375)
VIT B12 SERPL-MCNC: 308 PG/ML (ref 210–950)
WBC # BLD AUTO: 4.78 K/UL (ref 3.9–12.7)

## 2021-03-05 PROCEDURE — 94761 N-INVAS EAR/PLS OXIMETRY MLT: CPT

## 2021-03-05 PROCEDURE — 25000003 PHARM REV CODE 250: Performed by: STUDENT IN AN ORGANIZED HEALTH CARE EDUCATION/TRAINING PROGRAM

## 2021-03-05 PROCEDURE — 84100 ASSAY OF PHOSPHORUS: CPT | Performed by: STUDENT IN AN ORGANIZED HEALTH CARE EDUCATION/TRAINING PROGRAM

## 2021-03-05 PROCEDURE — 83735 ASSAY OF MAGNESIUM: CPT | Performed by: STUDENT IN AN ORGANIZED HEALTH CARE EDUCATION/TRAINING PROGRAM

## 2021-03-05 PROCEDURE — 97161 PT EVAL LOW COMPLEX 20 MIN: CPT

## 2021-03-05 PROCEDURE — 27100098 HC SPACER

## 2021-03-05 PROCEDURE — 25000242 PHARM REV CODE 250 ALT 637 W/ HCPCS: Performed by: STUDENT IN AN ORGANIZED HEALTH CARE EDUCATION/TRAINING PROGRAM

## 2021-03-05 PROCEDURE — G0378 HOSPITAL OBSERVATION PER HR: HCPCS

## 2021-03-05 PROCEDURE — 82306 VITAMIN D 25 HYDROXY: CPT | Performed by: STUDENT IN AN ORGANIZED HEALTH CARE EDUCATION/TRAINING PROGRAM

## 2021-03-05 PROCEDURE — 86140 C-REACTIVE PROTEIN: CPT | Performed by: STUDENT IN AN ORGANIZED HEALTH CARE EDUCATION/TRAINING PROGRAM

## 2021-03-05 PROCEDURE — 63600175 PHARM REV CODE 636 W HCPCS: Performed by: INTERNAL MEDICINE

## 2021-03-05 PROCEDURE — 94640 AIRWAY INHALATION TREATMENT: CPT

## 2021-03-05 PROCEDURE — 36415 COLL VENOUS BLD VENIPUNCTURE: CPT | Performed by: STUDENT IN AN ORGANIZED HEALTH CARE EDUCATION/TRAINING PROGRAM

## 2021-03-05 PROCEDURE — 83880 ASSAY OF NATRIURETIC PEPTIDE: CPT | Performed by: STUDENT IN AN ORGANIZED HEALTH CARE EDUCATION/TRAINING PROGRAM

## 2021-03-05 PROCEDURE — 96376 TX/PRO/DX INJ SAME DRUG ADON: CPT

## 2021-03-05 PROCEDURE — 96372 THER/PROPH/DIAG INJ SC/IM: CPT | Mod: 59

## 2021-03-05 PROCEDURE — 85025 COMPLETE CBC W/AUTO DIFF WBC: CPT | Performed by: STUDENT IN AN ORGANIZED HEALTH CARE EDUCATION/TRAINING PROGRAM

## 2021-03-05 PROCEDURE — 80053 COMPREHEN METABOLIC PANEL: CPT | Performed by: STUDENT IN AN ORGANIZED HEALTH CARE EDUCATION/TRAINING PROGRAM

## 2021-03-05 PROCEDURE — 96375 TX/PRO/DX INJ NEW DRUG ADDON: CPT

## 2021-03-05 PROCEDURE — 63600175 PHARM REV CODE 636 W HCPCS: Performed by: STUDENT IN AN ORGANIZED HEALTH CARE EDUCATION/TRAINING PROGRAM

## 2021-03-05 PROCEDURE — 25000003 PHARM REV CODE 250: Performed by: EMERGENCY MEDICINE

## 2021-03-05 PROCEDURE — 97165 OT EVAL LOW COMPLEX 30 MIN: CPT

## 2021-03-05 RX ORDER — ONDANSETRON HCL IN 0.9 % NACL 8 MG/50 ML
8 INTRAVENOUS SOLUTION, PIGGYBACK (ML) INTRAVENOUS EVERY 8 HOURS PRN
Status: DISCONTINUED | OUTPATIENT
Start: 2021-03-05 | End: 2021-03-05

## 2021-03-05 RX ORDER — LEVOFLOXACIN 750 MG/1
750 TABLET ORAL DAILY
Status: DISCONTINUED | OUTPATIENT
Start: 2021-03-06 | End: 2021-03-07

## 2021-03-05 RX ORDER — IBUPROFEN 200 MG
16 TABLET ORAL
Status: DISCONTINUED | OUTPATIENT
Start: 2021-03-05 | End: 2021-03-08 | Stop reason: HOSPADM

## 2021-03-05 RX ORDER — ALBUTEROL SULFATE 90 UG/1
2 AEROSOL, METERED RESPIRATORY (INHALATION) EVERY 6 HOURS
Status: DISCONTINUED | OUTPATIENT
Start: 2021-03-05 | End: 2021-03-05

## 2021-03-05 RX ORDER — LEVOFLOXACIN 500 MG/1
500 TABLET, FILM COATED ORAL DAILY
Status: DISCONTINUED | OUTPATIENT
Start: 2021-03-05 | End: 2021-03-05

## 2021-03-05 RX ORDER — GLUCAGON 1 MG
1 KIT INJECTION
Status: DISCONTINUED | OUTPATIENT
Start: 2021-03-05 | End: 2021-03-08 | Stop reason: HOSPADM

## 2021-03-05 RX ORDER — ASPIRIN 81 MG/1
81 TABLET ORAL DAILY
Status: DISCONTINUED | OUTPATIENT
Start: 2021-03-05 | End: 2021-03-08 | Stop reason: HOSPADM

## 2021-03-05 RX ORDER — INSULIN ASPART 100 [IU]/ML
1-10 INJECTION, SOLUTION INTRAVENOUS; SUBCUTANEOUS
Status: DISCONTINUED | OUTPATIENT
Start: 2021-03-05 | End: 2021-03-08 | Stop reason: HOSPADM

## 2021-03-05 RX ORDER — DEXAMETHASONE SODIUM PHOSPHATE 4 MG/ML
6 INJECTION, SOLUTION INTRA-ARTICULAR; INTRALESIONAL; INTRAMUSCULAR; INTRAVENOUS; SOFT TISSUE DAILY
Status: DISCONTINUED | OUTPATIENT
Start: 2021-03-05 | End: 2021-03-08 | Stop reason: HOSPADM

## 2021-03-05 RX ORDER — HYDROCHLOROTHIAZIDE 12.5 MG/1
12.5 TABLET ORAL DAILY
Status: DISCONTINUED | OUTPATIENT
Start: 2021-03-05 | End: 2021-03-08 | Stop reason: HOSPADM

## 2021-03-05 RX ORDER — LEVOFLOXACIN 750 MG/1
750 TABLET ORAL EVERY OTHER DAY
Status: DISCONTINUED | OUTPATIENT
Start: 2021-03-05 | End: 2021-03-05

## 2021-03-05 RX ORDER — ATORVASTATIN CALCIUM 40 MG/1
40 TABLET, FILM COATED ORAL NIGHTLY
Status: DISCONTINUED | OUTPATIENT
Start: 2021-03-05 | End: 2021-03-08 | Stop reason: HOSPADM

## 2021-03-05 RX ORDER — SODIUM CHLORIDE 0.9 % (FLUSH) 0.9 %
10 SYRINGE (ML) INJECTION
Status: DISCONTINUED | OUTPATIENT
Start: 2021-03-05 | End: 2021-03-08 | Stop reason: HOSPADM

## 2021-03-05 RX ORDER — ENOXAPARIN SODIUM 100 MG/ML
1 INJECTION SUBCUTANEOUS EVERY 12 HOURS
Status: DISCONTINUED | OUTPATIENT
Start: 2021-03-05 | End: 2021-03-08 | Stop reason: HOSPADM

## 2021-03-05 RX ORDER — ONDANSETRON 2 MG/ML
8 INJECTION INTRAMUSCULAR; INTRAVENOUS EVERY 8 HOURS PRN
Status: DISCONTINUED | OUTPATIENT
Start: 2021-03-05 | End: 2021-03-08 | Stop reason: HOSPADM

## 2021-03-05 RX ORDER — ALBUTEROL SULFATE 90 UG/1
2 AEROSOL, METERED RESPIRATORY (INHALATION) EVERY 6 HOURS
Status: DISCONTINUED | OUTPATIENT
Start: 2021-03-05 | End: 2021-03-08 | Stop reason: HOSPADM

## 2021-03-05 RX ORDER — AMLODIPINE BESYLATE 5 MG/1
5 TABLET ORAL DAILY
Status: DISCONTINUED | OUTPATIENT
Start: 2021-03-05 | End: 2021-03-08 | Stop reason: HOSPADM

## 2021-03-05 RX ORDER — IBUPROFEN 200 MG
24 TABLET ORAL
Status: DISCONTINUED | OUTPATIENT
Start: 2021-03-05 | End: 2021-03-08 | Stop reason: HOSPADM

## 2021-03-05 RX ORDER — POTASSIUM CHLORIDE 20 MEQ/1
20 TABLET, EXTENDED RELEASE ORAL
Status: COMPLETED | OUTPATIENT
Start: 2021-03-05 | End: 2021-03-05

## 2021-03-05 RX ADMIN — POTASSIUM CHLORIDE 20 MEQ: 1500 TABLET, EXTENDED RELEASE ORAL at 06:03

## 2021-03-05 RX ADMIN — ATORVASTATIN CALCIUM 40 MG: 40 TABLET, FILM COATED ORAL at 10:03

## 2021-03-05 RX ADMIN — REMDESIVIR 200 MG: 100 INJECTION, POWDER, LYOPHILIZED, FOR SOLUTION INTRAVENOUS at 02:03

## 2021-03-05 RX ADMIN — ALBUTEROL SULFATE 2 PUFF: 90 AEROSOL, METERED RESPIRATORY (INHALATION) at 09:03

## 2021-03-05 RX ADMIN — POTASSIUM PHOSPHATE, MONOBASIC AND POTASSIUM PHOSPHATE, DIBASIC 30 MMOL: 224; 236 INJECTION, SOLUTION, CONCENTRATE INTRAVENOUS at 08:03

## 2021-03-05 RX ADMIN — POTASSIUM CHLORIDE 20 MEQ: 1500 TABLET, EXTENDED RELEASE ORAL at 02:03

## 2021-03-05 RX ADMIN — HYDROCHLOROTHIAZIDE 12.5 MG: 25 TABLET ORAL at 10:03

## 2021-03-05 RX ADMIN — TIOTROPIUM BROMIDE INHALATION SPRAY 2 PUFF: 3.12 SPRAY, METERED RESPIRATORY (INHALATION) at 10:03

## 2021-03-05 RX ADMIN — ONDANSETRON 8 MG: 2 INJECTION INTRAMUSCULAR; INTRAVENOUS at 06:03

## 2021-03-05 RX ADMIN — LEVOFLOXACIN 750 MG: 750 TABLET, FILM COATED ORAL at 02:03

## 2021-03-05 RX ADMIN — ENOXAPARIN SODIUM 60 MG: 60 INJECTION SUBCUTANEOUS at 01:03

## 2021-03-05 RX ADMIN — POTASSIUM CHLORIDE 20 MEQ: 1500 TABLET, EXTENDED RELEASE ORAL at 04:03

## 2021-03-05 RX ADMIN — Medication 81 MG: at 10:03

## 2021-03-05 RX ADMIN — AMLODIPINE BESYLATE 5 MG: 5 TABLET ORAL at 10:03

## 2021-03-05 RX ADMIN — ENOXAPARIN SODIUM 60 MG: 60 INJECTION SUBCUTANEOUS at 08:03

## 2021-03-05 RX ADMIN — DEXAMETHASONE SODIUM PHOSPHATE 6 MG: 4 INJECTION, SOLUTION INTRA-ARTICULAR; INTRALESIONAL; INTRAMUSCULAR; INTRAVENOUS; SOFT TISSUE at 12:03

## 2021-03-05 RX ADMIN — ALBUTEROL SULFATE 2 PUFF: 90 AEROSOL, METERED RESPIRATORY (INHALATION) at 02:03

## 2021-03-05 RX ADMIN — DEXAMETHASONE SODIUM PHOSPHATE 6 MG: 4 INJECTION, SOLUTION INTRA-ARTICULAR; INTRALESIONAL; INTRAMUSCULAR; INTRAVENOUS; SOFT TISSUE at 10:03

## 2021-03-05 RX ADMIN — ENOXAPARIN SODIUM 60 MG: 60 INJECTION SUBCUTANEOUS at 10:03

## 2021-03-06 LAB
ALBUMIN SERPL BCP-MCNC: 3.2 G/DL (ref 3.5–5.2)
ALP SERPL-CCNC: 53 U/L (ref 55–135)
ALT SERPL W/O P-5'-P-CCNC: 38 U/L (ref 10–44)
ANION GAP SERPL CALC-SCNC: 9 MMOL/L (ref 8–16)
AST SERPL-CCNC: 40 U/L (ref 10–40)
BASOPHILS # BLD AUTO: 0.01 K/UL (ref 0–0.2)
BASOPHILS NFR BLD: 0.2 % (ref 0–1.9)
BILIRUB SERPL-MCNC: 0.3 MG/DL (ref 0.1–1)
BUN SERPL-MCNC: 10 MG/DL (ref 8–23)
CALCIUM SERPL-MCNC: 8.6 MG/DL (ref 8.7–10.5)
CHLORIDE SERPL-SCNC: 103 MMOL/L (ref 95–110)
CO2 SERPL-SCNC: 28 MMOL/L (ref 23–29)
CREAT SERPL-MCNC: 0.7 MG/DL (ref 0.5–1.4)
DIFFERENTIAL METHOD: ABNORMAL
EOSINOPHIL # BLD AUTO: 0 K/UL (ref 0–0.5)
EOSINOPHIL NFR BLD: 0 % (ref 0–8)
ERYTHROCYTE [DISTWIDTH] IN BLOOD BY AUTOMATED COUNT: 12.2 % (ref 11.5–14.5)
EST. GFR  (AFRICAN AMERICAN): >60 ML/MIN/1.73 M^2
EST. GFR  (NON AFRICAN AMERICAN): >60 ML/MIN/1.73 M^2
GLUCOSE SERPL-MCNC: 125 MG/DL (ref 70–110)
HCT VFR BLD AUTO: 36.4 % (ref 37–48.5)
HGB BLD-MCNC: 12.2 G/DL (ref 12–16)
IMM GRANULOCYTES # BLD AUTO: 0.01 K/UL (ref 0–0.04)
IMM GRANULOCYTES NFR BLD AUTO: 0.2 % (ref 0–0.5)
LYMPHOCYTES # BLD AUTO: 1.3 K/UL (ref 1–4.8)
LYMPHOCYTES NFR BLD: 31.1 % (ref 18–48)
MCH RBC QN AUTO: 28.1 PG (ref 27–31)
MCHC RBC AUTO-ENTMCNC: 33.5 G/DL (ref 32–36)
MCV RBC AUTO: 84 FL (ref 82–98)
MONOCYTES # BLD AUTO: 0.4 K/UL (ref 0.3–1)
MONOCYTES NFR BLD: 10.3 % (ref 4–15)
NEUTROPHILS # BLD AUTO: 2.4 K/UL (ref 1.8–7.7)
NEUTROPHILS NFR BLD: 58.2 % (ref 38–73)
NRBC BLD-RTO: 0 /100 WBC
PHOSPHATE SERPL-MCNC: 4 MG/DL (ref 2.7–4.5)
PLATELET # BLD AUTO: 269 K/UL (ref 150–350)
PMV BLD AUTO: 9.7 FL (ref 9.2–12.9)
POCT GLUCOSE: 127 MG/DL (ref 70–110)
POCT GLUCOSE: 134 MG/DL (ref 70–110)
POCT GLUCOSE: 134 MG/DL (ref 70–110)
POCT GLUCOSE: 143 MG/DL (ref 70–110)
POTASSIUM SERPL-SCNC: 3.9 MMOL/L (ref 3.5–5.1)
PROT SERPL-MCNC: 7.5 G/DL (ref 6–8.4)
RBC # BLD AUTO: 4.34 M/UL (ref 4–5.4)
SODIUM SERPL-SCNC: 140 MMOL/L (ref 136–145)
WBC # BLD AUTO: 4.09 K/UL (ref 3.9–12.7)

## 2021-03-06 PROCEDURE — 96372 THER/PROPH/DIAG INJ SC/IM: CPT | Mod: 59

## 2021-03-06 PROCEDURE — 94761 N-INVAS EAR/PLS OXIMETRY MLT: CPT

## 2021-03-06 PROCEDURE — 96376 TX/PRO/DX INJ SAME DRUG ADON: CPT

## 2021-03-06 PROCEDURE — 25000003 PHARM REV CODE 250: Performed by: STUDENT IN AN ORGANIZED HEALTH CARE EDUCATION/TRAINING PROGRAM

## 2021-03-06 PROCEDURE — 80053 COMPREHEN METABOLIC PANEL: CPT | Performed by: STUDENT IN AN ORGANIZED HEALTH CARE EDUCATION/TRAINING PROGRAM

## 2021-03-06 PROCEDURE — 25000003 PHARM REV CODE 250: Performed by: INTERNAL MEDICINE

## 2021-03-06 PROCEDURE — 36415 COLL VENOUS BLD VENIPUNCTURE: CPT | Performed by: STUDENT IN AN ORGANIZED HEALTH CARE EDUCATION/TRAINING PROGRAM

## 2021-03-06 PROCEDURE — G0378 HOSPITAL OBSERVATION PER HR: HCPCS

## 2021-03-06 PROCEDURE — 94640 AIRWAY INHALATION TREATMENT: CPT

## 2021-03-06 PROCEDURE — 63600175 PHARM REV CODE 636 W HCPCS: Performed by: STUDENT IN AN ORGANIZED HEALTH CARE EDUCATION/TRAINING PROGRAM

## 2021-03-06 PROCEDURE — 84100 ASSAY OF PHOSPHORUS: CPT | Performed by: STUDENT IN AN ORGANIZED HEALTH CARE EDUCATION/TRAINING PROGRAM

## 2021-03-06 PROCEDURE — 99900035 HC TECH TIME PER 15 MIN (STAT)

## 2021-03-06 PROCEDURE — 85025 COMPLETE CBC W/AUTO DIFF WBC: CPT | Performed by: STUDENT IN AN ORGANIZED HEALTH CARE EDUCATION/TRAINING PROGRAM

## 2021-03-06 PROCEDURE — 25000242 PHARM REV CODE 250 ALT 637 W/ HCPCS: Performed by: STUDENT IN AN ORGANIZED HEALTH CARE EDUCATION/TRAINING PROGRAM

## 2021-03-06 RX ORDER — POLYETHYLENE GLYCOL 3350 17 G/17G
17 POWDER, FOR SOLUTION ORAL DAILY PRN
Status: DISCONTINUED | OUTPATIENT
Start: 2021-03-06 | End: 2021-03-07

## 2021-03-06 RX ORDER — BISACODYL 5 MG
5 TABLET, DELAYED RELEASE (ENTERIC COATED) ORAL DAILY PRN
Status: DISCONTINUED | OUTPATIENT
Start: 2021-03-06 | End: 2021-03-07

## 2021-03-06 RX ADMIN — DEXAMETHASONE SODIUM PHOSPHATE 6 MG: 4 INJECTION, SOLUTION INTRA-ARTICULAR; INTRALESIONAL; INTRAMUSCULAR; INTRAVENOUS; SOFT TISSUE at 08:03

## 2021-03-06 RX ADMIN — LEVOFLOXACIN 750 MG: 750 TABLET, FILM COATED ORAL at 08:03

## 2021-03-06 RX ADMIN — TIOTROPIUM BROMIDE INHALATION SPRAY 2 PUFF: 3.12 SPRAY, METERED RESPIRATORY (INHALATION) at 08:03

## 2021-03-06 RX ADMIN — ALBUTEROL SULFATE 2 PUFF: 90 AEROSOL, METERED RESPIRATORY (INHALATION) at 12:03

## 2021-03-06 RX ADMIN — ENOXAPARIN SODIUM 60 MG: 60 INJECTION SUBCUTANEOUS at 08:03

## 2021-03-06 RX ADMIN — ALBUTEROL SULFATE 2 PUFF: 90 AEROSOL, METERED RESPIRATORY (INHALATION) at 07:03

## 2021-03-06 RX ADMIN — BISACODYL 5 MG: 5 TABLET, COATED ORAL at 08:03

## 2021-03-06 RX ADMIN — REMDESIVIR 100 MG: 100 INJECTION, POWDER, LYOPHILIZED, FOR SOLUTION INTRAVENOUS at 04:03

## 2021-03-06 RX ADMIN — ATORVASTATIN CALCIUM 40 MG: 40 TABLET, FILM COATED ORAL at 08:03

## 2021-03-06 RX ADMIN — POLYETHYLENE GLYCOL (3350) 17 G: 17 POWDER, FOR SOLUTION ORAL at 08:03

## 2021-03-06 RX ADMIN — ALBUTEROL SULFATE 2 PUFF: 90 AEROSOL, METERED RESPIRATORY (INHALATION) at 08:03

## 2021-03-06 RX ADMIN — ALBUTEROL SULFATE 2 PUFF: 90 AEROSOL, METERED RESPIRATORY (INHALATION) at 01:03

## 2021-03-06 RX ADMIN — AMLODIPINE BESYLATE 5 MG: 5 TABLET ORAL at 08:03

## 2021-03-06 RX ADMIN — HYDROCHLOROTHIAZIDE 12.5 MG: 25 TABLET ORAL at 08:03

## 2021-03-06 RX ADMIN — Medication 81 MG: at 08:03

## 2021-03-07 LAB
ALBUMIN SERPL BCP-MCNC: 3.1 G/DL (ref 3.5–5.2)
ALP SERPL-CCNC: 45 U/L (ref 55–135)
ALT SERPL W/O P-5'-P-CCNC: 42 U/L (ref 10–44)
ANION GAP SERPL CALC-SCNC: 10 MMOL/L (ref 8–16)
ANISOCYTOSIS BLD QL SMEAR: SLIGHT
AST SERPL-CCNC: 37 U/L (ref 10–40)
BASOPHILS # BLD AUTO: 0 K/UL (ref 0–0.2)
BASOPHILS NFR BLD: 0 % (ref 0–1.9)
BILIRUB SERPL-MCNC: 0.4 MG/DL (ref 0.1–1)
BUN SERPL-MCNC: 15 MG/DL (ref 8–23)
CALCIUM SERPL-MCNC: 8.1 MG/DL (ref 8.7–10.5)
CHLORIDE SERPL-SCNC: 105 MMOL/L (ref 95–110)
CO2 SERPL-SCNC: 26 MMOL/L (ref 23–29)
CREAT SERPL-MCNC: 0.6 MG/DL (ref 0.5–1.4)
DIFFERENTIAL METHOD: ABNORMAL
EOSINOPHIL # BLD AUTO: 0 K/UL (ref 0–0.5)
EOSINOPHIL NFR BLD: 0 % (ref 0–8)
ERYTHROCYTE [DISTWIDTH] IN BLOOD BY AUTOMATED COUNT: 12.2 % (ref 11.5–14.5)
EST. GFR  (AFRICAN AMERICAN): >60 ML/MIN/1.73 M^2
EST. GFR  (NON AFRICAN AMERICAN): >60 ML/MIN/1.73 M^2
GLUCOSE SERPL-MCNC: 103 MG/DL (ref 70–110)
HCT VFR BLD AUTO: 36.2 % (ref 37–48.5)
HGB BLD-MCNC: 12.2 G/DL (ref 12–16)
IMM GRANULOCYTES # BLD AUTO: 0.01 K/UL (ref 0–0.04)
IMM GRANULOCYTES NFR BLD AUTO: 0.2 % (ref 0–0.5)
LYMPHOCYTES # BLD AUTO: 1.5 K/UL (ref 1–4.8)
LYMPHOCYTES NFR BLD: 33 % (ref 18–48)
MCH RBC QN AUTO: 28.3 PG (ref 27–31)
MCHC RBC AUTO-ENTMCNC: 33.7 G/DL (ref 32–36)
MCV RBC AUTO: 84 FL (ref 82–98)
MONOCYTES # BLD AUTO: 0.5 K/UL (ref 0.3–1)
MONOCYTES NFR BLD: 9.9 % (ref 4–15)
NEUTROPHILS # BLD AUTO: 2.6 K/UL (ref 1.8–7.7)
NEUTROPHILS NFR BLD: 56.9 % (ref 38–73)
NRBC BLD-RTO: 0 /100 WBC
OVALOCYTES BLD QL SMEAR: ABNORMAL
PLATELET # BLD AUTO: 298 K/UL (ref 150–350)
PMV BLD AUTO: 9.8 FL (ref 9.2–12.9)
POCT GLUCOSE: 109 MG/DL (ref 70–110)
POCT GLUCOSE: 112 MG/DL (ref 70–110)
POCT GLUCOSE: 128 MG/DL (ref 70–110)
POCT GLUCOSE: 147 MG/DL (ref 70–110)
POIKILOCYTOSIS BLD QL SMEAR: SLIGHT
POTASSIUM SERPL-SCNC: 3.8 MMOL/L (ref 3.5–5.1)
PROCALCITONIN SERPL IA-MCNC: 0.06 NG/ML
PROT SERPL-MCNC: 7 G/DL (ref 6–8.4)
RBC # BLD AUTO: 4.31 M/UL (ref 4–5.4)
SODIUM SERPL-SCNC: 141 MMOL/L (ref 136–145)
WBC # BLD AUTO: 4.54 K/UL (ref 3.9–12.7)

## 2021-03-07 PROCEDURE — 25000242 PHARM REV CODE 250 ALT 637 W/ HCPCS: Performed by: STUDENT IN AN ORGANIZED HEALTH CARE EDUCATION/TRAINING PROGRAM

## 2021-03-07 PROCEDURE — 63600175 PHARM REV CODE 636 W HCPCS: Performed by: STUDENT IN AN ORGANIZED HEALTH CARE EDUCATION/TRAINING PROGRAM

## 2021-03-07 PROCEDURE — 96376 TX/PRO/DX INJ SAME DRUG ADON: CPT

## 2021-03-07 PROCEDURE — 36415 COLL VENOUS BLD VENIPUNCTURE: CPT | Performed by: STUDENT IN AN ORGANIZED HEALTH CARE EDUCATION/TRAINING PROGRAM

## 2021-03-07 PROCEDURE — 94640 AIRWAY INHALATION TREATMENT: CPT

## 2021-03-07 PROCEDURE — 99900035 HC TECH TIME PER 15 MIN (STAT)

## 2021-03-07 PROCEDURE — 25000003 PHARM REV CODE 250: Performed by: INTERNAL MEDICINE

## 2021-03-07 PROCEDURE — 84145 PROCALCITONIN (PCT): CPT | Performed by: STUDENT IN AN ORGANIZED HEALTH CARE EDUCATION/TRAINING PROGRAM

## 2021-03-07 PROCEDURE — 25000003 PHARM REV CODE 250: Performed by: STUDENT IN AN ORGANIZED HEALTH CARE EDUCATION/TRAINING PROGRAM

## 2021-03-07 PROCEDURE — 94761 N-INVAS EAR/PLS OXIMETRY MLT: CPT

## 2021-03-07 PROCEDURE — G0378 HOSPITAL OBSERVATION PER HR: HCPCS

## 2021-03-07 PROCEDURE — 85025 COMPLETE CBC W/AUTO DIFF WBC: CPT | Performed by: STUDENT IN AN ORGANIZED HEALTH CARE EDUCATION/TRAINING PROGRAM

## 2021-03-07 PROCEDURE — 96372 THER/PROPH/DIAG INJ SC/IM: CPT | Mod: 59

## 2021-03-07 PROCEDURE — 80053 COMPREHEN METABOLIC PANEL: CPT | Performed by: STUDENT IN AN ORGANIZED HEALTH CARE EDUCATION/TRAINING PROGRAM

## 2021-03-07 RX ORDER — POLYETHYLENE GLYCOL 3350 17 G/17G
17 POWDER, FOR SOLUTION ORAL DAILY
Status: DISCONTINUED | OUTPATIENT
Start: 2021-03-08 | End: 2021-03-08 | Stop reason: HOSPADM

## 2021-03-07 RX ORDER — BISACODYL 5 MG
5 TABLET, DELAYED RELEASE (ENTERIC COATED) ORAL ONCE
Status: DISCONTINUED | OUTPATIENT
Start: 2021-03-07 | End: 2021-03-08 | Stop reason: HOSPADM

## 2021-03-07 RX ORDER — POTASSIUM CHLORIDE 20 MEQ/1
20 TABLET, EXTENDED RELEASE ORAL ONCE
Status: COMPLETED | OUTPATIENT
Start: 2021-03-07 | End: 2021-03-07

## 2021-03-07 RX ADMIN — ALBUTEROL SULFATE 2 PUFF: 90 AEROSOL, METERED RESPIRATORY (INHALATION) at 07:03

## 2021-03-07 RX ADMIN — ENOXAPARIN SODIUM 60 MG: 60 INJECTION SUBCUTANEOUS at 08:03

## 2021-03-07 RX ADMIN — TIOTROPIUM BROMIDE INHALATION SPRAY 2 PUFF: 3.12 SPRAY, METERED RESPIRATORY (INHALATION) at 08:03

## 2021-03-07 RX ADMIN — AMLODIPINE BESYLATE 5 MG: 5 TABLET ORAL at 08:03

## 2021-03-07 RX ADMIN — PSYLLIUM HUSK 1 PACKET: 3.4 POWDER ORAL at 12:03

## 2021-03-07 RX ADMIN — ALBUTEROL SULFATE 2 PUFF: 90 AEROSOL, METERED RESPIRATORY (INHALATION) at 12:03

## 2021-03-07 RX ADMIN — POTASSIUM CHLORIDE 20 MEQ: 1500 TABLET, EXTENDED RELEASE ORAL at 08:03

## 2021-03-07 RX ADMIN — DEXAMETHASONE SODIUM PHOSPHATE 6 MG: 4 INJECTION, SOLUTION INTRA-ARTICULAR; INTRALESIONAL; INTRAMUSCULAR; INTRAVENOUS; SOFT TISSUE at 08:03

## 2021-03-07 RX ADMIN — ALBUTEROL SULFATE 2 PUFF: 90 AEROSOL, METERED RESPIRATORY (INHALATION) at 08:03

## 2021-03-07 RX ADMIN — HYDROCHLOROTHIAZIDE 12.5 MG: 25 TABLET ORAL at 08:03

## 2021-03-07 RX ADMIN — REMDESIVIR 100 MG: 100 INJECTION, POWDER, LYOPHILIZED, FOR SOLUTION INTRAVENOUS at 03:03

## 2021-03-07 RX ADMIN — BISACODYL 5 MG: 5 TABLET, COATED ORAL at 08:03

## 2021-03-07 RX ADMIN — LEVOFLOXACIN 750 MG: 750 TABLET, FILM COATED ORAL at 08:03

## 2021-03-07 RX ADMIN — Medication 81 MG: at 08:03

## 2021-03-07 RX ADMIN — ATORVASTATIN CALCIUM 40 MG: 40 TABLET, FILM COATED ORAL at 08:03

## 2021-03-08 ENCOUNTER — NURSE TRIAGE (OUTPATIENT)
Dept: ADMINISTRATIVE | Facility: CLINIC | Age: 71
End: 2021-03-08

## 2021-03-08 VITALS
DIASTOLIC BLOOD PRESSURE: 66 MMHG | SYSTOLIC BLOOD PRESSURE: 123 MMHG | HEIGHT: 60 IN | OXYGEN SATURATION: 93 % | BODY MASS INDEX: 28.79 KG/M2 | RESPIRATION RATE: 18 BRPM | WEIGHT: 146.63 LBS | TEMPERATURE: 98 F | HEART RATE: 73 BPM

## 2021-03-08 LAB
ALBUMIN SERPL BCP-MCNC: 3.2 G/DL (ref 3.5–5.2)
ALP SERPL-CCNC: 46 U/L (ref 55–135)
ALT SERPL W/O P-5'-P-CCNC: 66 U/L (ref 10–44)
ANION GAP SERPL CALC-SCNC: 9 MMOL/L (ref 8–16)
ANISOCYTOSIS BLD QL SMEAR: SLIGHT
AST SERPL-CCNC: 56 U/L (ref 10–40)
BASOPHILS # BLD AUTO: 0 K/UL (ref 0–0.2)
BASOPHILS NFR BLD: 0 % (ref 0–1.9)
BILIRUB SERPL-MCNC: 0.4 MG/DL (ref 0.1–1)
BUN SERPL-MCNC: 16 MG/DL (ref 8–23)
BURR CELLS BLD QL SMEAR: NORMAL
CALCIUM SERPL-MCNC: 8.5 MG/DL (ref 8.7–10.5)
CHLORIDE SERPL-SCNC: 105 MMOL/L (ref 95–110)
CO2 SERPL-SCNC: 27 MMOL/L (ref 23–29)
CREAT SERPL-MCNC: 0.7 MG/DL (ref 0.5–1.4)
D DIMER PPP IA.FEU-MCNC: <0.19 MG/L FEU
DIFFERENTIAL METHOD: NORMAL
EOSINOPHIL # BLD AUTO: 0 K/UL (ref 0–0.5)
EOSINOPHIL NFR BLD: 0 % (ref 0–8)
ERYTHROCYTE [DISTWIDTH] IN BLOOD BY AUTOMATED COUNT: 12.2 % (ref 11.5–14.5)
EST. GFR  (AFRICAN AMERICAN): >60 ML/MIN/1.73 M^2
EST. GFR  (NON AFRICAN AMERICAN): >60 ML/MIN/1.73 M^2
GLUCOSE SERPL-MCNC: 103 MG/DL (ref 70–110)
HCT VFR BLD AUTO: 38.2 % (ref 37–48.5)
HGB BLD-MCNC: 12.6 G/DL (ref 12–16)
IMM GRANULOCYTES # BLD AUTO: 0.01 K/UL (ref 0–0.04)
IMM GRANULOCYTES NFR BLD AUTO: 0.2 % (ref 0–0.5)
LYMPHOCYTES # BLD AUTO: 1.7 K/UL (ref 1–4.8)
LYMPHOCYTES NFR BLD: 32 % (ref 18–48)
MCH RBC QN AUTO: 28.1 PG (ref 27–31)
MCHC RBC AUTO-ENTMCNC: 33 G/DL (ref 32–36)
MCV RBC AUTO: 85 FL (ref 82–98)
MONOCYTES # BLD AUTO: 0.5 K/UL (ref 0.3–1)
MONOCYTES NFR BLD: 9.6 % (ref 4–15)
NEUTROPHILS # BLD AUTO: 3.1 K/UL (ref 1.8–7.7)
NEUTROPHILS NFR BLD: 58.2 % (ref 38–73)
NRBC BLD-RTO: 0 /100 WBC
OVALOCYTES BLD QL SMEAR: NORMAL
PLATELET # BLD AUTO: 329 K/UL (ref 150–350)
PLATELET BLD QL SMEAR: NORMAL
PMV BLD AUTO: 10 FL (ref 9.2–12.9)
POCT GLUCOSE: 91 MG/DL (ref 70–110)
POIKILOCYTOSIS BLD QL SMEAR: SLIGHT
POTASSIUM SERPL-SCNC: 4 MMOL/L (ref 3.5–5.1)
PROT SERPL-MCNC: 7.2 G/DL (ref 6–8.4)
RBC # BLD AUTO: 4.49 M/UL (ref 4–5.4)
SODIUM SERPL-SCNC: 141 MMOL/L (ref 136–145)
WBC # BLD AUTO: 5.31 K/UL (ref 3.9–12.7)

## 2021-03-08 PROCEDURE — G0378 HOSPITAL OBSERVATION PER HR: HCPCS

## 2021-03-08 PROCEDURE — 25000003 PHARM REV CODE 250: Performed by: STUDENT IN AN ORGANIZED HEALTH CARE EDUCATION/TRAINING PROGRAM

## 2021-03-08 PROCEDURE — 80053 COMPREHEN METABOLIC PANEL: CPT | Performed by: STUDENT IN AN ORGANIZED HEALTH CARE EDUCATION/TRAINING PROGRAM

## 2021-03-08 PROCEDURE — 96376 TX/PRO/DX INJ SAME DRUG ADON: CPT

## 2021-03-08 PROCEDURE — 94761 N-INVAS EAR/PLS OXIMETRY MLT: CPT

## 2021-03-08 PROCEDURE — 94640 AIRWAY INHALATION TREATMENT: CPT

## 2021-03-08 PROCEDURE — 25000242 PHARM REV CODE 250 ALT 637 W/ HCPCS: Performed by: STUDENT IN AN ORGANIZED HEALTH CARE EDUCATION/TRAINING PROGRAM

## 2021-03-08 PROCEDURE — 96372 THER/PROPH/DIAG INJ SC/IM: CPT | Mod: 59

## 2021-03-08 PROCEDURE — 85379 FIBRIN DEGRADATION QUANT: CPT | Performed by: STUDENT IN AN ORGANIZED HEALTH CARE EDUCATION/TRAINING PROGRAM

## 2021-03-08 PROCEDURE — 85025 COMPLETE CBC W/AUTO DIFF WBC: CPT | Performed by: STUDENT IN AN ORGANIZED HEALTH CARE EDUCATION/TRAINING PROGRAM

## 2021-03-08 PROCEDURE — 63600175 PHARM REV CODE 636 W HCPCS: Performed by: STUDENT IN AN ORGANIZED HEALTH CARE EDUCATION/TRAINING PROGRAM

## 2021-03-08 RX ORDER — ERGOCALCIFEROL 1.25 MG/1
50000 CAPSULE ORAL
Status: DISCONTINUED | OUTPATIENT
Start: 2021-03-08 | End: 2021-03-08 | Stop reason: HOSPADM

## 2021-03-08 RX ADMIN — Medication 81 MG: at 08:03

## 2021-03-08 RX ADMIN — PSYLLIUM HUSK 1 PACKET: 3.4 POWDER ORAL at 08:03

## 2021-03-08 RX ADMIN — ERGOCALCIFEROL 50000 UNITS: 1.25 CAPSULE ORAL at 09:03

## 2021-03-08 RX ADMIN — AMLODIPINE BESYLATE 5 MG: 5 TABLET ORAL at 08:03

## 2021-03-08 RX ADMIN — HYDROCHLOROTHIAZIDE 12.5 MG: 25 TABLET ORAL at 08:03

## 2021-03-08 RX ADMIN — DEXAMETHASONE SODIUM PHOSPHATE 6 MG: 4 INJECTION, SOLUTION INTRA-ARTICULAR; INTRALESIONAL; INTRAMUSCULAR; INTRAVENOUS; SOFT TISSUE at 08:03

## 2021-03-08 RX ADMIN — ENOXAPARIN SODIUM 60 MG: 60 INJECTION SUBCUTANEOUS at 08:03

## 2021-03-08 RX ADMIN — TIOTROPIUM BROMIDE INHALATION SPRAY 2 PUFF: 3.12 SPRAY, METERED RESPIRATORY (INHALATION) at 07:03

## 2021-03-08 RX ADMIN — ALBUTEROL SULFATE 2 PUFF: 90 AEROSOL, METERED RESPIRATORY (INHALATION) at 12:03

## 2021-03-08 RX ADMIN — ALBUTEROL SULFATE 2 PUFF: 90 AEROSOL, METERED RESPIRATORY (INHALATION) at 07:03

## 2021-03-08 RX ADMIN — POLYETHYLENE GLYCOL (3350) 17 G: 17 POWDER, FOR SOLUTION ORAL at 08:03

## 2021-03-09 ENCOUNTER — NURSE TRIAGE (OUTPATIENT)
Dept: ADMINISTRATIVE | Facility: CLINIC | Age: 71
End: 2021-03-09

## 2021-03-09 ENCOUNTER — PATIENT MESSAGE (OUTPATIENT)
Dept: ADMINISTRATIVE | Facility: OTHER | Age: 71
End: 2021-03-09

## 2021-03-09 ENCOUNTER — PATIENT MESSAGE (OUTPATIENT)
Dept: ADMINISTRATIVE | Facility: CLINIC | Age: 71
End: 2021-03-09

## 2021-03-10 ENCOUNTER — NURSE TRIAGE (OUTPATIENT)
Dept: ADMINISTRATIVE | Facility: CLINIC | Age: 71
End: 2021-03-10

## 2021-03-10 ENCOUNTER — PATIENT MESSAGE (OUTPATIENT)
Dept: ADMINISTRATIVE | Facility: CLINIC | Age: 71
End: 2021-03-10

## 2021-03-10 ENCOUNTER — PATIENT MESSAGE (OUTPATIENT)
Dept: ADMINISTRATIVE | Facility: OTHER | Age: 71
End: 2021-03-10

## 2021-03-10 LAB
BACTERIA BLD CULT: NORMAL
BACTERIA BLD CULT: NORMAL

## 2021-03-11 ENCOUNTER — PATIENT MESSAGE (OUTPATIENT)
Dept: ADMINISTRATIVE | Facility: OTHER | Age: 71
End: 2021-03-11

## 2021-03-12 ENCOUNTER — PATIENT MESSAGE (OUTPATIENT)
Dept: ADMINISTRATIVE | Facility: OTHER | Age: 71
End: 2021-03-12

## 2021-03-12 ENCOUNTER — NURSE TRIAGE (OUTPATIENT)
Dept: ADMINISTRATIVE | Facility: CLINIC | Age: 71
End: 2021-03-12

## 2021-03-13 ENCOUNTER — PATIENT MESSAGE (OUTPATIENT)
Dept: ADMINISTRATIVE | Facility: OTHER | Age: 71
End: 2021-03-13

## 2021-03-13 ENCOUNTER — PATIENT MESSAGE (OUTPATIENT)
Dept: ADMINISTRATIVE | Facility: CLINIC | Age: 71
End: 2021-03-13

## 2021-03-14 ENCOUNTER — PATIENT MESSAGE (OUTPATIENT)
Dept: ADMINISTRATIVE | Facility: OTHER | Age: 71
End: 2021-03-14

## 2021-03-15 ENCOUNTER — PATIENT MESSAGE (OUTPATIENT)
Dept: ADMINISTRATIVE | Facility: OTHER | Age: 71
End: 2021-03-15

## 2021-03-15 ENCOUNTER — NURSE TRIAGE (OUTPATIENT)
Dept: ADMINISTRATIVE | Facility: CLINIC | Age: 71
End: 2021-03-15

## 2021-03-16 ENCOUNTER — PATIENT MESSAGE (OUTPATIENT)
Dept: ADMINISTRATIVE | Facility: OTHER | Age: 71
End: 2021-03-16

## 2021-03-16 ENCOUNTER — PATIENT MESSAGE (OUTPATIENT)
Dept: OPTOMETRY | Facility: CLINIC | Age: 71
End: 2021-03-16

## 2021-03-17 ENCOUNTER — PATIENT MESSAGE (OUTPATIENT)
Dept: ADMINISTRATIVE | Facility: OTHER | Age: 71
End: 2021-03-17

## 2021-03-17 ENCOUNTER — NURSE TRIAGE (OUTPATIENT)
Dept: ADMINISTRATIVE | Facility: CLINIC | Age: 71
End: 2021-03-17

## 2021-03-18 ENCOUNTER — PATIENT MESSAGE (OUTPATIENT)
Dept: ADMINISTRATIVE | Facility: OTHER | Age: 71
End: 2021-03-18

## 2021-03-19 ENCOUNTER — PATIENT MESSAGE (OUTPATIENT)
Dept: ADMINISTRATIVE | Facility: OTHER | Age: 71
End: 2021-03-19

## 2021-03-19 ENCOUNTER — NURSE TRIAGE (OUTPATIENT)
Dept: ADMINISTRATIVE | Facility: CLINIC | Age: 71
End: 2021-03-19

## 2021-03-19 ENCOUNTER — PATIENT MESSAGE (OUTPATIENT)
Dept: ADMINISTRATIVE | Facility: CLINIC | Age: 71
End: 2021-03-19

## 2021-03-19 ENCOUNTER — OFFICE VISIT (OUTPATIENT)
Dept: FAMILY MEDICINE | Facility: CLINIC | Age: 71
End: 2021-03-19
Payer: MEDICARE

## 2021-03-19 VITALS
TEMPERATURE: 99 F | HEIGHT: 60 IN | DIASTOLIC BLOOD PRESSURE: 60 MMHG | OXYGEN SATURATION: 97 % | WEIGHT: 143.06 LBS | BODY MASS INDEX: 28.09 KG/M2 | SYSTOLIC BLOOD PRESSURE: 128 MMHG | HEART RATE: 88 BPM

## 2021-03-19 DIAGNOSIS — I10 ESSENTIAL HYPERTENSION: Primary | ICD-10-CM

## 2021-03-19 DIAGNOSIS — T73.2XXD FATIGUE DUE TO EXPOSURE, SUBSEQUENT ENCOUNTER: ICD-10-CM

## 2021-03-19 DIAGNOSIS — Z20.822 ENCOUNTER FOR LABORATORY TESTING FOR COVID-19 VIRUS: ICD-10-CM

## 2021-03-19 PROCEDURE — 1126F PR PAIN SEVERITY QUANTIFIED, NO PAIN PRESENT: ICD-10-PCS | Mod: S$GLB,,, | Performed by: INTERNAL MEDICINE

## 2021-03-19 PROCEDURE — 3008F BODY MASS INDEX DOCD: CPT | Mod: CPTII,S$GLB,, | Performed by: INTERNAL MEDICINE

## 2021-03-19 PROCEDURE — 1101F PR PT FALLS ASSESS DOC 0-1 FALLS W/OUT INJ PAST YR: ICD-10-PCS | Mod: CPTII,S$GLB,, | Performed by: INTERNAL MEDICINE

## 2021-03-19 PROCEDURE — 3008F PR BODY MASS INDEX (BMI) DOCUMENTED: ICD-10-PCS | Mod: CPTII,S$GLB,, | Performed by: INTERNAL MEDICINE

## 2021-03-19 PROCEDURE — 3288F FALL RISK ASSESSMENT DOCD: CPT | Mod: CPTII,S$GLB,, | Performed by: INTERNAL MEDICINE

## 2021-03-19 PROCEDURE — 99213 OFFICE O/P EST LOW 20 MIN: CPT | Mod: S$GLB,,, | Performed by: INTERNAL MEDICINE

## 2021-03-19 PROCEDURE — 3078F PR MOST RECENT DIASTOLIC BLOOD PRESSURE < 80 MM HG: ICD-10-PCS | Mod: CPTII,S$GLB,, | Performed by: INTERNAL MEDICINE

## 2021-03-19 PROCEDURE — 1101F PT FALLS ASSESS-DOCD LE1/YR: CPT | Mod: CPTII,S$GLB,, | Performed by: INTERNAL MEDICINE

## 2021-03-19 PROCEDURE — 3074F PR MOST RECENT SYSTOLIC BLOOD PRESSURE < 130 MM HG: ICD-10-PCS | Mod: CPTII,S$GLB,, | Performed by: INTERNAL MEDICINE

## 2021-03-19 PROCEDURE — 1159F MED LIST DOCD IN RCRD: CPT | Mod: S$GLB,,, | Performed by: INTERNAL MEDICINE

## 2021-03-19 PROCEDURE — 99999 PR PBB SHADOW E&M-EST. PATIENT-LVL IV: CPT | Mod: PBBFAC,,, | Performed by: INTERNAL MEDICINE

## 2021-03-19 PROCEDURE — 3074F SYST BP LT 130 MM HG: CPT | Mod: CPTII,S$GLB,, | Performed by: INTERNAL MEDICINE

## 2021-03-19 PROCEDURE — 1159F PR MEDICATION LIST DOCUMENTED IN MEDICAL RECORD: ICD-10-PCS | Mod: S$GLB,,, | Performed by: INTERNAL MEDICINE

## 2021-03-19 PROCEDURE — 99999 PR PBB SHADOW E&M-EST. PATIENT-LVL IV: ICD-10-PCS | Mod: PBBFAC,,, | Performed by: INTERNAL MEDICINE

## 2021-03-19 PROCEDURE — 3078F DIAST BP <80 MM HG: CPT | Mod: CPTII,S$GLB,, | Performed by: INTERNAL MEDICINE

## 2021-03-19 PROCEDURE — 3288F PR FALLS RISK ASSESSMENT DOCUMENTED: ICD-10-PCS | Mod: CPTII,S$GLB,, | Performed by: INTERNAL MEDICINE

## 2021-03-19 PROCEDURE — 1126F AMNT PAIN NOTED NONE PRSNT: CPT | Mod: S$GLB,,, | Performed by: INTERNAL MEDICINE

## 2021-03-19 PROCEDURE — 99213 PR OFFICE/OUTPT VISIT, EST, LEVL III, 20-29 MIN: ICD-10-PCS | Mod: S$GLB,,, | Performed by: INTERNAL MEDICINE

## 2021-03-19 RX ORDER — VIT C/E/ZN/COPPR/LUTEIN/ZEAXAN 250MG-90MG
1000 CAPSULE ORAL DAILY
Qty: 30 CAPSULE | Refills: 11 | Status: SHIPPED | OUTPATIENT
Start: 2021-03-19 | End: 2021-06-23 | Stop reason: SDUPTHER

## 2021-03-19 RX ORDER — CHOLECALCIFEROL (VITAMIN D3) 25 MCG
1 TABLET,CHEWABLE ORAL DAILY
Qty: 30 CAPSULE | Refills: 3 | Status: SHIPPED | OUTPATIENT
Start: 2021-03-19 | End: 2021-06-23 | Stop reason: SDUPTHER

## 2021-03-20 ENCOUNTER — NURSE TRIAGE (OUTPATIENT)
Dept: ADMINISTRATIVE | Facility: CLINIC | Age: 71
End: 2021-03-20

## 2021-03-20 ENCOUNTER — PATIENT MESSAGE (OUTPATIENT)
Dept: ADMINISTRATIVE | Facility: CLINIC | Age: 71
End: 2021-03-20

## 2021-03-20 ENCOUNTER — PATIENT MESSAGE (OUTPATIENT)
Dept: ADMINISTRATIVE | Facility: OTHER | Age: 71
End: 2021-03-20

## 2021-03-21 ENCOUNTER — PATIENT MESSAGE (OUTPATIENT)
Dept: ADMINISTRATIVE | Facility: CLINIC | Age: 71
End: 2021-03-21

## 2021-03-21 ENCOUNTER — NURSE TRIAGE (OUTPATIENT)
Dept: ADMINISTRATIVE | Facility: CLINIC | Age: 71
End: 2021-03-21

## 2021-03-24 ENCOUNTER — PATIENT MESSAGE (OUTPATIENT)
Dept: OPTOMETRY | Facility: CLINIC | Age: 71
End: 2021-03-24

## 2021-05-12 ENCOUNTER — PATIENT MESSAGE (OUTPATIENT)
Dept: OPTOMETRY | Facility: CLINIC | Age: 71
End: 2021-05-12

## 2021-05-17 ENCOUNTER — OFFICE VISIT (OUTPATIENT)
Dept: OPHTHALMOLOGY | Facility: CLINIC | Age: 71
End: 2021-05-17
Payer: MEDICARE

## 2021-05-17 DIAGNOSIS — H52.7 REFRACTIVE ERROR: ICD-10-CM

## 2021-05-17 DIAGNOSIS — H25.13 NUCLEAR SCLEROSIS OF BOTH EYES: Primary | ICD-10-CM

## 2021-05-17 DIAGNOSIS — I10 ESSENTIAL HYPERTENSION: ICD-10-CM

## 2021-05-17 DIAGNOSIS — H40.013 OAG (OPEN ANGLE GLAUCOMA) SUSPECT, LOW RISK, BILATERAL: ICD-10-CM

## 2021-05-17 PROCEDURE — 92136 OPHTHALMIC BIOMETRY: CPT | Mod: LT,S$GLB,, | Performed by: OPHTHALMOLOGY

## 2021-05-17 PROCEDURE — 92136 BIOMETRY: ICD-10-PCS | Mod: LT,S$GLB,, | Performed by: OPHTHALMOLOGY

## 2021-05-17 PROCEDURE — 1101F PT FALLS ASSESS-DOCD LE1/YR: CPT | Mod: CPTII,S$GLB,, | Performed by: OPHTHALMOLOGY

## 2021-05-17 PROCEDURE — 92004 PR EYE EXAM, NEW PATIENT,COMPREHESV: ICD-10-PCS | Mod: S$GLB,,, | Performed by: OPHTHALMOLOGY

## 2021-05-17 PROCEDURE — 1126F AMNT PAIN NOTED NONE PRSNT: CPT | Mod: S$GLB,,, | Performed by: OPHTHALMOLOGY

## 2021-05-17 PROCEDURE — 3288F PR FALLS RISK ASSESSMENT DOCUMENTED: ICD-10-PCS | Mod: CPTII,S$GLB,, | Performed by: OPHTHALMOLOGY

## 2021-05-17 PROCEDURE — 92004 COMPRE OPH EXAM NEW PT 1/>: CPT | Mod: S$GLB,,, | Performed by: OPHTHALMOLOGY

## 2021-05-17 PROCEDURE — 1101F PR PT FALLS ASSESS DOC 0-1 FALLS W/OUT INJ PAST YR: ICD-10-PCS | Mod: CPTII,S$GLB,, | Performed by: OPHTHALMOLOGY

## 2021-05-17 PROCEDURE — 99499 RISK ADDL DX/OHS AUDIT: ICD-10-PCS | Mod: S$GLB,,, | Performed by: OPHTHALMOLOGY

## 2021-05-17 PROCEDURE — 99999 PR PBB SHADOW E&M-EST. PATIENT-LVL III: ICD-10-PCS | Mod: PBBFAC,,, | Performed by: OPHTHALMOLOGY

## 2021-05-17 PROCEDURE — 99999 PR PBB SHADOW E&M-EST. PATIENT-LVL III: CPT | Mod: PBBFAC,,, | Performed by: OPHTHALMOLOGY

## 2021-05-17 PROCEDURE — 3288F FALL RISK ASSESSMENT DOCD: CPT | Mod: CPTII,S$GLB,, | Performed by: OPHTHALMOLOGY

## 2021-05-17 PROCEDURE — 1126F PR PAIN SEVERITY QUANTIFIED, NO PAIN PRESENT: ICD-10-PCS | Mod: S$GLB,,, | Performed by: OPHTHALMOLOGY

## 2021-05-17 PROCEDURE — 99499 UNLISTED E&M SERVICE: CPT | Mod: S$GLB,,, | Performed by: OPHTHALMOLOGY

## 2021-05-17 RX ORDER — OFLOXACIN 3 MG/ML
1 SOLUTION/ DROPS OPHTHALMIC 4 TIMES DAILY
Qty: 5 ML | Refills: 1 | Status: SHIPPED | OUTPATIENT
Start: 2021-07-31 | End: 2021-06-23

## 2021-05-17 RX ORDER — NEPAFENAC 3 MG/ML
1 SUSPENSION/ DROPS OPHTHALMIC DAILY
Qty: 3 ML | Refills: 1 | Status: SHIPPED | OUTPATIENT
Start: 2021-07-31 | End: 2021-08-02 | Stop reason: SDUPTHER

## 2021-05-17 RX ORDER — PREDNISOLONE ACETATE 10 MG/ML
1 SUSPENSION/ DROPS OPHTHALMIC 4 TIMES DAILY
Qty: 5 ML | Refills: 1 | Status: SHIPPED | OUTPATIENT
Start: 2021-08-03 | End: 2021-06-23

## 2021-06-17 ENCOUNTER — CLINICAL SUPPORT (OUTPATIENT)
Dept: OPHTHALMOLOGY | Facility: CLINIC | Age: 71
End: 2021-06-17
Payer: MEDICARE

## 2021-06-17 ENCOUNTER — PATIENT MESSAGE (OUTPATIENT)
Dept: OPHTHALMOLOGY | Facility: CLINIC | Age: 71
End: 2021-06-17

## 2021-06-17 ENCOUNTER — OFFICE VISIT (OUTPATIENT)
Dept: OPTOMETRY | Facility: CLINIC | Age: 71
End: 2021-06-17
Payer: MEDICARE

## 2021-06-17 DIAGNOSIS — H40.013 OAG (OPEN ANGLE GLAUCOMA) SUSPECT, LOW RISK, BILATERAL: ICD-10-CM

## 2021-06-17 DIAGNOSIS — H40.013 OAG (OPEN ANGLE GLAUCOMA) SUSPECT, LOW RISK, BILATERAL: Primary | ICD-10-CM

## 2021-06-17 PROCEDURE — 92012 PR EYE EXAM, EST PATIENT,INTERMED: ICD-10-PCS | Mod: S$GLB,,, | Performed by: OPTOMETRIST

## 2021-06-17 PROCEDURE — 1126F AMNT PAIN NOTED NONE PRSNT: CPT | Mod: S$GLB,,, | Performed by: OPTOMETRIST

## 2021-06-17 PROCEDURE — 3288F PR FALLS RISK ASSESSMENT DOCUMENTED: ICD-10-PCS | Mod: CPTII,S$GLB,, | Performed by: OPTOMETRIST

## 2021-06-17 PROCEDURE — 3288F FALL RISK ASSESSMENT DOCD: CPT | Mod: CPTII,S$GLB,, | Performed by: OPTOMETRIST

## 2021-06-17 PROCEDURE — 92012 INTRM OPH EXAM EST PATIENT: CPT | Mod: S$GLB,,, | Performed by: OPTOMETRIST

## 2021-06-17 PROCEDURE — 99999 PR PBB SHADOW E&M-EST. PATIENT-LVL III: ICD-10-PCS | Mod: PBBFAC,,, | Performed by: OPTOMETRIST

## 2021-06-17 PROCEDURE — 1101F PR PT FALLS ASSESS DOC 0-1 FALLS W/OUT INJ PAST YR: ICD-10-PCS | Mod: CPTII,S$GLB,, | Performed by: OPTOMETRIST

## 2021-06-17 PROCEDURE — 1101F PT FALLS ASSESS-DOCD LE1/YR: CPT | Mod: CPTII,S$GLB,, | Performed by: OPTOMETRIST

## 2021-06-17 PROCEDURE — 1126F PR PAIN SEVERITY QUANTIFIED, NO PAIN PRESENT: ICD-10-PCS | Mod: S$GLB,,, | Performed by: OPTOMETRIST

## 2021-06-17 PROCEDURE — 99999 PR PBB SHADOW E&M-EST. PATIENT-LVL III: CPT | Mod: PBBFAC,,, | Performed by: OPTOMETRIST

## 2021-06-23 ENCOUNTER — OFFICE VISIT (OUTPATIENT)
Dept: FAMILY MEDICINE | Facility: CLINIC | Age: 71
End: 2021-06-23
Payer: MEDICARE

## 2021-06-23 VITALS
SYSTOLIC BLOOD PRESSURE: 110 MMHG | WEIGHT: 144.19 LBS | TEMPERATURE: 98 F | HEIGHT: 60 IN | BODY MASS INDEX: 28.31 KG/M2 | OXYGEN SATURATION: 97 % | DIASTOLIC BLOOD PRESSURE: 62 MMHG | HEART RATE: 79 BPM

## 2021-06-23 DIAGNOSIS — I10 ESSENTIAL HYPERTENSION: Primary | ICD-10-CM

## 2021-06-23 DIAGNOSIS — E53.8 B12 DEFICIENCY: ICD-10-CM

## 2021-06-23 DIAGNOSIS — R89.9 ABNORMAL LABORATORY TEST: ICD-10-CM

## 2021-06-23 DIAGNOSIS — R73.01 ELEVATED FASTING GLUCOSE: ICD-10-CM

## 2021-06-23 DIAGNOSIS — E55.9 VITAMIN D DEFICIENCY: ICD-10-CM

## 2021-06-23 PROCEDURE — 1126F PR PAIN SEVERITY QUANTIFIED, NO PAIN PRESENT: ICD-10-PCS | Mod: S$GLB,,, | Performed by: INTERNAL MEDICINE

## 2021-06-23 PROCEDURE — 3288F PR FALLS RISK ASSESSMENT DOCUMENTED: ICD-10-PCS | Mod: CPTII,S$GLB,, | Performed by: INTERNAL MEDICINE

## 2021-06-23 PROCEDURE — 3078F PR MOST RECENT DIASTOLIC BLOOD PRESSURE < 80 MM HG: ICD-10-PCS | Mod: CPTII,S$GLB,, | Performed by: INTERNAL MEDICINE

## 2021-06-23 PROCEDURE — 99999 PR PBB SHADOW E&M-EST. PATIENT-LVL III: ICD-10-PCS | Mod: PBBFAC,,, | Performed by: INTERNAL MEDICINE

## 2021-06-23 PROCEDURE — 1159F MED LIST DOCD IN RCRD: CPT | Mod: S$GLB,,, | Performed by: INTERNAL MEDICINE

## 2021-06-23 PROCEDURE — 3008F PR BODY MASS INDEX (BMI) DOCUMENTED: ICD-10-PCS | Mod: CPTII,S$GLB,, | Performed by: INTERNAL MEDICINE

## 2021-06-23 PROCEDURE — 1101F PR PT FALLS ASSESS DOC 0-1 FALLS W/OUT INJ PAST YR: ICD-10-PCS | Mod: CPTII,S$GLB,, | Performed by: INTERNAL MEDICINE

## 2021-06-23 PROCEDURE — 1126F AMNT PAIN NOTED NONE PRSNT: CPT | Mod: S$GLB,,, | Performed by: INTERNAL MEDICINE

## 2021-06-23 PROCEDURE — 99999 PR PBB SHADOW E&M-EST. PATIENT-LVL III: CPT | Mod: PBBFAC,,, | Performed by: INTERNAL MEDICINE

## 2021-06-23 PROCEDURE — 99214 PR OFFICE/OUTPT VISIT, EST, LEVL IV, 30-39 MIN: ICD-10-PCS | Mod: S$GLB,,, | Performed by: INTERNAL MEDICINE

## 2021-06-23 PROCEDURE — 99214 OFFICE O/P EST MOD 30 MIN: CPT | Mod: S$GLB,,, | Performed by: INTERNAL MEDICINE

## 2021-06-23 PROCEDURE — 3078F DIAST BP <80 MM HG: CPT | Mod: CPTII,S$GLB,, | Performed by: INTERNAL MEDICINE

## 2021-06-23 PROCEDURE — 3008F BODY MASS INDEX DOCD: CPT | Mod: CPTII,S$GLB,, | Performed by: INTERNAL MEDICINE

## 2021-06-23 PROCEDURE — 3288F FALL RISK ASSESSMENT DOCD: CPT | Mod: CPTII,S$GLB,, | Performed by: INTERNAL MEDICINE

## 2021-06-23 PROCEDURE — 1101F PT FALLS ASSESS-DOCD LE1/YR: CPT | Mod: CPTII,S$GLB,, | Performed by: INTERNAL MEDICINE

## 2021-06-23 PROCEDURE — 1159F PR MEDICATION LIST DOCUMENTED IN MEDICAL RECORD: ICD-10-PCS | Mod: S$GLB,,, | Performed by: INTERNAL MEDICINE

## 2021-06-23 PROCEDURE — 3074F SYST BP LT 130 MM HG: CPT | Mod: CPTII,S$GLB,, | Performed by: INTERNAL MEDICINE

## 2021-06-23 PROCEDURE — 3074F PR MOST RECENT SYSTOLIC BLOOD PRESSURE < 130 MM HG: ICD-10-PCS | Mod: CPTII,S$GLB,, | Performed by: INTERNAL MEDICINE

## 2021-06-23 RX ORDER — VIT C/E/ZN/COPPR/LUTEIN/ZEAXAN 250MG-90MG
1000 CAPSULE ORAL DAILY
Qty: 30 CAPSULE | Refills: 11 | Status: SHIPPED | OUTPATIENT
Start: 2021-06-23 | End: 2021-09-21

## 2021-06-23 RX ORDER — AMLODIPINE BESYLATE 5 MG/1
5 TABLET ORAL DAILY
Qty: 90 TABLET | Refills: 1 | Status: SHIPPED | OUTPATIENT
Start: 2021-06-23 | End: 2021-12-20

## 2021-06-23 RX ORDER — CHOLECALCIFEROL (VITAMIN D3) 25 MCG
1 TABLET,CHEWABLE ORAL DAILY
Qty: 30 CAPSULE | Refills: 3 | Status: SHIPPED | OUTPATIENT
Start: 2021-06-23 | End: 2021-09-21 | Stop reason: SDUPTHER

## 2021-06-23 RX ORDER — HYDROCHLOROTHIAZIDE 25 MG/1
12.5 TABLET ORAL DAILY
Qty: 90 TABLET | Refills: 1 | Status: SHIPPED | OUTPATIENT
Start: 2021-06-23 | End: 2021-12-20 | Stop reason: SDUPTHER

## 2021-07-09 ENCOUNTER — IMMUNIZATION (OUTPATIENT)
Dept: INTERNAL MEDICINE | Facility: CLINIC | Age: 71
End: 2021-07-09
Payer: MEDICARE

## 2021-07-09 DIAGNOSIS — Z23 NEED FOR VACCINATION: Primary | ICD-10-CM

## 2021-07-09 PROCEDURE — 0011A COVID-19, MRNA, LNP-S, PF, 100 MCG/0.5 ML DOSE VACCINE: CPT | Mod: PBBFAC | Performed by: INTERNAL MEDICINE

## 2021-07-12 ENCOUNTER — TELEPHONE (OUTPATIENT)
Dept: OPHTHALMOLOGY | Facility: CLINIC | Age: 71
End: 2021-07-12

## 2021-07-12 DIAGNOSIS — H25.12 NS (NUCLEAR SCLEROSIS), LEFT: Primary | ICD-10-CM

## 2021-07-29 ENCOUNTER — TELEPHONE (OUTPATIENT)
Dept: OPHTHALMOLOGY | Facility: CLINIC | Age: 71
End: 2021-07-29

## 2021-07-29 DIAGNOSIS — H25.11 NS (NUCLEAR SCLEROSIS), RIGHT: Primary | ICD-10-CM

## 2021-07-30 ENCOUNTER — TELEPHONE (OUTPATIENT)
Dept: OPHTHALMOLOGY | Facility: CLINIC | Age: 71
End: 2021-07-30

## 2021-07-30 DIAGNOSIS — Z13.9 SCREENING PROCEDURE: Primary | ICD-10-CM

## 2021-07-31 ENCOUNTER — LAB VISIT (OUTPATIENT)
Dept: SPORTS MEDICINE | Facility: CLINIC | Age: 71
End: 2021-07-31
Payer: MEDICARE

## 2021-07-31 DIAGNOSIS — Z13.9 SCREENING PROCEDURE: ICD-10-CM

## 2021-07-31 PROCEDURE — U0003 INFECTIOUS AGENT DETECTION BY NUCLEIC ACID (DNA OR RNA); SEVERE ACUTE RESPIRATORY SYNDROME CORONAVIRUS 2 (SARS-COV-2) (CORONAVIRUS DISEASE [COVID-19]), AMPLIFIED PROBE TECHNIQUE, MAKING USE OF HIGH THROUGHPUT TECHNOLOGIES AS DESCRIBED BY CMS-2020-01-R: HCPCS | Performed by: OPHTHALMOLOGY

## 2021-07-31 PROCEDURE — U0005 INFEC AGEN DETEC AMPLI PROBE: HCPCS | Performed by: OPHTHALMOLOGY

## 2021-08-02 ENCOUNTER — TELEPHONE (OUTPATIENT)
Dept: OPHTHALMOLOGY | Facility: CLINIC | Age: 71
End: 2021-08-02

## 2021-08-02 DIAGNOSIS — H25.13 NUCLEAR SCLEROSIS OF BOTH EYES: ICD-10-CM

## 2021-08-02 LAB
SARS-COV-2 RNA RESP QL NAA+PROBE: NOT DETECTED
SARS-COV-2- CYCLE NUMBER: -1

## 2021-08-02 RX ORDER — PREDNISOLONE ACETATE 10 MG/ML
1 SUSPENSION/ DROPS OPHTHALMIC 4 TIMES DAILY
Qty: 5 ML | Refills: 1 | Status: SHIPPED | OUTPATIENT
Start: 2021-08-03 | End: 2021-09-02

## 2021-08-02 RX ORDER — OFLOXACIN 3 MG/ML
1 SOLUTION/ DROPS OPHTHALMIC 4 TIMES DAILY
Qty: 5 ML | Refills: 1 | Status: SHIPPED | OUTPATIENT
Start: 2021-08-02 | End: 2021-08-12

## 2021-08-02 RX ORDER — NEPAFENAC 3 MG/ML
1 SUSPENSION/ DROPS OPHTHALMIC DAILY
Qty: 3 ML | Refills: 1 | Status: SHIPPED | OUTPATIENT
Start: 2021-08-02 | End: 2021-09-01

## 2021-08-03 ENCOUNTER — ANESTHESIA EVENT (OUTPATIENT)
Dept: SURGERY | Facility: OTHER | Age: 71
End: 2021-08-03
Payer: MEDICARE

## 2021-08-03 ENCOUNTER — HOSPITAL ENCOUNTER (OUTPATIENT)
Facility: OTHER | Age: 71
Discharge: HOME | End: 2021-08-03
Attending: OPHTHALMOLOGY | Admitting: OPHTHALMOLOGY
Payer: MEDICARE

## 2021-08-03 ENCOUNTER — ANESTHESIA (OUTPATIENT)
Dept: SURGERY | Facility: OTHER | Age: 71
End: 2021-08-03
Payer: MEDICARE

## 2021-08-03 VITALS
BODY MASS INDEX: 26.68 KG/M2 | HEIGHT: 62 IN | RESPIRATION RATE: 16 BRPM | HEART RATE: 82 BPM | TEMPERATURE: 98 F | DIASTOLIC BLOOD PRESSURE: 66 MMHG | WEIGHT: 145 LBS | SYSTOLIC BLOOD PRESSURE: 136 MMHG | OXYGEN SATURATION: 98 %

## 2021-08-03 DIAGNOSIS — H25.12 NUCLEAR SCLEROTIC CATARACT OF LEFT EYE: Primary | ICD-10-CM

## 2021-08-03 PROCEDURE — 66984 XCAPSL CTRC RMVL W/O ECP: CPT | Mod: LT,,, | Performed by: OPHTHALMOLOGY

## 2021-08-03 PROCEDURE — 25000003 PHARM REV CODE 250: Performed by: OPHTHALMOLOGY

## 2021-08-03 PROCEDURE — 36000707: Performed by: OPHTHALMOLOGY

## 2021-08-03 PROCEDURE — 71000015 HC POSTOP RECOV 1ST HR: Performed by: OPHTHALMOLOGY

## 2021-08-03 PROCEDURE — 36000706: Performed by: OPHTHALMOLOGY

## 2021-08-03 PROCEDURE — 37000009 HC ANESTHESIA EA ADD 15 MINS: Performed by: OPHTHALMOLOGY

## 2021-08-03 PROCEDURE — 66984 PR REMOVAL, CATARACT, W/INSRT INTRAOC LENS, W/O ENDO CYCLO: ICD-10-PCS | Mod: LT,,, | Performed by: OPHTHALMOLOGY

## 2021-08-03 PROCEDURE — 63600175 PHARM REV CODE 636 W HCPCS: Performed by: OPHTHALMOLOGY

## 2021-08-03 PROCEDURE — 63600175 PHARM REV CODE 636 W HCPCS: Performed by: STUDENT IN AN ORGANIZED HEALTH CARE EDUCATION/TRAINING PROGRAM

## 2021-08-03 PROCEDURE — 37000008 HC ANESTHESIA 1ST 15 MINUTES: Performed by: OPHTHALMOLOGY

## 2021-08-03 PROCEDURE — V2632 POST CHMBR INTRAOCULAR LENS: HCPCS | Performed by: OPHTHALMOLOGY

## 2021-08-03 DEVICE — IMPLANTABLE DEVICE: Type: IMPLANTABLE DEVICE | Site: EYE | Status: FUNCTIONAL

## 2021-08-03 RX ORDER — PREDNISOLONE ACETATE 10 MG/ML
SUSPENSION/ DROPS OPHTHALMIC
Status: DISCONTINUED | OUTPATIENT
Start: 2021-08-03 | End: 2021-08-03 | Stop reason: HOSPADM

## 2021-08-03 RX ORDER — SODIUM CHLORIDE 0.9 % (FLUSH) 0.9 %
10 SYRINGE (ML) INJECTION
Status: DISCONTINUED | OUTPATIENT
Start: 2021-08-03 | End: 2021-08-03 | Stop reason: HOSPADM

## 2021-08-03 RX ORDER — TETRACAINE HYDROCHLORIDE 5 MG/ML
1 SOLUTION OPHTHALMIC
Status: COMPLETED | OUTPATIENT
Start: 2021-08-03 | End: 2021-08-03

## 2021-08-03 RX ORDER — HYDROCODONE BITARTRATE AND ACETAMINOPHEN 5; 325 MG/1; MG/1
1 TABLET ORAL EVERY 4 HOURS PRN
Status: DISCONTINUED | OUTPATIENT
Start: 2021-08-03 | End: 2021-08-03 | Stop reason: HOSPADM

## 2021-08-03 RX ORDER — LIDOCAINE HYDROCHLORIDE 40 MG/ML
INJECTION, SOLUTION RETROBULBAR
Status: DISCONTINUED | OUTPATIENT
Start: 2021-08-03 | End: 2021-08-03 | Stop reason: HOSPADM

## 2021-08-03 RX ORDER — TROPICAMIDE 10 MG/ML
1 SOLUTION/ DROPS OPHTHALMIC
Status: COMPLETED | OUTPATIENT
Start: 2021-08-03 | End: 2021-08-03

## 2021-08-03 RX ORDER — OFLOXACIN 3 MG/ML
1 SOLUTION/ DROPS OPHTHALMIC
Status: DISCONTINUED | OUTPATIENT
Start: 2021-08-03 | End: 2021-08-03 | Stop reason: HOSPADM

## 2021-08-03 RX ORDER — CYCLOPENTOLATE HYDROCHLORIDE 10 MG/ML
1 SOLUTION/ DROPS OPHTHALMIC
Status: DISCONTINUED | OUTPATIENT
Start: 2021-08-03 | End: 2021-08-03 | Stop reason: HOSPADM

## 2021-08-03 RX ORDER — EPINEPHRINE 1 MG/ML
INJECTION, SOLUTION INTRACARDIAC; INTRAMUSCULAR; INTRAVENOUS; SUBCUTANEOUS
Status: DISCONTINUED | OUTPATIENT
Start: 2021-08-03 | End: 2021-08-03 | Stop reason: HOSPADM

## 2021-08-03 RX ORDER — ACETAMINOPHEN 325 MG/1
650 TABLET ORAL EVERY 4 HOURS PRN
Status: DISCONTINUED | OUTPATIENT
Start: 2021-08-03 | End: 2021-08-03 | Stop reason: HOSPADM

## 2021-08-03 RX ORDER — PHENYLEPHRINE HYDROCHLORIDE 25 MG/ML
1 SOLUTION/ DROPS OPHTHALMIC
Status: COMPLETED | OUTPATIENT
Start: 2021-08-03 | End: 2021-08-03

## 2021-08-03 RX ORDER — MIDAZOLAM HYDROCHLORIDE 1 MG/ML
INJECTION INTRAMUSCULAR; INTRAVENOUS
Status: DISCONTINUED | OUTPATIENT
Start: 2021-08-03 | End: 2021-08-03

## 2021-08-03 RX ADMIN — TROPICAMIDE 1 DROP: 10 SOLUTION/ DROPS OPHTHALMIC at 06:08

## 2021-08-03 RX ADMIN — TROPICAMIDE 1 DROP: 10 SOLUTION/ DROPS OPHTHALMIC at 07:08

## 2021-08-03 RX ADMIN — CYCLOPENTOLATE HYDROCHLORIDE 1 DROP: 10 SOLUTION/ DROPS OPHTHALMIC at 07:08

## 2021-08-03 RX ADMIN — PHENYLEPHRINE HYDROCHLORIDE 1 DROP: 25 SOLUTION/ DROPS OPHTHALMIC at 06:08

## 2021-08-03 RX ADMIN — OFLOXACIN 1 DROP: 3 SOLUTION OPHTHALMIC at 07:08

## 2021-08-03 RX ADMIN — MIDAZOLAM HYDROCHLORIDE 1 MG: 1 INJECTION, SOLUTION INTRAMUSCULAR; INTRAVENOUS at 08:08

## 2021-08-03 RX ADMIN — OFLOXACIN 1 DROP: 3 SOLUTION OPHTHALMIC at 06:08

## 2021-08-03 RX ADMIN — PHENYLEPHRINE HYDROCHLORIDE 1 DROP: 25 SOLUTION/ DROPS OPHTHALMIC at 07:08

## 2021-08-03 RX ADMIN — TETRACAINE HYDROCHLORIDE 1 DROP: 5 SOLUTION OPHTHALMIC at 06:08

## 2021-08-03 RX ADMIN — MIDAZOLAM HYDROCHLORIDE 1 MG: 1 INJECTION, SOLUTION INTRAMUSCULAR; INTRAVENOUS at 07:08

## 2021-08-03 RX ADMIN — TETRACAINE HYDROCHLORIDE 1 DROP: 5 SOLUTION OPHTHALMIC at 07:08

## 2021-08-03 RX ADMIN — CYCLOPENTOLATE HYDROCHLORIDE 1 DROP: 10 SOLUTION/ DROPS OPHTHALMIC at 06:08

## 2021-08-04 ENCOUNTER — OFFICE VISIT (OUTPATIENT)
Dept: OPHTHALMOLOGY | Facility: CLINIC | Age: 71
End: 2021-08-04
Payer: MEDICARE

## 2021-08-04 VITALS — DIASTOLIC BLOOD PRESSURE: 78 MMHG | SYSTOLIC BLOOD PRESSURE: 140 MMHG | HEART RATE: 68 BPM

## 2021-08-04 DIAGNOSIS — Z98.890 POST-OPERATIVE STATE: Primary | ICD-10-CM

## 2021-08-04 PROCEDURE — 3077F PR MOST RECENT SYSTOLIC BLOOD PRESSURE >= 140 MM HG: ICD-10-PCS | Mod: CPTII,S$GLB,, | Performed by: OPHTHALMOLOGY

## 2021-08-04 PROCEDURE — 99999 PR PBB SHADOW E&M-EST. PATIENT-LVL III: CPT | Mod: PBBFAC,,, | Performed by: OPHTHALMOLOGY

## 2021-08-04 PROCEDURE — 1101F PR PT FALLS ASSESS DOC 0-1 FALLS W/OUT INJ PAST YR: ICD-10-PCS | Mod: CPTII,S$GLB,, | Performed by: OPHTHALMOLOGY

## 2021-08-04 PROCEDURE — 99024 POSTOP FOLLOW-UP VISIT: CPT | Mod: S$GLB,,, | Performed by: OPHTHALMOLOGY

## 2021-08-04 PROCEDURE — 1159F PR MEDICATION LIST DOCUMENTED IN MEDICAL RECORD: ICD-10-PCS | Mod: CPTII,S$GLB,, | Performed by: OPHTHALMOLOGY

## 2021-08-04 PROCEDURE — 1159F MED LIST DOCD IN RCRD: CPT | Mod: CPTII,S$GLB,, | Performed by: OPHTHALMOLOGY

## 2021-08-04 PROCEDURE — 1101F PT FALLS ASSESS-DOCD LE1/YR: CPT | Mod: CPTII,S$GLB,, | Performed by: OPHTHALMOLOGY

## 2021-08-04 PROCEDURE — 1126F AMNT PAIN NOTED NONE PRSNT: CPT | Mod: CPTII,S$GLB,, | Performed by: OPHTHALMOLOGY

## 2021-08-04 PROCEDURE — 3077F SYST BP >= 140 MM HG: CPT | Mod: CPTII,S$GLB,, | Performed by: OPHTHALMOLOGY

## 2021-08-04 PROCEDURE — 1126F PR PAIN SEVERITY QUANTIFIED, NO PAIN PRESENT: ICD-10-PCS | Mod: CPTII,S$GLB,, | Performed by: OPHTHALMOLOGY

## 2021-08-04 PROCEDURE — 1160F PR REVIEW ALL MEDS BY PRESCRIBER/CLIN PHARMACIST DOCUMENTED: ICD-10-PCS | Mod: CPTII,S$GLB,, | Performed by: OPHTHALMOLOGY

## 2021-08-04 PROCEDURE — 3288F FALL RISK ASSESSMENT DOCD: CPT | Mod: CPTII,S$GLB,, | Performed by: OPHTHALMOLOGY

## 2021-08-04 PROCEDURE — 3288F PR FALLS RISK ASSESSMENT DOCUMENTED: ICD-10-PCS | Mod: CPTII,S$GLB,, | Performed by: OPHTHALMOLOGY

## 2021-08-04 PROCEDURE — 1160F RVW MEDS BY RX/DR IN RCRD: CPT | Mod: CPTII,S$GLB,, | Performed by: OPHTHALMOLOGY

## 2021-08-04 PROCEDURE — 99999 PR PBB SHADOW E&M-EST. PATIENT-LVL III: ICD-10-PCS | Mod: PBBFAC,,, | Performed by: OPHTHALMOLOGY

## 2021-08-04 PROCEDURE — 3078F PR MOST RECENT DIASTOLIC BLOOD PRESSURE < 80 MM HG: ICD-10-PCS | Mod: CPTII,S$GLB,, | Performed by: OPHTHALMOLOGY

## 2021-08-04 PROCEDURE — 99024 PR POST-OP FOLLOW-UP VISIT: ICD-10-PCS | Mod: S$GLB,,, | Performed by: OPHTHALMOLOGY

## 2021-08-04 PROCEDURE — 3078F DIAST BP <80 MM HG: CPT | Mod: CPTII,S$GLB,, | Performed by: OPHTHALMOLOGY

## 2021-08-04 RX ORDER — CLINDAMYCIN HYDROCHLORIDE 150 MG/1
CAPSULE ORAL
COMMUNITY
Start: 2021-05-03 | End: 2021-11-01

## 2021-08-08 ENCOUNTER — PATIENT MESSAGE (OUTPATIENT)
Dept: OPHTHALMOLOGY | Facility: CLINIC | Age: 71
End: 2021-08-08

## 2021-08-11 ENCOUNTER — OFFICE VISIT (OUTPATIENT)
Dept: OPHTHALMOLOGY | Facility: CLINIC | Age: 71
End: 2021-08-11
Payer: MEDICARE

## 2021-08-11 DIAGNOSIS — Z98.890 POST-OPERATIVE STATE: Primary | ICD-10-CM

## 2021-08-11 DIAGNOSIS — H25.11 NS (NUCLEAR SCLEROSIS), RIGHT: ICD-10-CM

## 2021-08-11 PROCEDURE — 3288F FALL RISK ASSESSMENT DOCD: CPT | Mod: CPTII,S$GLB,, | Performed by: OPHTHALMOLOGY

## 2021-08-11 PROCEDURE — 1126F AMNT PAIN NOTED NONE PRSNT: CPT | Mod: CPTII,S$GLB,, | Performed by: OPHTHALMOLOGY

## 2021-08-11 PROCEDURE — 1160F RVW MEDS BY RX/DR IN RCRD: CPT | Mod: CPTII,S$GLB,, | Performed by: OPHTHALMOLOGY

## 2021-08-11 PROCEDURE — 1159F MED LIST DOCD IN RCRD: CPT | Mod: CPTII,S$GLB,, | Performed by: OPHTHALMOLOGY

## 2021-08-11 PROCEDURE — 3288F PR FALLS RISK ASSESSMENT DOCUMENTED: ICD-10-PCS | Mod: CPTII,S$GLB,, | Performed by: OPHTHALMOLOGY

## 2021-08-11 PROCEDURE — 99999 PR PBB SHADOW E&M-EST. PATIENT-LVL III: CPT | Mod: PBBFAC,,, | Performed by: OPHTHALMOLOGY

## 2021-08-11 PROCEDURE — 99024 PR POST-OP FOLLOW-UP VISIT: ICD-10-PCS | Mod: S$GLB,,, | Performed by: OPHTHALMOLOGY

## 2021-08-11 PROCEDURE — 1101F PR PT FALLS ASSESS DOC 0-1 FALLS W/OUT INJ PAST YR: ICD-10-PCS | Mod: CPTII,S$GLB,, | Performed by: OPHTHALMOLOGY

## 2021-08-11 PROCEDURE — 1101F PT FALLS ASSESS-DOCD LE1/YR: CPT | Mod: CPTII,S$GLB,, | Performed by: OPHTHALMOLOGY

## 2021-08-11 PROCEDURE — 1160F PR REVIEW ALL MEDS BY PRESCRIBER/CLIN PHARMACIST DOCUMENTED: ICD-10-PCS | Mod: CPTII,S$GLB,, | Performed by: OPHTHALMOLOGY

## 2021-08-11 PROCEDURE — 99024 POSTOP FOLLOW-UP VISIT: CPT | Mod: S$GLB,,, | Performed by: OPHTHALMOLOGY

## 2021-08-11 PROCEDURE — 99999 PR PBB SHADOW E&M-EST. PATIENT-LVL III: ICD-10-PCS | Mod: PBBFAC,,, | Performed by: OPHTHALMOLOGY

## 2021-08-11 PROCEDURE — 1159F PR MEDICATION LIST DOCUMENTED IN MEDICAL RECORD: ICD-10-PCS | Mod: CPTII,S$GLB,, | Performed by: OPHTHALMOLOGY

## 2021-08-11 PROCEDURE — 1126F PR PAIN SEVERITY QUANTIFIED, NO PAIN PRESENT: ICD-10-PCS | Mod: CPTII,S$GLB,, | Performed by: OPHTHALMOLOGY

## 2021-09-07 ENCOUNTER — PATIENT MESSAGE (OUTPATIENT)
Dept: OPHTHALMOLOGY | Facility: CLINIC | Age: 71
End: 2021-09-07

## 2021-09-08 ENCOUNTER — PATIENT MESSAGE (OUTPATIENT)
Dept: FAMILY MEDICINE | Facility: CLINIC | Age: 71
End: 2021-09-08

## 2021-09-12 ENCOUNTER — OFFICE VISIT (OUTPATIENT)
Dept: FAMILY MEDICINE | Facility: CLINIC | Age: 71
End: 2021-09-12
Payer: MEDICARE

## 2021-09-12 ENCOUNTER — HOSPITAL ENCOUNTER (EMERGENCY)
Facility: HOSPITAL | Age: 71
Discharge: HOME | End: 2021-09-12
Attending: EMERGENCY MEDICINE
Payer: MEDICARE

## 2021-09-12 VITALS
OXYGEN SATURATION: 99 % | HEART RATE: 60 BPM | DIASTOLIC BLOOD PRESSURE: 80 MMHG | SYSTOLIC BLOOD PRESSURE: 140 MMHG | TEMPERATURE: 98 F | RESPIRATION RATE: 17 BRPM

## 2021-09-12 VITALS
DIASTOLIC BLOOD PRESSURE: 84 MMHG | SYSTOLIC BLOOD PRESSURE: 143 MMHG | TEMPERATURE: 98 F | HEART RATE: 76 BPM | RESPIRATION RATE: 16 BRPM | OXYGEN SATURATION: 98 %

## 2021-09-12 DIAGNOSIS — S62.92XA CLOSED FRACTURE OF LEFT HAND, INITIAL ENCOUNTER: Primary | ICD-10-CM

## 2021-09-12 DIAGNOSIS — M79.642 PAIN OF LEFT HAND: ICD-10-CM

## 2021-09-12 DIAGNOSIS — R52 PAIN: ICD-10-CM

## 2021-09-12 DIAGNOSIS — M25.532 ACUTE WRIST PAIN, LEFT: Primary | ICD-10-CM

## 2021-09-12 DIAGNOSIS — M25.562 ACUTE PAIN OF LEFT KNEE: ICD-10-CM

## 2021-09-12 PROCEDURE — 99999 PR PBB SHADOW E&M-EST. PATIENT-LVL II: CPT | Mod: PBBFAC,,,

## 2021-09-12 PROCEDURE — 99999 PR PBB SHADOW E&M-EST. PATIENT-LVL II: ICD-10-PCS | Mod: PBBFAC,,,

## 2021-09-12 PROCEDURE — 25000003 PHARM REV CODE 250: Performed by: EMERGENCY MEDICINE

## 2021-09-12 PROCEDURE — 99283 EMERGENCY DEPT VISIT LOW MDM: CPT

## 2021-09-12 RX ORDER — IBUPROFEN 600 MG/1
600 TABLET ORAL EVERY 6 HOURS PRN
Qty: 20 TABLET | Refills: 0 | Status: SHIPPED | OUTPATIENT
Start: 2021-09-12 | End: 2021-11-01

## 2021-09-12 RX ORDER — HYDROCODONE BITARTRATE AND ACETAMINOPHEN 5; 325 MG/1; MG/1
1 TABLET ORAL EVERY 4 HOURS PRN
Qty: 18 TABLET | Refills: 0 | Status: SHIPPED | OUTPATIENT
Start: 2021-09-12 | End: 2021-11-01

## 2021-09-12 RX ORDER — ACETAMINOPHEN 500 MG
1000 TABLET ORAL
Status: COMPLETED | OUTPATIENT
Start: 2021-09-12 | End: 2021-09-12

## 2021-09-12 RX ADMIN — ACETAMINOPHEN 1000 MG: 500 TABLET ORAL at 12:09

## 2021-09-21 ENCOUNTER — TELEPHONE (OUTPATIENT)
Dept: ORTHOPEDICS | Facility: CLINIC | Age: 71
End: 2021-09-21

## 2021-09-21 ENCOUNTER — OFFICE VISIT (OUTPATIENT)
Dept: FAMILY MEDICINE | Facility: CLINIC | Age: 71
End: 2021-09-21
Payer: MEDICARE

## 2021-09-21 VITALS
WEIGHT: 148.38 LBS | OXYGEN SATURATION: 96 % | SYSTOLIC BLOOD PRESSURE: 128 MMHG | HEART RATE: 75 BPM | BODY MASS INDEX: 27.3 KG/M2 | DIASTOLIC BLOOD PRESSURE: 68 MMHG | HEIGHT: 62 IN | TEMPERATURE: 99 F

## 2021-09-21 DIAGNOSIS — S62.307D CLOSED DISPLACED FRACTURE OF FIFTH METACARPAL BONE OF LEFT HAND WITH ROUTINE HEALING, UNSPECIFIED PORTION OF METACARPAL, SUBSEQUENT ENCOUNTER: Primary | ICD-10-CM

## 2021-09-21 DIAGNOSIS — D50.0 IRON DEFICIENCY ANEMIA DUE TO CHRONIC BLOOD LOSS: ICD-10-CM

## 2021-09-21 DIAGNOSIS — Z79.899 MEDICATION MANAGEMENT: ICD-10-CM

## 2021-09-21 DIAGNOSIS — I10 ESSENTIAL HYPERTENSION: ICD-10-CM

## 2021-09-21 DIAGNOSIS — E53.8 B12 DEFICIENCY: ICD-10-CM

## 2021-09-21 DIAGNOSIS — E55.9 VITAMIN D DEFICIENCY: ICD-10-CM

## 2021-09-21 PROCEDURE — 3078F PR MOST RECENT DIASTOLIC BLOOD PRESSURE < 80 MM HG: ICD-10-PCS | Mod: CPTII,S$GLB,, | Performed by: INTERNAL MEDICINE

## 2021-09-21 PROCEDURE — 3074F PR MOST RECENT SYSTOLIC BLOOD PRESSURE < 130 MM HG: ICD-10-PCS | Mod: CPTII,S$GLB,, | Performed by: INTERNAL MEDICINE

## 2021-09-21 PROCEDURE — 1126F PR PAIN SEVERITY QUANTIFIED, NO PAIN PRESENT: ICD-10-PCS | Mod: CPTII,S$GLB,, | Performed by: INTERNAL MEDICINE

## 2021-09-21 PROCEDURE — 99999 PR PBB SHADOW E&M-EST. PATIENT-LVL IV: ICD-10-PCS | Mod: PBBFAC,,, | Performed by: INTERNAL MEDICINE

## 2021-09-21 PROCEDURE — 3288F FALL RISK ASSESSMENT DOCD: CPT | Mod: CPTII,S$GLB,, | Performed by: INTERNAL MEDICINE

## 2021-09-21 PROCEDURE — 1126F AMNT PAIN NOTED NONE PRSNT: CPT | Mod: CPTII,S$GLB,, | Performed by: INTERNAL MEDICINE

## 2021-09-21 PROCEDURE — 99214 PR OFFICE/OUTPT VISIT, EST, LEVL IV, 30-39 MIN: ICD-10-PCS | Mod: S$GLB,,, | Performed by: INTERNAL MEDICINE

## 2021-09-21 PROCEDURE — 3008F PR BODY MASS INDEX (BMI) DOCUMENTED: ICD-10-PCS | Mod: CPTII,S$GLB,, | Performed by: INTERNAL MEDICINE

## 2021-09-21 PROCEDURE — 3288F PR FALLS RISK ASSESSMENT DOCUMENTED: ICD-10-PCS | Mod: CPTII,S$GLB,, | Performed by: INTERNAL MEDICINE

## 2021-09-21 PROCEDURE — 99999 PR PBB SHADOW E&M-EST. PATIENT-LVL IV: CPT | Mod: PBBFAC,,, | Performed by: INTERNAL MEDICINE

## 2021-09-21 PROCEDURE — 3078F DIAST BP <80 MM HG: CPT | Mod: CPTII,S$GLB,, | Performed by: INTERNAL MEDICINE

## 2021-09-21 PROCEDURE — 3074F SYST BP LT 130 MM HG: CPT | Mod: CPTII,S$GLB,, | Performed by: INTERNAL MEDICINE

## 2021-09-21 PROCEDURE — 1100F PTFALLS ASSESS-DOCD GE2>/YR: CPT | Mod: CPTII,S$GLB,, | Performed by: INTERNAL MEDICINE

## 2021-09-21 PROCEDURE — 1159F PR MEDICATION LIST DOCUMENTED IN MEDICAL RECORD: ICD-10-PCS | Mod: CPTII,S$GLB,, | Performed by: INTERNAL MEDICINE

## 2021-09-21 PROCEDURE — 1159F MED LIST DOCD IN RCRD: CPT | Mod: CPTII,S$GLB,, | Performed by: INTERNAL MEDICINE

## 2021-09-21 PROCEDURE — 99214 OFFICE O/P EST MOD 30 MIN: CPT | Mod: S$GLB,,, | Performed by: INTERNAL MEDICINE

## 2021-09-21 PROCEDURE — 3008F BODY MASS INDEX DOCD: CPT | Mod: CPTII,S$GLB,, | Performed by: INTERNAL MEDICINE

## 2021-09-21 PROCEDURE — 1160F PR REVIEW ALL MEDS BY PRESCRIBER/CLIN PHARMACIST DOCUMENTED: ICD-10-PCS | Mod: CPTII,S$GLB,, | Performed by: INTERNAL MEDICINE

## 2021-09-21 PROCEDURE — 1100F PR PT FALLS ASSESS DOC 2+ FALLS/FALL W/INJURY/YR: ICD-10-PCS | Mod: CPTII,S$GLB,, | Performed by: INTERNAL MEDICINE

## 2021-09-21 PROCEDURE — 1160F RVW MEDS BY RX/DR IN RCRD: CPT | Mod: CPTII,S$GLB,, | Performed by: INTERNAL MEDICINE

## 2021-09-21 RX ORDER — ACETAMINOPHEN 500 MG
1 TABLET ORAL DAILY
Qty: 90 CAPSULE | Refills: 3 | Status: SHIPPED | OUTPATIENT
Start: 2021-09-21 | End: 2022-02-15 | Stop reason: SDUPTHER

## 2021-09-21 RX ORDER — CHOLECALCIFEROL (VITAMIN D3) 25 MCG
1 TABLET,CHEWABLE ORAL DAILY
Qty: 90 CAPSULE | Refills: 3 | Status: SHIPPED | OUTPATIENT
Start: 2021-09-21 | End: 2023-08-11

## 2021-09-22 PROBLEM — D50.0 IRON DEFICIENCY ANEMIA DUE TO CHRONIC BLOOD LOSS: Status: ACTIVE | Noted: 2021-09-22

## 2021-09-22 PROBLEM — E55.9 VITAMIN D DEFICIENCY: Status: ACTIVE | Noted: 2021-09-22

## 2021-09-30 ENCOUNTER — PATIENT OUTREACH (OUTPATIENT)
Dept: ADMINISTRATIVE | Facility: OTHER | Age: 71
End: 2021-09-30

## 2021-09-30 DIAGNOSIS — M79.642 LEFT HAND PAIN: Primary | ICD-10-CM

## 2021-10-01 ENCOUNTER — OFFICE VISIT (OUTPATIENT)
Dept: ORTHOPEDICS | Facility: CLINIC | Age: 71
End: 2021-10-01
Payer: MEDICARE

## 2021-10-01 VITALS
SYSTOLIC BLOOD PRESSURE: 126 MMHG | WEIGHT: 148 LBS | BODY MASS INDEX: 27.07 KG/M2 | RESPIRATION RATE: 20 BRPM | HEART RATE: 84 BPM | DIASTOLIC BLOOD PRESSURE: 60 MMHG

## 2021-10-01 DIAGNOSIS — S62.317A CLOSED DISPLACED FRACTURE OF BASE OF FIFTH METACARPAL BONE OF LEFT HAND, INITIAL ENCOUNTER: ICD-10-CM

## 2021-10-01 PROCEDURE — 99999 PR PBB SHADOW E&M-EST. PATIENT-LVL IV: ICD-10-PCS | Mod: PBBFAC,,, | Performed by: PHYSICIAN ASSISTANT

## 2021-10-01 PROCEDURE — 3008F PR BODY MASS INDEX (BMI) DOCUMENTED: ICD-10-PCS | Mod: CPTII,S$GLB,, | Performed by: PHYSICIAN ASSISTANT

## 2021-10-01 PROCEDURE — 1125F PR PAIN SEVERITY QUANTIFIED, PAIN PRESENT: ICD-10-PCS | Mod: CPTII,S$GLB,, | Performed by: PHYSICIAN ASSISTANT

## 2021-10-01 PROCEDURE — 3288F PR FALLS RISK ASSESSMENT DOCUMENTED: ICD-10-PCS | Mod: CPTII,S$GLB,, | Performed by: PHYSICIAN ASSISTANT

## 2021-10-01 PROCEDURE — 1160F RVW MEDS BY RX/DR IN RCRD: CPT | Mod: CPTII,S$GLB,, | Performed by: PHYSICIAN ASSISTANT

## 2021-10-01 PROCEDURE — 99999 PR PBB SHADOW E&M-EST. PATIENT-LVL IV: CPT | Mod: PBBFAC,,, | Performed by: PHYSICIAN ASSISTANT

## 2021-10-01 PROCEDURE — 3078F DIAST BP <80 MM HG: CPT | Mod: CPTII,S$GLB,, | Performed by: PHYSICIAN ASSISTANT

## 2021-10-01 PROCEDURE — 3288F FALL RISK ASSESSMENT DOCD: CPT | Mod: CPTII,S$GLB,, | Performed by: PHYSICIAN ASSISTANT

## 2021-10-01 PROCEDURE — 1125F AMNT PAIN NOTED PAIN PRSNT: CPT | Mod: CPTII,S$GLB,, | Performed by: PHYSICIAN ASSISTANT

## 2021-10-01 PROCEDURE — 3074F SYST BP LT 130 MM HG: CPT | Mod: CPTII,S$GLB,, | Performed by: PHYSICIAN ASSISTANT

## 2021-10-01 PROCEDURE — 99213 PR OFFICE/OUTPT VISIT, EST, LEVL III, 20-29 MIN: ICD-10-PCS | Mod: S$GLB,,, | Performed by: PHYSICIAN ASSISTANT

## 2021-10-01 PROCEDURE — 3074F PR MOST RECENT SYSTOLIC BLOOD PRESSURE < 130 MM HG: ICD-10-PCS | Mod: CPTII,S$GLB,, | Performed by: PHYSICIAN ASSISTANT

## 2021-10-01 PROCEDURE — 1159F MED LIST DOCD IN RCRD: CPT | Mod: CPTII,S$GLB,, | Performed by: PHYSICIAN ASSISTANT

## 2021-10-01 PROCEDURE — 3078F PR MOST RECENT DIASTOLIC BLOOD PRESSURE < 80 MM HG: ICD-10-PCS | Mod: CPTII,S$GLB,, | Performed by: PHYSICIAN ASSISTANT

## 2021-10-01 PROCEDURE — 3008F BODY MASS INDEX DOCD: CPT | Mod: CPTII,S$GLB,, | Performed by: PHYSICIAN ASSISTANT

## 2021-10-01 PROCEDURE — 1159F PR MEDICATION LIST DOCUMENTED IN MEDICAL RECORD: ICD-10-PCS | Mod: CPTII,S$GLB,, | Performed by: PHYSICIAN ASSISTANT

## 2021-10-01 PROCEDURE — 99213 OFFICE O/P EST LOW 20 MIN: CPT | Mod: S$GLB,,, | Performed by: PHYSICIAN ASSISTANT

## 2021-10-01 PROCEDURE — 1101F PR PT FALLS ASSESS DOC 0-1 FALLS W/OUT INJ PAST YR: ICD-10-PCS | Mod: CPTII,S$GLB,, | Performed by: PHYSICIAN ASSISTANT

## 2021-10-01 PROCEDURE — 1101F PT FALLS ASSESS-DOCD LE1/YR: CPT | Mod: CPTII,S$GLB,, | Performed by: PHYSICIAN ASSISTANT

## 2021-10-01 PROCEDURE — 1160F PR REVIEW ALL MEDS BY PRESCRIBER/CLIN PHARMACIST DOCUMENTED: ICD-10-PCS | Mod: CPTII,S$GLB,, | Performed by: PHYSICIAN ASSISTANT

## 2021-10-05 ENCOUNTER — PATIENT MESSAGE (OUTPATIENT)
Dept: ORTHOPEDICS | Facility: CLINIC | Age: 71
End: 2021-10-05

## 2021-10-06 ENCOUNTER — CLINICAL SUPPORT (OUTPATIENT)
Dept: REHABILITATION | Facility: HOSPITAL | Age: 71
End: 2021-10-06
Attending: PHYSICIAN ASSISTANT
Payer: MEDICARE

## 2021-10-06 DIAGNOSIS — R52 PAIN: ICD-10-CM

## 2021-10-06 DIAGNOSIS — S62.317A CLOSED DISPLACED FRACTURE OF BASE OF FIFTH METACARPAL BONE OF LEFT HAND, INITIAL ENCOUNTER: ICD-10-CM

## 2021-10-06 DIAGNOSIS — R53.1 WEAKNESS: ICD-10-CM

## 2021-10-06 DIAGNOSIS — M25.60 STIFFNESS IN JOINT: ICD-10-CM

## 2021-10-06 PROCEDURE — 97110 THERAPEUTIC EXERCISES: CPT | Mod: PN

## 2021-10-06 PROCEDURE — 97165 OT EVAL LOW COMPLEX 30 MIN: CPT | Mod: PN

## 2021-10-11 ENCOUNTER — CLINICAL SUPPORT (OUTPATIENT)
Dept: REHABILITATION | Facility: HOSPITAL | Age: 71
End: 2021-10-11
Payer: MEDICARE

## 2021-10-11 DIAGNOSIS — M25.60 STIFFNESS IN JOINT: ICD-10-CM

## 2021-10-11 DIAGNOSIS — R52 PAIN: ICD-10-CM

## 2021-10-11 DIAGNOSIS — R53.1 WEAKNESS: ICD-10-CM

## 2021-10-11 PROCEDURE — 97140 MANUAL THERAPY 1/> REGIONS: CPT | Mod: PN

## 2021-10-11 PROCEDURE — 97018 PARAFFIN BATH THERAPY: CPT | Mod: 59,PN

## 2021-10-11 PROCEDURE — 97110 THERAPEUTIC EXERCISES: CPT | Mod: PN

## 2021-10-14 ENCOUNTER — CLINICAL SUPPORT (OUTPATIENT)
Dept: REHABILITATION | Facility: HOSPITAL | Age: 71
End: 2021-10-14
Payer: MEDICARE

## 2021-10-14 ENCOUNTER — PES CALL (OUTPATIENT)
Dept: ADMINISTRATIVE | Facility: CLINIC | Age: 71
End: 2021-10-14

## 2021-10-14 DIAGNOSIS — R53.1 WEAKNESS: ICD-10-CM

## 2021-10-14 DIAGNOSIS — R52 PAIN: ICD-10-CM

## 2021-10-14 DIAGNOSIS — M25.60 STIFFNESS IN JOINT: ICD-10-CM

## 2021-10-14 PROCEDURE — 97018 PARAFFIN BATH THERAPY: CPT | Mod: 59,PN

## 2021-10-14 PROCEDURE — 97140 MANUAL THERAPY 1/> REGIONS: CPT | Mod: PN

## 2021-10-14 PROCEDURE — 97110 THERAPEUTIC EXERCISES: CPT | Mod: PN

## 2021-10-18 ENCOUNTER — CLINICAL SUPPORT (OUTPATIENT)
Dept: REHABILITATION | Facility: HOSPITAL | Age: 71
End: 2021-10-18
Payer: MEDICARE

## 2021-10-18 DIAGNOSIS — R53.1 WEAKNESS: ICD-10-CM

## 2021-10-18 DIAGNOSIS — M25.60 STIFFNESS IN JOINT: ICD-10-CM

## 2021-10-18 DIAGNOSIS — R52 PAIN: ICD-10-CM

## 2021-10-18 PROCEDURE — 97110 THERAPEUTIC EXERCISES: CPT | Mod: PN

## 2021-10-18 PROCEDURE — 97018 PARAFFIN BATH THERAPY: CPT | Mod: PN

## 2021-10-21 ENCOUNTER — CLINICAL SUPPORT (OUTPATIENT)
Dept: REHABILITATION | Facility: HOSPITAL | Age: 71
End: 2021-10-21
Payer: MEDICARE

## 2021-10-21 DIAGNOSIS — R52 PAIN: ICD-10-CM

## 2021-10-21 DIAGNOSIS — M25.60 STIFFNESS IN JOINT: ICD-10-CM

## 2021-10-21 DIAGNOSIS — R53.1 WEAKNESS: ICD-10-CM

## 2021-10-21 PROCEDURE — 97110 THERAPEUTIC EXERCISES: CPT | Mod: PN

## 2021-10-25 ENCOUNTER — CLINICAL SUPPORT (OUTPATIENT)
Dept: REHABILITATION | Facility: HOSPITAL | Age: 71
End: 2021-10-25
Payer: MEDICARE

## 2021-10-25 DIAGNOSIS — M25.60 STIFFNESS IN JOINT: ICD-10-CM

## 2021-10-25 DIAGNOSIS — R52 PAIN: ICD-10-CM

## 2021-10-25 DIAGNOSIS — R53.1 WEAKNESS: ICD-10-CM

## 2021-10-25 PROCEDURE — 97110 THERAPEUTIC EXERCISES: CPT | Mod: PN

## 2021-10-31 ENCOUNTER — PATIENT OUTREACH (OUTPATIENT)
Dept: ADMINISTRATIVE | Facility: OTHER | Age: 71
End: 2021-10-31
Payer: MEDICARE

## 2021-11-01 ENCOUNTER — DOCUMENTATION ONLY (OUTPATIENT)
Dept: REHABILITATION | Facility: HOSPITAL | Age: 71
End: 2021-11-01
Payer: MEDICARE

## 2021-11-01 ENCOUNTER — OFFICE VISIT (OUTPATIENT)
Dept: ORTHOPEDICS | Facility: CLINIC | Age: 71
End: 2021-11-01
Payer: MEDICARE

## 2021-11-01 VITALS
SYSTOLIC BLOOD PRESSURE: 132 MMHG | WEIGHT: 148 LBS | RESPIRATION RATE: 18 BRPM | BODY MASS INDEX: 27.07 KG/M2 | DIASTOLIC BLOOD PRESSURE: 72 MMHG | HEART RATE: 68 BPM

## 2021-11-01 DIAGNOSIS — S62.317S: Primary | ICD-10-CM

## 2021-11-01 DIAGNOSIS — M25.532 LEFT WRIST PAIN: ICD-10-CM

## 2021-11-01 PROCEDURE — 1160F PR REVIEW ALL MEDS BY PRESCRIBER/CLIN PHARMACIST DOCUMENTED: ICD-10-PCS | Mod: CPTII,S$GLB,, | Performed by: PHYSICIAN ASSISTANT

## 2021-11-01 PROCEDURE — 1101F PT FALLS ASSESS-DOCD LE1/YR: CPT | Mod: CPTII,S$GLB,, | Performed by: PHYSICIAN ASSISTANT

## 2021-11-01 PROCEDURE — 1159F MED LIST DOCD IN RCRD: CPT | Mod: CPTII,S$GLB,, | Performed by: PHYSICIAN ASSISTANT

## 2021-11-01 PROCEDURE — 3008F BODY MASS INDEX DOCD: CPT | Mod: CPTII,S$GLB,, | Performed by: PHYSICIAN ASSISTANT

## 2021-11-01 PROCEDURE — 1125F AMNT PAIN NOTED PAIN PRSNT: CPT | Mod: CPTII,S$GLB,, | Performed by: PHYSICIAN ASSISTANT

## 2021-11-01 PROCEDURE — 3008F PR BODY MASS INDEX (BMI) DOCUMENTED: ICD-10-PCS | Mod: CPTII,S$GLB,, | Performed by: PHYSICIAN ASSISTANT

## 2021-11-01 PROCEDURE — 99213 PR OFFICE/OUTPT VISIT, EST, LEVL III, 20-29 MIN: ICD-10-PCS | Mod: S$GLB,,, | Performed by: PHYSICIAN ASSISTANT

## 2021-11-01 PROCEDURE — 3288F PR FALLS RISK ASSESSMENT DOCUMENTED: ICD-10-PCS | Mod: CPTII,S$GLB,, | Performed by: PHYSICIAN ASSISTANT

## 2021-11-01 PROCEDURE — 3075F SYST BP GE 130 - 139MM HG: CPT | Mod: CPTII,S$GLB,, | Performed by: PHYSICIAN ASSISTANT

## 2021-11-01 PROCEDURE — 99999 PR PBB SHADOW E&M-EST. PATIENT-LVL III: ICD-10-PCS | Mod: PBBFAC,,, | Performed by: PHYSICIAN ASSISTANT

## 2021-11-01 PROCEDURE — 1160F RVW MEDS BY RX/DR IN RCRD: CPT | Mod: CPTII,S$GLB,, | Performed by: PHYSICIAN ASSISTANT

## 2021-11-01 PROCEDURE — 1159F PR MEDICATION LIST DOCUMENTED IN MEDICAL RECORD: ICD-10-PCS | Mod: CPTII,S$GLB,, | Performed by: PHYSICIAN ASSISTANT

## 2021-11-01 PROCEDURE — 99999 PR PBB SHADOW E&M-EST. PATIENT-LVL III: CPT | Mod: PBBFAC,,, | Performed by: PHYSICIAN ASSISTANT

## 2021-11-01 PROCEDURE — 3078F PR MOST RECENT DIASTOLIC BLOOD PRESSURE < 80 MM HG: ICD-10-PCS | Mod: CPTII,S$GLB,, | Performed by: PHYSICIAN ASSISTANT

## 2021-11-01 PROCEDURE — 3075F PR MOST RECENT SYSTOLIC BLOOD PRESS GE 130-139MM HG: ICD-10-PCS | Mod: CPTII,S$GLB,, | Performed by: PHYSICIAN ASSISTANT

## 2021-11-01 PROCEDURE — 3288F FALL RISK ASSESSMENT DOCD: CPT | Mod: CPTII,S$GLB,, | Performed by: PHYSICIAN ASSISTANT

## 2021-11-01 PROCEDURE — 1125F PR PAIN SEVERITY QUANTIFIED, PAIN PRESENT: ICD-10-PCS | Mod: CPTII,S$GLB,, | Performed by: PHYSICIAN ASSISTANT

## 2021-11-01 PROCEDURE — 1101F PR PT FALLS ASSESS DOC 0-1 FALLS W/OUT INJ PAST YR: ICD-10-PCS | Mod: CPTII,S$GLB,, | Performed by: PHYSICIAN ASSISTANT

## 2021-11-01 PROCEDURE — 3078F DIAST BP <80 MM HG: CPT | Mod: CPTII,S$GLB,, | Performed by: PHYSICIAN ASSISTANT

## 2021-11-01 PROCEDURE — 99213 OFFICE O/P EST LOW 20 MIN: CPT | Mod: S$GLB,,, | Performed by: PHYSICIAN ASSISTANT

## 2021-11-08 ENCOUNTER — CLINICAL SUPPORT (OUTPATIENT)
Dept: REHABILITATION | Facility: HOSPITAL | Age: 71
End: 2021-11-08
Payer: MEDICARE

## 2021-11-08 DIAGNOSIS — R52 PAIN: ICD-10-CM

## 2021-11-08 DIAGNOSIS — R53.1 WEAKNESS: ICD-10-CM

## 2021-11-08 DIAGNOSIS — M25.60 STIFFNESS IN JOINT: ICD-10-CM

## 2021-11-08 PROCEDURE — 97110 THERAPEUTIC EXERCISES: CPT | Mod: PN

## 2021-11-12 ENCOUNTER — TELEPHONE (OUTPATIENT)
Dept: OPHTHALMOLOGY | Facility: CLINIC | Age: 71
End: 2021-11-12
Payer: MEDICARE

## 2021-11-12 ENCOUNTER — PATIENT MESSAGE (OUTPATIENT)
Dept: OPHTHALMOLOGY | Facility: CLINIC | Age: 71
End: 2021-11-12
Payer: MEDICARE

## 2021-11-15 ENCOUNTER — CLINICAL SUPPORT (OUTPATIENT)
Dept: REHABILITATION | Facility: HOSPITAL | Age: 71
End: 2021-11-15
Payer: MEDICARE

## 2021-11-15 DIAGNOSIS — M25.60 STIFFNESS IN JOINT: ICD-10-CM

## 2021-11-15 DIAGNOSIS — R52 PAIN: ICD-10-CM

## 2021-11-15 DIAGNOSIS — R53.1 WEAKNESS: ICD-10-CM

## 2021-11-15 PROCEDURE — 97110 THERAPEUTIC EXERCISES: CPT | Mod: PN

## 2021-11-15 PROCEDURE — 97018 PARAFFIN BATH THERAPY: CPT | Mod: PN

## 2021-11-18 ENCOUNTER — CLINICAL SUPPORT (OUTPATIENT)
Dept: REHABILITATION | Facility: HOSPITAL | Age: 71
End: 2021-11-18
Payer: MEDICARE

## 2021-11-18 DIAGNOSIS — R53.1 WEAKNESS: ICD-10-CM

## 2021-11-18 DIAGNOSIS — R52 PAIN: ICD-10-CM

## 2021-11-18 DIAGNOSIS — M25.60 STIFFNESS IN JOINT: ICD-10-CM

## 2021-11-18 PROCEDURE — 97110 THERAPEUTIC EXERCISES: CPT | Mod: PN

## 2021-12-01 ENCOUNTER — OFFICE VISIT (OUTPATIENT)
Dept: OPHTHALMOLOGY | Facility: CLINIC | Age: 71
End: 2021-12-01
Payer: MEDICARE

## 2021-12-01 DIAGNOSIS — Z98.890 POST-OPERATIVE STATE: Primary | ICD-10-CM

## 2021-12-01 DIAGNOSIS — H25.11 NS (NUCLEAR SCLEROSIS), RIGHT: ICD-10-CM

## 2021-12-01 PROCEDURE — 99024 PR POST-OP FOLLOW-UP VISIT: ICD-10-PCS | Mod: S$GLB,,, | Performed by: OPHTHALMOLOGY

## 2021-12-01 PROCEDURE — 99999 PR PBB SHADOW E&M-EST. PATIENT-LVL III: CPT | Mod: PBBFAC,,, | Performed by: OPHTHALMOLOGY

## 2021-12-01 PROCEDURE — 99024 POSTOP FOLLOW-UP VISIT: CPT | Mod: S$GLB,,, | Performed by: OPHTHALMOLOGY

## 2021-12-01 PROCEDURE — 99999 PR PBB SHADOW E&M-EST. PATIENT-LVL III: ICD-10-PCS | Mod: PBBFAC,,, | Performed by: OPHTHALMOLOGY

## 2021-12-02 ENCOUNTER — TELEPHONE (OUTPATIENT)
Dept: OPHTHALMOLOGY | Facility: CLINIC | Age: 71
End: 2021-12-02
Payer: MEDICARE

## 2021-12-10 ENCOUNTER — PES CALL (OUTPATIENT)
Dept: ADMINISTRATIVE | Facility: CLINIC | Age: 71
End: 2021-12-10
Payer: MEDICARE

## 2021-12-10 ENCOUNTER — TELEPHONE (OUTPATIENT)
Dept: OPHTHALMOLOGY | Facility: CLINIC | Age: 71
End: 2021-12-10
Payer: MEDICARE

## 2021-12-13 ENCOUNTER — TELEPHONE (OUTPATIENT)
Dept: OPHTHALMOLOGY | Facility: CLINIC | Age: 71
End: 2021-12-13
Payer: MEDICARE

## 2021-12-13 PROBLEM — I70.0 AORTIC ATHEROSCLEROSIS: Status: ACTIVE | Noted: 2021-12-13

## 2021-12-16 DIAGNOSIS — I10 ESSENTIAL HYPERTENSION: ICD-10-CM

## 2021-12-20 ENCOUNTER — PATIENT MESSAGE (OUTPATIENT)
Dept: FAMILY MEDICINE | Facility: CLINIC | Age: 71
End: 2021-12-20
Payer: MEDICARE

## 2021-12-20 DIAGNOSIS — I10 ESSENTIAL HYPERTENSION: ICD-10-CM

## 2021-12-20 RX ORDER — HYDROCHLOROTHIAZIDE 25 MG/1
12.5 TABLET ORAL DAILY
Qty: 90 TABLET | Refills: 1 | Status: SHIPPED | OUTPATIENT
Start: 2021-12-20 | End: 2023-02-06

## 2021-12-20 RX ORDER — AMLODIPINE BESYLATE 5 MG/1
TABLET ORAL
Qty: 90 TABLET | Refills: 3 | Status: SHIPPED | OUTPATIENT
Start: 2021-12-20 | End: 2023-02-06

## 2022-01-07 ENCOUNTER — LAB VISIT (OUTPATIENT)
Dept: PRIMARY CARE CLINIC | Facility: CLINIC | Age: 72
End: 2022-01-07
Payer: MEDICARE

## 2022-01-07 DIAGNOSIS — Z20.822 CONTACT WITH AND (SUSPECTED) EXPOSURE TO COVID-19: ICD-10-CM

## 2022-01-07 LAB
CTP QC/QA: YES
SARS-COV-2 AG RESP QL IA.RAPID: POSITIVE

## 2022-01-07 PROCEDURE — 87811 SARS-COV-2 COVID19 W/OPTIC: CPT

## 2022-01-10 ENCOUNTER — PATIENT MESSAGE (OUTPATIENT)
Dept: ADMINISTRATIVE | Facility: HOSPITAL | Age: 72
End: 2022-01-10
Payer: MEDICARE

## 2022-01-13 ENCOUNTER — APPOINTMENT (OUTPATIENT)
Dept: RADIOLOGY | Facility: OTHER | Age: 72
End: 2022-01-13
Attending: INTERNAL MEDICINE
Payer: MEDICARE

## 2022-01-13 VITALS — HEIGHT: 62 IN | BODY MASS INDEX: 27.22 KG/M2 | WEIGHT: 147.94 LBS

## 2022-01-13 DIAGNOSIS — Z12.31 ENCOUNTER FOR SCREENING MAMMOGRAM FOR MALIGNANT NEOPLASM OF BREAST: ICD-10-CM

## 2022-01-13 PROCEDURE — 77063 BREAST TOMOSYNTHESIS BI: CPT | Mod: TC,PN

## 2022-01-13 PROCEDURE — 77063 BREAST TOMOSYNTHESIS BI: CPT | Mod: 26,,, | Performed by: RADIOLOGY

## 2022-01-13 PROCEDURE — 77067 MAMMO DIGITAL SCREENING BILAT WITH TOMO: ICD-10-PCS | Mod: 26,,, | Performed by: RADIOLOGY

## 2022-01-13 PROCEDURE — 77063 MAMMO DIGITAL SCREENING BILAT WITH TOMO: ICD-10-PCS | Mod: 26,,, | Performed by: RADIOLOGY

## 2022-01-13 PROCEDURE — 77067 SCR MAMMO BI INCL CAD: CPT | Mod: 26,,, | Performed by: RADIOLOGY

## 2022-01-19 ENCOUNTER — PES CALL (OUTPATIENT)
Dept: ADMINISTRATIVE | Facility: CLINIC | Age: 72
End: 2022-01-19
Payer: MEDICARE

## 2022-02-15 ENCOUNTER — OFFICE VISIT (OUTPATIENT)
Dept: FAMILY MEDICINE | Facility: CLINIC | Age: 72
End: 2022-02-15
Payer: MEDICARE

## 2022-02-15 VITALS
HEIGHT: 62 IN | WEIGHT: 149.06 LBS | OXYGEN SATURATION: 98 % | SYSTOLIC BLOOD PRESSURE: 140 MMHG | HEART RATE: 77 BPM | BODY MASS INDEX: 27.43 KG/M2 | DIASTOLIC BLOOD PRESSURE: 82 MMHG

## 2022-02-15 DIAGNOSIS — E66.3 OVERWEIGHT (BMI 25.0-29.9): ICD-10-CM

## 2022-02-15 DIAGNOSIS — E53.8 B12 DEFICIENCY: ICD-10-CM

## 2022-02-15 DIAGNOSIS — E55.9 VITAMIN D DEFICIENCY: ICD-10-CM

## 2022-02-15 DIAGNOSIS — Z23 NEED FOR INFLUENZA VACCINATION: ICD-10-CM

## 2022-02-15 DIAGNOSIS — D32.9 MENINGIOMA: ICD-10-CM

## 2022-02-15 DIAGNOSIS — Z00.00 ENCOUNTER FOR PREVENTIVE HEALTH EXAMINATION: Primary | ICD-10-CM

## 2022-02-15 DIAGNOSIS — I70.0 AORTIC ATHEROSCLEROSIS: ICD-10-CM

## 2022-02-15 DIAGNOSIS — I10 ESSENTIAL HYPERTENSION: ICD-10-CM

## 2022-02-15 PROBLEM — U07.1 ACUTE HYPOXEMIC RESPIRATORY FAILURE DUE TO COVID-19: Status: RESOLVED | Noted: 2021-03-05 | Resolved: 2022-02-15

## 2022-02-15 PROBLEM — J96.01 ACUTE HYPOXEMIC RESPIRATORY FAILURE DUE TO COVID-19: Status: RESOLVED | Noted: 2021-03-05 | Resolved: 2022-02-15

## 2022-02-15 PROCEDURE — 3008F PR BODY MASS INDEX (BMI) DOCUMENTED: ICD-10-PCS | Mod: CPTII,S$GLB,, | Performed by: NURSE PRACTITIONER

## 2022-02-15 PROCEDURE — 1159F MED LIST DOCD IN RCRD: CPT | Mod: CPTII,S$GLB,, | Performed by: NURSE PRACTITIONER

## 2022-02-15 PROCEDURE — G0439 PR MEDICARE ANNUAL WELLNESS SUBSEQUENT VISIT: ICD-10-PCS | Mod: S$GLB,,, | Performed by: NURSE PRACTITIONER

## 2022-02-15 PROCEDURE — 1170F FXNL STATUS ASSESSED: CPT | Mod: CPTII,S$GLB,, | Performed by: NURSE PRACTITIONER

## 2022-02-15 PROCEDURE — 1126F AMNT PAIN NOTED NONE PRSNT: CPT | Mod: CPTII,S$GLB,, | Performed by: NURSE PRACTITIONER

## 2022-02-15 PROCEDURE — 90694 VACC AIIV4 NO PRSRV 0.5ML IM: CPT | Mod: S$GLB,,, | Performed by: NURSE PRACTITIONER

## 2022-02-15 PROCEDURE — 1170F PR FUNCTIONAL STATUS ASSESSED: ICD-10-PCS | Mod: CPTII,S$GLB,, | Performed by: NURSE PRACTITIONER

## 2022-02-15 PROCEDURE — 1100F PTFALLS ASSESS-DOCD GE2>/YR: CPT | Mod: CPTII,S$GLB,, | Performed by: NURSE PRACTITIONER

## 2022-02-15 PROCEDURE — G0008 ADMIN INFLUENZA VIRUS VAC: HCPCS | Mod: S$GLB,,, | Performed by: NURSE PRACTITIONER

## 2022-02-15 PROCEDURE — 99999 PR PBB SHADOW E&M-EST. PATIENT-LVL III: ICD-10-PCS | Mod: PBBFAC,,, | Performed by: NURSE PRACTITIONER

## 2022-02-15 PROCEDURE — 1100F PR PT FALLS ASSESS DOC 2+ FALLS/FALL W/INJURY/YR: ICD-10-PCS | Mod: CPTII,S$GLB,, | Performed by: NURSE PRACTITIONER

## 2022-02-15 PROCEDURE — 3008F BODY MASS INDEX DOCD: CPT | Mod: CPTII,S$GLB,, | Performed by: NURSE PRACTITIONER

## 2022-02-15 PROCEDURE — 3077F SYST BP >= 140 MM HG: CPT | Mod: CPTII,S$GLB,, | Performed by: NURSE PRACTITIONER

## 2022-02-15 PROCEDURE — 1160F RVW MEDS BY RX/DR IN RCRD: CPT | Mod: CPTII,S$GLB,, | Performed by: NURSE PRACTITIONER

## 2022-02-15 PROCEDURE — 99999 PR PBB SHADOW E&M-EST. PATIENT-LVL III: CPT | Mod: PBBFAC,,, | Performed by: NURSE PRACTITIONER

## 2022-02-15 PROCEDURE — 3288F PR FALLS RISK ASSESSMENT DOCUMENTED: ICD-10-PCS | Mod: CPTII,S$GLB,, | Performed by: NURSE PRACTITIONER

## 2022-02-15 PROCEDURE — 1159F PR MEDICATION LIST DOCUMENTED IN MEDICAL RECORD: ICD-10-PCS | Mod: CPTII,S$GLB,, | Performed by: NURSE PRACTITIONER

## 2022-02-15 PROCEDURE — G0008 FLU VACCINE - QUADRIVALENT - ADJUVANTED: ICD-10-PCS | Mod: S$GLB,,, | Performed by: NURSE PRACTITIONER

## 2022-02-15 PROCEDURE — 3079F DIAST BP 80-89 MM HG: CPT | Mod: CPTII,S$GLB,, | Performed by: NURSE PRACTITIONER

## 2022-02-15 PROCEDURE — 90694 FLU VACCINE - QUADRIVALENT - ADJUVANTED: ICD-10-PCS | Mod: S$GLB,,, | Performed by: NURSE PRACTITIONER

## 2022-02-15 PROCEDURE — 3079F PR MOST RECENT DIASTOLIC BLOOD PRESSURE 80-89 MM HG: ICD-10-PCS | Mod: CPTII,S$GLB,, | Performed by: NURSE PRACTITIONER

## 2022-02-15 PROCEDURE — G0439 PPPS, SUBSEQ VISIT: HCPCS | Mod: S$GLB,,, | Performed by: NURSE PRACTITIONER

## 2022-02-15 PROCEDURE — 1160F PR REVIEW ALL MEDS BY PRESCRIBER/CLIN PHARMACIST DOCUMENTED: ICD-10-PCS | Mod: CPTII,S$GLB,, | Performed by: NURSE PRACTITIONER

## 2022-02-15 PROCEDURE — 99499 UNLISTED E&M SERVICE: CPT | Mod: S$GLB,,, | Performed by: NURSE PRACTITIONER

## 2022-02-15 PROCEDURE — 3288F FALL RISK ASSESSMENT DOCD: CPT | Mod: CPTII,S$GLB,, | Performed by: NURSE PRACTITIONER

## 2022-02-15 PROCEDURE — 3077F PR MOST RECENT SYSTOLIC BLOOD PRESSURE >= 140 MM HG: ICD-10-PCS | Mod: CPTII,S$GLB,, | Performed by: NURSE PRACTITIONER

## 2022-02-15 PROCEDURE — 1126F PR PAIN SEVERITY QUANTIFIED, NO PAIN PRESENT: ICD-10-PCS | Mod: CPTII,S$GLB,, | Performed by: NURSE PRACTITIONER

## 2022-02-15 PROCEDURE — 99499 RISK ADDL DX/OHS AUDIT: ICD-10-PCS | Mod: S$GLB,,, | Performed by: NURSE PRACTITIONER

## 2022-02-15 RX ORDER — ACETAMINOPHEN 500 MG
1 TABLET ORAL DAILY
Qty: 90 CAPSULE | Refills: 3 | Status: SHIPPED | OUTPATIENT
Start: 2022-02-15 | End: 2023-08-11

## 2022-02-15 NOTE — PATIENT INSTRUCTIONS
Counseling and Referral of Other Preventative  (Italic type indicates deductible and co-insurance are waived)    Patient Name: Julisa Jensen  Today's Date: 2/15/2022    Health Maintenance       Date Due Completion Date    Influenza Vaccine (1) 09/01/2021 10/14/2020    Shingles Vaccine (2 of 2) 03/04/2022 (Originally 1/11/2021) 11/16/2020    COVID-19 Vaccine (3 - Booster for Moderna series) 03/04/2022 (Originally 1/9/2022) 7/9/2021    DEXA SCAN 10/17/2022 10/17/2019    Mammogram 01/13/2023 1/13/2022    Lipid Panel 10/15/2025 10/15/2020    Colorectal Cancer Screening 06/23/2030 6/23/2020    TETANUS VACCINE 11/16/2030 11/16/2020        Orders Placed This Encounter   Procedures    Influenza (FLUAD) - Quadrivalent (Adjuvanted) *Preferred* (65+) (PF)     The following information is provided to all patients.  This information is to help you find resources for any of the problems found today that may be affecting your health:                Living healthy guide: www.[facility].[place].gov      Understanding Diabetes: www.diabetes.org      Eating healthy: www.cdc.gov/healthyweight      CDC home safety checklist: www.cdc.gov/steadi/patient.html      Agency on Aging: www.goea.[place].[facility]      Alcoholics anonymous (AA): www.aa.org      Physical Activity: www.elvira.nih.gov/dh0qcbu      Tobacco use: www.quitwithusla.org

## 2022-02-15 NOTE — PROGRESS NOTES
"  Julisa Jensen presented for a  Medicare AWV and comprehensive Health Risk Assessment today. The following components were reviewed and updated:    · Medical history  · Family History  · Social history  · Allergies and Current Medications  · Health Risk Assessment  · Health Maintenance  · Care Team         ** See Completed Assessments for Annual Wellness Visit within the encounter summary.**         The following assessments were completed:  · Living Situation  · CAGE  · Depression Screening  · Timed Get Up and Go  · Whisper Test  · Cognitive Function Screening      · Nutrition Screening  · ADL Screening  · PAQ Screening        Vitals:    02/15/22 1108   BP: (!) 140/82   BP Location: Right arm   Patient Position: Sitting   BP Method: Small (Manual)   Pulse: 77   SpO2: 98%   Weight: 67.6 kg (149 lb 0.5 oz)   Height: 5' 2" (1.575 m)     Body mass index is 27.26 kg/m².     Physical Exam  Vitals reviewed.   Constitutional:       General: She is not in acute distress.     Appearance: Normal appearance. She is well-developed and well-groomed.   HENT:      Head: Normocephalic.   Cardiovascular:      Rate and Rhythm: Normal rate.   Pulmonary:      Effort: Pulmonary effort is normal. No respiratory distress.   Skin:     General: Skin is warm and dry.      Coloration: Skin is not pale.   Neurological:      Mental Status: She is alert and oriented to person, place, and time.      Coordination: Coordination normal.      Gait: Gait normal.   Psychiatric:         Attention and Perception: Attention normal.         Mood and Affect: Mood and affect normal.         Speech: Speech normal.         Behavior: Behavior normal. Behavior is cooperative.         Thought Content: Thought content normal.             Diagnoses and health risks identified today and associated recommendations/orders:    1. Encounter for preventive health examination    2. Essential hypertension  Chronic; stable on medication. Follow up with PCP.    3. Aortic " atherosclerosis  Chronic; stable. As seen on previous imaging. Follow up with PCP.    4. Meningioma  Chronic; stable. Follow up with PCP.    5. Vitamin D deficiency  Chronic; stable on medication. Follow up with PCP.  - cholecalciferol, vitamin D3, (VITAMIN D3) 50 mcg (2,000 unit) Cap; Take 1 capsule (2,000 Units total) by mouth once daily.  Dispense: 90 capsule; Refill: 3    6. B12 deficiency  Chronic; stable on medication. Follow up with PCP.    7. Need for influenza vaccination  - Influenza (FLUAD) - Quadrivalent (Adjuvanted) *Preferred* (65+) (PF)    8. Overweight (BMI 25.0-29.9)  Encouraged patient to continue to eat a low salt/low fat diet and discussed importance of engaging in physical activity at least 5x/week for a minimum of 30 min/day.      Provided Julisa with a 5-10 year written screening schedule and personal prevention plan. Recommendations were developed using the USPSTF age appropriate recommendations. Education, counseling, and referrals were provided as needed. After Visit Summary printed and given to patient which includes a list of additional screenings/tests needed.    Follow up for your next annual wellness visit.    Elizabeth Dickens NP    Advance Care Planning     I offered to discuss advanced care planning, including how to pick a person who would make decisions for you if you were unable to make them for yourself, called a health care power of , and what kind of decisions you might make such as use of life sustaining treatments such as ventilators and tube feeding when faced with a life limiting illness recorded on a living will that they will need to know. (How you want to be cared for as you near the end of your natural life)     X Patient is interested in learning more about how to make advanced directives.  I provided them paperwork and offered to discuss this with them.

## 2022-02-18 ENCOUNTER — PATIENT MESSAGE (OUTPATIENT)
Dept: FAMILY MEDICINE | Facility: CLINIC | Age: 72
End: 2022-02-18
Payer: MEDICARE

## 2022-02-27 ENCOUNTER — OFFICE VISIT (OUTPATIENT)
Dept: URGENT CARE | Facility: CLINIC | Age: 72
End: 2022-02-27
Payer: MEDICARE

## 2022-02-27 VITALS
DIASTOLIC BLOOD PRESSURE: 81 MMHG | WEIGHT: 149 LBS | RESPIRATION RATE: 18 BRPM | BODY MASS INDEX: 27.42 KG/M2 | TEMPERATURE: 98 F | HEART RATE: 83 BPM | HEIGHT: 62 IN | OXYGEN SATURATION: 96 % | SYSTOLIC BLOOD PRESSURE: 134 MMHG

## 2022-02-27 DIAGNOSIS — J02.9 PHARYNGITIS, UNSPECIFIED ETIOLOGY: ICD-10-CM

## 2022-02-27 DIAGNOSIS — J02.9 SORE THROAT: Primary | ICD-10-CM

## 2022-02-27 DIAGNOSIS — J30.2 SEASONAL ALLERGIES: ICD-10-CM

## 2022-02-27 DIAGNOSIS — R09.81 NASAL CONGESTION: ICD-10-CM

## 2022-02-27 LAB
CTP QC/QA: YES
MOLECULAR STREP A: NEGATIVE

## 2022-02-27 PROCEDURE — 1160F RVW MEDS BY RX/DR IN RCRD: CPT | Mod: CPTII,S$GLB,, | Performed by: NURSE PRACTITIONER

## 2022-02-27 PROCEDURE — 3079F DIAST BP 80-89 MM HG: CPT | Mod: CPTII,S$GLB,, | Performed by: NURSE PRACTITIONER

## 2022-02-27 PROCEDURE — 1159F MED LIST DOCD IN RCRD: CPT | Mod: CPTII,S$GLB,, | Performed by: NURSE PRACTITIONER

## 2022-02-27 PROCEDURE — 3075F SYST BP GE 130 - 139MM HG: CPT | Mod: CPTII,S$GLB,, | Performed by: NURSE PRACTITIONER

## 2022-02-27 PROCEDURE — 1159F PR MEDICATION LIST DOCUMENTED IN MEDICAL RECORD: ICD-10-PCS | Mod: CPTII,S$GLB,, | Performed by: NURSE PRACTITIONER

## 2022-02-27 PROCEDURE — 3075F PR MOST RECENT SYSTOLIC BLOOD PRESS GE 130-139MM HG: ICD-10-PCS | Mod: CPTII,S$GLB,, | Performed by: NURSE PRACTITIONER

## 2022-02-27 PROCEDURE — 87651 POCT STREP A MOLECULAR: ICD-10-PCS | Mod: QW,S$GLB,, | Performed by: NURSE PRACTITIONER

## 2022-02-27 PROCEDURE — 1125F AMNT PAIN NOTED PAIN PRSNT: CPT | Mod: CPTII,S$GLB,, | Performed by: NURSE PRACTITIONER

## 2022-02-27 PROCEDURE — 1160F PR REVIEW ALL MEDS BY PRESCRIBER/CLIN PHARMACIST DOCUMENTED: ICD-10-PCS | Mod: CPTII,S$GLB,, | Performed by: NURSE PRACTITIONER

## 2022-02-27 PROCEDURE — 99213 OFFICE O/P EST LOW 20 MIN: CPT | Mod: S$GLB,,, | Performed by: NURSE PRACTITIONER

## 2022-02-27 PROCEDURE — 1125F PR PAIN SEVERITY QUANTIFIED, PAIN PRESENT: ICD-10-PCS | Mod: CPTII,S$GLB,, | Performed by: NURSE PRACTITIONER

## 2022-02-27 PROCEDURE — 99213 PR OFFICE/OUTPT VISIT, EST, LEVL III, 20-29 MIN: ICD-10-PCS | Mod: S$GLB,,, | Performed by: NURSE PRACTITIONER

## 2022-02-27 PROCEDURE — 3008F BODY MASS INDEX DOCD: CPT | Mod: CPTII,S$GLB,, | Performed by: NURSE PRACTITIONER

## 2022-02-27 PROCEDURE — 3008F PR BODY MASS INDEX (BMI) DOCUMENTED: ICD-10-PCS | Mod: CPTII,S$GLB,, | Performed by: NURSE PRACTITIONER

## 2022-02-27 PROCEDURE — 3079F PR MOST RECENT DIASTOLIC BLOOD PRESSURE 80-89 MM HG: ICD-10-PCS | Mod: CPTII,S$GLB,, | Performed by: NURSE PRACTITIONER

## 2022-02-27 PROCEDURE — 87651 STREP A DNA AMP PROBE: CPT | Mod: QW,S$GLB,, | Performed by: NURSE PRACTITIONER

## 2022-02-27 RX ORDER — CETIRIZINE HYDROCHLORIDE 10 MG/1
10 TABLET ORAL DAILY
Qty: 30 TABLET | Refills: 0 | Status: SHIPPED | OUTPATIENT
Start: 2022-02-27 | End: 2023-08-11

## 2022-02-27 RX ORDER — FLUTICASONE PROPIONATE 50 MCG
1 SPRAY, SUSPENSION (ML) NASAL DAILY
Qty: 18.2 ML | Refills: 0 | Status: SHIPPED | OUTPATIENT
Start: 2022-02-27 | End: 2022-03-31 | Stop reason: SDUPTHER

## 2022-02-27 NOTE — PROGRESS NOTES
"Subjective:       Patient ID: Julisa Jensen is a 71 y.o. female.    Vitals:  height is 5' 2" (1.575 m) and weight is 67.6 kg (149 lb). Her oral temperature is 97.9 °F (36.6 °C). Her blood pressure is 134/81 and her pulse is 83. Her respiration is 18 and oxygen saturation is 96%.     Chief Complaint: Sore Throat    72 y/o female c/o sore throat, with trouble swallowing x 1 week.  When she swallows, she feels like she has a lump in throat. She noticed symptoms, after she got a recent flu vaccine.  Denies fever. Denies neck pain. Denies lymph node tenderness. Appetite ok. Denies nausea, vomiting, or diarrhea. Denies chest pain, shortness of breath, or wheeze.  Reports some nasal congestion, post nasal drip, and mild cough.  Symptoms are worse at night.     Sore Throat   This is a new problem. The current episode started 1 to 4 weeks ago. The problem has been unchanged. Neither side of throat is experiencing more pain than the other. There has been no fever. The pain is at a severity of 8/10. The pain is moderate. Associated symptoms include congestion, coughing, swollen glands and trouble swallowing. She has tried gargles (honey) for the symptoms. The treatment provided mild relief.       HENT: Positive for congestion, postnasal drip, sore throat and trouble swallowing.    Respiratory: Positive for cough.        Objective:      Physical Exam   Constitutional: She is oriented to person, place, and time. She appears well-developed. She is cooperative.  Non-toxic appearance. She does not appear ill. No distress.   HENT:   Head: Normocephalic and atraumatic.   Ears:   Right Ear: Hearing, tympanic membrane, external ear and ear canal normal.   Left Ear: Hearing, tympanic membrane, external ear and ear canal normal.   Nose: Nose normal. No mucosal edema (has nasal congestion), rhinorrhea or nasal deformity. No epistaxis. Right sinus exhibits no maxillary sinus tenderness and no frontal sinus tenderness. Left sinus exhibits " no maxillary sinus tenderness and no frontal sinus tenderness.   Mouth/Throat: Uvula is midline and mucous membranes are normal. No trismus in the jaw. Normal dentition. No uvula swelling (uvula midline). Posterior oropharyngeal erythema present. No oropharyngeal exudate or posterior oropharyngeal edema.   Eyes: Conjunctivae and lids are normal. No scleral icterus.   Neck: Trachea normal and phonation normal. Neck supple. No edema present. No erythema present. No neck rigidity present.   Cardiovascular: Normal rate, regular rhythm, normal heart sounds and normal pulses.   Pulmonary/Chest: Effort normal and breath sounds normal. No respiratory distress. She has no decreased breath sounds. She has no rhonchi.   Abdominal: Normal appearance.   Musculoskeletal: Normal range of motion.         General: No deformity. Normal range of motion.      Cervical back: She exhibits no tenderness.   Lymphadenopathy:     She has no cervical adenopathy.   Neurological: She is alert and oriented to person, place, and time. She exhibits normal muscle tone. Coordination normal.   Skin: Skin is warm, dry, intact, not diaphoretic and not pale.   Psychiatric: Her speech is normal and behavior is normal. Judgment and thought content normal.   Nursing note and vitals reviewed.        Assessment:       1. Sore throat    2. Pharyngitis, unspecified etiology    3. Seasonal allergies    4. Nasal congestion          Plan:         Sore throat  -     POCT Strep A, Molecular    Pharyngitis, unspecified etiology    Seasonal allergies    Nasal congestion    Other orders  -     cetirizine (ZYRTEC) 10 MG tablet; Take 1 tablet (10 mg total) by mouth once daily.  Dispense: 30 tablet; Refill: 0  -     fluticasone propionate (FLONASE) 50 mcg/actuation nasal spray; 1 spray (50 mcg total) by Each Nostril route once daily.  Dispense: 18.2 mL; Refill: 0      Patient Instructions   Oral fluids (hot tea)  Rest   Blow nose often  OTC: cough drops, ibuprofen or  tylenol, chloraseptic  Avoid allergens

## 2022-02-27 NOTE — PATIENT INSTRUCTIONS
Oral fluids (hot tea)  Rest   Blow nose often  OTC: cough drops, ibuprofen or tylenol, chloraseptic  Avoid allergens

## 2022-03-21 ENCOUNTER — LAB VISIT (OUTPATIENT)
Dept: LAB | Facility: HOSPITAL | Age: 72
End: 2022-03-21
Attending: INTERNAL MEDICINE
Payer: MEDICARE

## 2022-03-21 DIAGNOSIS — E55.9 VITAMIN D DEFICIENCY: ICD-10-CM

## 2022-03-21 DIAGNOSIS — I10 ESSENTIAL HYPERTENSION: ICD-10-CM

## 2022-03-21 DIAGNOSIS — D50.0 IRON DEFICIENCY ANEMIA DUE TO CHRONIC BLOOD LOSS: ICD-10-CM

## 2022-03-21 DIAGNOSIS — S62.307D CLOSED DISPLACED FRACTURE OF FIFTH METACARPAL BONE OF LEFT HAND WITH ROUTINE HEALING, UNSPECIFIED PORTION OF METACARPAL, SUBSEQUENT ENCOUNTER: ICD-10-CM

## 2022-03-21 DIAGNOSIS — Z79.899 MEDICATION MANAGEMENT: ICD-10-CM

## 2022-03-21 DIAGNOSIS — E53.8 B12 DEFICIENCY: ICD-10-CM

## 2022-03-21 LAB
25(OH)D3+25(OH)D2 SERPL-MCNC: 38 NG/ML (ref 30–96)
ALBUMIN SERPL BCP-MCNC: 3.9 G/DL (ref 3.5–5.2)
ALP SERPL-CCNC: 88 U/L (ref 55–135)
ALT SERPL W/O P-5'-P-CCNC: 25 U/L (ref 10–44)
ANION GAP SERPL CALC-SCNC: 7 MMOL/L (ref 8–16)
AST SERPL-CCNC: 22 U/L (ref 10–40)
BASOPHILS # BLD AUTO: 0.05 K/UL (ref 0–0.2)
BASOPHILS NFR BLD: 0.8 % (ref 0–1.9)
BILIRUB SERPL-MCNC: 0.6 MG/DL (ref 0.1–1)
BUN SERPL-MCNC: 13 MG/DL (ref 8–23)
CALCIUM SERPL-MCNC: 9.5 MG/DL (ref 8.7–10.5)
CHLORIDE SERPL-SCNC: 105 MMOL/L (ref 95–110)
CHOLEST SERPL-MCNC: 174 MG/DL (ref 120–199)
CHOLEST/HDLC SERPL: 3.9 {RATIO} (ref 2–5)
CO2 SERPL-SCNC: 28 MMOL/L (ref 23–29)
CREAT SERPL-MCNC: 0.7 MG/DL (ref 0.5–1.4)
DIFFERENTIAL METHOD: NORMAL
EOSINOPHIL # BLD AUTO: 0.1 K/UL (ref 0–0.5)
EOSINOPHIL NFR BLD: 2 % (ref 0–8)
ERYTHROCYTE [DISTWIDTH] IN BLOOD BY AUTOMATED COUNT: 13.1 % (ref 11.5–14.5)
EST. GFR  (AFRICAN AMERICAN): >60 ML/MIN/1.73 M^2
EST. GFR  (NON AFRICAN AMERICAN): >60 ML/MIN/1.73 M^2
FERRITIN SERPL-MCNC: 156 NG/ML (ref 20–300)
FOLATE SERPL-MCNC: 11 NG/ML (ref 4–24)
GLUCOSE SERPL-MCNC: 97 MG/DL (ref 70–110)
HCT VFR BLD AUTO: 38.9 % (ref 37–48.5)
HDLC SERPL-MCNC: 45 MG/DL (ref 40–75)
HDLC SERPL: 25.9 % (ref 20–50)
HGB BLD-MCNC: 13.1 G/DL (ref 12–16)
IMM GRANULOCYTES # BLD AUTO: 0.02 K/UL (ref 0–0.04)
IMM GRANULOCYTES NFR BLD AUTO: 0.3 % (ref 0–0.5)
IRON SERPL-MCNC: 103 UG/DL (ref 30–160)
LDLC SERPL CALC-MCNC: 106 MG/DL (ref 63–159)
LYMPHOCYTES # BLD AUTO: 2.4 K/UL (ref 1–4.8)
LYMPHOCYTES NFR BLD: 41.4 % (ref 18–48)
MCH RBC QN AUTO: 28.5 PG (ref 27–31)
MCHC RBC AUTO-ENTMCNC: 33.7 G/DL (ref 32–36)
MCV RBC AUTO: 85 FL (ref 82–98)
MONOCYTES # BLD AUTO: 0.4 K/UL (ref 0.3–1)
MONOCYTES NFR BLD: 7.5 % (ref 4–15)
NEUTROPHILS # BLD AUTO: 2.8 K/UL (ref 1.8–7.7)
NEUTROPHILS NFR BLD: 48 % (ref 38–73)
NONHDLC SERPL-MCNC: 129 MG/DL
NRBC BLD-RTO: 0 /100 WBC
PLATELET # BLD AUTO: 309 K/UL (ref 150–450)
PMV BLD AUTO: 11 FL (ref 9.2–12.9)
POTASSIUM SERPL-SCNC: 3.6 MMOL/L (ref 3.5–5.1)
PROT SERPL-MCNC: 8.3 G/DL (ref 6–8.4)
RBC # BLD AUTO: 4.59 M/UL (ref 4–5.4)
SATURATED IRON: 26 % (ref 20–50)
SODIUM SERPL-SCNC: 140 MMOL/L (ref 136–145)
TOTAL IRON BINDING CAPACITY: 394 UG/DL (ref 250–450)
TRANSFERRIN SERPL-MCNC: 266 MG/DL (ref 200–375)
TRIGL SERPL-MCNC: 115 MG/DL (ref 30–150)
TSH SERPL DL<=0.005 MIU/L-ACNC: 3.6 UIU/ML (ref 0.4–4)
VIT B12 SERPL-MCNC: 755 PG/ML (ref 210–950)
WBC # BLD AUTO: 5.9 K/UL (ref 3.9–12.7)

## 2022-03-21 PROCEDURE — 82746 ASSAY OF FOLIC ACID SERUM: CPT | Performed by: INTERNAL MEDICINE

## 2022-03-21 PROCEDURE — 80053 COMPREHEN METABOLIC PANEL: CPT | Performed by: INTERNAL MEDICINE

## 2022-03-21 PROCEDURE — 82728 ASSAY OF FERRITIN: CPT | Performed by: INTERNAL MEDICINE

## 2022-03-21 PROCEDURE — 85025 COMPLETE CBC W/AUTO DIFF WBC: CPT | Performed by: INTERNAL MEDICINE

## 2022-03-21 PROCEDURE — 80061 LIPID PANEL: CPT | Performed by: INTERNAL MEDICINE

## 2022-03-21 PROCEDURE — 82607 VITAMIN B-12: CPT | Performed by: INTERNAL MEDICINE

## 2022-03-21 PROCEDURE — 84443 ASSAY THYROID STIM HORMONE: CPT | Performed by: INTERNAL MEDICINE

## 2022-03-21 PROCEDURE — 84466 ASSAY OF TRANSFERRIN: CPT | Performed by: INTERNAL MEDICINE

## 2022-03-21 PROCEDURE — 82306 VITAMIN D 25 HYDROXY: CPT | Performed by: INTERNAL MEDICINE

## 2022-03-21 PROCEDURE — 36415 COLL VENOUS BLD VENIPUNCTURE: CPT | Performed by: INTERNAL MEDICINE

## 2022-03-25 ENCOUNTER — PATIENT MESSAGE (OUTPATIENT)
Dept: FAMILY MEDICINE | Facility: CLINIC | Age: 72
End: 2022-03-25
Payer: MEDICARE

## 2022-03-31 ENCOUNTER — OFFICE VISIT (OUTPATIENT)
Dept: FAMILY MEDICINE | Facility: CLINIC | Age: 72
End: 2022-03-31
Payer: MEDICARE

## 2022-03-31 VITALS
BODY MASS INDEX: 27.51 KG/M2 | HEART RATE: 67 BPM | TEMPERATURE: 99 F | DIASTOLIC BLOOD PRESSURE: 72 MMHG | SYSTOLIC BLOOD PRESSURE: 120 MMHG | WEIGHT: 149.5 LBS | HEIGHT: 62 IN | OXYGEN SATURATION: 96 %

## 2022-03-31 DIAGNOSIS — R53.83 FATIGUE, UNSPECIFIED TYPE: ICD-10-CM

## 2022-03-31 DIAGNOSIS — E55.9 VITAMIN D DEFICIENCY: ICD-10-CM

## 2022-03-31 DIAGNOSIS — I10 ESSENTIAL HYPERTENSION: Primary | ICD-10-CM

## 2022-03-31 DIAGNOSIS — E53.8 B12 DEFICIENCY: ICD-10-CM

## 2022-03-31 DIAGNOSIS — D50.0 IRON DEFICIENCY ANEMIA DUE TO CHRONIC BLOOD LOSS: ICD-10-CM

## 2022-03-31 PROCEDURE — 3008F BODY MASS INDEX DOCD: CPT | Mod: CPTII,S$GLB,, | Performed by: INTERNAL MEDICINE

## 2022-03-31 PROCEDURE — 3008F PR BODY MASS INDEX (BMI) DOCUMENTED: ICD-10-PCS | Mod: CPTII,S$GLB,, | Performed by: INTERNAL MEDICINE

## 2022-03-31 PROCEDURE — 3288F PR FALLS RISK ASSESSMENT DOCUMENTED: ICD-10-PCS | Mod: CPTII,S$GLB,, | Performed by: INTERNAL MEDICINE

## 2022-03-31 PROCEDURE — 99214 PR OFFICE/OUTPT VISIT, EST, LEVL IV, 30-39 MIN: ICD-10-PCS | Mod: S$GLB,,, | Performed by: INTERNAL MEDICINE

## 2022-03-31 PROCEDURE — 99999 PR PBB SHADOW E&M-EST. PATIENT-LVL III: CPT | Mod: PBBFAC,,, | Performed by: INTERNAL MEDICINE

## 2022-03-31 PROCEDURE — 1160F RVW MEDS BY RX/DR IN RCRD: CPT | Mod: CPTII,S$GLB,, | Performed by: INTERNAL MEDICINE

## 2022-03-31 PROCEDURE — 1126F AMNT PAIN NOTED NONE PRSNT: CPT | Mod: CPTII,S$GLB,, | Performed by: INTERNAL MEDICINE

## 2022-03-31 PROCEDURE — 99214 OFFICE O/P EST MOD 30 MIN: CPT | Mod: S$GLB,,, | Performed by: INTERNAL MEDICINE

## 2022-03-31 PROCEDURE — 3074F PR MOST RECENT SYSTOLIC BLOOD PRESSURE < 130 MM HG: ICD-10-PCS | Mod: CPTII,S$GLB,, | Performed by: INTERNAL MEDICINE

## 2022-03-31 PROCEDURE — 3078F DIAST BP <80 MM HG: CPT | Mod: CPTII,S$GLB,, | Performed by: INTERNAL MEDICINE

## 2022-03-31 PROCEDURE — 1160F PR REVIEW ALL MEDS BY PRESCRIBER/CLIN PHARMACIST DOCUMENTED: ICD-10-PCS | Mod: CPTII,S$GLB,, | Performed by: INTERNAL MEDICINE

## 2022-03-31 PROCEDURE — 3078F PR MOST RECENT DIASTOLIC BLOOD PRESSURE < 80 MM HG: ICD-10-PCS | Mod: CPTII,S$GLB,, | Performed by: INTERNAL MEDICINE

## 2022-03-31 PROCEDURE — 1126F PR PAIN SEVERITY QUANTIFIED, NO PAIN PRESENT: ICD-10-PCS | Mod: CPTII,S$GLB,, | Performed by: INTERNAL MEDICINE

## 2022-03-31 PROCEDURE — 99999 PR PBB SHADOW E&M-EST. PATIENT-LVL III: ICD-10-PCS | Mod: PBBFAC,,, | Performed by: INTERNAL MEDICINE

## 2022-03-31 PROCEDURE — 3074F SYST BP LT 130 MM HG: CPT | Mod: CPTII,S$GLB,, | Performed by: INTERNAL MEDICINE

## 2022-03-31 PROCEDURE — 1101F PR PT FALLS ASSESS DOC 0-1 FALLS W/OUT INJ PAST YR: ICD-10-PCS | Mod: CPTII,S$GLB,, | Performed by: INTERNAL MEDICINE

## 2022-03-31 PROCEDURE — 1159F MED LIST DOCD IN RCRD: CPT | Mod: CPTII,S$GLB,, | Performed by: INTERNAL MEDICINE

## 2022-03-31 PROCEDURE — 1101F PT FALLS ASSESS-DOCD LE1/YR: CPT | Mod: CPTII,S$GLB,, | Performed by: INTERNAL MEDICINE

## 2022-03-31 PROCEDURE — 1159F PR MEDICATION LIST DOCUMENTED IN MEDICAL RECORD: ICD-10-PCS | Mod: CPTII,S$GLB,, | Performed by: INTERNAL MEDICINE

## 2022-03-31 PROCEDURE — 3288F FALL RISK ASSESSMENT DOCD: CPT | Mod: CPTII,S$GLB,, | Performed by: INTERNAL MEDICINE

## 2022-03-31 RX ORDER — FLUTICASONE PROPIONATE 50 MCG
1 SPRAY, SUSPENSION (ML) NASAL DAILY
Qty: 18.2 ML | Refills: 0 | Status: SHIPPED | OUTPATIENT
Start: 2022-03-31 | End: 2022-08-12 | Stop reason: SDUPTHER

## 2022-03-31 NOTE — PROGRESS NOTES
Subjective:       Patient ID: Julisa Jensen is a 71 y.o. female.    Chief Complaint: Follow-up      HPI  Julisa Jensen is a 71 y.o. female with chronic conditions of HTN, vit D and B12 deficiency, and anemia who presents today for routine health maintenance visit.    Reports has been feeling tired.  She sleeps well from 10:00 p.m. to 6:00 a.m. her weight has been stable.  However her appetite has decreased and has been having denture problems. Has been following with a dentist after to the extractions and prosthesis made but has been having persistent pain of the lower teeth.  Has about time to heal but planning to follow-up with the dentist and as for referral if no further treatment possible.  With her dental problems feels  her sinuses have been congested.    Reports compliance with medications and her blood pressure has been controlled.  Last labs shows no anemia, improved iron and B12, normal metabolic panel and lipid profile.  Vitamin-D has improved.  TSH was normal.    She is not routinely exercising.  Has no toxic habits.    Health Maintenance:  Health Maintenance   Topic Date Due    High Dose Statin  Never done    DEXA Scan  10/17/2022    Mammogram  01/13/2023    Lipid Panel  03/21/2027    TETANUS VACCINE  11/16/2030    Hepatitis C Screening  Completed       Review of Systems   Constitutional: Positive for fatigue.   HENT: Positive for dental problem, ear pain and sinus pressure/congestion.    Respiratory: Negative.    Cardiovascular: Negative.    Gastrointestinal: Negative.    Genitourinary: Negative.    Neurological: Negative for weakness and headaches.   Psychiatric/Behavioral: Negative for dysphoric mood (But feeling down) and sleep disturbance.      Past Medical History:   Diagnosis Date    Cataract     COVID-19 03/2021    Hypertension     Stroke        Past Surgical History:   Procedure Laterality Date    CATARACT EXTRACTION W/  INTRAOCULAR LENS IMPLANT Left 8/3/2021    Procedure:  EXTRACTION, CATARACT, WITH IOL INSERTION;  Surgeon: Ji Shaikh MD;  Location: [facility], [place] OR;  Service: Ophthalmology;  Laterality: Left;    CHOLECYSTECTOMY      COLONOSCOPY N/A 6/23/2020    Procedure: COLONOSCOPY;  Surgeon: Madelyn Finch MD;  Location: [facility] ENDO;  Service: Endoscopy;  Laterality: N/A;       Family History   Problem Relation Age of Onset    Breast cancer Maternal Cousin     Cataracts Mother     Heart disease Mother     No Known Problems Father     Diabetes Sister     Diabetes Brother     No Known Problems Daughter     No Known Problems Son     Amblyopia Neg Hx     Blindness Neg Hx     Glaucoma Neg Hx     Macular degeneration Neg Hx     Retinal detachment Neg Hx     Strabismus Neg Hx        Social History     Socioeconomic History    Marital status:    Tobacco Use    Smoking status: Never Smoker    Smokeless tobacco: Never Used   Substance and Sexual Activity    Alcohol use: Yes     Comment: occasionally    Drug use: Never    Sexual activity: Not Currently     Partners: Male     Social Determinants of Health     Financial Resource Strain: High Risk    Difficulty of Paying Living Expenses: Hard   Food Insecurity: Food Insecurity Present    Worried About Running Out of Food in the Last Year: Sometimes true    Ran Out of Food in the Last Year: Never true   Transportation Needs: No Transportation Needs    Lack of Transportation (Medical): No    Lack of Transportation (Non-Medical): No   Physical Activity: Inactive    Days of Exercise per Week: 0 days    Minutes of Exercise per Session: 0 min   Stress: Stress Concern Present    Feeling of Stress : To some extent   Social Connections: Moderately Integrated    Frequency of Communication with Friends and Family: More than three times a week    Frequency of Social Gatherings with Friends and Family: More than three times a week    Attends Roman [employer] Services: More than 4 times per year    Active Member of Clubs  or Organizations: No    Attends Club or Organization Meetings: Never    Marital Status:    Housing Stability: Low Risk     Unable to Pay for Housing in the Last Year: No    Number of Places Lived in the Last Year: 1    Unstable Housing in the Last Year: No       Current Outpatient Medications   Medication Sig Dispense Refill    amLODIPine (NORVASC) 5 MG tablet TAKE 1 TABLET BY MOUTH EVERY DAY 90 tablet 3    cetirizine (ZYRTEC) 10 MG tablet Take 1 tablet (10 mg total) by mouth once daily. 30 tablet 0    cholecalciferol, vitamin D3, (VITAMIN D3) 50 mcg (2,000 unit) Cap Take 1 capsule (2,000 Units total) by mouth once daily. 90 capsule 3    cyanocobalamin, vitamin B-12, 1,000 mcg Cap Take 1 capsule by mouth once daily. 90 capsule 3    hydroCHLOROthiazide (HYDRODIURIL) 25 MG tablet Take 0.5 tablets (12.5 mg total) by mouth once daily. 90 tablet 1    aspirin (ECOTRIN) 81 MG EC tablet Take 1 tablet (81 mg total) by mouth once daily. 30 tablet 11    fluticasone propionate (FLONASE) 50 mcg/actuation nasal spray 1 spray (50 mcg total) by Each Nostril route once daily. 18.2 mL 0     No current facility-administered medications for this visit.       Review of patient's allergies indicates:   Allergen Reactions    Penicillins          Objective:       Last 3 sets of Vitals    Vitals - 1 value per visit 2/27/2022 3/31/2022 3/31/2022   SYSTOLIC 134 - 120   DIASTOLIC 81 - 72   Pulse 83 - 67   Temp 97.9 - 98.5   Resp 18 - -   SPO2 96 - 96   Weight (lb) 149 - 149.47   Weight (kg) 67.586 - 67.8   Height 62 - 62   BMI (Calculated) 27.2 - 27.3   VISIT REPORT - - -   Pain Score  - 0 -   Some recent data might be hidden   Physical Exam  Constitutional:       Appearance: Normal appearance.   HENT:      Head: Normocephalic.      Right Ear: Tympanic membrane, ear canal and external ear normal.      Left Ear: Tympanic membrane, ear canal and external ear normal.      Nose: Nose normal.      Mouth/Throat:      Mouth:  Mucous membranes are moist.      Pharynx: Oropharynx is clear.   Eyes:      General: No scleral icterus.     Extraocular Movements: Extraocular movements intact.      Conjunctiva/sclera: Conjunctivae normal.      Pupils: Pupils are equal, round, and reactive to light.   Neck:      Vascular: No carotid bruit.   Cardiovascular:      Rate and Rhythm: Normal rate and regular rhythm.      Pulses: Normal pulses.      Heart sounds: Normal heart sounds.   Pulmonary:      Effort: Pulmonary effort is normal.      Breath sounds: Normal breath sounds.   Abdominal:      General: Bowel sounds are normal. There is no distension.      Palpations: Abdomen is soft. There is no mass.      Tenderness: There is no abdominal tenderness.   Musculoskeletal:         General: No swelling. Normal range of motion.      Cervical back: Neck supple.   Lymphadenopathy:      Cervical: No cervical adenopathy.   Skin:     General: Skin is warm.   Neurological:      General: No focal deficit present.      Mental Status: She is alert and oriented to person, place, and time.   Psychiatric:         Mood and Affect: Mood normal.         Behavior: Behavior normal.           CBC:  Recent Labs   Lab 03/07/21  0441 03/08/21  0334 03/21/22  0736   WBC 4.54 5.31 5.90   RBC 4.31 4.49 4.59   Hemoglobin 12.2 12.6 13.1   Hematocrit 36.2 L 38.2 38.9   Platelets 298 329 309   MCV 84 85 85   MCH 28.3 28.1 28.5   MCHC 33.7 33.0 33.7     CMP:  Recent Labs   Lab 03/07/21  0441 03/08/21  0333 03/21/22  0736   Glucose 103 103 97   Calcium 8.1 L 8.5 L 9.5   Albumin 3.1 L 3.2 L 3.9   Total Protein 7.0 7.2 8.3   Sodium 141 141 140   Potassium 3.8 4.0 3.6   CO2 26 27 28   Chloride 105 105 105   BUN 15 16 13   Creatinine 0.6 0.7 0.7   Alkaline Phosphatase 45 L 46 L 88   ALT 42 66 H 25   AST 37 56 H 22   Total Bilirubin 0.4 0.4 0.6     URINALYSIS:       LIPIDS:  Recent Labs   Lab 08/29/19  1032 02/04/20  0943 10/15/20  0817 03/21/22  0736   TSH 1.619  --  2.375 3.603   HDL  --   47 52 45   Cholesterol  --  166 165 174   Triglycerides  --  123 86 115   LDL Cholesterol  --  94.4 95.8 106.0   HDL/Cholesterol Ratio  --  28.3 31.5 25.9   Non-HDL Cholesterol  --  119 113 129   Total Cholesterol/HDL Ratio  --  3.5 3.2 3.9     TSH:  Recent Labs   Lab 08/29/19  1032 10/15/20  0817 03/21/22  0736   TSH 1.619 2.375 3.603       A1C:        Imaging:  Mammo Digital Screening Bilat w/ Kirby  Narrative: Result:  Mammo Digital Screening Bilat w/ Kirby    History:  Patient is 71 y.o. and is seen for a screening mammogram.    Films Compared:  Prior images (if available) were compared.     Findings:   This procedure was performed using tomosynthesis.   Computer-aided detection was utilized in the interpretation of this   examination.    The breasts have scattered areas of fibroglandular density. There is no   evidence of suspicious masses, microcalcifications or architectural   distortion. Patient is S/P biopsy on the right.     Findings are stable.   Impression:    No mammographic evidence of malignancy.    BI-RADS Category 1: Negative    Recommendation:  Routine screening mammogram in 1 year is recommended.    Your estimated lifetime risk of breast cancer (to age 85) based on   Tyrer-Cuzick risk assessment model is 3.07 %.  According to the American   Cancer Society, patients with a lifetime breast cancer risk of 20% or   higher might benefit from supplemental screening tests. ??       Assessment:       1. Essential hypertension    2. Vitamin D deficiency    3. B12 deficiency    4. Iron deficiency anemia due to chronic blood loss    5. Fatigue, unspecified type          Chronic conditions status updated as per HPI. Other than changes above, cont current medications and maintain follow up with specialist. Return to clinic in 3-6 months.  Plan:       Julisa was seen today for follow-up.    Diagnoses and all orders for this visit:    Essential hypertension   - controlled.  Same treatment.    Vitamin D  deficiency   - improved.  Same treatment    B12 deficiency   - improved.  Same treatment.    Iron deficiency anemia due to chronic blood loss   - improved    Fatigue, unspecified type   - encourage diet and exercise.  Denies depression.    Other orders  -     fluticasone propionate (FLONASE) 50 mcg/actuation nasal spray; 1 spray (50 mcg total) by Each Nostril route once daily.      Health Maintenance Due   Topic Date Due    High Dose Statin  Never done    Shingles Vaccine (2 of 2) 01/11/2021    COVID-19 Vaccine (3 - Booster for Moderna series) 12/09/2021        Sandra Stafford MD  [facility]  Disclaimer:  This note has been generated using voice-recognition software. There may be grammatical or spelling errors that have been missed during proof-reading

## 2022-05-03 ENCOUNTER — OFFICE VISIT (OUTPATIENT)
Dept: URGENT CARE | Facility: CLINIC | Age: 72
End: 2022-05-03
Payer: MEDICARE

## 2022-05-03 VITALS
HEIGHT: 62 IN | TEMPERATURE: 99 F | WEIGHT: 149 LBS | OXYGEN SATURATION: 98 % | DIASTOLIC BLOOD PRESSURE: 82 MMHG | HEART RATE: 80 BPM | SYSTOLIC BLOOD PRESSURE: 128 MMHG | RESPIRATION RATE: 16 BRPM | BODY MASS INDEX: 27.42 KG/M2

## 2022-05-03 DIAGNOSIS — S29.012A MUSCLE STRAIN OF UPPER BACK: Primary | ICD-10-CM

## 2022-05-03 PROCEDURE — 99213 PR OFFICE/OUTPT VISIT, EST, LEVL III, 20-29 MIN: ICD-10-PCS | Mod: S$GLB,,, | Performed by: NURSE PRACTITIONER

## 2022-05-03 PROCEDURE — 3074F SYST BP LT 130 MM HG: CPT | Mod: CPTII,S$GLB,, | Performed by: NURSE PRACTITIONER

## 2022-05-03 PROCEDURE — 99213 OFFICE O/P EST LOW 20 MIN: CPT | Mod: S$GLB,,, | Performed by: NURSE PRACTITIONER

## 2022-05-03 PROCEDURE — 1125F PR PAIN SEVERITY QUANTIFIED, PAIN PRESENT: ICD-10-PCS | Mod: CPTII,S$GLB,, | Performed by: NURSE PRACTITIONER

## 2022-05-03 PROCEDURE — 3074F PR MOST RECENT SYSTOLIC BLOOD PRESSURE < 130 MM HG: ICD-10-PCS | Mod: CPTII,S$GLB,, | Performed by: NURSE PRACTITIONER

## 2022-05-03 PROCEDURE — 3008F PR BODY MASS INDEX (BMI) DOCUMENTED: ICD-10-PCS | Mod: CPTII,S$GLB,, | Performed by: NURSE PRACTITIONER

## 2022-05-03 PROCEDURE — 1125F AMNT PAIN NOTED PAIN PRSNT: CPT | Mod: CPTII,S$GLB,, | Performed by: NURSE PRACTITIONER

## 2022-05-03 PROCEDURE — 3079F DIAST BP 80-89 MM HG: CPT | Mod: CPTII,S$GLB,, | Performed by: NURSE PRACTITIONER

## 2022-05-03 PROCEDURE — 1160F PR REVIEW ALL MEDS BY PRESCRIBER/CLIN PHARMACIST DOCUMENTED: ICD-10-PCS | Mod: CPTII,S$GLB,, | Performed by: NURSE PRACTITIONER

## 2022-05-03 PROCEDURE — 1160F RVW MEDS BY RX/DR IN RCRD: CPT | Mod: CPTII,S$GLB,, | Performed by: NURSE PRACTITIONER

## 2022-05-03 PROCEDURE — 1159F PR MEDICATION LIST DOCUMENTED IN MEDICAL RECORD: ICD-10-PCS | Mod: CPTII,S$GLB,, | Performed by: NURSE PRACTITIONER

## 2022-05-03 PROCEDURE — 3008F BODY MASS INDEX DOCD: CPT | Mod: CPTII,S$GLB,, | Performed by: NURSE PRACTITIONER

## 2022-05-03 PROCEDURE — 3079F PR MOST RECENT DIASTOLIC BLOOD PRESSURE 80-89 MM HG: ICD-10-PCS | Mod: CPTII,S$GLB,, | Performed by: NURSE PRACTITIONER

## 2022-05-03 PROCEDURE — 1159F MED LIST DOCD IN RCRD: CPT | Mod: CPTII,S$GLB,, | Performed by: NURSE PRACTITIONER

## 2022-05-03 RX ORDER — METHOCARBAMOL 500 MG/1
500 TABLET, FILM COATED ORAL 2 TIMES DAILY PRN
Qty: 15 TABLET | Refills: 0 | Status: SHIPPED | OUTPATIENT
Start: 2022-05-03 | End: 2023-05-09

## 2022-05-03 NOTE — PROGRESS NOTES
"Subjective:       Patient ID: Julisa Jensen is a 71 y.o. female.    Vitals:  height is 5' 2" (1.575 m) and weight is 67.6 kg (149 lb). Her temperature is 98.9 °F (37.2 °C). Her blood pressure is 128/82 and her pulse is 80. Her respiration is 16 and oxygen saturation is 98%.     Chief Complaint: Muscle Pain    Pt presents a pulled muscle on her upper back since Saturday  Provider note below:  This is a 71 y.o. female here with daughter, translating, declined  who presents today with a chief complaint of upper back muscle spasm, daughter reports patient was flushing the toilet but "turned wrong" and since then she is having upper back pain, denies any trauma fall or injury, denies numbness or tingling, denies radiating pain, denies head trauma or injury, denies fever, body aches or chills, denies cough, wheezing or shortness of breath, denies nausea, vomiting, diarrhea or abdominal pain, denies chest pain or dizziness positional lightheadedness, denies sore throat or trouble swallowing, denies loss of taste or smell, or any other symptoms        Muscle Pain  This is a new problem. The current episode started in the past 7 days. The problem occurs constantly. The problem has been unchanged. Pertinent negatives include no abdominal pain, coughing, nausea or vomiting. The symptoms are aggravated by bending and twisting. She has tried NSAIDs and heat (Alieve, ibuprofen, heat) for the symptoms. The treatment provided mild relief.       Respiratory: Negative for chest tightness, cough, sputum production, shortness of breath and wheezing.    Gastrointestinal: Negative for abdominal pain, nausea, vomiting and constipation.   Musculoskeletal: Positive for back pain.   Neurological: Negative for dizziness and light-headedness.       Past Medical History:   Diagnosis Date    Cataract     COVID-19 03/2021    Hypertension     Stroke        Objective:      Physical Exam   Constitutional: She is oriented to person, " place, and time. She appears well-developed. She is cooperative. No distress.   HENT:   Head: Normocephalic and atraumatic.   Nose: Nose normal.   Mouth/Throat: Oropharynx is clear and moist and mucous membranes are normal.   Eyes: Conjunctivae and lids are normal.   Neck: Trachea normal and phonation normal. Neck supple.   Cardiovascular: Normal rate, regular rhythm, normal heart sounds and normal pulses.   Pulmonary/Chest: Effort normal and breath sounds normal.   Abdominal: Normal appearance and bowel sounds are normal. She exhibits no abdominal bruit, no pulsatile midline mass and no mass. Soft.   Musculoskeletal:         General: No deformity.      Thoracic back: She exhibits tenderness and spasm. She exhibits normal range of motion, no bony tenderness, no swelling, no edema, no deformity and no laceration.      Comments: No midline spine tenderness.  Full range of motion bilaterally with 5/5 strength  Able to ambulate with smooth rhythmic gait       Neurological: She is alert and oriented to person, place, and time. She has normal strength and normal reflexes. No sensory deficit.   Skin: Skin is warm, dry, intact and not diaphoretic.   Psychiatric: Her speech is normal and behavior is normal. Judgment and thought content normal.   Nursing note and vitals reviewed.          Patient in no acute distress.  Vitals reassuring.  Medication prescribed and side effect discussed with daughter/patient in detail.  I reiterated importance of further evaluation if no improvement symptoms.  Discussed results/diagnosis/plan in depth with patient in clinic. Strict precautions given to patient to monitor for worsening signs and symptoms. Advised to follow up with primary.All questions answered. Strict ER precautions given. If your symptoms worsens or fail to improve you should go to the Emergency Room. Discharge and follow-up instructions given verbally/printed. Discharge and follow-up instructions discussed with the patient  who expressed understanding and willingness to comply with my recommendations.Patient voiced understanding and in agreement with current treatment plan.     Please be advised this text was dictated with [email or web address] software and may contain errors due to translation.      Assessment:       1. Muscle strain of upper back          Plan:         Muscle strain of upper back  -     methocarbamoL (ROBAXIN) 500 MG Tab; Take 1 tablet (500 mg total) by mouth 2 (two) times daily as needed (muscle spasm).  Dispense: 15 tablet; Refill: 0                 Patient Instructions   PLEASE READ YOUR DISCHARGE INSTRUCTIONS ENTIRELY AS IT CONTAINS IMPORTANT INFORMATION.      Please drink plenty of fluids.  Please get plenty of rest.  Ice to the area.   You may do gently stretching if tolerable.    Please return here or go to the Emergency Department for any concerns or worsening of condition.    If you were prescribed a narcotic medication or muscle relaxer (robaxin), do not drive or operate heavy equipment or machinery while taking these medications. Take a half first to see how it affects you    If you lose control of your bowel and/or bladder, please go to the nearest Emergency Department immediately.    If you lose sensation in between your legs by your genitalia and/or rectum, please go to the nearest Emergency Department immediately.    If you lose control or sensation of any extremity, please go to the nearest Emergency Department immediately.    Do not stay in one position to long.  When sleeping on your back place a pillow under knees to reduce tension on back.  If sleeping on your side, place pillow between knees to keep spine in better alinement.  Wear supportive shoes such as tennis shoes for support of the lower back.  Take any medication as directed.    Please follow up with your primary care doctor or specialist as needed.    If you  smoke, please stop smoking.      Please arrange follow up with your primary medical clinic as  soon as possible. You must understand that you've received an Urgent Care treatment only and that you may be released before all of your medical problems are known or treated. You, the patient, will arrange for follow up as instructed. If your symptoms worsen or fail to improve you should go to the Emergency Room

## 2022-05-04 ENCOUNTER — TELEPHONE (OUTPATIENT)
Dept: OPHTHALMOLOGY | Facility: CLINIC | Age: 72
End: 2022-05-04
Payer: MEDICARE

## 2022-05-04 ENCOUNTER — PATIENT MESSAGE (OUTPATIENT)
Dept: OPHTHALMOLOGY | Facility: CLINIC | Age: 72
End: 2022-05-04
Payer: MEDICARE

## 2022-05-04 NOTE — PATIENT INSTRUCTIONS
PLEASE READ YOUR DISCHARGE INSTRUCTIONS ENTIRELY AS IT CONTAINS IMPORTANT INFORMATION.      Please drink plenty of fluids.  Please get plenty of rest.  Ice to the area.   You may do gently stretching if tolerable.    Please return here or go to the Emergency Department for any concerns or worsening of condition.    If you were prescribed a narcotic medication or muscle relaxer (robaxin), do not drive or operate heavy equipment or machinery while taking these medications. Take a half first to see how it affects you    If you lose control of your bowel and/or bladder, please go to the nearest Emergency Department immediately.    If you lose sensation in between your legs by your genitalia and/or rectum, please go to the nearest Emergency Department immediately.    If you lose control or sensation of any extremity, please go to the nearest Emergency Department immediately.    Do not stay in one position to long.  When sleeping on your back place a pillow under knees to reduce tension on back.  If sleeping on your side, place pillow between knees to keep spine in better alinement.  Wear supportive shoes such as tennis shoes for support of the lower back.  Take any medication as directed.    Please follow up with your primary care doctor or specialist as needed.    If you  smoke, please stop smoking.      Please arrange follow up with your primary medical clinic as soon as possible. You must understand that you've received an Urgent Care treatment only and that you may be released before all of your medical problems are known or treated. You, the patient, will arrange for follow up as instructed. If your symptoms worsen or fail to improve you should go to the Emergency Room

## 2022-05-12 ENCOUNTER — PATIENT OUTREACH (OUTPATIENT)
Dept: ADMINISTRATIVE | Facility: OTHER | Age: 72
End: 2022-05-12
Payer: MEDICARE

## 2022-05-13 NOTE — PROGRESS NOTES
Requested updates within Care Everywhere.  Patient's chart was reviewed for overdue YAZMIN topics.  Health maintenance:updated  Immunizations:reconciled   Legacy:   Media:  Orders placed:  Tasked appts:  Labs Linked:  Upcoming appt:

## 2022-05-16 ENCOUNTER — OFFICE VISIT (OUTPATIENT)
Dept: OPHTHALMOLOGY | Facility: CLINIC | Age: 72
End: 2022-05-16
Payer: MEDICARE

## 2022-05-16 DIAGNOSIS — H52.7 REFRACTIVE ERROR: ICD-10-CM

## 2022-05-16 DIAGNOSIS — H25.11 NS (NUCLEAR SCLEROSIS), RIGHT: Primary | ICD-10-CM

## 2022-05-16 DIAGNOSIS — Z96.1 PSEUDOPHAKIA: ICD-10-CM

## 2022-05-16 DIAGNOSIS — H04.123 DRY EYE SYNDROME OF BOTH EYES: ICD-10-CM

## 2022-05-16 DIAGNOSIS — I10 ESSENTIAL HYPERTENSION: ICD-10-CM

## 2022-05-16 PROCEDURE — 3288F FALL RISK ASSESSMENT DOCD: CPT | Mod: CPTII,S$GLB,, | Performed by: OPHTHALMOLOGY

## 2022-05-16 PROCEDURE — 1126F AMNT PAIN NOTED NONE PRSNT: CPT | Mod: CPTII,S$GLB,, | Performed by: OPHTHALMOLOGY

## 2022-05-16 PROCEDURE — 1101F PR PT FALLS ASSESS DOC 0-1 FALLS W/OUT INJ PAST YR: ICD-10-PCS | Mod: CPTII,S$GLB,, | Performed by: OPHTHALMOLOGY

## 2022-05-16 PROCEDURE — 1159F MED LIST DOCD IN RCRD: CPT | Mod: CPTII,S$GLB,, | Performed by: OPHTHALMOLOGY

## 2022-05-16 PROCEDURE — 3288F PR FALLS RISK ASSESSMENT DOCUMENTED: ICD-10-PCS | Mod: CPTII,S$GLB,, | Performed by: OPHTHALMOLOGY

## 2022-05-16 PROCEDURE — 99999 PR PBB SHADOW E&M-EST. PATIENT-LVL III: CPT | Mod: PBBFAC,,, | Performed by: OPHTHALMOLOGY

## 2022-05-16 PROCEDURE — 1159F PR MEDICATION LIST DOCUMENTED IN MEDICAL RECORD: ICD-10-PCS | Mod: CPTII,S$GLB,, | Performed by: OPHTHALMOLOGY

## 2022-05-16 PROCEDURE — 1101F PT FALLS ASSESS-DOCD LE1/YR: CPT | Mod: CPTII,S$GLB,, | Performed by: OPHTHALMOLOGY

## 2022-05-16 PROCEDURE — 99999 PR PBB SHADOW E&M-EST. PATIENT-LVL III: ICD-10-PCS | Mod: PBBFAC,,, | Performed by: OPHTHALMOLOGY

## 2022-05-16 PROCEDURE — 92014 COMPRE OPH EXAM EST PT 1/>: CPT | Mod: S$GLB,,, | Performed by: OPHTHALMOLOGY

## 2022-05-16 PROCEDURE — 1160F RVW MEDS BY RX/DR IN RCRD: CPT | Mod: CPTII,S$GLB,, | Performed by: OPHTHALMOLOGY

## 2022-05-16 PROCEDURE — 92014 PR EYE EXAM, EST PATIENT,COMPREHESV: ICD-10-PCS | Mod: S$GLB,,, | Performed by: OPHTHALMOLOGY

## 2022-05-16 PROCEDURE — 1126F PR PAIN SEVERITY QUANTIFIED, NO PAIN PRESENT: ICD-10-PCS | Mod: CPTII,S$GLB,, | Performed by: OPHTHALMOLOGY

## 2022-05-16 PROCEDURE — 1160F PR REVIEW ALL MEDS BY PRESCRIBER/CLIN PHARMACIST DOCUMENTED: ICD-10-PCS | Mod: CPTII,S$GLB,, | Performed by: OPHTHALMOLOGY

## 2022-05-16 NOTE — PROGRESS NOTES
"Subjective:       Patient ID: Julisa Jensen is a 72 y.o. female.    Chief Complaint: Concerns About Ocular Health    HPI     DSL- 12/1/2021     73 y/o female is here for concerns with the LT eye. Pt present with c/o of   "something" in the LT eye. Pt is debating if she she schedule CE OD, pt   sates she failed vision test when renewing license.     Eyemeds  No gtts    Last edited by Kevin Bermudez on 5/16/2022  2:36 PM. (History)             Assessment:       1. NS (nuclear sclerosis), right    2. Dry eye syndrome of both eyes    3. Essential hypertension    4. Refractive error    5. Pseudophakia        Plan:       Cataract OD- Not visually significant per Pt at this time.     SOPHIA-Needs AT's.  HTN-No retinopathy OU.  RE-Needs MRx.      AT's.  Control HTN.  RTC 2 wks for MRx.  "

## 2022-05-30 ENCOUNTER — OFFICE VISIT (OUTPATIENT)
Dept: OPHTHALMOLOGY | Facility: CLINIC | Age: 72
End: 2022-05-30
Payer: MEDICARE

## 2022-05-30 DIAGNOSIS — H04.123 DRY EYE SYNDROME OF BOTH EYES: ICD-10-CM

## 2022-05-30 DIAGNOSIS — H25.11 NS (NUCLEAR SCLEROSIS), RIGHT: ICD-10-CM

## 2022-05-30 DIAGNOSIS — H52.7 REFRACTIVE ERROR: Primary | ICD-10-CM

## 2022-05-30 DIAGNOSIS — Z96.1 PSEUDOPHAKIA: ICD-10-CM

## 2022-05-30 PROCEDURE — 99999 PR PBB SHADOW E&M-EST. PATIENT-LVL II: CPT | Mod: PBBFAC,,, | Performed by: OPHTHALMOLOGY

## 2022-05-30 PROCEDURE — 1159F MED LIST DOCD IN RCRD: CPT | Mod: CPTII,S$GLB,, | Performed by: OPHTHALMOLOGY

## 2022-05-30 PROCEDURE — 99499 NO LOS: ICD-10-PCS | Mod: S$GLB,,, | Performed by: OPHTHALMOLOGY

## 2022-05-30 PROCEDURE — 1159F PR MEDICATION LIST DOCUMENTED IN MEDICAL RECORD: ICD-10-PCS | Mod: CPTII,S$GLB,, | Performed by: OPHTHALMOLOGY

## 2022-05-30 PROCEDURE — 1160F RVW MEDS BY RX/DR IN RCRD: CPT | Mod: CPTII,S$GLB,, | Performed by: OPHTHALMOLOGY

## 2022-05-30 PROCEDURE — 99499 UNLISTED E&M SERVICE: CPT | Mod: S$GLB,,, | Performed by: OPHTHALMOLOGY

## 2022-05-30 PROCEDURE — 99999 PR PBB SHADOW E&M-EST. PATIENT-LVL II: ICD-10-PCS | Mod: PBBFAC,,, | Performed by: OPHTHALMOLOGY

## 2022-05-30 PROCEDURE — 1160F PR REVIEW ALL MEDS BY PRESCRIBER/CLIN PHARMACIST DOCUMENTED: ICD-10-PCS | Mod: CPTII,S$GLB,, | Performed by: OPHTHALMOLOGY

## 2022-05-31 NOTE — PROGRESS NOTES
Subjective:       Patient ID: Julisa Jensen is a 72 y.o. female.    Chief Complaint: Concerns About Ocular Health    HPI     F/U for MRX       Last edited by Kellen Ahn on 5/30/2022  1:28 PM. (History)             Assessment:       1. Refractive error    2. Dry eye syndrome of both eyes    3. NS (nuclear sclerosis), right    4. Pseudophakia        Plan:       RE-Pt is here for MRx.  SOPHIA-Doing well.  Cataract OD- Not visually significant per Pt.         Give MRx.  AT's.  RTC 1 yr.

## 2022-08-06 ENCOUNTER — OFFICE VISIT (OUTPATIENT)
Dept: URGENT CARE | Facility: CLINIC | Age: 72
End: 2022-08-06
Payer: MEDICARE

## 2022-08-06 VITALS
HEIGHT: 62 IN | OXYGEN SATURATION: 95 % | DIASTOLIC BLOOD PRESSURE: 71 MMHG | SYSTOLIC BLOOD PRESSURE: 118 MMHG | HEART RATE: 73 BPM | RESPIRATION RATE: 18 BRPM | BODY MASS INDEX: 27.42 KG/M2 | TEMPERATURE: 98 F | WEIGHT: 149 LBS

## 2022-08-06 DIAGNOSIS — R11.0 NAUSEA: ICD-10-CM

## 2022-08-06 DIAGNOSIS — R42 VERTIGO: Primary | ICD-10-CM

## 2022-08-06 PROCEDURE — 1160F RVW MEDS BY RX/DR IN RCRD: CPT | Mod: CPTII,S$GLB,, | Performed by: PHYSICIAN ASSISTANT

## 2022-08-06 PROCEDURE — 1159F PR MEDICATION LIST DOCUMENTED IN MEDICAL RECORD: ICD-10-PCS | Mod: CPTII,S$GLB,, | Performed by: PHYSICIAN ASSISTANT

## 2022-08-06 PROCEDURE — 3008F BODY MASS INDEX DOCD: CPT | Mod: CPTII,S$GLB,, | Performed by: PHYSICIAN ASSISTANT

## 2022-08-06 PROCEDURE — 3078F PR MOST RECENT DIASTOLIC BLOOD PRESSURE < 80 MM HG: ICD-10-PCS | Mod: CPTII,S$GLB,, | Performed by: PHYSICIAN ASSISTANT

## 2022-08-06 PROCEDURE — 99213 OFFICE O/P EST LOW 20 MIN: CPT | Mod: S$GLB,,, | Performed by: PHYSICIAN ASSISTANT

## 2022-08-06 PROCEDURE — 3074F SYST BP LT 130 MM HG: CPT | Mod: CPTII,S$GLB,, | Performed by: PHYSICIAN ASSISTANT

## 2022-08-06 PROCEDURE — 3078F DIAST BP <80 MM HG: CPT | Mod: CPTII,S$GLB,, | Performed by: PHYSICIAN ASSISTANT

## 2022-08-06 PROCEDURE — 99213 PR OFFICE/OUTPT VISIT, EST, LEVL III, 20-29 MIN: ICD-10-PCS | Mod: S$GLB,,, | Performed by: PHYSICIAN ASSISTANT

## 2022-08-06 PROCEDURE — 1160F PR REVIEW ALL MEDS BY PRESCRIBER/CLIN PHARMACIST DOCUMENTED: ICD-10-PCS | Mod: CPTII,S$GLB,, | Performed by: PHYSICIAN ASSISTANT

## 2022-08-06 PROCEDURE — 1159F MED LIST DOCD IN RCRD: CPT | Mod: CPTII,S$GLB,, | Performed by: PHYSICIAN ASSISTANT

## 2022-08-06 PROCEDURE — 3008F PR BODY MASS INDEX (BMI) DOCUMENTED: ICD-10-PCS | Mod: CPTII,S$GLB,, | Performed by: PHYSICIAN ASSISTANT

## 2022-08-06 PROCEDURE — 1126F PR PAIN SEVERITY QUANTIFIED, NO PAIN PRESENT: ICD-10-PCS | Mod: CPTII,S$GLB,, | Performed by: PHYSICIAN ASSISTANT

## 2022-08-06 PROCEDURE — 1126F AMNT PAIN NOTED NONE PRSNT: CPT | Mod: CPTII,S$GLB,, | Performed by: PHYSICIAN ASSISTANT

## 2022-08-06 PROCEDURE — 3074F PR MOST RECENT SYSTOLIC BLOOD PRESSURE < 130 MM HG: ICD-10-PCS | Mod: CPTII,S$GLB,, | Performed by: PHYSICIAN ASSISTANT

## 2022-08-06 RX ORDER — MECLIZINE HCL 12.5 MG 12.5 MG/1
12.5 TABLET ORAL 4 TIMES DAILY PRN
Qty: 90 TABLET | Refills: 1 | Status: SHIPPED | OUTPATIENT
Start: 2022-08-06 | End: 2022-09-15 | Stop reason: SDUPTHER

## 2022-08-06 RX ORDER — ONDANSETRON 8 MG/1
8 TABLET, ORALLY DISINTEGRATING ORAL
Status: COMPLETED | OUTPATIENT
Start: 2022-08-06 | End: 2022-08-06

## 2022-08-06 RX ADMIN — ONDANSETRON 8 MG: 8 TABLET, ORALLY DISINTEGRATING ORAL at 11:08

## 2022-08-06 NOTE — PROGRESS NOTES
"Subjective:       Patient ID: Julisa Jensen is a 72 y.o. female.    Vitals:  height is 5' 2" (1.575 m) and weight is 67.6 kg (149 lb). Her temperature is 97.6 °F (36.4 °C). Her blood pressure is 118/71 and her pulse is 73. Her respiration is 18 and oxygen saturation is 95%.     Chief Complaint: Dizziness    Pt has been having dizzy spells x 3 days . Pt states that she has been having nausea as well . Pts pharmacy updated .     Dizziness:   Chronicity:  New  Onset:  In the past 7 days  Progression since onset:  Unchanged  Frequency:  Every few minutes  Pain Scale:  0/10  Severity:  Mild  Duration:  Very brief  Dizziness characteristics:  Motion-sickness and off-balance  Frequency of Spells:  Daily   Associated symptoms: nausea.no hearing loss, no ear congestion, no ear pain, no fever, no headaches, no tinnitus, no vomiting, no diaphoresis, no aural fullness, no weakness, no visual disturbances, no light-headedness, no syncope, no palpitations, no panic, no facial weakness, no slurred speech, no numbness in extremities and no chest pain.  Aggravated by:  Position changes and getting up  Treatments tried:  Nothing  Improvements on treatment:  No relief      Constitution: Negative for sweating and fever.   HENT: Negative for ear pain, tinnitus and hearing loss.    Cardiovascular: Negative for chest pain, palpitations and passing out.   Gastrointestinal: Positive for nausea. Negative for vomiting.   Skin: Negative for erythema.   Neurological: Positive for dizziness. Negative for light-headedness and headaches.       Objective:      Physical Exam   Constitutional: She is oriented to person, place, and time. She appears well-developed. She is cooperative.  Non-toxic appearance. She does not appear ill. No distress.   HENT:   Head: Normocephalic and atraumatic.   Ears:   Right Ear: Hearing, tympanic membrane, external ear and ear canal normal.   Left Ear: Hearing, tympanic membrane, external ear and ear canal normal. "   Nose: Nose normal. No mucosal edema, rhinorrhea, nasal deformity or congestion. No epistaxis. Right sinus exhibits no maxillary sinus tenderness and no frontal sinus tenderness. Left sinus exhibits no maxillary sinus tenderness and no frontal sinus tenderness.   Mouth/Throat: Uvula is midline, oropharynx is clear and moist and mucous membranes are normal. No trismus in the jaw. Normal dentition. No uvula swelling. No oropharyngeal exudate, posterior oropharyngeal edema or posterior oropharyngeal erythema.   Eyes: Conjunctivae, EOM and lids are normal. Pupils are equal, round, and reactive to light. Right eye exhibits no discharge. Left eye exhibits no discharge.   Neck: Trachea normal and phonation normal. Neck supple. No JVD present. No tracheal deviation present. No thyromegaly present. No edema present. No erythema present. No neck rigidity present.   Cardiovascular: Normal rate, regular rhythm, normal heart sounds and normal pulses.   No murmur heard.Exam reveals no gallop and no friction rub.   Pulmonary/Chest: Effort normal and breath sounds normal. No stridor. No respiratory distress. She has no decreased breath sounds. She has no wheezes. She has no rhonchi. She has no rales. She exhibits no tenderness.   Abdominal: Normal appearance. She exhibits no distension. Soft. There is no abdominal tenderness. There is no rebound and no guarding.   Musculoskeletal: Normal range of motion.         General: No deformity. Normal range of motion.   Neurological: no focal deficit. She is alert and oriented to person, place, and time. She exhibits normal muscle tone. Coordination normal.      Comments: Vertigo is reproducible flat and turning head to the right side and sitting her up.   Skin: Skin is warm, dry, intact, not diaphoretic, not pale and no rash. Capillary refill takes less than 2 seconds. No erythema   Psychiatric: Her speech is normal and behavior is normal. Judgment and thought content normal.   Nursing  note and vitals reviewed.        Assessment:       1. Vertigo    2. Nausea          Plan:         Vertigo  -     meclizine (ANTIVERT) 12.5 mg tablet; Take 1 tablet (12.5 mg total) by mouth 4 (four) times daily as needed for Dizziness or Nausea.  Dispense: 90 tablet; Refill: 1    Nausea  -     ondansetron disintegrating tablet 8 mg    Follow up if symptoms worsen or fail to improve. There are no Patient Instructions on file for this visit.

## 2022-08-07 ENCOUNTER — PATIENT MESSAGE (OUTPATIENT)
Dept: FAMILY MEDICINE | Facility: CLINIC | Age: 72
End: 2022-08-07
Payer: MEDICARE

## 2022-08-09 ENCOUNTER — PATIENT MESSAGE (OUTPATIENT)
Dept: FAMILY MEDICINE | Facility: CLINIC | Age: 72
End: 2022-08-09
Payer: MEDICARE

## 2022-08-10 RX ORDER — DIAZEPAM 2 MG/1
2 TABLET ORAL EVERY 12 HOURS PRN
Qty: 10 TABLET | Refills: 0 | Status: SHIPPED | OUTPATIENT
Start: 2022-08-10 | End: 2022-08-18 | Stop reason: SDUPTHER

## 2022-08-12 ENCOUNTER — OFFICE VISIT (OUTPATIENT)
Dept: FAMILY MEDICINE | Facility: CLINIC | Age: 72
End: 2022-08-12
Payer: MEDICARE

## 2022-08-12 VITALS
HEART RATE: 88 BPM | BODY MASS INDEX: 17.56 KG/M2 | SYSTOLIC BLOOD PRESSURE: 132 MMHG | DIASTOLIC BLOOD PRESSURE: 82 MMHG | WEIGHT: 95.44 LBS | HEIGHT: 62 IN | OXYGEN SATURATION: 98 %

## 2022-08-12 DIAGNOSIS — I10 ESSENTIAL HYPERTENSION: ICD-10-CM

## 2022-08-12 DIAGNOSIS — R42 VERTIGO: Primary | ICD-10-CM

## 2022-08-12 DIAGNOSIS — R00.2 PALPITATIONS: ICD-10-CM

## 2022-08-12 PROCEDURE — 1101F PT FALLS ASSESS-DOCD LE1/YR: CPT | Mod: CPTII,S$GLB,, | Performed by: INTERNAL MEDICINE

## 2022-08-12 PROCEDURE — 1126F AMNT PAIN NOTED NONE PRSNT: CPT | Mod: CPTII,S$GLB,, | Performed by: INTERNAL MEDICINE

## 2022-08-12 PROCEDURE — 1159F MED LIST DOCD IN RCRD: CPT | Mod: CPTII,S$GLB,, | Performed by: INTERNAL MEDICINE

## 2022-08-12 PROCEDURE — 3075F SYST BP GE 130 - 139MM HG: CPT | Mod: CPTII,S$GLB,, | Performed by: INTERNAL MEDICINE

## 2022-08-12 PROCEDURE — 99999 PR PBB SHADOW E&M-EST. PATIENT-LVL IV: ICD-10-PCS | Mod: PBBFAC,,, | Performed by: INTERNAL MEDICINE

## 2022-08-12 PROCEDURE — 3288F FALL RISK ASSESSMENT DOCD: CPT | Mod: CPTII,S$GLB,, | Performed by: INTERNAL MEDICINE

## 2022-08-12 PROCEDURE — 1126F PR PAIN SEVERITY QUANTIFIED, NO PAIN PRESENT: ICD-10-PCS | Mod: CPTII,S$GLB,, | Performed by: INTERNAL MEDICINE

## 2022-08-12 PROCEDURE — 99999 PR PBB SHADOW E&M-EST. PATIENT-LVL IV: CPT | Mod: PBBFAC,,, | Performed by: INTERNAL MEDICINE

## 2022-08-12 PROCEDURE — 1160F RVW MEDS BY RX/DR IN RCRD: CPT | Mod: CPTII,S$GLB,, | Performed by: INTERNAL MEDICINE

## 2022-08-12 PROCEDURE — 3008F BODY MASS INDEX DOCD: CPT | Mod: CPTII,S$GLB,, | Performed by: INTERNAL MEDICINE

## 2022-08-12 PROCEDURE — 3079F DIAST BP 80-89 MM HG: CPT | Mod: CPTII,S$GLB,, | Performed by: INTERNAL MEDICINE

## 2022-08-12 PROCEDURE — 3075F PR MOST RECENT SYSTOLIC BLOOD PRESS GE 130-139MM HG: ICD-10-PCS | Mod: CPTII,S$GLB,, | Performed by: INTERNAL MEDICINE

## 2022-08-12 PROCEDURE — 1101F PR PT FALLS ASSESS DOC 0-1 FALLS W/OUT INJ PAST YR: ICD-10-PCS | Mod: CPTII,S$GLB,, | Performed by: INTERNAL MEDICINE

## 2022-08-12 PROCEDURE — 1159F PR MEDICATION LIST DOCUMENTED IN MEDICAL RECORD: ICD-10-PCS | Mod: CPTII,S$GLB,, | Performed by: INTERNAL MEDICINE

## 2022-08-12 PROCEDURE — 1160F PR REVIEW ALL MEDS BY PRESCRIBER/CLIN PHARMACIST DOCUMENTED: ICD-10-PCS | Mod: CPTII,S$GLB,, | Performed by: INTERNAL MEDICINE

## 2022-08-12 PROCEDURE — 99214 OFFICE O/P EST MOD 30 MIN: CPT | Mod: 25,S$GLB,, | Performed by: INTERNAL MEDICINE

## 2022-08-12 PROCEDURE — 96372 PR INJECTION,THERAP/PROPH/DIAG2ST, IM OR SUBCUT: ICD-10-PCS | Mod: S$GLB,,, | Performed by: INTERNAL MEDICINE

## 2022-08-12 PROCEDURE — 99214 PR OFFICE/OUTPT VISIT, EST, LEVL IV, 30-39 MIN: ICD-10-PCS | Mod: 25,S$GLB,, | Performed by: INTERNAL MEDICINE

## 2022-08-12 PROCEDURE — 3079F PR MOST RECENT DIASTOLIC BLOOD PRESSURE 80-89 MM HG: ICD-10-PCS | Mod: CPTII,S$GLB,, | Performed by: INTERNAL MEDICINE

## 2022-08-12 PROCEDURE — 3008F PR BODY MASS INDEX (BMI) DOCUMENTED: ICD-10-PCS | Mod: CPTII,S$GLB,, | Performed by: INTERNAL MEDICINE

## 2022-08-12 PROCEDURE — 3288F PR FALLS RISK ASSESSMENT DOCUMENTED: ICD-10-PCS | Mod: CPTII,S$GLB,, | Performed by: INTERNAL MEDICINE

## 2022-08-12 PROCEDURE — 96372 THER/PROPH/DIAG INJ SC/IM: CPT | Mod: S$GLB,,, | Performed by: INTERNAL MEDICINE

## 2022-08-12 RX ORDER — TRIAMCINOLONE ACETONIDE 40 MG/ML
40 INJECTION, SUSPENSION INTRA-ARTICULAR; INTRAMUSCULAR ONCE
Status: DISCONTINUED | OUTPATIENT
Start: 2022-08-12 | End: 2022-08-12

## 2022-08-12 RX ORDER — FLUTICASONE PROPIONATE 50 MCG
1 SPRAY, SUSPENSION (ML) NASAL DAILY
Qty: 18.2 ML | Refills: 0 | Status: SHIPPED | OUTPATIENT
Start: 2022-08-12 | End: 2022-09-15 | Stop reason: SDUPTHER

## 2022-08-16 ENCOUNTER — TELEPHONE (OUTPATIENT)
Dept: FAMILY MEDICINE | Facility: CLINIC | Age: 72
End: 2022-08-16
Payer: MEDICARE

## 2022-08-18 RX ORDER — DIAZEPAM 2 MG/1
2 TABLET ORAL EVERY 12 HOURS PRN
Qty: 30 TABLET | Refills: 0 | Status: SHIPPED | OUTPATIENT
Start: 2022-08-18 | End: 2023-05-09

## 2022-08-18 NOTE — TELEPHONE ENCOUNTER
No new care gaps identified.  [facility] Embedded Care Gaps. Reference number: [id number]. 8/18/2022   9:41:50 AM CDT

## 2022-08-22 ENCOUNTER — HOSPITAL ENCOUNTER (OUTPATIENT)
Dept: CARDIOLOGY | Facility: HOSPITAL | Age: 72
Discharge: HOME | End: 2022-08-22
Attending: INTERNAL MEDICINE
Payer: MEDICARE

## 2022-08-22 DIAGNOSIS — R00.2 PALPITATIONS: ICD-10-CM

## 2022-08-22 PROCEDURE — 93227 HOLTER MONITOR - 48 HOUR (CUPID ONLY): ICD-10-PCS | Mod: ,,, | Performed by: INTERNAL MEDICINE

## 2022-08-22 PROCEDURE — 93225 XTRNL ECG REC<48 HRS REC: CPT

## 2022-08-22 PROCEDURE — 93227 XTRNL ECG REC<48 HR R&I: CPT | Mod: ,,, | Performed by: INTERNAL MEDICINE

## 2022-08-25 LAB
OHS CV EVENT MONITOR DAY: 0
OHS CV HOLTER LENGTH DECIMAL HOURS: 48
OHS CV HOLTER LENGTH HOURS: 48
OHS CV HOLTER LENGTH MINUTES: 0
OHS CV HOLTER SINUS AVERAGE HR: 80
OHS CV HOLTER SINUS MAX HR: 118
OHS CV HOLTER SINUS MIN HR: 53

## 2022-08-29 ENCOUNTER — PATIENT MESSAGE (OUTPATIENT)
Dept: FAMILY MEDICINE | Facility: CLINIC | Age: 72
End: 2022-08-29
Payer: MEDICARE

## 2022-09-15 ENCOUNTER — OFFICE VISIT (OUTPATIENT)
Dept: FAMILY MEDICINE | Facility: CLINIC | Age: 72
End: 2022-09-15
Payer: MEDICARE

## 2022-09-15 VITALS
WEIGHT: 150.81 LBS | BODY MASS INDEX: 27.75 KG/M2 | HEIGHT: 62 IN | OXYGEN SATURATION: 98 % | DIASTOLIC BLOOD PRESSURE: 84 MMHG | HEART RATE: 76 BPM | SYSTOLIC BLOOD PRESSURE: 126 MMHG

## 2022-09-15 DIAGNOSIS — I10 ESSENTIAL HYPERTENSION: ICD-10-CM

## 2022-09-15 DIAGNOSIS — Z78.0 POSTMENOPAUSE: ICD-10-CM

## 2022-09-15 DIAGNOSIS — H93.099: ICD-10-CM

## 2022-09-15 DIAGNOSIS — R53.82 CHRONIC FATIGUE, UNSPECIFIED: ICD-10-CM

## 2022-09-15 DIAGNOSIS — R42 VERTIGO: Primary | ICD-10-CM

## 2022-09-15 PROCEDURE — 99214 PR OFFICE/OUTPT VISIT, EST, LEVL IV, 30-39 MIN: ICD-10-PCS | Mod: S$GLB,,, | Performed by: INTERNAL MEDICINE

## 2022-09-15 PROCEDURE — 1101F PT FALLS ASSESS-DOCD LE1/YR: CPT | Mod: CPTII,S$GLB,, | Performed by: INTERNAL MEDICINE

## 2022-09-15 PROCEDURE — 3008F PR BODY MASS INDEX (BMI) DOCUMENTED: ICD-10-PCS | Mod: CPTII,S$GLB,, | Performed by: INTERNAL MEDICINE

## 2022-09-15 PROCEDURE — 1160F RVW MEDS BY RX/DR IN RCRD: CPT | Mod: CPTII,S$GLB,, | Performed by: INTERNAL MEDICINE

## 2022-09-15 PROCEDURE — 3008F BODY MASS INDEX DOCD: CPT | Mod: CPTII,S$GLB,, | Performed by: INTERNAL MEDICINE

## 2022-09-15 PROCEDURE — 3074F PR MOST RECENT SYSTOLIC BLOOD PRESSURE < 130 MM HG: ICD-10-PCS | Mod: CPTII,S$GLB,, | Performed by: INTERNAL MEDICINE

## 2022-09-15 PROCEDURE — 99999 PR PBB SHADOW E&M-EST. PATIENT-LVL IV: CPT | Mod: PBBFAC,,, | Performed by: INTERNAL MEDICINE

## 2022-09-15 PROCEDURE — 3074F SYST BP LT 130 MM HG: CPT | Mod: CPTII,S$GLB,, | Performed by: INTERNAL MEDICINE

## 2022-09-15 PROCEDURE — 1159F MED LIST DOCD IN RCRD: CPT | Mod: CPTII,S$GLB,, | Performed by: INTERNAL MEDICINE

## 2022-09-15 PROCEDURE — 1126F PR PAIN SEVERITY QUANTIFIED, NO PAIN PRESENT: ICD-10-PCS | Mod: CPTII,S$GLB,, | Performed by: INTERNAL MEDICINE

## 2022-09-15 PROCEDURE — 1159F PR MEDICATION LIST DOCUMENTED IN MEDICAL RECORD: ICD-10-PCS | Mod: CPTII,S$GLB,, | Performed by: INTERNAL MEDICINE

## 2022-09-15 PROCEDURE — 1126F AMNT PAIN NOTED NONE PRSNT: CPT | Mod: CPTII,S$GLB,, | Performed by: INTERNAL MEDICINE

## 2022-09-15 PROCEDURE — 3079F DIAST BP 80-89 MM HG: CPT | Mod: CPTII,S$GLB,, | Performed by: INTERNAL MEDICINE

## 2022-09-15 PROCEDURE — 3288F PR FALLS RISK ASSESSMENT DOCUMENTED: ICD-10-PCS | Mod: CPTII,S$GLB,, | Performed by: INTERNAL MEDICINE

## 2022-09-15 PROCEDURE — 99999 PR PBB SHADOW E&M-EST. PATIENT-LVL IV: ICD-10-PCS | Mod: PBBFAC,,, | Performed by: INTERNAL MEDICINE

## 2022-09-15 PROCEDURE — 3079F PR MOST RECENT DIASTOLIC BLOOD PRESSURE 80-89 MM HG: ICD-10-PCS | Mod: CPTII,S$GLB,, | Performed by: INTERNAL MEDICINE

## 2022-09-15 PROCEDURE — 1101F PR PT FALLS ASSESS DOC 0-1 FALLS W/OUT INJ PAST YR: ICD-10-PCS | Mod: CPTII,S$GLB,, | Performed by: INTERNAL MEDICINE

## 2022-09-15 PROCEDURE — 1160F PR REVIEW ALL MEDS BY PRESCRIBER/CLIN PHARMACIST DOCUMENTED: ICD-10-PCS | Mod: CPTII,S$GLB,, | Performed by: INTERNAL MEDICINE

## 2022-09-15 PROCEDURE — 99214 OFFICE O/P EST MOD 30 MIN: CPT | Mod: S$GLB,,, | Performed by: INTERNAL MEDICINE

## 2022-09-15 PROCEDURE — 3288F FALL RISK ASSESSMENT DOCD: CPT | Mod: CPTII,S$GLB,, | Performed by: INTERNAL MEDICINE

## 2022-09-15 RX ORDER — TRETINOIN 0.25 MG/G
CREAM TOPICAL
COMMUNITY
Start: 2022-09-08 | End: 2023-05-09

## 2022-09-15 RX ORDER — KETOCONAZOLE 20 MG/ML
SHAMPOO, SUSPENSION TOPICAL
Qty: 120 ML | Refills: 2 | Status: SHIPPED | OUTPATIENT
Start: 2022-09-15 | End: 2022-10-19 | Stop reason: SDUPTHER

## 2022-09-15 RX ORDER — MECLIZINE HCL 12.5 MG 12.5 MG/1
12.5 TABLET ORAL 3 TIMES DAILY PRN
Qty: 90 TABLET | Refills: 1 | Status: SHIPPED | OUTPATIENT
Start: 2022-09-15 | End: 2023-05-09

## 2022-09-15 RX ORDER — FLUTICASONE PROPIONATE 50 MCG
1 SPRAY, SUSPENSION (ML) NASAL DAILY
Qty: 18.2 ML | Refills: 0 | Status: SHIPPED | OUTPATIENT
Start: 2022-09-15 | End: 2023-05-09

## 2022-09-15 NOTE — PROGRESS NOTES
Subjective:       Patient ID: Julisa Jensen is a 72 y.o. female.    Chief Complaint: No chief complaint on file.      HPI  Julisa Jensen is a 72 y.o. female with chronic conditions of  HTN, vit D and B12 deficiency, anemia, and recently treated for vertigo who presents today for follow-up.    HTN- Has been controlled on present treatment with Amlodipine and HCTZ. Compliant with treatment.    Vertigo- Dizziness has improved but still present with head turns, especially upward or back. Has been using mostly Valium at night with some relief. Stopped using Meclizine as she was using diazepam. Has not seen ENT. No headaches, some nausea.    No new labs.    No toxic habits.    Health Maintenance:  Health Maintenance   Topic Date Due    High Dose Statin  Never done    DEXA Scan  10/17/2022    Mammogram  01/13/2023    Lipid Panel  03/21/2027    TETANUS VACCINE  11/16/2030    Hepatitis C Screening  Completed       Review of Systems   Gastrointestinal:  Positive for nausea.   Neurological:  Positive for dizziness. Negative for headaches.   Psychiatric/Behavioral:  Negative for sleep disturbance.    All other systems reviewed and are negative.   Past Medical History:   Diagnosis Date    Cataract     COVID-19 03/2021    Hypertension     Stroke        Past Surgical History:   Procedure Laterality Date    CATARACT EXTRACTION W/  INTRAOCULAR LENS IMPLANT Left 8/3/2021    Procedure: EXTRACTION, CATARACT, WITH IOL INSERTION;  Surgeon: Ji Shaikh MD;  Location: [facility] OR;  Service: Ophthalmology;  Laterality: Left;    CHOLECYSTECTOMY      COLONOSCOPY N/A 6/23/2020    Procedure: COLONOSCOPY;  Surgeon: Madelyn Finch MD;  Location: [facility];  Service: Endoscopy;  Laterality: N/A;       Family History   Problem Relation Age of Onset    Breast cancer Maternal Cousin     Cataracts Mother     Heart disease Mother     No Known Problems Father     Diabetes Sister     Diabetes Brother     No Known Problems Daughter     No  Known Problems Son     Amblyopia Neg Hx     Blindness Neg Hx     Glaucoma Neg Hx     Macular degeneration Neg Hx     Retinal detachment Neg Hx     Strabismus Neg Hx        Social History     Socioeconomic History    Marital status:    Tobacco Use    Smoking status: Never    Smokeless tobacco: Never   Substance and Sexual Activity    Alcohol use: Yes     Comment: occasionally    Drug use: Never    Sexual activity: Not Currently     Partners: Male     Social Determinants of Health     Financial Resource Strain: High Risk    Difficulty of Paying Living Expenses: Hard   Food Insecurity: Food Insecurity Present    Worried About Running Out of Food in the Last Year: Sometimes true    Ran Out of Food in the Last Year: Never true   Transportation Needs: No Transportation Needs    Lack of Transportation (Medical): No    Lack of Transportation (Non-Medical): No   Physical Activity: Inactive    Days of Exercise per Week: 0 days    Minutes of Exercise per Session: 0 min   Stress: Stress Concern Present    Feeling of Stress : To some extent   Social Connections: Moderately Integrated    Frequency of Communication with Friends and Family: More than three times a week    Frequency of Social Gatherings with Friends and Family: More than three times a week    Attends Je[employer] witness Services: More than 4 times per year    Active Member of Clubs or Organizations: No    Attends Club or Organization Meetings: Never    Marital Status:    Housing Stability: Low Risk     Unable to Pay for Housing in the Last Year: No    Number of Places Lived in the Last Year: 1    Unstable Housing in the Last Year: No       Current Outpatient Medications   Medication Sig Dispense Refill    amLODIPine (NORVASC) 5 MG tablet TAKE 1 TABLET BY MOUTH EVERY DAY 90 tablet 3    aspirin (ECOTRIN) 81 MG EC tablet Take 1 tablet (81 mg total) by mouth once daily. 30 tablet 11    cetirizine (ZYRTEC) 10 MG tablet Take 1 tablet (10 mg total) by mouth once daily.  30 tablet 0    cholecalciferol, vitamin D3, (VITAMIN D3) 50 mcg (2,000 unit) Cap Take 1 capsule (2,000 Units total) by mouth once daily. 90 capsule 3    cyanocobalamin, vitamin B-12, 1,000 mcg Cap Take 1 capsule by mouth once daily. 90 capsule 3    diazePAM (VALIUM) 2 MG tablet Take 1 tablet (2 mg total) by mouth every 12 (twelve) hours as needed (Vertigo). 30 tablet 0    fluticasone propionate (FLONASE) 50 mcg/actuation nasal spray 1 spray (50 mcg total) by Each Nostril route once daily. 18.2 mL 0    hydroCHLOROthiazide (HYDRODIURIL) 25 MG tablet Take 0.5 tablets (12.5 mg total) by mouth once daily. 90 tablet 1    meclizine (ANTIVERT) 12.5 mg tablet Take 1 tablet (12.5 mg total) by mouth 4 (four) times daily as needed for Dizziness or Nausea. 90 tablet 1    methocarbamoL (ROBAXIN) 500 MG Tab Take 1 tablet (500 mg total) by mouth 2 (two) times daily as needed (muscle spasm). 15 tablet 0     No current facility-administered medications for this visit.       Review of patient's allergies indicates:   Allergen Reactions    Penicillins          Objective:       Last 3 sets of Vitals    Vitals - 1 value per visit 8/6/2022 8/12/2022 8/12/2022   SYSTOLIC 118 - 132   DIASTOLIC 71 - 82   Pulse 73 - 88   Temp 97.6 - -   Resp 18 - -   SPO2 95 - 98   Weight (lb) 149 - 95.46   Weight (kg) 67.586 - 43.3   Height 62 - 62   BMI (Calculated) 27.2 - 17.5   VISIT REPORT - - -   Pain Score  - 0 -   Some recent data might be hidden   Physical Exam  Constitutional:       General: She is not in acute distress.  HENT:      Head: Normocephalic.      Right Ear: Tympanic membrane, ear canal and external ear normal.      Left Ear: Tympanic membrane, ear canal and external ear normal.      Nose: Nose normal.      Mouth/Throat:      Mouth: Mucous membranes are moist.   Eyes:      General: No scleral icterus.     Extraocular Movements: Extraocular movements intact.      Conjunctiva/sclera: Conjunctivae normal.      Comments: No nystagmus.   Neck:       Vascular: No carotid bruit.      Comments: No goiter.  Cardiovascular:      Rate and Rhythm: Normal rate and regular rhythm.      Pulses: Normal pulses.      Heart sounds: Normal heart sounds.   Pulmonary:      Effort: Pulmonary effort is normal.      Breath sounds: Normal breath sounds.   Abdominal:      General: Bowel sounds are normal. There is no distension.      Palpations: Abdomen is soft. There is no mass.      Tenderness: There is no abdominal tenderness.   Musculoskeletal:         General: No swelling.   Lymphadenopathy:      Cervical: No cervical adenopathy.   Skin:     General: Skin is warm and dry.   Neurological:      General: No focal deficit present.      Mental Status: She is alert and oriented to person, place, and time.      Comments: No nystagmus. Dizziness when laying supine and sitting up.   Psychiatric:         Mood and Affect: Mood normal.         Behavior: Behavior normal.         CBC:  Recent Labs   Lab 03/07/21 0441 03/08/21  0334 03/21/22  0736   WBC 4.54 5.31 5.90   RBC 4.31 4.49 4.59   Hemoglobin 12.2 12.6 13.1   Hematocrit 36.2 L 38.2 38.9   Platelets 298 329 309   MCV 84 85 85   MCH 28.3 28.1 28.5   MCHC 33.7 33.0 33.7     CMP:  Recent Labs   Lab 03/07/21  0441 03/08/21  0333 03/21/22  0736   Glucose 103 103 97   Calcium 8.1 L 8.5 L 9.5   Albumin 3.1 L 3.2 L 3.9   Total Protein 7.0 7.2 8.3   Sodium 141 141 140   Potassium 3.8 4.0 3.6   CO2 26 27 28   Chloride 105 105 105   BUN 15 16 13   Creatinine 0.6 0.7 0.7   Alkaline Phosphatase 45 L 46 L 88   ALT 42 66 H 25   AST 37 56 H 22   Total Bilirubin 0.4 0.4 0.6     URINALYSIS:       LIPIDS:  Recent Labs   Lab 02/04/20  0943 10/15/20  0817 03/21/22  0736   TSH  --  2.375 3.603   HDL 47 52 45   Cholesterol 166 165 174   Triglycerides 123 86 115   LDL Cholesterol 94.4 95.8 106.0   HDL/Cholesterol Ratio 28.3 31.5 25.9   Non-HDL Cholesterol 119 113 129   Total Cholesterol/HDL Ratio 3.5 3.2 3.9     TSH:  Recent Labs   Lab 10/15/20  0817  03/21/22  0736   TSH 2.375 3.603       A1C:        Imaging:  Holter monitor - 48 hour  Predominant rhythm sinus rhythm with heart rates varying between 53 and   118 BPM with an average of 80 BPM.   There were very rare PVCs totalling 8 and averaging 0.17 per hour.  There were very rare PACs totalling 10 and averaging 0.21 per hour.  Several short runs of tachycardia were noted, likely atrial with aberrancy   during daytime hours maximum number of beats 5, heart rate 108 BPM.  The diary was returned incomplete.      Assessment:       1. Vertigo    2. Essential hypertension    3. Postmenopause    4. 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 Chronic fatigue, unspecified            Plan:       Julisa was seen today for dizziness.    Diagnoses and all orders for this visit:    Vertigo  -     fluticasone propionate (FLONASE) 50 mcg/actuation nasal spray; 1 spray (50 mcg total) by Each Nostril route once daily.  -     meclizine (ANTIVERT) 12.5 mg tablet; Take 1 tablet (12.5 mg total) by mouth 3 (three) times daily as needed for Dizziness or Nausea.  -     Ambulatory referral/consult to ENT; Future  -     CBC Auto Differential; Future  -     C-Reactive Protein; Future  -     Lipid Panel; Future  -     TSH; Future  - No headache, visual changes, or focal signs.    Essential hypertension   - Stable.     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    Health Maintenance Due   Topic Date Due    High Dose Statin  Never done    Shingles Vaccine (2 of 2) 01/11/2021    COVID-19 Vaccine (3 - Booster for Moderna series) 12/09/2021    Influenza Vaccine (1) 09/01/2022        Return to clinic as scheduled.    Sandra Stafford MD  [facility]  Disclaimer:   This note has been generated using voice-recognition software. There may be grammatical or spelling errors that have been missed during proof-reading

## 2022-09-16 ENCOUNTER — LAB VISIT (OUTPATIENT)
Dept: LAB | Facility: HOSPITAL | Age: 72
End: 2022-09-16
Attending: INTERNAL MEDICINE
Payer: MEDICARE

## 2022-09-16 DIAGNOSIS — H93.099: ICD-10-CM

## 2022-09-16 DIAGNOSIS — R42 VERTIGO: ICD-10-CM

## 2022-09-16 DIAGNOSIS — R53.82 CHRONIC FATIGUE, UNSPECIFIED: ICD-10-CM

## 2022-09-16 LAB
BASOPHILS # BLD AUTO: 0.04 K/UL (ref 0–0.2)
BASOPHILS NFR BLD: 0.8 % (ref 0–1.9)
CHOLEST SERPL-MCNC: 163 MG/DL (ref 120–199)
CHOLEST/HDLC SERPL: 3.6 {RATIO} (ref 2–5)
CRP SERPL-MCNC: 2 MG/L (ref 0–8.2)
DIFFERENTIAL METHOD: ABNORMAL
EOSINOPHIL # BLD AUTO: 0.1 K/UL (ref 0–0.5)
EOSINOPHIL NFR BLD: 1.8 % (ref 0–8)
ERYTHROCYTE [DISTWIDTH] IN BLOOD BY AUTOMATED COUNT: 13.1 % (ref 11.5–14.5)
HCT VFR BLD AUTO: 37.5 % (ref 37–48.5)
HDLC SERPL-MCNC: 45 MG/DL (ref 40–75)
HDLC SERPL: 27.6 % (ref 20–50)
HGB BLD-MCNC: 12.6 G/DL (ref 12–16)
IMM GRANULOCYTES # BLD AUTO: 0.01 K/UL (ref 0–0.04)
IMM GRANULOCYTES NFR BLD AUTO: 0.2 % (ref 0–0.5)
LDLC SERPL CALC-MCNC: 103.2 MG/DL (ref 63–159)
LYMPHOCYTES # BLD AUTO: 2.4 K/UL (ref 1–4.8)
LYMPHOCYTES NFR BLD: 48.4 % (ref 18–48)
MCH RBC QN AUTO: 28.3 PG (ref 27–31)
MCHC RBC AUTO-ENTMCNC: 33.6 G/DL (ref 32–36)
MCV RBC AUTO: 84 FL (ref 82–98)
MONOCYTES # BLD AUTO: 0.3 K/UL (ref 0.3–1)
MONOCYTES NFR BLD: 6.6 % (ref 4–15)
NEUTROPHILS # BLD AUTO: 2.1 K/UL (ref 1.8–7.7)
NEUTROPHILS NFR BLD: 42.2 % (ref 38–73)
NONHDLC SERPL-MCNC: 118 MG/DL
NRBC BLD-RTO: 0 /100 WBC
PLATELET # BLD AUTO: 319 K/UL (ref 150–450)
PMV BLD AUTO: 10.9 FL (ref 9.2–12.9)
RBC # BLD AUTO: 4.45 M/UL (ref 4–5.4)
TRIGL SERPL-MCNC: 74 MG/DL (ref 30–150)
TSH SERPL DL<=0.005 MIU/L-ACNC: 2.51 UIU/ML (ref 0.4–4)
WBC # BLD AUTO: 5 K/UL (ref 3.9–12.7)

## 2022-09-16 PROCEDURE — 36415 COLL VENOUS BLD VENIPUNCTURE: CPT | Mod: PO | Performed by: INTERNAL MEDICINE

## 2022-09-16 PROCEDURE — 80061 LIPID PANEL: CPT | Performed by: INTERNAL MEDICINE

## 2022-09-16 PROCEDURE — 84443 ASSAY THYROID STIM HORMONE: CPT | Performed by: INTERNAL MEDICINE

## 2022-09-16 PROCEDURE — 86140 C-REACTIVE PROTEIN: CPT | Performed by: INTERNAL MEDICINE

## 2022-09-16 PROCEDURE — 85025 COMPLETE CBC W/AUTO DIFF WBC: CPT | Performed by: INTERNAL MEDICINE

## 2022-09-18 ENCOUNTER — PATIENT MESSAGE (OUTPATIENT)
Dept: FAMILY MEDICINE | Facility: CLINIC | Age: 72
End: 2022-09-18
Payer: MEDICARE

## 2022-10-15 ENCOUNTER — PATIENT MESSAGE (OUTPATIENT)
Dept: FAMILY MEDICINE | Facility: CLINIC | Age: 72
End: 2022-10-15
Payer: MEDICARE

## 2022-10-19 RX ORDER — KETOCONAZOLE 20 MG/ML
SHAMPOO, SUSPENSION TOPICAL
Qty: 120 ML | Refills: 2 | Status: SHIPPED | OUTPATIENT
Start: 2022-10-20 | End: 2022-11-22

## 2022-11-08 ENCOUNTER — PATIENT MESSAGE (OUTPATIENT)
Dept: ADMINISTRATIVE | Facility: HOSPITAL | Age: 72
End: 2022-11-08
Payer: MEDICARE

## 2022-11-08 NOTE — LETTER
November 15, 2022    Julisa Jensen  [address] [place] [address]             [facility]  [address]  [place] [address]  Phone: [phone] Dear Miriam [facility] is committed to your overall health.  To help you get the most out of each of your visits, we will review your information to make sure you are up to date on all of your recommended tests and/or procedures.       Sandra Stafford MD  has found that your chart shows you may be due for :         Health Maintenance Due   Topic    High Dose Statin     Shingles Vaccine (2 of 2)    COVID-19 Vaccine (3 - Booster for Moderna series)    Influenza Vaccine (1)    DEXA Scan          If you have had any of the above done at another facility, please bring the records or information with you so that your record at [facility] will be complete.  If you would like to schedule any of these test, please call [phone] or send a message via [email or web address].        If you are currently taking medication, please bring it with you to your appointment for review.           Thank you for letting us care for you,        Sandra Stafford MD and your [facility] Team

## 2022-11-15 ENCOUNTER — PATIENT OUTREACH (OUTPATIENT)
Dept: ADMINISTRATIVE | Facility: HOSPITAL | Age: 72
End: 2022-11-15
Payer: MEDICARE

## 2022-11-22 ENCOUNTER — OFFICE VISIT (OUTPATIENT)
Dept: FAMILY MEDICINE | Facility: CLINIC | Age: 72
End: 2022-11-22
Payer: MEDICARE

## 2022-11-22 VITALS
SYSTOLIC BLOOD PRESSURE: 124 MMHG | BODY MASS INDEX: 27.7 KG/M2 | WEIGHT: 150.56 LBS | HEIGHT: 62 IN | DIASTOLIC BLOOD PRESSURE: 82 MMHG | HEART RATE: 74 BPM | OXYGEN SATURATION: 98 %

## 2022-11-22 DIAGNOSIS — I10 ESSENTIAL HYPERTENSION: Primary | ICD-10-CM

## 2022-11-22 DIAGNOSIS — R42 VERTIGO: ICD-10-CM

## 2022-11-22 DIAGNOSIS — Z78.0 POSTMENOPAUSE: ICD-10-CM

## 2022-11-22 PROCEDURE — 1101F PT FALLS ASSESS-DOCD LE1/YR: CPT | Mod: CPTII,S$GLB,, | Performed by: INTERNAL MEDICINE

## 2022-11-22 PROCEDURE — 1160F RVW MEDS BY RX/DR IN RCRD: CPT | Mod: CPTII,S$GLB,, | Performed by: INTERNAL MEDICINE

## 2022-11-22 PROCEDURE — G0008 ADMIN INFLUENZA VIRUS VAC: HCPCS | Mod: S$GLB,,, | Performed by: INTERNAL MEDICINE

## 2022-11-22 PROCEDURE — 1159F PR MEDICATION LIST DOCUMENTED IN MEDICAL RECORD: ICD-10-PCS | Mod: CPTII,S$GLB,, | Performed by: INTERNAL MEDICINE

## 2022-11-22 PROCEDURE — 1160F PR REVIEW ALL MEDS BY PRESCRIBER/CLIN PHARMACIST DOCUMENTED: ICD-10-PCS | Mod: CPTII,S$GLB,, | Performed by: INTERNAL MEDICINE

## 2022-11-22 PROCEDURE — 3079F DIAST BP 80-89 MM HG: CPT | Mod: CPTII,S$GLB,, | Performed by: INTERNAL MEDICINE

## 2022-11-22 PROCEDURE — 3079F PR MOST RECENT DIASTOLIC BLOOD PRESSURE 80-89 MM HG: ICD-10-PCS | Mod: CPTII,S$GLB,, | Performed by: INTERNAL MEDICINE

## 2022-11-22 PROCEDURE — 3074F PR MOST RECENT SYSTOLIC BLOOD PRESSURE < 130 MM HG: ICD-10-PCS | Mod: CPTII,S$GLB,, | Performed by: INTERNAL MEDICINE

## 2022-11-22 PROCEDURE — 1101F PR PT FALLS ASSESS DOC 0-1 FALLS W/OUT INJ PAST YR: ICD-10-PCS | Mod: CPTII,S$GLB,, | Performed by: INTERNAL MEDICINE

## 2022-11-22 PROCEDURE — 3008F BODY MASS INDEX DOCD: CPT | Mod: CPTII,S$GLB,, | Performed by: INTERNAL MEDICINE

## 2022-11-22 PROCEDURE — 99214 PR OFFICE/OUTPT VISIT, EST, LEVL IV, 30-39 MIN: ICD-10-PCS | Mod: S$GLB,,, | Performed by: INTERNAL MEDICINE

## 2022-11-22 PROCEDURE — 90694 VACC AIIV4 NO PRSRV 0.5ML IM: CPT | Mod: S$GLB,,, | Performed by: INTERNAL MEDICINE

## 2022-11-22 PROCEDURE — 99999 PR PBB SHADOW E&M-EST. PATIENT-LVL IV: CPT | Mod: PBBFAC,,, | Performed by: INTERNAL MEDICINE

## 2022-11-22 PROCEDURE — 99999 PR PBB SHADOW E&M-EST. PATIENT-LVL IV: ICD-10-PCS | Mod: PBBFAC,,, | Performed by: INTERNAL MEDICINE

## 2022-11-22 PROCEDURE — 1126F PR PAIN SEVERITY QUANTIFIED, NO PAIN PRESENT: ICD-10-PCS | Mod: CPTII,S$GLB,, | Performed by: INTERNAL MEDICINE

## 2022-11-22 PROCEDURE — 3074F SYST BP LT 130 MM HG: CPT | Mod: CPTII,S$GLB,, | Performed by: INTERNAL MEDICINE

## 2022-11-22 PROCEDURE — 3288F FALL RISK ASSESSMENT DOCD: CPT | Mod: CPTII,S$GLB,, | Performed by: INTERNAL MEDICINE

## 2022-11-22 PROCEDURE — 90694 FLU VACCINE - QUADRIVALENT - ADJUVANTED: ICD-10-PCS | Mod: S$GLB,,, | Performed by: INTERNAL MEDICINE

## 2022-11-22 PROCEDURE — G0008 FLU VACCINE - QUADRIVALENT - ADJUVANTED: ICD-10-PCS | Mod: S$GLB,,, | Performed by: INTERNAL MEDICINE

## 2022-11-22 PROCEDURE — 1159F MED LIST DOCD IN RCRD: CPT | Mod: CPTII,S$GLB,, | Performed by: INTERNAL MEDICINE

## 2022-11-22 PROCEDURE — 3288F PR FALLS RISK ASSESSMENT DOCUMENTED: ICD-10-PCS | Mod: CPTII,S$GLB,, | Performed by: INTERNAL MEDICINE

## 2022-11-22 PROCEDURE — 3008F PR BODY MASS INDEX (BMI) DOCUMENTED: ICD-10-PCS | Mod: CPTII,S$GLB,, | Performed by: INTERNAL MEDICINE

## 2022-11-22 PROCEDURE — 1126F AMNT PAIN NOTED NONE PRSNT: CPT | Mod: CPTII,S$GLB,, | Performed by: INTERNAL MEDICINE

## 2022-11-22 PROCEDURE — 99214 OFFICE O/P EST MOD 30 MIN: CPT | Mod: S$GLB,,, | Performed by: INTERNAL MEDICINE

## 2022-11-22 RX ORDER — KETOCONAZOLE 20 MG/ML
SHAMPOO, SUSPENSION TOPICAL
Qty: 120 ML | Refills: 2 | Status: SHIPPED | OUTPATIENT
Start: 2022-11-24 | End: 2023-08-11

## 2022-11-22 NOTE — PROGRESS NOTES
Subjective:       Patient ID: Julisa Jensen is a 72 y.o. female.    Chief Complaint: No chief complaint on file.      HPI  Julisa Jensen is a 72 y.o. female with chronic conditions of  HTN, vit D and B12 deficiency, anemia, and recently treated for vertigo who presents today for follow-up.    Recently treated for significant vertigo.  Trial of Flonase, meclizine ordered.  Reports meclizine helps and she uses as needed.  Still has occasional dizziness.  Usually feels dizzy when she lays down or when she looks up when washing her hair.  The dizziness lasts for a few seconds.  There has been no recent congestion or headache.  When she starts with congestion uses Flonase that helps.  Her  has been with the Flu for a week and she has been without symptoms.    Reports last time she used the Nizoral shampoo felt like a burning or itching and stop using it.  It was helping in the beginning.  He was using the shampoo almost every day instead of twice a week.  Will like to try it again less often.    Last labs had no anemia, normal white blood cell count and platelets.  Lipid profile looks better.  Thyroid was normal.    Bone density test is still pending.    She would like the flu shot today.    Health Maintenance:  Health Maintenance   Topic Date Due    High Dose Statin  Never done    DEXA Scan  10/17/2022    Mammogram  01/13/2023    Lipid Panel  09/16/2027    TETANUS VACCINE  11/16/2030    Hepatitis C Screening  Completed       Review of Systems   Constitutional: Negative.  Negative for fever and unexpected weight change.   HENT: Negative.     Respiratory: Negative.     Cardiovascular: Negative.    Gastrointestinal: Negative.    Genitourinary:  Negative for difficulty urinating.   Musculoskeletal: Negative.    Integumentary:  Negative.   Neurological:  Positive for dizziness.   Psychiatric/Behavioral: Negative.          Past Medical History:   Diagnosis Date    Cataract     COVID-19 03/2021    Hypertension      Stroke        Past Surgical History:   Procedure Laterality Date    CATARACT EXTRACTION W/  INTRAOCULAR LENS IMPLANT Left 8/3/2021    Procedure: EXTRACTION, CATARACT, WITH IOL INSERTION;  Surgeon: Ji Shaikh MD;  Location: [facility] OR;  Service: Ophthalmology;  Laterality: Left;    CHOLECYSTECTOMY      COLONOSCOPY N/A 6/23/2020    Procedure: COLONOSCOPY;  Surgeon: Madelyn Finch MD;  Location: [facility];  Service: Endoscopy;  Laterality: N/A;       Family History   Problem Relation Age of Onset    Breast cancer Maternal Cousin     Cataracts Mother     Heart disease Mother     No Known Problems Father     Diabetes Sister     Diabetes Brother     No Known Problems Daughter     No Known Problems Son     Amblyopia Neg Hx     Blindness Neg Hx     Glaucoma Neg Hx     Macular degeneration Neg Hx     Retinal detachment Neg Hx     Strabismus Neg Hx        Social History     Socioeconomic History    Marital status:    Tobacco Use    Smoking status: Never    Smokeless tobacco: Never   Substance and Sexual Activity    Alcohol use: Yes     Comment: occasionally    Drug use: Never    Sexual activity: Not Currently     Partners: Male     Social Determinants of Health     Financial Resource Strain: High Risk    Difficulty of Paying Living Expenses: Hard   Food Insecurity: Food Insecurity Present    Worried About Running Out of Food in the Last Year: Sometimes true    Ran Out of Food in the Last Year: Never true   Transportation Needs: No Transportation Needs    Lack of Transportation (Medical): No    Lack of Transportation (Non-Medical): No   Physical Activity: Inactive    Days of Exercise per Week: 0 days    Minutes of Exercise per Session: 0 min   Stress: Stress Concern Present    Feeling of Stress : To some extent   Social Connections: Moderately Integrated    Frequency of Communication with Friends and Family: More than three times a week    Frequency of Social Gatherings with Friends and Family: More than  three times a week    Attends Anabaptist Services: More than 4 times per year    Active Member of Clubs or Organizations: No    Attends Club or Organization Meetings: Never    Marital Status:    Housing Stability: Low Risk     Unable to Pay for Housing in the Last Year: No    Number of Places Lived in the Last Year: 1    Unstable Housing in the Last Year: No       Current Outpatient Medications   Medication Sig Dispense Refill    amLODIPine (NORVASC) 5 MG tablet TAKE 1 TABLET BY MOUTH EVERY DAY 90 tablet 3    aspirin (ECOTRIN) 81 MG EC tablet Take 1 tablet (81 mg total) by mouth once daily. 30 tablet 11    cetirizine (ZYRTEC) 10 MG tablet Take 1 tablet (10 mg total) by mouth once daily. 30 tablet 0    cholecalciferol, vitamin D3, (VITAMIN D3) 50 mcg (2,000 unit) Cap Take 1 capsule (2,000 Units total) by mouth once daily. 90 capsule 3    cyanocobalamin, vitamin B-12, 1,000 mcg Cap Take 1 capsule by mouth once daily. 90 capsule 3    diazePAM (VALIUM) 2 MG tablet Take 1 tablet (2 mg total) by mouth every 12 (twelve) hours as needed (Vertigo). (Patient not taking: Reported on 9/15/2022) 30 tablet 0    fluticasone propionate (FLONASE) 50 mcg/actuation nasal spray 1 spray (50 mcg total) by Each Nostril route once daily. 18.2 mL 0    hydroCHLOROthiazide (HYDRODIURIL) 25 MG tablet Take 0.5 tablets (12.5 mg total) by mouth once daily. 90 tablet 1    ketoconazole (NIZORAL) 2 % shampoo Apply topically twice a week. 120 mL 2    meclizine (ANTIVERT) 12.5 mg tablet Take 1 tablet (12.5 mg total) by mouth 3 (three) times daily as needed for Dizziness or Nausea. 90 tablet 1    methocarbamoL (ROBAXIN) 500 MG Tab Take 1 tablet (500 mg total) by mouth 2 (two) times daily as needed (muscle spasm). 15 tablet 0    tretinoin (RETIN-A) 0.025 % cream Apply topically Every 3 (three) days.       No current facility-administered medications for this visit.       Review of patient's allergies indicates:   Allergen Reactions    Penicillins           Objective:       Last 3 sets of Vitals    Vitals - 1 value per visit 8/12/2022 9/15/2022 9/15/2022   SYSTOLIC 132 - 126   DIASTOLIC 82 - 84   Pulse 88 - 76   Temp - - -   Resp - - -   SPO2 98 - 98   Weight (lb) 95.46 - 150.79   Weight (kg) 43.3 - 68.4   Height 62 - 62   BMI (Calculated) 17.5 - 27.6   VISIT REPORT - - -   Pain Score  - 0 -   Some recent data might be hidden   Physical Exam  Constitutional:       General: She is not in acute distress.  HENT:      Head: Normocephalic.      Right Ear: Tympanic membrane, ear canal and external ear normal.      Left Ear: Tympanic membrane, ear canal and external ear normal.      Nose: Nose normal.      Mouth/Throat:      Mouth: Mucous membranes are moist.   Eyes:      General: No scleral icterus.     Extraocular Movements: Extraocular movements intact.      Conjunctiva/sclera: Conjunctivae normal.   Neck:      Vascular: No carotid bruit.      Comments: No goiter.  Cardiovascular:      Rate and Rhythm: Normal rate and regular rhythm.      Pulses: Normal pulses.      Heart sounds: Normal heart sounds.   Pulmonary:      Effort: Pulmonary effort is normal.      Breath sounds: Normal breath sounds.   Abdominal:      General: Bowel sounds are normal. There is no distension.      Palpations: Abdomen is soft. There is no mass.      Tenderness: There is no abdominal tenderness.   Musculoskeletal:         General: No swelling.   Lymphadenopathy:      Cervical: No cervical adenopathy.   Skin:     General: Skin is warm and dry.   Neurological:      General: No focal deficit present.      Mental Status: She is alert and oriented to person, place, and time.   Psychiatric:         Mood and Affect: Mood normal.         Behavior: Behavior normal.         CBC:  Recent Labs   Lab 03/08/21  0334 03/21/22  0736 09/16/22  0819   WBC 5.31 5.90 5.00   RBC 4.49 4.59 4.45   Hemoglobin 12.6 13.1 12.6   Hematocrit 38.2 38.9 37.5   Platelets 329 309 319   MCV 85 85 84   MCH 28.1 28.5 28.3    MCHC 33.0 33.7 33.6     CMP:  Recent Labs   Lab 03/07/21  0441 03/08/21  0333 03/21/22  0736   Glucose 103 103 97   Calcium 8.1 L 8.5 L 9.5   Albumin 3.1 L 3.2 L 3.9   Total Protein 7.0 7.2 8.3   Sodium 141 141 140   Potassium 3.8 4.0 3.6   CO2 26 27 28   Chloride 105 105 105   BUN 15 16 13   Creatinine 0.6 0.7 0.7   Alkaline Phosphatase 45 L 46 L 88   ALT 42 66 H 25   AST 37 56 H 22   Total Bilirubin 0.4 0.4 0.6     URINALYSIS:       LIPIDS:  Recent Labs   Lab 10/15/20  0817 03/21/22  0736 09/16/22  0819   TSH 2.375 3.603 2.511   HDL 52 45 45   Cholesterol 165 174 163   Triglycerides 86 115 74   LDL Cholesterol 95.8 106.0 103.2   HDL/Cholesterol Ratio 31.5 25.9 27.6   Non-HDL Cholesterol 113 129 118   Total Cholesterol/HDL Ratio 3.2 3.9 3.6     TSH:  Recent Labs   Lab 10/15/20  0817 03/21/22  0736 09/16/22  0819   TSH 2.375 3.603 2.511       A1C:        Imaging:  Holter monitor - 48 hour  Predominant rhythm sinus rhythm with heart rates varying between 53 and   118 BPM with an average of 80 BPM.   There were very rare PVCs totalling 8 and averaging 0.17 per hour.  There were very rare PACs totalling 10 and averaging 0.21 per hour.  Several short runs of tachycardia were noted, likely atrial with aberrancy   during daytime hours maximum number of beats 5, heart rate 108 BPM.  The diary was returned incomplete.      Assessment:       1. Essential hypertension    2. Vertigo    3. Postmenopause            Plan:       1. Essential hypertension   - Controlled.    2. Vertigo   - Continue meclizine and Flonase as needed.    3. Postmenopause  -     DXA Bone Density Spine And Hip; Future; Expected date: 11/22/2022    Other orders  -     Influenza (FLUAD) - Quadrivalent (Adjuvanted) *Preferred* (65+) (PF)  -     ketoconazole (NIZORAL) 2 % shampoo; Apply topically twice a week.  Dispense: 120 mL; Refill: 2- for treatment of scalp dermatitis.     Health Maintenance Due   Topic Date Due    High Dose Statin  Never done     Shingles Vaccine (2 of 2) 01/11/2021    COVID-19 Vaccine (3 - Booster for Moderna series) 09/03/2021    Influenza Vaccine (1) 09/01/2022    DEXA Scan  10/17/2022    Mammogram  01/13/2023            Return to clinic in 6 months.    Sandra Stafford MD  [facility]  Disclaimer:  This note has been generated using voice-recognition software. There may be grammatical or spelling errors that have been missed during proof-reading

## 2022-12-13 ENCOUNTER — PATIENT MESSAGE (OUTPATIENT)
Dept: RADIOLOGY | Facility: HOSPITAL | Age: 72
End: 2022-12-13
Payer: MEDICARE

## 2022-12-19 ENCOUNTER — OFFICE VISIT (OUTPATIENT)
Dept: OTOLARYNGOLOGY | Facility: CLINIC | Age: 72
End: 2022-12-19
Payer: MEDICARE

## 2022-12-19 ENCOUNTER — CLINICAL SUPPORT (OUTPATIENT)
Dept: OTOLARYNGOLOGY | Facility: CLINIC | Age: 72
End: 2022-12-19
Payer: MEDICARE

## 2022-12-19 VITALS
HEART RATE: 79 BPM | DIASTOLIC BLOOD PRESSURE: 83 MMHG | BODY MASS INDEX: 27.75 KG/M2 | WEIGHT: 150.81 LBS | HEIGHT: 62 IN | SYSTOLIC BLOOD PRESSURE: 144 MMHG

## 2022-12-19 DIAGNOSIS — H90.41 SENSORINEURAL HEARING LOSS (SNHL) OF RIGHT EAR WITH UNRESTRICTED HEARING OF LEFT EAR: Primary | ICD-10-CM

## 2022-12-19 DIAGNOSIS — H90.A21 SENSORINEURAL HEARING LOSS (SNHL) OF RIGHT EAR WITH RESTRICTED HEARING OF LEFT EAR: Chronic | ICD-10-CM

## 2022-12-19 DIAGNOSIS — H81.11 BPPV (BENIGN PAROXYSMAL POSITIONAL VERTIGO), RIGHT: Primary | Chronic | ICD-10-CM

## 2022-12-19 PROCEDURE — 1126F PR PAIN SEVERITY QUANTIFIED, NO PAIN PRESENT: ICD-10-PCS | Mod: CPTII,S$GLB,, | Performed by: OTOLARYNGOLOGY

## 2022-12-19 PROCEDURE — 1126F AMNT PAIN NOTED NONE PRSNT: CPT | Mod: CPTII,S$GLB,, | Performed by: OTOLARYNGOLOGY

## 2022-12-19 PROCEDURE — 95992 CANALITH REPOSITIONING PROC: CPT | Mod: S$GLB,,, | Performed by: OTOLARYNGOLOGY

## 2022-12-19 PROCEDURE — 92567 TYMPANOMETRY: CPT | Mod: S$GLB,,, | Performed by: PHYSICIAN ASSISTANT

## 2022-12-19 PROCEDURE — 3288F FALL RISK ASSESSMENT DOCD: CPT | Mod: CPTII,S$GLB,, | Performed by: OTOLARYNGOLOGY

## 2022-12-19 PROCEDURE — 3008F BODY MASS INDEX DOCD: CPT | Mod: CPTII,S$GLB,, | Performed by: OTOLARYNGOLOGY

## 2022-12-19 PROCEDURE — 1101F PT FALLS ASSESS-DOCD LE1/YR: CPT | Mod: CPTII,S$GLB,, | Performed by: OTOLARYNGOLOGY

## 2022-12-19 PROCEDURE — 99999 PR PBB SHADOW E&M-EST. PATIENT-LVL V: ICD-10-PCS | Mod: PBBFAC,,, | Performed by: OTOLARYNGOLOGY

## 2022-12-19 PROCEDURE — 1101F PR PT FALLS ASSESS DOC 0-1 FALLS W/OUT INJ PAST YR: ICD-10-PCS | Mod: CPTII,S$GLB,, | Performed by: OTOLARYNGOLOGY

## 2022-12-19 PROCEDURE — 3079F PR MOST RECENT DIASTOLIC BLOOD PRESSURE 80-89 MM HG: ICD-10-PCS | Mod: CPTII,S$GLB,, | Performed by: OTOLARYNGOLOGY

## 2022-12-19 PROCEDURE — 3077F PR MOST RECENT SYSTOLIC BLOOD PRESSURE >= 140 MM HG: ICD-10-PCS | Mod: CPTII,S$GLB,, | Performed by: OTOLARYNGOLOGY

## 2022-12-19 PROCEDURE — 1159F PR MEDICATION LIST DOCUMENTED IN MEDICAL RECORD: ICD-10-PCS | Mod: CPTII,S$GLB,, | Performed by: OTOLARYNGOLOGY

## 2022-12-19 PROCEDURE — 3079F DIAST BP 80-89 MM HG: CPT | Mod: CPTII,S$GLB,, | Performed by: OTOLARYNGOLOGY

## 2022-12-19 PROCEDURE — 3008F PR BODY MASS INDEX (BMI) DOCUMENTED: ICD-10-PCS | Mod: CPTII,S$GLB,, | Performed by: OTOLARYNGOLOGY

## 2022-12-19 PROCEDURE — 3077F SYST BP >= 140 MM HG: CPT | Mod: CPTII,S$GLB,, | Performed by: OTOLARYNGOLOGY

## 2022-12-19 PROCEDURE — 1159F MED LIST DOCD IN RCRD: CPT | Mod: CPTII,S$GLB,, | Performed by: OTOLARYNGOLOGY

## 2022-12-19 PROCEDURE — 99999 PR PBB SHADOW E&M-EST. PATIENT-LVL V: CPT | Mod: PBBFAC,,, | Performed by: OTOLARYNGOLOGY

## 2022-12-19 PROCEDURE — 3288F PR FALLS RISK ASSESSMENT DOCUMENTED: ICD-10-PCS | Mod: CPTII,S$GLB,, | Performed by: OTOLARYNGOLOGY

## 2022-12-19 PROCEDURE — 1160F PR REVIEW ALL MEDS BY PRESCRIBER/CLIN PHARMACIST DOCUMENTED: ICD-10-PCS | Mod: CPTII,S$GLB,, | Performed by: OTOLARYNGOLOGY

## 2022-12-19 PROCEDURE — 99204 PR OFFICE/OUTPT VISIT, NEW, LEVL IV, 45-59 MIN: ICD-10-PCS | Mod: 25,S$GLB,, | Performed by: OTOLARYNGOLOGY

## 2022-12-19 PROCEDURE — 92557 COMPREHENSIVE HEARING TEST: CPT | Mod: S$GLB,,, | Performed by: PHYSICIAN ASSISTANT

## 2022-12-19 PROCEDURE — 92557 PR COMPREHENSIVE HEARING TEST: ICD-10-PCS | Mod: S$GLB,,, | Performed by: PHYSICIAN ASSISTANT

## 2022-12-19 PROCEDURE — 99204 OFFICE O/P NEW MOD 45 MIN: CPT | Mod: 25,S$GLB,, | Performed by: OTOLARYNGOLOGY

## 2022-12-19 PROCEDURE — 92567 PR TYMPA2METRY: ICD-10-PCS | Mod: S$GLB,,, | Performed by: PHYSICIAN ASSISTANT

## 2022-12-19 PROCEDURE — 1160F RVW MEDS BY RX/DR IN RCRD: CPT | Mod: CPTII,S$GLB,, | Performed by: OTOLARYNGOLOGY

## 2022-12-19 PROCEDURE — 95992 PR CANALITH REPOSITIONING PROCEDURE, PER DAY: ICD-10-PCS | Mod: S$GLB,,, | Performed by: OTOLARYNGOLOGY

## 2022-12-19 NOTE — PROGRESS NOTES
Chief Complaint   Patient presents with    Dizziness     X months   Happens when puts head back or forward    .     HPI:  The pt is a 72 y.o. female with a history of  dizziness. The problem began a few months ago. The problem is described as moderate. The sensation is characterized as being episodic. The sensation is also described as: a sensation of unsteadiness. This episodic sensation lasts 1-2 minutes. The following associated signs and symptoms are noted: weakness. The patient and/or caregiver deny the following: hearing loss, tinnitus, loss of consciousness, seizure activity, and focal neurological signs.     The patient has not had a full neurologic examination. The neurologic examination was reportedly normal. The patient has had the following tests to date:none. The patient has been treated with meclizine and diazepam.  This treatment has resulted in: minimal improvement,        Past Medical History:   Diagnosis Date    Cataract     COVID-19 03/2021    Hypertension     Stroke      Social History     Socioeconomic History    Marital status:    Tobacco Use    Smoking status: Never    Smokeless tobacco: Never   Substance and Sexual Activity    Alcohol use: Yes     Comment: occasionally    Drug use: Never    Sexual activity: Not Currently     Partners: Male     Social Determinants of Health     Financial Resource Strain: High Risk    Difficulty of Paying Living Expenses: Hard   Food Insecurity: Food Insecurity Present    Worried About Running Out of Food in the Last Year: Sometimes true    Ran Out of Food in the Last Year: Never true   Transportation Needs: No Transportation Needs    Lack of Transportation (Medical): No    Lack of Transportation (Non-Medical): No   Physical Activity: Inactive    Days of Exercise per Week: 0 days    Minutes of Exercise per Session: 0 min   Stress: Stress Concern Present    Feeling of Stress : To some extent   Social Connections: Moderately Integrated    Frequency of  Communication with Friends and Family: More than three times a week    Frequency of Social Gatherings with Friends and Family: More than three times a week    Attends Sabianism Services: More than 4 times per year    Active Member of Clubs or Organizations: No    Attends Club or Organization Meetings: Never    Marital Status:    Housing Stability: Low Risk     Unable to Pay for Housing in the Last Year: No    Number of Places Lived in the Last Year: 1    Unstable Housing in the Last Year: No     Past Surgical History:   Procedure Laterality Date    CATARACT EXTRACTION W/  INTRAOCULAR LENS IMPLANT Left 8/3/2021    Procedure: EXTRACTION, CATARACT, WITH IOL INSERTION;  Surgeon: Ji Shaikh MD;  Location: [facility] OR;  Service: Ophthalmology;  Laterality: Left;    CHOLECYSTECTOMY      COLONOSCOPY N/A 6/23/2020    Procedure: COLONOSCOPY;  Surgeon: Madelyn Finch MD;  Location: [facility] ENDO;  Service: Endoscopy;  Laterality: N/A;     Family History   Problem Relation Age of Onset    Breast cancer Maternal Cousin     Cataracts Mother     Heart disease Mother     No Known Problems Father     Diabetes Sister     Diabetes Brother     No Known Problems Daughter     No Known Problems Son     Amblyopia Neg Hx     Blindness Neg Hx     Glaucoma Neg Hx     Macular degeneration Neg Hx     Retinal detachment Neg Hx     Strabismus Neg Hx            Review of Systems  General: negative for chills, fever or weight loss  Psychological: negative for mood changes or depression  Ophthalmic: negative for blurry vision, photophobia or eye pain  ENT: see HPI  Respiratory: no cough, shortness of breath, or wheezing  Cardiovascular: no chest pain or dyspnea on exertion  Gastrointestinal: no abdominal pain, change in bowel habits, or black/ bloody stools  Musculoskeletal: negative for gait disturbance or muscular weakness  Neurological: no syncope or seizures; no ataxia  Dermatological: negative for puritis,  rash and  jaundice  Hematologic/lymphatic: no easy bruising, no new lumps or bumps      Physical Exam:    Vitals:    12/19/22 0921   BP: (!) 144/83   Pulse: 79       Constitutional: Well appearing / communicating without difficutly.  NAD.  Eyes: EOM I Bilaterally  Head/Face: Normocephalic.  Negative paranasal sinus pressure/tenderness.  Salivary glands WNL.  House Brackmann I Bilaterally.    Right Ear: Auricle normal appearance. External Auditory Canal within normal limits no lesions or masses,TM w/o masses/lesions/perforations. TM mobility noted.   Left Ear: Auricle normal appearance. External Auditory Canal within normal limits no lesions or masses,TM w/o masses/lesions/perforations. TM mobility noted.  Vestibular exam: negative romberg, negative fakuda step test, negative head thrust; negative Rhomberg  Positive Dixx-Hallpike test to right    Nose: No gross nasal septal deviation. Inferior Turbinates 3+ bilaterally. No septal perforation. No masses/lesions. External nasal skin appears normal without masses/lesions.  Oral Cavity: Gingiva/lips within normal limits.  Dentition/gingiva healthy appearing. Mucus membranes moist. Floor of mouth soft, no masses palpated. Oral Tongue mobile. Hard Palate appears normal.    Oropharynx: Base of tongue appears normal. No masses/lesions noted. Tonsillar fossa/pharyngeal wall without lesions. Posterior oropharynx WNL.  Soft palate without masses. Midline uvula.   Neck/Lymphatic: No LAD I-VI bilaterally.  No thyromegaly.  No masses noted on exam.          Diagnostic studies:  Audiogram interpreted personally by me and discussed in detail with the patient today. Normal hearing As; mild SNHL Ad at 2000 hertz; Type a tympanograms bilaterally.          Assessment:    ICD-10-CM ICD-9-CM    1. BPPV (benign paroxysmal positional vertigo), right  H81.11 386.11 Ambulatory referral/consult to ENT      2. Sensorineural hearing loss (SNHL) of right ear with restricted hearing of left ear  H90.A21  389.22         The primary encounter diagnosis was BPPV (benign paroxysmal positional vertigo), right. A diagnosis of Sensorineural hearing loss (SNHL) of right ear with restricted hearing of left ear was also pertinent to this visit.      Plan:  Provided the patient with the instruction for the Epley maneuvers         We had a long discussion regarding the relevant anatomy and pathology relevant to BPPV.  We discussed that in the ear there are three semicircular canals that detect rotational movement.  BPPV occurs as a result of otoconia, tiny crystals of calcium carbonate that are a normal part of the inner ears anatomy, detaching from their normal anatomic position and collecting in one of the semicircular canals.  When the head moves, the otoconia shift. This stimulates the cupula to send false signals to the brain, producing vertigo and triggering nystagmus (involuntary eye movements).  I have performed a CRM of the right ear and gave the patient necessary post-procedure instructions at that time.    We reviewed the patient's recent audiogram and hearing loss in detail.  We also discussed the use hearing protection when exposed to loud noise, including lawn equipment. Recommend annual audiogram.         Ellie Read MD

## 2022-12-19 NOTE — PROGRESS NOTES
"Julisa Jensen, a 72 y.o. female, was seen today in the clinic for an audiologic evaluation.  Ms. Jensen was seen with a professional  via [facility], # [id number]. Patient's main complaint was dizziness and a sensation of heaviness behind the right ear.  Ms. Jensen feels her hearing is unaffected but she does perceive a "boom" sound in the right ear.    Audiogram results revealed normal hearing in both ears with the exception of a mild sensorineural notch at 2000 Hz in the right ear.  Speech reception thresholds were noted at 10 dB in the right ear and 0 dB in the left ear.  Speech discrimination scores were not obtained due to English not being Ms. Jensen's primary language however she was comfortable repeating spondees. Tympanometry revealed Type A tympanograms in both ears.     Results were reviewed with the patient via Ricardo professional Breanna, # [id number].     Recommendations:  Otologic evaluation  Annual audiogram  Hearing protection when in noise              "

## 2022-12-19 NOTE — PATIENT INSTRUCTIONS
"  Patient Instruction After Office Treatments (Epley or Semont maneuvers)    After the maneuver is performed wait 10 minutes before going home to avoid quick spins. It takes time for the debris to settle in the correct location (think of a snow globe). Avoid driving yourself home.        Sleep semi-recumbent for the next few nights.  Sleep at a 45 degree angle (eg. Chair).  Stay vertical during the day.  Do not got to the dentist or hairdresser.       No exercise for a week.    For one week, avoid any head movements that could provoke your dizziness.  Use a couple of pillows when you sleep.  Avoid sleeping on the "bad" side.  Avoid quick head movements. Act as if you're wearing an  invisible neck brace.    Wait at least 7 days before doing the Falcon-Daroff exercise or home epley maneuver unless otherwise instructed by your physician. Complete the exercises 3 times before bed that way if you are dizzy symptoms can resolve while sleeping. Repeat every night for a week.    At one week post-treatment, put yourself in the position that usually makes you dizzy under conditions in which you can't fall or hurt yourself. Let your doctor know how you did.                                    HOME TREATMENT OF BPPV:    The Falcon-Daroff Exercises are a method of treating BPPV, usually used when the office treatment fails. They succeed in 95% of cases but are more arduous than the office treatments. These exercises are performed in three sets per day for two weeks. In each set, one performs the maneuver as shown five times.     1 repetition = maneuver done to each side in turn (takes 2 minutes)     Suggested Schedule for Falcon-Daroff exercises   Time Exercise Duration   Morning 5 repetitions 10 minutes   Noon 5 repetitions 10 minutes   Evening 5 repetitions 10 minutes     Start sitting upright (position 1). Then move into the side-lying position (position 2), with the head angled upward about senior care. An easy way to remember " this is to imagine someone standing about 6 feet in front of you, and just keep looking at their head at all times. Stay in the side-lying position for 30 seconds, or until the dizziness subsides if this is longer, then go back to the sitting position (position 3). Stay there for 30 seconds, and then go to the opposite side (position 4) and follow the same routine..    These exercises should be performed for two weeks, three times per day, or for three weeks, twice per day. This adds up to 52 sets in total. In most persons, complete relief from symptoms is obtained after 30 sets, or about 10 days. In approximately 30 percent of patients, BPPV will recur within one year. If BPPV recurs, you may wish to add one 10-minute exercise to your daily routine (Adriana et al, 1999). The Falcon-Daroff exercises as well as the Semont and Epley maneuvers are compared in an article by Ezekiel (1994), listed in the reference section.

## 2023-02-02 ENCOUNTER — PATIENT OUTREACH (OUTPATIENT)
Dept: ADMINISTRATIVE | Facility: HOSPITAL | Age: 73
End: 2023-02-02
Payer: MEDICARE

## 2023-02-02 ENCOUNTER — PATIENT MESSAGE (OUTPATIENT)
Dept: ADMINISTRATIVE | Facility: HOSPITAL | Age: 73
End: 2023-02-02
Payer: MEDICARE

## 2023-02-02 NOTE — PROGRESS NOTES
Care Everywhere updates requested and reviewed.  Immunizations reconciled. Media reports reviewed.  Duplicate HM overrides and  orders removed.  Overdue HM topic chart audit and/or requested.  Overdue lab testing linked to upcoming lab appointments if applies.    Health Maintenance Due   Topic Date Due    Hemoglobin A1c (Prediabetes)  Never done    High Dose Statin  Never done    Shingles Vaccine (2 of 2) 2021    COVID-19 Vaccine (3 - Booster for Moderna series) 2021    DEXA Scan  10/17/2022    Mammogram  2023

## 2023-02-08 ENCOUNTER — PATIENT OUTREACH (OUTPATIENT)
Dept: ADMINISTRATIVE | Facility: HOSPITAL | Age: 73
End: 2023-02-08
Payer: MEDICARE

## 2023-02-16 ENCOUNTER — OFFICE VISIT (OUTPATIENT)
Dept: FAMILY MEDICINE | Facility: CLINIC | Age: 73
End: 2023-02-16
Payer: MEDICARE

## 2023-02-16 VITALS
DIASTOLIC BLOOD PRESSURE: 82 MMHG | WEIGHT: 150.56 LBS | HEIGHT: 62 IN | SYSTOLIC BLOOD PRESSURE: 126 MMHG | BODY MASS INDEX: 27.7 KG/M2 | OXYGEN SATURATION: 96 % | HEART RATE: 94 BPM

## 2023-02-16 DIAGNOSIS — I10 ESSENTIAL HYPERTENSION: ICD-10-CM

## 2023-02-16 DIAGNOSIS — Z13.6 ENCOUNTER FOR SCREENING FOR CARDIOVASCULAR DISORDERS: ICD-10-CM

## 2023-02-16 DIAGNOSIS — R73.9 HYPERGLYCEMIA: ICD-10-CM

## 2023-02-16 DIAGNOSIS — I70.0 AORTIC ATHEROSCLEROSIS: ICD-10-CM

## 2023-02-16 DIAGNOSIS — Z79.899 MEDICATION MANAGEMENT: ICD-10-CM

## 2023-02-16 DIAGNOSIS — M25.511 ACUTE PAIN OF RIGHT SHOULDER: Primary | ICD-10-CM

## 2023-02-16 DIAGNOSIS — Z12.31 ENCOUNTER FOR SCREENING MAMMOGRAM FOR MALIGNANT NEOPLASM OF BREAST: ICD-10-CM

## 2023-02-16 PROCEDURE — 1160F RVW MEDS BY RX/DR IN RCRD: CPT | Mod: CPTII,S$GLB,, | Performed by: INTERNAL MEDICINE

## 2023-02-16 PROCEDURE — 99214 OFFICE O/P EST MOD 30 MIN: CPT | Mod: S$GLB,,, | Performed by: INTERNAL MEDICINE

## 2023-02-16 PROCEDURE — 3079F DIAST BP 80-89 MM HG: CPT | Mod: CPTII,S$GLB,, | Performed by: INTERNAL MEDICINE

## 2023-02-16 PROCEDURE — 3288F PR FALLS RISK ASSESSMENT DOCUMENTED: ICD-10-PCS | Mod: CPTII,S$GLB,, | Performed by: INTERNAL MEDICINE

## 2023-02-16 PROCEDURE — 3008F BODY MASS INDEX DOCD: CPT | Mod: CPTII,S$GLB,, | Performed by: INTERNAL MEDICINE

## 2023-02-16 PROCEDURE — 3074F SYST BP LT 130 MM HG: CPT | Mod: CPTII,S$GLB,, | Performed by: INTERNAL MEDICINE

## 2023-02-16 PROCEDURE — 3008F PR BODY MASS INDEX (BMI) DOCUMENTED: ICD-10-PCS | Mod: CPTII,S$GLB,, | Performed by: INTERNAL MEDICINE

## 2023-02-16 PROCEDURE — 3074F PR MOST RECENT SYSTOLIC BLOOD PRESSURE < 130 MM HG: ICD-10-PCS | Mod: CPTII,S$GLB,, | Performed by: INTERNAL MEDICINE

## 2023-02-16 PROCEDURE — 3288F FALL RISK ASSESSMENT DOCD: CPT | Mod: CPTII,S$GLB,, | Performed by: INTERNAL MEDICINE

## 2023-02-16 PROCEDURE — 1160F PR REVIEW ALL MEDS BY PRESCRIBER/CLIN PHARMACIST DOCUMENTED: ICD-10-PCS | Mod: CPTII,S$GLB,, | Performed by: INTERNAL MEDICINE

## 2023-02-16 PROCEDURE — 1101F PT FALLS ASSESS-DOCD LE1/YR: CPT | Mod: CPTII,S$GLB,, | Performed by: INTERNAL MEDICINE

## 2023-02-16 PROCEDURE — 1159F MED LIST DOCD IN RCRD: CPT | Mod: CPTII,S$GLB,, | Performed by: INTERNAL MEDICINE

## 2023-02-16 PROCEDURE — 99214 PR OFFICE/OUTPT VISIT, EST, LEVL IV, 30-39 MIN: ICD-10-PCS | Mod: S$GLB,,, | Performed by: INTERNAL MEDICINE

## 2023-02-16 PROCEDURE — 1101F PR PT FALLS ASSESS DOC 0-1 FALLS W/OUT INJ PAST YR: ICD-10-PCS | Mod: CPTII,S$GLB,, | Performed by: INTERNAL MEDICINE

## 2023-02-16 PROCEDURE — 1126F AMNT PAIN NOTED NONE PRSNT: CPT | Mod: CPTII,S$GLB,, | Performed by: INTERNAL MEDICINE

## 2023-02-16 PROCEDURE — 99999 PR PBB SHADOW E&M-EST. PATIENT-LVL V: ICD-10-PCS | Mod: PBBFAC,,, | Performed by: INTERNAL MEDICINE

## 2023-02-16 PROCEDURE — 3079F PR MOST RECENT DIASTOLIC BLOOD PRESSURE 80-89 MM HG: ICD-10-PCS | Mod: CPTII,S$GLB,, | Performed by: INTERNAL MEDICINE

## 2023-02-16 PROCEDURE — 1159F PR MEDICATION LIST DOCUMENTED IN MEDICAL RECORD: ICD-10-PCS | Mod: CPTII,S$GLB,, | Performed by: INTERNAL MEDICINE

## 2023-02-16 PROCEDURE — 99999 PR PBB SHADOW E&M-EST. PATIENT-LVL V: CPT | Mod: PBBFAC,,, | Performed by: INTERNAL MEDICINE

## 2023-02-16 PROCEDURE — 1126F PR PAIN SEVERITY QUANTIFIED, NO PAIN PRESENT: ICD-10-PCS | Mod: CPTII,S$GLB,, | Performed by: INTERNAL MEDICINE

## 2023-02-16 RX ORDER — CELECOXIB 200 MG/1
200 CAPSULE ORAL 2 TIMES DAILY PRN
Qty: 60 CAPSULE | Refills: 1 | Status: SHIPPED | OUTPATIENT
Start: 2023-02-16 | End: 2023-08-11

## 2023-02-16 RX ORDER — HYDROCHLOROTHIAZIDE 25 MG/1
12.5 TABLET ORAL DAILY
Qty: 90 TABLET | Refills: 1 | Status: SHIPPED | OUTPATIENT
Start: 2023-02-16 | End: 2024-01-25

## 2023-02-16 RX ORDER — AMLODIPINE BESYLATE 5 MG/1
5 TABLET ORAL DAILY
Qty: 90 TABLET | Refills: 3 | Status: SHIPPED | OUTPATIENT
Start: 2023-02-16 | End: 2024-02-22

## 2023-02-16 NOTE — PROGRESS NOTES
Subjective:       Patient ID: Julisa Jensen is a 72 y.o. female.    Chief Complaint: Follow-up      Follow-up  Associated symptoms include arthralgias. Pertinent negatives include no fever or weakness.   Julisa Jensen is a 72 y.o. female with chronic conditions of  HTN, vit D and B12 deficiency, anemia, and recently treated for vertigo  who presents today for follow-up.    Reports she went to see the ENT and with maneuver given her vertigo improved.  Has not had any more problems.    In the last few days has been having bilateral upper extremity pain.  More pronounced on right shoulder.  Denies any trauma but is active and sometimes can lift her rear things.  Denies numbness of swelling, or weakness.  In the past she has similar pain and improved with conservative management.  Has not used any medications.  Not interested in injections.    There are no new labs.  Last labs stable.  Reports compliance with medications.    Has no toxic habits.      Health Maintenance:  Health Maintenance   Topic Date Due    High Dose Statin  Never done    DEXA Scan  10/17/2022    Mammogram  01/13/2023    Lipid Panel  09/16/2027    TETANUS VACCINE  11/16/2030    Hepatitis C Screening  Completed       Review of Systems   Constitutional: Negative.  Negative for fever and unexpected weight change.   HENT: Negative.     Respiratory: Negative.     Cardiovascular: Negative.    Gastrointestinal: Negative.    Genitourinary:  Negative for difficulty urinating.   Musculoskeletal:  Positive for arthralgias.   Integumentary:  Negative.   Neurological: Negative.  Negative for weakness.   Psychiatric/Behavioral: Negative.      Past Medical History:   Diagnosis Date    Cataract     COVID-19 03/2021    Hypertension     Stroke        Past Surgical History:   Procedure Laterality Date    CATARACT EXTRACTION W/  INTRAOCULAR LENS IMPLANT Left 8/3/2021    Procedure: EXTRACTION, CATARACT, WITH IOL INSERTION;  Surgeon: Ji Shaikh MD;   Location: [facility] OR;  Service: Ophthalmology;  Laterality: Left;    CHOLECYSTECTOMY      COLONOSCOPY N/A 6/23/2020    Procedure: COLONOSCOPY;  Surgeon: Madelyn Finch MD;  Location: [facility] ENDO;  Service: Endoscopy;  Laterality: N/A;       Family History   Problem Relation Age of Onset    Breast cancer Maternal Cousin     Cataracts Mother     Heart disease Mother     No Known Problems Father     Diabetes Sister     Diabetes Brother     No Known Problems Daughter     No Known Problems Son     Amblyopia Neg Hx     Blindness Neg Hx     Glaucoma Neg Hx     Macular degeneration Neg Hx     Retinal detachment Neg Hx     Strabismus Neg Hx        Social History     Socioeconomic History    Marital status:    Tobacco Use    Smoking status: Never    Smokeless tobacco: Never   Substance and Sexual Activity    Alcohol use: Yes     Comment: occasionally    Drug use: Never    Sexual activity: Not Currently     Partners: Male       Current Outpatient Medications   Medication Sig Dispense Refill    cetirizine (ZYRTEC) 10 MG tablet Take 1 tablet (10 mg total) by mouth once daily. 30 tablet 0    cholecalciferol, vitamin D3, (VITAMIN D3) 50 mcg (2,000 unit) Cap Take 1 capsule (2,000 Units total) by mouth once daily. 90 capsule 3    cyanocobalamin, vitamin B-12, 1,000 mcg Cap Take 1 capsule by mouth once daily. 90 capsule 3    fluticasone propionate (FLONASE) 50 mcg/actuation nasal spray 1 spray (50 mcg total) by Each Nostril route once daily. 18.2 mL 0    ketoconazole (NIZORAL) 2 % shampoo Apply topically twice a week. 120 mL 2    meclizine (ANTIVERT) 12.5 mg tablet Take 1 tablet (12.5 mg total) by mouth 3 (three) times daily as needed for Dizziness or Nausea. 90 tablet 1    methocarbamoL (ROBAXIN) 500 MG Tab Take 1 tablet (500 mg total) by mouth 2 (two) times daily as needed (muscle spasm). 15 tablet 0    tretinoin (RETIN-A) 0.025 % cream Apply topically Every 3 (three) days.      amLODIPine (NORVASC) 5 MG tablet Take 1 tablet (5  mg total) by mouth once daily. 90 tablet 3    aspirin (ECOTRIN) 81 MG EC tablet Take 1 tablet (81 mg total) by mouth once daily. 30 tablet 11    celecoxib (CELEBREX) 200 MG capsule Take 1 capsule (200 mg total) by mouth 2 (two) times daily as needed for Pain. 60 capsule 1    diazePAM (VALIUM) 2 MG tablet Take 1 tablet (2 mg total) by mouth every 12 (twelve) hours as needed (Vertigo). 30 tablet 0    hydroCHLOROthiazide (HYDRODIURIL) 25 MG tablet Take 0.5 tablets (12.5 mg total) by mouth once daily. 90 tablet 1     No current facility-administered medications for this visit.       Review of patient's allergies indicates:   Allergen Reactions    Penicillins          Objective:       Last 3 sets of Vitals    Vitals - 1 value per visit 12/19/2022 2/16/2023 2/16/2023   SYSTOLIC 144 - 126   DIASTOLIC 83 - 82   Pulse 79 - 94   Temp - - -   Resp - - -   SPO2 - - 96   Weight (lb) - - 150.57   Weight (kg) - - 68.3   Height - - 62   BMI (Calculated) - - 27.5   VISIT REPORT - - -   Pain Score  - 0 -   Some recent data might be hidden   Physical Exam  Constitutional:       General: She is not in acute distress.  HENT:      Head: Normocephalic.      Right Ear: Tympanic membrane, ear canal and external ear normal.      Left Ear: Tympanic membrane, ear canal and external ear normal.      Nose: Nose normal.      Mouth/Throat:      Mouth: Mucous membranes are moist.   Eyes:      General: No scleral icterus.     Extraocular Movements: Extraocular movements intact.      Conjunctiva/sclera: Conjunctivae normal.   Neck:      Vascular: No carotid bruit.      Comments: No goiter.  Cardiovascular:      Rate and Rhythm: Normal rate and regular rhythm.      Pulses: Normal pulses.      Heart sounds: Normal heart sounds.   Pulmonary:      Effort: Pulmonary effort is normal.      Breath sounds: Normal breath sounds.   Abdominal:      General: Bowel sounds are normal. There is no distension.      Palpations: Abdomen is soft. There is no mass.       Tenderness: There is no abdominal tenderness.   Musculoskeletal:         General: No swelling. Normal range of motion.      Comments: Good active range of motion.  Has deltoid pain with abduction on the right side.   Lymphadenopathy:      Cervical: No cervical adenopathy.   Skin:     General: Skin is warm and dry.   Neurological:      General: No focal deficit present.      Mental Status: She is alert and oriented to person, place, and time.      Motor: No weakness.   Psychiatric:         Mood and Affect: Mood normal.         Behavior: Behavior normal.         CBC:  Recent Labs   Lab 03/08/21 0334 03/21/22 0736 09/16/22  0819   WBC 5.31 5.90 5.00   RBC 4.49 4.59 4.45   Hemoglobin 12.6 13.1 12.6   Hematocrit 38.2 38.9 37.5   Platelets 329 309 319   MCV 85 85 84   MCH 28.1 28.5 28.3   MCHC 33.0 33.7 33.6     CMP:  Recent Labs   Lab 03/07/21  0441 03/08/21  0333 03/21/22  0736   Glucose 103 103 97   Calcium 8.1 L 8.5 L 9.5   Albumin 3.1 L 3.2 L 3.9   Total Protein 7.0 7.2 8.3   Sodium 141 141 140   Potassium 3.8 4.0 3.6   CO2 26 27 28   Chloride 105 105 105   BUN 15 16 13   Creatinine 0.6 0.7 0.7   Alkaline Phosphatase 45 L 46 L 88   ALT 42 66 H 25   AST 37 56 H 22   Total Bilirubin 0.4 0.4 0.6     URINALYSIS:       LIPIDS:  Recent Labs   Lab 10/15/20  0817 03/21/22  0736 09/16/22  0819   TSH 2.375 3.603 2.511   HDL 52 45 45   Cholesterol 165 174 163   Triglycerides 86 115 74   LDL Cholesterol 95.8 106.0 103.2   HDL/Cholesterol Ratio 31.5 25.9 27.6   Non-HDL Cholesterol 113 129 118   Total Cholesterol/HDL Ratio 3.2 3.9 3.6     TSH:  Recent Labs   Lab 10/15/20  0817 03/21/22  0736 09/16/22  0819   TSH 2.375 3.603 2.511       A1C:        Imaging:  Holter monitor - 48 hour  Predominant rhythm sinus rhythm with heart rates varying between 53 and   118 BPM with an average of 80 BPM.   There were very rare PVCs totalling 8 and averaging 0.17 per hour.  There were very rare PACs totalling 10 and averaging 0.21 per  hour.  Several short runs of tachycardia were noted, likely atrial with aberrancy   during daytime hours maximum number of beats 5, heart rate 108 BPM.  The diary was returned incomplete.      Assessment:       1. Acute pain of right shoulder    2. Essential hypertension    3. Hyperglycemia    4. Aortic atherosclerosis    5. Encounter for screening for cardiovascular disorders    6. Medication management    7. Encounter for screening mammogram for malignant neoplasm of breast              Plan:       1. Acute pain of right shoulder  -     suspect rotator cough problem.  Has history of rotator cuff tear on the left side.  -   celecoxib (CELEBREX) 200 MG capsule; Take 1 capsule (200 mg total) by mouth 2 (two) times daily as needed for Pain.  Dispense: 60 capsule; Refill: 1  -     CBC Auto Differential; Future; Expected date: 02/16/2023  -     Ambulatory referral/consult to Physical/Occupational Therapy; Future; Expected date: 02/16/2023    2. Essential hypertension  -    controlled  -  CBC Auto Differential; Future; Expected date: 02/16/2023  -     Comprehensive Metabolic Panel; Future; Expected date: 02/16/2023  -     TSH; Future; Expected date: 02/16/2023  -     hydroCHLOROthiazide (HYDRODIURIL) 25 MG tablet; Take 0.5 tablets (12.5 mg total) by mouth once daily.  Dispense: 90 tablet; Refill: 1  -     amLODIPine (NORVASC) 5 MG tablet; Take 1 tablet (5 mg total) by mouth once daily.  Dispense: 90 tablet; Refill: 3    3. Hyperglycemia  -     elevating glucose level is not accurate as labs were not fasting.  - Hemoglobin A1C; Future; Expected date: 02/16/2023    4. Aortic atherosclerosis  Overview:  Seen on Ct abd 2/5/20  history of mini stroke    Orders:  -     Lipid Panel; Future; Expected date: 02/16/2023    5. Encounter for screening for cardiovascular disorders  -     10 year cardiovascular risk level was borderline intermediate  - CT Cardiac Scoring; Future; Expected date: 02/16/2023    6. Medication  management  -     TSH; Future; Expected date: 02/16/2023    7. Encounter for screening mammogram for malignant neoplasm of breast  -     Mammo Digital Screening Bilat w/ Kirby; Future; Expected date: 02/16/2023  Pending bone density     Health Maintenance Due   Topic Date Due    Hemoglobin A1c (Prediabetes)  Never done    High Dose Statin  Never done    Shingles Vaccine (2 of 2) 01/11/2021    COVID-19 Vaccine (3 - Booster for Moderna series) 09/03/2021    DEXA Scan  10/17/2022    Mammogram  01/13/2023            Return to clinic in 3 months.    Sandra Stafford MD  [facility]  Disclaimer:  This note has been generated using voice-recognition software. There may be grammatical or spelling errors that have been missed during proof-reading

## 2023-03-07 ENCOUNTER — CLINICAL SUPPORT (OUTPATIENT)
Dept: REHABILITATION | Facility: HOSPITAL | Age: 73
End: 2023-03-07
Attending: INTERNAL MEDICINE
Payer: MEDICARE

## 2023-03-07 DIAGNOSIS — M25.60 STIFFNESS DUE TO IMMOBILITY: ICD-10-CM

## 2023-03-07 DIAGNOSIS — M79.601 PAIN OF RIGHT UPPER EXTREMITY: ICD-10-CM

## 2023-03-07 DIAGNOSIS — R53.1 WEAKNESS: ICD-10-CM

## 2023-03-07 DIAGNOSIS — M25.511 ACUTE PAIN OF RIGHT SHOULDER: ICD-10-CM

## 2023-03-07 DIAGNOSIS — Z74.09 STIFFNESS DUE TO IMMOBILITY: ICD-10-CM

## 2023-03-07 PROCEDURE — 97110 THERAPEUTIC EXERCISES: CPT | Mod: PN

## 2023-03-07 PROCEDURE — 97165 OT EVAL LOW COMPLEX 30 MIN: CPT | Mod: PN

## 2023-03-08 ENCOUNTER — HOSPITAL ENCOUNTER (OUTPATIENT)
Dept: RADIOLOGY | Facility: HOSPITAL | Age: 73
Discharge: HOME | End: 2023-03-08
Attending: INTERNAL MEDICINE
Payer: MEDICARE

## 2023-03-08 DIAGNOSIS — Z12.31 ENCOUNTER FOR SCREENING MAMMOGRAM FOR MALIGNANT NEOPLASM OF BREAST: ICD-10-CM

## 2023-03-08 DIAGNOSIS — Z78.0 POSTMENOPAUSE: ICD-10-CM

## 2023-03-08 PROCEDURE — 77080 DXA BONE DENSITY AXIAL: CPT | Mod: 26,,, | Performed by: RADIOLOGY

## 2023-03-08 PROCEDURE — 77063 BREAST TOMOSYNTHESIS BI: CPT | Mod: 26,,, | Performed by: RADIOLOGY

## 2023-03-08 PROCEDURE — 77067 SCR MAMMO BI INCL CAD: CPT | Mod: TC

## 2023-03-08 PROCEDURE — 77067 SCR MAMMO BI INCL CAD: CPT | Mod: 26,,, | Performed by: RADIOLOGY

## 2023-03-08 PROCEDURE — 77080 DEXA BONE DENSITY SPINE HIP: ICD-10-PCS | Mod: 26,,, | Performed by: RADIOLOGY

## 2023-03-08 PROCEDURE — 77067 MAMMO DIGITAL SCREENING BILAT WITH TOMO: ICD-10-PCS | Mod: 26,,, | Performed by: RADIOLOGY

## 2023-03-08 PROCEDURE — 77063 MAMMO DIGITAL SCREENING BILAT WITH TOMO: ICD-10-PCS | Mod: 26,,, | Performed by: RADIOLOGY

## 2023-03-08 PROCEDURE — 77080 DXA BONE DENSITY AXIAL: CPT | Mod: TC

## 2023-03-14 NOTE — PROGRESS NOTES
"  Occupational Therapy Treatment Note     Date: 3/15/2023  Name: Julisa Jensen  Clinic Number: [id number]    Therapy Diagnosis:   Encounter Diagnoses   Name Primary?    Pain of right upper extremity Yes    Weakness     Stiffness due to immobility      Physician: Sandra Stafford MD    Physician Orders: OT evaluate and treat  Medical Diagnosis: M25.511 (ICD-10-CM) - Acute pain of right shoulder  Evaluation Date: 3/7/2023  Insurance Authorization period Expiration: 12/31/2023  Plan of Care Expiration Period: 6/2/2023  Next MD appointment:5/18/20232     Visit # / Visits Authorized: 2/ 1  Time In:9:15  Time Out: 10:00  Total Billable Time: 45 minutes     Precautions: Standard     Subjective     Pt reports: "I feel okay today."  she was compliant with home exercise program given last session.   Response to previous treatment:decreased pain  Functional change: tolerated progression of treatment well    Pain: 3/10  Location: left shoulder        Objective     Objective Measures updated at progress report unless specified.   Sensation Test: Patient denies any numbness/tingling     Observation/Inspection:  neither rounded shoulders nor forward head     Range of Motion/Strength:   Shoulder   Left     Right   Pain/Dysfunction with Movement     AROM PROM MMT AROM PROM MMT     Flexion 140 WFL 3/5 140 WFL 3/5     Extension 55 WFL 3/5 45 WFL 3/5     Abduction 120 WFL 3/5 110 WFL 3/5     HorizAdduction 15 WFL 3/5 30 WFL 3/5     Internal rotation L4 WFL 3/5 L4 WFL 3/5     ER at 90° abd 50 WFL 3/5 80 WFL 3/5     ER at 0° abd 35 WFL 3/5 20 WFL 3/5     NT=Not tested  ROM Comments: Pain at end range     Painful Arc: noted in left     Tenderness upon Palpation:      Positive: AC joint, Bicipital Groove, and Supraspinatus Region     Special Tests:  AC Joint Left Right   AC Joint Compression Test - -   Empty Can Test + -   Drop Arm test + -   Champagne Toast + -   Resisted External Rotation 45* Internal Roatation + -   Bear Hug - -   Brandt's - " -   Hawkin's Kenndy + +   Neer's Test + +   Yergason's -- -   Anterior Apprehension test - -      Scapular Control/Dyskinesis:     Normal / Subtle / Obvious  Comments    Left  subtle -    Right  subtle -         CMS Impairment/Limitation/Restriction for FOTO Shoulder Survey     Therapist reviewed FOTO scores for Julisa Jensen on 3/7/2023.   FOTO documents entered into [employer] - see Media section.     Limitation Score: 51%  Category: Self Care         Treatment     Julisa received the following manual therapy techniques for 10 minutes:   -consisting of patient supine for Bilateral biceps and upper trapezius soft tissue mobilization, pectoralis lift, subscapularis myofascial release and stretch, PROM with endrange stretching, glenohumeral joint inferior anterior posterior glides grade I-II, gentle shoulder oscillations    Julisa received therapeutic exercises for 35 minutes including:  Exercises        PROM (Bilateral) Shoulder Flexion/Abduction/Internal rotation/External Rotation 10x     Supine dowel Flexion 1  2/15   Sidelying Abduction  0  2/15   Sidelying External Rotation 0  2/15   pulleys 3'   Wall slides towel  2/15   Theraband Extension pull downs Red  2/15   Theraband Rows Red  2/15   Theraband Horizontal abduction Red  2/15   Theraband External Rotation Red  2/15     Home Exercises and Education Provided     Education provided:   - Progress towards goals     Written Home Exercises Provided: Patient instructed to cont prior HEP.  Exercises were reviewed and Julisa was able to demonstrate them prior to the end of the session.  Julisa demonstrated good  understanding of the HEP provided.   .   See EMR under Patient Instructions for exercises provided prior visit.        Assessment     Pt with good participation this date. Pain report low throughout session. Pt with soft tissue restrictions noted in bilateral biceps and pectoralis greater on left versus right. She was able to perform all exercises in a pain free  range of motion and good technique. Pt very motivated.     Julisa is progressing well towards her goals and there are no updates to goals at this time. Pt prognosis is Good.     Pt will continue to benefit from skilled outpatient occupational therapy to address the deficits listed in the problem list on initial evaluation provide pt/family education and to maximize pt's level of independence in the home and community environment.     Anticipated barriers to occupational therapy: none    Pt's spiritual, cultural and educational needs considered and pt agreeable to plan of care and goals.    Goals:  Short Term Goals to be met in 4 weeks: (4/7/2023)  1) Initiate Hep-met, ongoing  2) Pt will increase Bilateral shoulder AROM by 10 degrees grossly for improved performance with overhead activities of daily living's -Not met, progressing  3) Pt will report 5/10 pain in (Bilateral)shoulder at worst -Not met, progressing  4) Pt will demonstrate increased MMT to 4-/5 grossly Bilateral shoulder -Not met, progressing  5) Patient will be able to achieve less than or equal to 40% on FOTO shoulder survey demonstrating overall improved functional ability with upper extremity. -Not met, progressing      Long Term Goals to be met by discharge:  1) Independent with home exercise program -Not met, progressing  2) Pt will demonstrate (Right) shoulder AROM WNL grossly for [place] with activities of daily living's -Not met, progressing  3) Pt will demonstrate (Right) shoulder MMT WNL grossly for [place] with functional activities -Not met, progressing  4) Independent and pain free with ADL's and IADL's -Not met, progressing  5) Patient will be able to achieve less than or equal to 25% on FOTO shoulder survey demonstrating overall improved functional ability with upper extremity. -Not met, progressing     Plan   Continue with OT plan of care  Updates/Grading for next session: progress as able      ANA CRISTINA Mathews

## 2023-03-15 ENCOUNTER — CLINICAL SUPPORT (OUTPATIENT)
Dept: REHABILITATION | Facility: HOSPITAL | Age: 73
End: 2023-03-15
Payer: MEDICARE

## 2023-03-15 DIAGNOSIS — M25.60 STIFFNESS DUE TO IMMOBILITY: ICD-10-CM

## 2023-03-15 DIAGNOSIS — Z74.09 STIFFNESS DUE TO IMMOBILITY: ICD-10-CM

## 2023-03-15 DIAGNOSIS — M79.601 PAIN OF RIGHT UPPER EXTREMITY: Primary | ICD-10-CM

## 2023-03-15 DIAGNOSIS — R53.1 WEAKNESS: ICD-10-CM

## 2023-03-15 PROCEDURE — 97140 MANUAL THERAPY 1/> REGIONS: CPT | Mod: PN

## 2023-03-15 PROCEDURE — 97110 THERAPEUTIC EXERCISES: CPT | Mod: PN

## 2023-03-16 ENCOUNTER — PATIENT MESSAGE (OUTPATIENT)
Dept: FAMILY MEDICINE | Facility: CLINIC | Age: 73
End: 2023-03-16
Payer: MEDICARE

## 2023-03-21 ENCOUNTER — HOSPITAL ENCOUNTER (OUTPATIENT)
Dept: RADIOLOGY | Facility: HOSPITAL | Age: 73
Discharge: HOME | End: 2023-03-21
Attending: INTERNAL MEDICINE
Payer: MEDICARE

## 2023-03-21 ENCOUNTER — PATIENT MESSAGE (OUTPATIENT)
Dept: FAMILY MEDICINE | Facility: CLINIC | Age: 73
End: 2023-03-21
Payer: MEDICARE

## 2023-03-21 DIAGNOSIS — Z13.6 ENCOUNTER FOR SCREENING FOR CARDIOVASCULAR DISORDERS: ICD-10-CM

## 2023-03-21 PROCEDURE — 75571 CT CALCIUM SCORING CARDIAC: ICD-10-PCS | Mod: 26,,, | Performed by: RADIOLOGY

## 2023-03-21 PROCEDURE — 75571 CT HRT W/O DYE W/CA TEST: CPT | Mod: TC

## 2023-03-21 PROCEDURE — 75571 CT HRT W/O DYE W/CA TEST: CPT | Mod: 26,,, | Performed by: RADIOLOGY

## 2023-03-22 ENCOUNTER — CLINICAL SUPPORT (OUTPATIENT)
Dept: REHABILITATION | Facility: HOSPITAL | Age: 73
End: 2023-03-22
Payer: MEDICARE

## 2023-03-22 DIAGNOSIS — M25.60 STIFFNESS DUE TO IMMOBILITY: ICD-10-CM

## 2023-03-22 DIAGNOSIS — R53.1 WEAKNESS: ICD-10-CM

## 2023-03-22 DIAGNOSIS — Z74.09 STIFFNESS DUE TO IMMOBILITY: ICD-10-CM

## 2023-03-22 DIAGNOSIS — M79.601 PAIN OF RIGHT UPPER EXTREMITY: Primary | ICD-10-CM

## 2023-03-22 PROCEDURE — 97110 THERAPEUTIC EXERCISES: CPT | Mod: PN

## 2023-03-22 PROCEDURE — 97140 MANUAL THERAPY 1/> REGIONS: CPT | Mod: PN

## 2023-03-28 NOTE — PROGRESS NOTES
"  Occupational Therapy Treatment Note     Date: 3/29/2023  Name: Julisa Jensen  Clinic Number: [id number]    Therapy Diagnosis:   Encounter Diagnoses   Name Primary?    Pain of right upper extremity Yes    Weakness     Stiffness due to immobility        Physician: Sandra Stafford MD    Physician Orders: OT evaluate and treat  Medical Diagnosis: M25.511 (ICD-10-CM) - Acute pain of right shoulder  Evaluation Date: 3/7/2023  Insurance Authorization period Expiration: 12/31/2023  Plan of Care Expiration Period: 6/2/2023  Next MD appointment:5/18/20232     Visit # / Visits Authorized: 4/ 1  Time In:9:30  Time Out: 10:15  Total Billable Time: 45 minutes     Precautions: Standard     Subjective     Pt reports: "I'm feeling so much better."  she was compliant with home exercise program given last session.   Response to previous treatment:decreased pain  Functional change: improved range of motion     Pain: 1-2/10  Location: left shoulder      Objective     Objective Measures updated at progress report unless specified.   Sensation Test: Patient denies any numbness/tingling     Range of Motion/Strength:   Shoulder   Left     Right   Pain/Dysfunction with Movement     AROM PROM MMT AROM PROM MMT     Flexion 130(-10) WFL 3/5 145(+5) WFL 3/5     Extension 50(-5) WFL 3/5 45(=) WFL 3/5     Abduction 130(+10) WFL 3/5 140(+30) WFL 3/5     HorizAdduction 15(=) WFL 3/5 30(=) WFL 3/5     Internal rotation L4(=) WFL 3/5 L4(=) WFL 3/5     ER at 90° abd 75(+25) WFL 3/5 90(+10) WFL 3/5     ER at 0° abd 50(+15) WFL 3/5 55(+35) WFL 3/5     NT=Not tested  ROM Comments: Pain at end range     Painful Arc: noted in left     Tenderness upon Palpation:      Positive: AC joint, Bicipital Groove, and Supraspinatus Region     Special Tests:  AC Joint Left Right   AC Joint Compression Test - -   Empty Can Test + -   Drop Arm test + -   Champagne Toast + -   Resisted External Rotation 45* Internal Roatation + -   Bear Hug - -   Brandt's - -   Hawkin's " Steven + +   Neer's Test + +   Yergason's -- -   Anterior Apprehension test - -      Scapular Control/Dyskinesis:     Normal / Subtle / Obvious  Comments    Left  subtle -    Right  subtle -         CMS Impairment/Limitation/Restriction for FOTO Shoulder Survey     Therapist reviewed FOTO scores for Julisa Jensen on 3/7/2023.   FOTO documents entered into [email or web address] - see Media section.     Limitation Score: 51%  Category: Self Care         Treatment     Julisa received the following manual therapy techniques for 10 minutes:   -consisting of patient supine for Bilateral biceps and upper trapezius soft tissue mobilization, pectoralis lift, subscapularis myofascial release and stretch, PROM with endrange stretching, glenohumeral joint inferior anterior posterior glides grade I-II, gentle shoulder oscillations    Julisa received therapeutic exercises for 35 minutes including:  Exercises        PROM (Bilateral) Shoulder Flexion/Abduction/Internal rotation/External Rotation 10x     Supine dowel Flexion 2  2/15   Sidelying Abduction  1  2/15   Sidelying External Rotation 1  2/15   pulleys 3'   Wall slides towel  2/15   Theraband Extension pull downs Green  2/15   Theraband Rows Green  2/15   Theraband Horizontal abduction Green  2/15   Theraband External Rotation Green  2/15     Home Exercises and Education Provided     Education provided:   - Progress towards goals     Written Home Exercises Provided: Patient instructed to cont prior HEP.  Exercises were reviewed and Julisa was able to demonstrate them prior to the end of the session.  Julisa demonstrated good  understanding of the HEP provided.     See EMR under Patient Instructions for exercises provided prior visit.      Assessment     Pt with good participation this date. Pain report remains low in sessions. Pt with soft tissue restrictions noted in bilateral biceps and pectoralis greater on left versus right. She was able to increase weight and resistance with exercises  performing all in a pain free range of motion and good technique. Range of motion steadily improving. Pt very motivated.     Julisa is progressing well towards her goals and there are no updates to goals at this time. Pt prognosis is Good.     Pt will continue to benefit from skilled outpatient occupational therapy to address the deficits listed in the problem list on initial evaluation provide pt/family education and to maximize pt's level of independence in the home and community environment.     Anticipated barriers to occupational therapy: none    Pt's spiritual, cultural and educational needs considered and pt agreeable to plan of care and goals.    Goals:  Short Term Goals to be met in 4 weeks: (4/7/2023)  1) Initiate Hep-met, ongoing  2) Pt will increase Bilateral shoulder AROM by 10 degrees grossly for improved performance with overhead activities of daily living's -met  3) Pt will report 5/10 pain in (Bilateral)shoulder at worst -met  4) Pt will demonstrate increased MMT to 4-/5 grossly Bilateral shoulder -met  5) Patient will be able to achieve less than or equal to 40% on FOTO shoulder survey demonstrating overall improved functional ability with upper extremity. -Not met, progressing      Long Term Goals to be met by discharge:  1) Independent with home exercise program -Not met, progressing  2) Pt will demonstrate (Right) shoulder AROM WNL grossly for [place] with activities of daily living's -Not met, progressing  3) Pt will demonstrate (Right) shoulder MMT WNL grossly for [place] with functional activities -Not met, progressing  4) Independent and pain free with ADL's and IADL's -Not met, progressing  5) Patient will be able to achieve less than or equal to 25% on FOTO shoulder survey demonstrating overall improved functional ability with upper extremity. -Not met, progressing     Plan   Continue with OT plan of care  Updates/Grading for next session: progress as able      Lara Dominguez,  [facility]

## 2023-03-29 ENCOUNTER — CLINICAL SUPPORT (OUTPATIENT)
Dept: REHABILITATION | Facility: HOSPITAL | Age: 73
End: 2023-03-29
Payer: MEDICARE

## 2023-03-29 DIAGNOSIS — M79.601 PAIN OF RIGHT UPPER EXTREMITY: Primary | ICD-10-CM

## 2023-03-29 DIAGNOSIS — Z74.09 STIFFNESS DUE TO IMMOBILITY: ICD-10-CM

## 2023-03-29 DIAGNOSIS — R53.1 WEAKNESS: ICD-10-CM

## 2023-03-29 DIAGNOSIS — M25.60 STIFFNESS DUE TO IMMOBILITY: ICD-10-CM

## 2023-03-29 PROCEDURE — 97110 THERAPEUTIC EXERCISES: CPT | Mod: PN

## 2023-03-29 PROCEDURE — 97140 MANUAL THERAPY 1/> REGIONS: CPT | Mod: PN

## 2023-04-04 NOTE — PROGRESS NOTES
Subjective:       Patient ID: Julisa Jensen is a 72 y.o. female.    Chief Complaint: No chief complaint on file.      HPI  Julisa Jensen is a 72 y.o. female with chronic conditions of HTN, vit D and B12 deficiency, and anemia  who presents today for dizziness evaluation.    Patient reports around 12 days ago she went to the beach and a waived hit her ear and later that evening started with dizziness.  Symptoms persisted and went to the urgent care.  Meclizine and Zofran were prescribed but reports cause as a lot of drowsiness and still is dizzy and loses her balance easily.  Has not had any falls.  Denies fever, chills, shortness of breath, sinus congestion, knee body closer with similar symptoms.    She called the office with persistent of symptoms and Valium was prescribed.  Reports she is tolerating Valium better and has had slight improvement of her dizziness but still persistent.      Her blood pressure has been stable but reports occasional episodes of palpitations which can be present even at rest.      Health Maintenance:  Health Maintenance   Topic Date Due    High Dose Statin  Never done    DEXA Scan  10/17/2022    Mammogram  01/13/2023    Lipid Panel  03/21/2027    TETANUS VACCINE  11/16/2030    Hepatitis C Screening  Completed       Review of Systems   Constitutional: Negative.  Negative for fever and unexpected weight change.   HENT: Negative.  Negative for nasal congestion, sinus pressure/congestion and sore throat.    Respiratory: Negative.  Negative for cough and shortness of breath.    Cardiovascular: Negative.  Negative for chest pain.   Gastrointestinal: Positive for nausea. Negative for abdominal pain, change in bowel habit and change in bowel habit.   Genitourinary: Negative for difficulty urinating.   Musculoskeletal: Negative.    Integumentary:  Negative.   Neurological: Positive for dizziness and light-headedness.   Psychiatric/Behavioral: Negative.       Past Medical History:    Diagnosis Date    Cataract     COVID-19 03/2021    Hypertension     Stroke        Past Surgical History:   Procedure Laterality Date    CATARACT EXTRACTION W/  INTRAOCULAR LENS IMPLANT Left 8/3/2021    Procedure: EXTRACTION, CATARACT, WITH IOL INSERTION;  Surgeon: Ji Shaikh MD;  Location: [facility] OR;  Service: Ophthalmology;  Laterality: Left;    CHOLECYSTECTOMY      COLONOSCOPY N/A 6/23/2020    Procedure: COLONOSCOPY;  Surgeon: Madelyn Finch MD;  Location: [facility] ENDO;  Service: Endoscopy;  Laterality: N/A;       Family History   Problem Relation Age of Onset    Breast cancer Maternal Cousin     Cataracts Mother     Heart disease Mother     No Known Problems Father     Diabetes Sister     Diabetes Brother     No Known Problems Daughter     No Known Problems Son     Amblyopia Neg Hx     Blindness Neg Hx     Glaucoma Neg Hx     Macular degeneration Neg Hx     Retinal detachment Neg Hx     Strabismus Neg Hx        Social History     Socioeconomic History    Marital status:    Tobacco Use    Smoking status: Never Smoker    Smokeless tobacco: Never Used   Substance and Sexual Activity    Alcohol use: Yes     Comment: occasionally    Drug use: Never    Sexual activity: Not Currently     Partners: Male     Social Determinants of Health     Financial Resource Strain: High Risk    Difficulty of Paying Living Expenses: Hard   Food Insecurity: Food Insecurity Present    Worried About Running Out of Food in the Last Year: Sometimes true    Ran Out of Food in the Last Year: Never true   Transportation Needs: No Transportation Needs    Lack of Transportation (Medical): No    Lack of Transportation (Non-Medical): No   Physical Activity: Inactive    Days of Exercise per Week: 0 days    Minutes of Exercise per Session: 0 min   Stress: Stress Concern Present    Feeling of Stress : To some extent   Social Connections: Moderately Integrated    Frequency of Communication with  Previously Negative (within the last year) Friends and Family: More than three times a week    Frequency of Social Gatherings with Friends and Family: More than three times a week    Attends Oriental orthodox Services: More than 4 times per year    Active Member of Clubs or Organizations: No    Attends Club or Organization Meetings: Never    Marital Status:    Housing Stability: Low Risk     Unable to Pay for Housing in the Last Year: No    Number of Places Lived in the Last Year: 1    Unstable Housing in the Last Year: No       Current Outpatient Medications   Medication Sig Dispense Refill    amLODIPine (NORVASC) 5 MG tablet TAKE 1 TABLET BY MOUTH EVERY DAY 90 tablet 3    aspirin (ECOTRIN) 81 MG EC tablet Take 1 tablet (81 mg total) by mouth once daily. 30 tablet 11    cetirizine (ZYRTEC) 10 MG tablet Take 1 tablet (10 mg total) by mouth once daily. 30 tablet 0    cholecalciferol, vitamin D3, (VITAMIN D3) 50 mcg (2,000 unit) Cap Take 1 capsule (2,000 Units total) by mouth once daily. 90 capsule 3    cyanocobalamin, vitamin B-12, 1,000 mcg Cap Take 1 capsule by mouth once daily. 90 capsule 3    diazePAM (VALIUM) 2 MG tablet Take 1 tablet (2 mg total) by mouth every 12 (twelve) hours as needed (Vertigo). 10 tablet 0    fluticasone propionate (FLONASE) 50 mcg/actuation nasal spray 1 spray (50 mcg total) by Each Nostril route once daily. 18.2 mL 0    hydroCHLOROthiazide (HYDRODIURIL) 25 MG tablet Take 0.5 tablets (12.5 mg total) by mouth once daily. 90 tablet 1    meclizine (ANTIVERT) 12.5 mg tablet Take 1 tablet (12.5 mg total) by mouth 4 (four) times daily as needed for Dizziness or Nausea. 90 tablet 1    methocarbamoL (ROBAXIN) 500 MG Tab Take 1 tablet (500 mg total) by mouth 2 (two) times daily as needed (muscle spasm). 15 tablet 0     No current facility-administered medications for this visit.       Review of patient's allergies indicates:   Allergen Reactions    Penicillins          Objective:       Last 3 sets of Vitals    Vitals - 1  value per visit 5/16/2022 8/6/2022 8/6/2022   SYSTOLIC - - 118   DIASTOLIC - - 71   Pulse - - 73   Temp - - 97.6   Resp - - 18   SPO2 - - 95   Weight (lb) - - 149   Weight (kg) - - 67.586   Height - - 62   BMI (Calculated) - - 27.2   VISIT REPORT - - -   Pain Score  0 0 -   Some recent data might be hidden   Physical Exam  Constitutional:       General: She is not in acute distress.  HENT:      Head: Normocephalic.      Right Ear: Tympanic membrane, ear canal and external ear normal.      Left Ear: Tympanic membrane, ear canal and external ear normal.      Nose: Nose normal.      Mouth/Throat:      Mouth: Mucous membranes are moist.   Eyes:      General: No scleral icterus.     Extraocular Movements: Extraocular movements intact.      Conjunctiva/sclera: Conjunctivae normal.      Comments: No nystagmus.   Neck:      Vascular: No carotid bruit.      Comments: No goiter.  Cardiovascular:      Rate and Rhythm: Normal rate and regular rhythm.      Pulses: Normal pulses.      Heart sounds: Normal heart sounds.   Pulmonary:      Effort: Pulmonary effort is normal.      Breath sounds: Normal breath sounds.   Abdominal:      General: Bowel sounds are normal. There is no distension.      Palpations: Abdomen is soft. There is no mass.      Tenderness: There is no abdominal tenderness.   Musculoskeletal:         General: No swelling.   Lymphadenopathy:      Cervical: No cervical adenopathy.   Skin:     General: Skin is warm and dry.   Neurological:      General: No focal deficit present.      Mental Status: She is alert and oriented to person, place, and time.      Comments: Dizziness with changes in head position and standing up.   Psychiatric:         Mood and Affect: Mood normal.         Behavior: Behavior normal.           CBC:  Recent Labs   Lab 03/07/21  0441 03/08/21  0334 03/21/22  0736   WBC 4.54 5.31 5.90   RBC 4.31 4.49 4.59   Hemoglobin 12.2 12.6 13.1   Hematocrit 36.2 L 38.2 38.9   Platelets 298 329 309   MCV 84  85 85   MCH 28.3 28.1 28.5   MCHC 33.7 33.0 33.7     CMP:  Recent Labs   Lab 03/07/21  0441 03/08/21  0333 03/21/22  0736   Glucose 103 103 97   Calcium 8.1 L 8.5 L 9.5   Albumin 3.1 L 3.2 L 3.9   Total Protein 7.0 7.2 8.3   Sodium 141 141 140   Potassium 3.8 4.0 3.6   CO2 26 27 28   Chloride 105 105 105   BUN 15 16 13   Creatinine 0.6 0.7 0.7   Alkaline Phosphatase 45 L 46 L 88   ALT 42 66 H 25   AST 37 56 H 22   Total Bilirubin 0.4 0.4 0.6     URINALYSIS:       LIPIDS:  Recent Labs   Lab 08/29/19  1032 02/04/20  0943 10/15/20  0817 03/21/22  0736   TSH 1.619  --  2.375 3.603   HDL  --  47 52 45   Cholesterol  --  166 165 174   Triglycerides  --  123 86 115   LDL Cholesterol  --  94.4 95.8 106.0   HDL/Cholesterol Ratio  --  28.3 31.5 25.9   Non-HDL Cholesterol  --  119 113 129   Total Cholesterol/HDL Ratio  --  3.5 3.2 3.9     TSH:  Recent Labs   Lab 08/29/19  1032 10/15/20  0817 03/21/22  0736   TSH 1.619 2.375 3.603       A1C:        Imaging:  Mammo Digital Screening Bilat w/ Kirby  Narrative: Result:  Mammo Digital Screening Bilat w/ Kirby    History:  Patient is 71 y.o. and is seen for a screening mammogram.    Films Compared:  Prior images (if available) were compared.     Findings:   This procedure was performed using tomosynthesis.   Computer-aided detection was utilized in the interpretation of this   examination.    The breasts have scattered areas of fibroglandular density. There is no   evidence of suspicious masses, microcalcifications or architectural   distortion. Patient is S/P biopsy on the right.     Findings are stable.   Impression:    No mammographic evidence of malignancy.    BI-RADS Category 1: Negative    Recommendation:  Routine screening mammogram in 1 year is recommended.    Your estimated lifetime risk of breast cancer (to age 85) based on   Tyrer-Cuzick risk assessment model is 3.07 %.  According to the American   Cancer Society, patients with a lifetime breast cancer risk of 20% or    higher might benefit from supplemental screening tests. ??       Assessment:       1. Vertigo    2. Palpitations    3. Essential hypertension            Plan:       Julisa was seen today for dizziness.    Diagnoses and all orders for this visit:    Vertigo  -     fluticasone propionate (FLONASE) 50 mcg/actuation nasal spray; 1 spray (50 mcg total) by Each Nostril route once daily.  -     methylPREDNISolone sod suc(PF) injection 40 mg  - continue Valium as needed and try Epley exercise.  - is symptoms persist will consider MRI.  - has referral ENT from the ER.    Palpitations  -     Holter monitor - 48 hour; Future  - stay hydrated    Essential hypertension   - stable blood pressure.  Same treatment.        Health Maintenance Due   Topic Date Due    High Dose Statin  Never done    Shingles Vaccine (2 of 2) 01/11/2021    COVID-19 Vaccine (3 - Booster for Moderna series) 12/09/2021        Return to clinic as needed or as scheduled..    Sandra Stafford MD  [facility]  Disclaimer:  This note has been generated using voice-recognition software. There may be grammatical or spelling errors that have been missed during proof-reading

## 2023-04-04 NOTE — PROGRESS NOTES
"  Occupational Therapy Treatment Note     Date: 4/5/2023  Name: Julisa Jensen  Clinic Number: [id number]    Therapy Diagnosis:   Encounter Diagnoses   Name Primary?    Pain of right upper extremity Yes    Weakness     Stiffness due to immobility      Physician: Sandra Stafford MD    Physician Orders: OT evaluate and treat  Medical Diagnosis: M25.511 (ICD-10-CM) - Acute pain of right shoulder  Evaluation Date: 3/7/2023  Insurance Authorization period Expiration: 12/31/2023  Plan of Care Expiration Period: 6/2/2023  Next MD appointment:5/18/20232     Visit # / Visits Authorized: 5/ 1  Time In:9:15  Time Out: 10:00  Total Billable Time: 45 minutes     Precautions: Standard     Subjective     Pt reports: "this left still bothers me some but it's getting better."  she was compliant with home exercise program given last session.   Response to previous treatment:low pain  Functional change: improved range of motion     Pain: 1-2/10  Location: left shoulder      Objective     Objective Measures updated at progress report unless specified.   Sensation Test: Patient denies any numbness/tingling     Range of Motion/Strength:   Shoulder   Left     Right   Pain/Dysfunction with Movement     AROM PROM MMT AROM PROM MMT     Flexion 130(-10) WFL 3/5 145(+5) WFL 3/5     Extension 50(-5) WFL 3/5 45(=) WFL 3/5     Abduction 130(+10) WFL 3/5 140(+30) WFL 3/5     HorizAdduction 15(=) WFL 3/5 30(=) WFL 3/5     Internal rotation L4(=) WFL 3/5 L4(=) WFL 3/5     ER at 90° abd 75(+25) WFL 3/5 90(+10) WFL 3/5     ER at 0° abd 50(+15) WFL 3/5 55(+35) WFL 3/5     NT=Not tested  ROM Comments: Pain at end range     Painful Arc: noted in left     Tenderness upon Palpation:      Positive: AC joint, Bicipital Groove, and Supraspinatus Region     Special Tests:  AC Joint Left Right   AC Joint Compression Test - -   Empty Can Test + -   Drop Arm test + -   Champagne Toast + -   Resisted External Rotation 45* Internal Roatation + -   Bear Hug - -   Brandt's " - -   Hawkin's Kenndy + +   Neer's Test + +   Yergason's -- -   Anterior Apprehension test - -      Scapular Control/Dyskinesis:     Normal / Subtle / Obvious  Comments    Left  subtle -    Right  subtle -         CMS Impairment/Limitation/Restriction for FOTO Shoulder Survey     Therapist reviewed FOTO scores for Julisa Jensen on 3/7/2023.   FOTO documents entered into [employer] - see Media section.     Limitation Score: 51%  Category: Self Care  5th visit:35%         Treatment     Julisa received the following manual therapy techniques for 10 minutes:   -consisting of patient supine for Bilateral biceps and upper trapezius soft tissue mobilization, pectoralis lift, subscapularis myofascial release and stretch, PROM with endrange stretching, glenohumeral joint inferior anterior posterior glides grade I-II, gentle shoulder oscillations    Julisa received therapeutic exercises for 35 minutes including:  Exercises        PROM (Bilateral) Shoulder Flexion/Abduction/Internal rotation/External Rotation 10x     Supine dowel Flexion 3  2/15   Sidelying Abduction  2  2/15   Sidelying External Rotation 2  2/15   pulleys 3'   Wall slides ball  2/15   Theraband Extension pull downs Green  2/15   Theraband Rows Green  2/15   Theraband Horizontal abduction Green  2/15   Theraband External Rotation Green  2/15     Home Exercises and Education Provided     Education provided:   - Progress towards goals     Written Home Exercises Provided: Patient instructed to cont prior HEP.  Exercises were reviewed and Julisa was able to demonstrate them prior to the end of the session.  Julisa demonstrated good  understanding of the HEP provided.     See EMR under Patient Instructions for exercises provided prior visit.      Assessment     Pt with good participation this date. Pain report remains low in sessions. Pt with soft tissue restrictions noted in bilateral biceps and pectoralis greater on left versus right. She was able to increase weight  and resistance with exercises performing all in a pain free range of motion and good technique. Pt with improved FOTO score indicating increased functional use bilateral shoulders with self care tasks. Pt very motivated.     Julisa is progressing well towards her goals and there are no updates to goals at this time. Pt prognosis is Good.     Pt will continue to benefit from skilled outpatient occupational therapy to address the deficits listed in the problem list on initial evaluation provide pt/family education and to maximize pt's level of independence in the home and community environment.     Anticipated barriers to occupational therapy: none    Pt's spiritual, cultural and educational needs considered and pt agreeable to plan of care and goals.    Goals:  Short Term Goals to be met in 4 weeks: (4/7/2023)  1) Initiate Hep-met, ongoing  2) Pt will increase Bilateral shoulder AROM by 10 degrees grossly for improved performance with overhead activities of daily living's -met  3) Pt will report 5/10 pain in (Bilateral)shoulder at worst -met  4) Pt will demonstrate increased MMT to 4-/5 grossly Bilateral shoulder -met  5) Patient will be able to achieve less than or equal to 40% on FOTO shoulder survey demonstrating overall improved functional ability with upper extremity. -Not met, progressing      Long Term Goals to be met by discharge:  1) Independent with home exercise program -Not met, progressing  2) Pt will demonstrate (Right) shoulder AROM WNL grossly for [place] with activities of daily living's -Not met, progressing  3) Pt will demonstrate (Right) shoulder MMT WNL grossly for [place] with functional activities -Not met, progressing  4) Independent and pain free with ADL's and IADL's -Not met, progressing  5) Patient will be able to achieve less than or equal to 25% on FOTO shoulder survey demonstrating overall improved functional ability with upper extremity. -Not met, progressing     Plan    Continue with OT plan of care  Updates/Grading for next session: progress as able      ANA CRISTINA Mathews

## 2023-04-05 ENCOUNTER — CLINICAL SUPPORT (OUTPATIENT)
Dept: REHABILITATION | Facility: HOSPITAL | Age: 73
End: 2023-04-05
Payer: MEDICARE

## 2023-04-05 DIAGNOSIS — Z74.09 STIFFNESS DUE TO IMMOBILITY: ICD-10-CM

## 2023-04-05 DIAGNOSIS — R53.1 WEAKNESS: ICD-10-CM

## 2023-04-05 DIAGNOSIS — M25.60 STIFFNESS DUE TO IMMOBILITY: ICD-10-CM

## 2023-04-05 DIAGNOSIS — M79.601 PAIN OF RIGHT UPPER EXTREMITY: Primary | ICD-10-CM

## 2023-04-05 PROCEDURE — 97110 THERAPEUTIC EXERCISES: CPT | Mod: PN

## 2023-04-05 PROCEDURE — 97140 MANUAL THERAPY 1/> REGIONS: CPT | Mod: PN

## 2023-04-11 ENCOUNTER — PES CALL (OUTPATIENT)
Dept: ADMINISTRATIVE | Facility: CLINIC | Age: 73
End: 2023-04-11
Payer: MEDICARE

## 2023-04-11 NOTE — PROGRESS NOTES
"  Occupational Therapy Treatment Note     Date: 4/12/2023  Name: Julisa Jensen  Clinic Number: [id number]    Therapy Diagnosis:   Encounter Diagnoses   Name Primary?    Pain of right upper extremity Yes    Weakness     Stiffness due to immobility      Physician: Sandra Stafford MD    Physician Orders: OT evaluate and treat  Medical Diagnosis: M25.511 (ICD-10-CM) - Acute pain of right shoulder  Evaluation Date: 3/7/2023  Insurance Authorization period Expiration: 12/31/2023  Plan of Care Expiration Period: 6/2/2023  Next MD appointment:5/18/20232     Visit # / Visits Authorized: 6/ 1  Time In:9:15  Time Out: 10:00  Total Billable Time: 45 minutes     Precautions: Standard     Subjective     Pt reports: "I always feel better after I finish therapy."  she was compliant with home exercise program given last session.   Response to previous treatment:pain free  Functional change: improved range of motion     Pain: 0/10  Location: left shoulder      Objective     Objective Measures updated at progress report unless specified.   Sensation Test: Patient denies any numbness/tingling     Range of Motion/Strength:   Shoulder   Left     Right   Pain/Dysfunction with Movement     AROM PROM MMT AROM PROM MMT     Flexion 135(+5) WFL 3/5 145(+5) WFL 3/5     Extension 50(=) WFL 3/5 45(=) WFL 3/5     Abduction 130(=) WFL 3/5 140(+30) WFL 3/5     HorizAdduction 15(=) WFL 3/5 30(=) WFL 3/5     Internal rotation L4(=) WFL 3/5 L4(=) WFL 3/5     ER at 90° abd 75(=) WFL 3/5 90(+10) WFL 3/5     ER at 0° abd 70(+20) WFL 3/5 55(+35) WFL 3/5     NT=Not tested  ROM Comments: Pain at end range     Painful Arc: noted in left     Tenderness upon Palpation:      Positive: AC joint, Bicipital Groove, and Supraspinatus Region     Special Tests:  AC Joint Left Right   AC Joint Compression Test - -   Empty Can Test + -   Drop Arm test + -   Champagne Toast + -   Resisted External Rotation 45* Internal Roatation + -   Bear Hug - -   Brandt's - -   Hawkin's " Steven + +   Neer's Test + +   Yergason's -- -   Anterior Apprehension test - -      Scapular Control/Dyskinesis:     Normal / Subtle / Obvious  Comments    Left  subtle -    Right  subtle -         CMS Impairment/Limitation/Restriction for FOTO Shoulder Survey     Therapist reviewed FOTO scores for Julisa Jensen on 3/7/2023.   FOTO documents entered into [employer] - see Media section.     Limitation Score: 51%  Category: Self Care  5th visit:35%         Treatment     Julisa received the following manual therapy techniques for 10 minutes:   -consisting of patient supine for Bilateral biceps and upper trapezius soft tissue mobilization, pectoralis lift, subscapularis myofascial release and stretch, PROM with endrange stretching, glenohumeral joint inferior anterior posterior glides grade I-II, gentle shoulder oscillations    Julisa received therapeutic exercises for 35 minutes including:  Exercises        PROM (Bilateral) Shoulder Flexion/Abduction/Internal rotation/External Rotation 10x     Supine dowel Flexion 3  2/15   Sidelying Abduction  1  2/15   Sidelying External Rotation 1  2/15   pulleys 3'   Wall slides ball  2/15   Theraband Extension pull downs Green  2/15   Theraband Rows Green  2/15   Theraband Horizontal abduction Green  2/15   Theraband External Rotation Green  2/15     Home Exercises and Education Provided     Education provided:   - Progress towards goals     Written Home Exercises Provided: Patient instructed to cont prior HEP.  Exercises were reviewed and Julisa was able to demonstrate them prior to the end of the session.  Julisa demonstrated good  understanding of the HEP provided.     See EMR under Patient Instructions for exercises provided prior visit.      Assessment   Pt with good participation this date. Pain free after today's session. Pt with soft tissue restrictions noted in bilateral biceps and pectoralis greater on left versus right. Good endurance with exercises. Range of motion  continues to steadily improve. Pt very motivated.     Julisa is progressing well towards her goals and there are no updates to goals at this time. Pt prognosis is Good.     Pt will continue to benefit from skilled outpatient occupational therapy to address the deficits listed in the problem list on initial evaluation provide pt/family education and to maximize pt's level of independence in the home and community environment.     Anticipated barriers to occupational therapy: none    Pt's spiritual, cultural and educational needs considered and pt agreeable to plan of care and goals.    Goals:  Short Term Goals to be met in 4 weeks: (4/7/2023)  1) Initiate Hep-met, ongoing  2) Pt will increase Bilateral shoulder AROM by 10 degrees grossly for improved performance with overhead activities of daily living's -met  3) Pt will report 5/10 pain in (Bilateral)shoulder at worst -met  4) Pt will demonstrate increased MMT to 4-/5 grossly Bilateral shoulder -met  5) Patient will be able to achieve less than or equal to 40% on FOTO shoulder survey demonstrating overall improved functional ability with upper extremity. -met     Long Term Goals to be met by discharge:  1) Independent with home exercise program -Not met, progressing  2) Pt will demonstrate (Right) shoulder AROM WNL grossly for [place] with activities of daily living's -Not met, progressing  3) Pt will demonstrate (Right) shoulder MMT WNL grossly for [place] with functional activities -Not met, progressing  4) Independent and pain free with ADL's and IADL's -Not met, progressing  5) Patient will be able to achieve less than or equal to 25% on FOTO shoulder survey demonstrating overall improved functional ability with upper extremity. -Not met, progressing     Plan   Continue with OT plan of care  Updates/Grading for next session: progress as able      ANA CRISTINA Mathews

## 2023-04-12 ENCOUNTER — CLINICAL SUPPORT (OUTPATIENT)
Dept: REHABILITATION | Facility: HOSPITAL | Age: 73
End: 2023-04-12
Payer: MEDICARE

## 2023-04-12 DIAGNOSIS — M79.601 PAIN OF RIGHT UPPER EXTREMITY: Primary | ICD-10-CM

## 2023-04-12 DIAGNOSIS — M25.60 STIFFNESS DUE TO IMMOBILITY: ICD-10-CM

## 2023-04-12 DIAGNOSIS — R53.1 WEAKNESS: ICD-10-CM

## 2023-04-12 DIAGNOSIS — Z74.09 STIFFNESS DUE TO IMMOBILITY: ICD-10-CM

## 2023-04-12 PROCEDURE — 97110 THERAPEUTIC EXERCISES: CPT | Mod: PN

## 2023-04-12 PROCEDURE — 97140 MANUAL THERAPY 1/> REGIONS: CPT | Mod: PN

## 2023-04-18 NOTE — PROGRESS NOTES
"  Occupational Therapy Treatment Note     Date: 4/19/2023  Name: Julisa Jensen  Clinic Number: [id number]    Therapy Diagnosis:   Encounter Diagnoses   Name Primary?    Pain of right upper extremity Yes    Weakness     Stiffness due to immobility        Physician: Sandra Stafford MD    Physician Orders: OT evaluate and treat  Medical Diagnosis: M25.511 (ICD-10-CM) - Acute pain of right shoulder  Evaluation Date: 3/7/2023  Insurance Authorization period Expiration: 12/31/2023  Plan of Care Expiration Period: 6/2/2023  Next MD appointment:5/18/20232     Visit # / Visits Authorized: 7/ 1  Time In:9:15  Time Out: 10:00  Total Billable Time: 45 minutes     Precautions: Standard     Subjective     Pt reports: "I still have a little pain sometimes in this left shoulder but I can continue on my own after next week."  she was compliant with home exercise program given last session.   Response to previous treatment:pain free  Functional change: improved range of motion     Pain: 0/10  Location: left shoulder      Objective     Objective Measures updated at progress report unless specified.   Sensation Test: Patient denies any numbness/tingling     Range of Motion/Strength:   Shoulder   Left     Right   Pain/Dysfunction with Movement     AROM PROM MMT AROM PROM MMT     Flexion 135(+5) WFL 3/5 145(+5) WFL 3/5     Extension 50(=) WFL 3/5 45(=) WFL 3/5     Abduction 130(=) WFL 3/5 140(+30) WFL 3/5     HorizAdduction 15(=) WFL 3/5 30(=) WFL 3/5     Internal rotation L4(=) WFL 3/5 L4(=) WFL 3/5     ER at 90° abd 75(=) WFL 3/5 90(+10) WFL 3/5     ER at 0° abd 70(+20) WFL 3/5 55(+35) WFL 3/5     NT=Not tested  ROM Comments: Pain at end range     Painful Arc: noted in left     Tenderness upon Palpation:      Positive: AC joint, Bicipital Groove, and Supraspinatus Region     Special Tests:  AC Joint Left Right   AC Joint Compression Test - -   Empty Can Test + -   Drop Arm test + -   Champagne Toast + -   Resisted External Rotation 45* " Internal Roatation + -   Bear Hug - -   Brandt's - -   Hawkin's Kenndy + +   Neer's Test + +   Yergason's -- -   Anterior Apprehension test - -      Scapular Control/Dyskinesis:     Normal / Subtle / Obvious  Comments    Left  subtle -    Right  subtle -         CMS Impairment/Limitation/Restriction for FOTO Shoulder Survey     Therapist reviewed FOTO scores for Julisa Jensen on 3/7/2023.   FOTO documents entered into [employer] - see Media section.     Limitation Score: 51%  Category: Self Care  5th visit:35%         Treatment     Julisa received the following manual therapy techniques for 10 minutes:   -consisting of patient supine for Bilateral biceps and upper trapezius soft tissue mobilization, pectoralis lift, subscapularis myofascial release and stretch, PROM with endrange stretching, glenohumeral joint inferior anterior posterior glides grade I-II, gentle shoulder oscillations    Julisa received therapeutic exercises for 35 minutes including:  Exercises        PROM (Bilateral) Shoulder Flexion/Abduction/Internal rotation/External Rotation 10x     Supine dowel Flexion 3  2/15   Sidelying Abduction  1  2/15   Sidelying External Rotation 1  2/15   pulleys 3'   Wall slides ball  2/15   Theraband Extension pull downs Green  2/15   Theraband Rows Green  2/15   Theraband Horizontal abduction Green  2/15   Theraband External Rotation Green  2/15     Home Exercises and Education Provided     Education provided:   - Progress towards goals     Written Home Exercises Provided: Patient instructed to cont prior HEP.  Exercises were reviewed and Julisa was able to demonstrate them prior to the end of the session.  Julisa demonstrated good  understanding of the HEP provided.     See EMR under Patient Instructions for exercises provided prior visit.      Assessment   Pt with good participation this date. Pain free during today's session. Pt with soft tissue restrictions noted in left biceps and pectoralis however improving and  responds well to manual. Good endurance with exercises. Range of motion continues to steadily improve. Pt very motivated.     Julisa is progressing well towards her goals and there are no updates to goals at this time. Pt prognosis is Good.     Pt will continue to benefit from skilled outpatient occupational therapy to address the deficits listed in the problem list on initial evaluation provide pt/family education and to maximize pt's level of independence in the home and community environment.     Anticipated barriers to occupational therapy: none    Pt's spiritual, cultural and educational needs considered and pt agreeable to plan of care and goals.    Goals:  Short Term Goals to be met in 4 weeks: (4/7/2023)  1) Initiate Hep-met, ongoing  2) Pt will increase Bilateral shoulder AROM by 10 degrees grossly for improved performance with overhead activities of daily living's -met  3) Pt will report 5/10 pain in (Bilateral)shoulder at worst -met  4) Pt will demonstrate increased MMT to 4-/5 grossly Bilateral shoulder -met  5) Patient will be able to achieve less than or equal to 40% on FOTO shoulder survey demonstrating overall improved functional ability with upper extremity. -met     Long Term Goals to be met by discharge:  1) Independent with home exercise program -Not met, progressing  2) Pt will demonstrate (Right) shoulder AROM WNL grossly for [place] with activities of daily living's -Not met, progressing  3) Pt will demonstrate (Right) shoulder MMT WNL grossly for [place] with functional activities -Not met, progressing  4) Independent and pain free with ADL's and IADL's -Not met, progressing  5) Patient will be able to achieve less than or equal to 25% on FOTO shoulder survey demonstrating overall improved functional ability with upper extremity. -Not met, progressing     Plan   Continue with OT plan of care  Updates/Grading for next session: anticipate discharge to home exercise program next  session.      ANA CRISTINA Mathews

## 2023-04-19 ENCOUNTER — CLINICAL SUPPORT (OUTPATIENT)
Dept: REHABILITATION | Facility: HOSPITAL | Age: 73
End: 2023-04-19
Payer: MEDICARE

## 2023-04-19 DIAGNOSIS — M79.601 PAIN OF RIGHT UPPER EXTREMITY: Primary | ICD-10-CM

## 2023-04-19 DIAGNOSIS — Z74.09 STIFFNESS DUE TO IMMOBILITY: ICD-10-CM

## 2023-04-19 DIAGNOSIS — M25.60 STIFFNESS DUE TO IMMOBILITY: ICD-10-CM

## 2023-04-19 DIAGNOSIS — R53.1 WEAKNESS: ICD-10-CM

## 2023-04-19 PROCEDURE — 97140 MANUAL THERAPY 1/> REGIONS: CPT | Mod: PN

## 2023-04-19 PROCEDURE — 97110 THERAPEUTIC EXERCISES: CPT | Mod: PN

## 2023-04-25 NOTE — PROGRESS NOTES
"  Occupational Therapy Treatment Note/Discharge Summary     Date: 4/26/2023  Name: Julisa Jensen  Clinic Number: [id number]    Therapy Diagnosis:   Encounter Diagnoses   Name Primary?    Pain of right upper extremity Yes    Weakness     Stiffness due to immobility        Physician: Sandra Stafford MD    Physician Orders: OT evaluate and treat  Medical Diagnosis: M25.511 (ICD-10-CM) - Acute pain of right shoulder  Evaluation Date: 3/7/2023  Insurance Authorization period Expiration: 12/31/2023  Plan of Care Expiration Period: 6/2/2023  Next MD appointment:5/18/20232     Visit # / Visits Authorized: 8/ 1  Time In:9:15  Time Out: 10:00  Total Billable Time: 45 minutes     Precautions: Standard     Subjective     Pt reports: "I am ready to continue on my own."  she was compliant with home exercise program given last session.   Response to previous treatment:pain free  Functional change: improved range of motion, improved FOTO score    Pain: 0/10  Location: left shoulder      Objective     Objective Measures updated at progress report unless specified.   Sensation Test: Patient denies any numbness/tingling     Range of Motion/Strength:   Shoulder   Left     Right   Pain/Dysfunction with Movement     AROM PROM MMT AROM PROM MMT     Flexion 135(=) WFL 5/5 145(=) WFL 5/5     Extension 50(=) WFL 5/5 50(+5) WFL 5/5     Abduction 140(+10) WFL 5/5 140(=) WFL 5/5     HorizAdduction 30(+15) WFL 5/5 30(=) WFL 5/5     Internal rotation L4(=) WFL 5/5 L4(=) WFL 5/5     ER at 90° abd 85(+10) WFL 5/5 90(=) WFL 5/5     ER at 0° abd 75(+5) WFL 5/5 70(+15) WFL 5/5     NT=Not tested  ROM Comments: Pain at end range     Painful Arc: noted in left     Tenderness upon Palpation:      Positive: AC joint, Bicipital Groove, and Supraspinatus Region     Special Tests:  AC Joint Left Right   AC Joint Compression Test - -   Empty Can Test + -   Drop Arm test + -   Champagne Toast + -   Resisted External Rotation 45* Internal Roatation + -   Bear Hug - " -   Brandt's - -   Hawkin's Kenndy + +   Neer's Test + +   Yergason's -- -   Anterior Apprehension test - -      Scapular Control/Dyskinesis:     Normal / Subtle / Obvious  Comments    Left  subtle -    Right  subtle -         CMS Impairment/Limitation/Restriction for FOTO Shoulder Survey     Therapist reviewed FOTO scores for Julisa Jensen on 3/7/2023.   FOTO documents entered into [employer] - see Media section.     Limitation Score: 51%  Category: Self Care  5th visit:35%  8th visit:26%         Treatment     Julisa received the following manual therapy techniques for 10 minutes:   -consisting of patient supine for Bilateral biceps and upper trapezius soft tissue mobilization, pectoralis lift, subscapularis myofascial release and stretch, PROM with endrange stretching, glenohumeral joint inferior anterior posterior glides grade I-II, gentle shoulder oscillations    Julisa received therapeutic exercises for 35 minutes including:  Exercises        PROM (Bilateral) Shoulder Flexion/Abduction/Internal rotation/External Rotation 10x     Supine dowel Flexion 3  2/15   Sidelying Abduction  1  2/15   Sidelying External Rotation 1  2/15   pulleys 3'   Wall slides ball  2/15   Theraband Extension pull downs Green  2/15   Theraband Rows Green  2/15   Theraband Horizontal abduction Green  2/15   Theraband External Rotation Green  2/15     Home Exercises and Education Provided     Education provided:   - Progress towards goals     Written Home Exercises Provided: Patient instructed to cont prior HEP.  Exercises were reviewed and Julisa was able to demonstrate them prior to the end of the session.  Julisa demonstrated good  understanding of the HEP provided.     See EMR under Patient Instructions for exercises provided prior visit.      Assessment   Pt has been attending OT x 7 weeks for treatment bilateral shoulder pain, weakness and stiffness. Pt has been motivated and pleasant throughout course of care. Pt demonstrating overall  "improved range of motion and strength in bilateral shoulders and is now within normal limits grossly. She is Independent and pain free with activities of daily living"s, IADL's and home exercise program. She also has an improved FOTO score indicating increased functional use bilateral upper extremity's with self care tasks. Pt has partially met goals. Pt no longer requires skilled services at this time.    Goals:  Short Term Goals to be met in 4 weeks: (4/7/2023)  1) Initiate Hep-met  2) Pt will increase Bilateral shoulder AROM by 10 degrees grossly for improved performance with overhead activities of daily living's -met  3) Pt will report 5/10 pain in (Bilateral)shoulder at worst -met  4) Pt will demonstrate increased MMT to 4-/5 grossly Bilateral shoulder -met  5) Patient will be able to achieve less than or equal to 40% on FOTO shoulder survey demonstrating overall improved functional ability with upper extremity. -met     Long Term Goals to be met by discharge:  1) Independent with home exercise program -met  2) Pt will demonstrate (Right) shoulder AROM WNL grossly for [place] with activities of daily living's -Met  3) Pt will demonstrate (Right) shoulder MMT WNL grossly for [place] with functional activities -met  4) Independent and pain free with ADL's and IADL's -met  5) Patient will be able to achieve less than or equal to 25% on FOTO shoulder survey demonstrating overall improved functional ability with upper extremity. -Not met, 26% limited     Plan   Discharge from OT      ANA CRISTINA Mathews                 "

## 2023-04-26 ENCOUNTER — CLINICAL SUPPORT (OUTPATIENT)
Dept: REHABILITATION | Facility: HOSPITAL | Age: 73
End: 2023-04-26
Payer: MEDICARE

## 2023-04-26 DIAGNOSIS — R53.1 WEAKNESS: ICD-10-CM

## 2023-04-26 DIAGNOSIS — M79.601 PAIN OF RIGHT UPPER EXTREMITY: Primary | ICD-10-CM

## 2023-04-26 DIAGNOSIS — M25.60 STIFFNESS DUE TO IMMOBILITY: ICD-10-CM

## 2023-04-26 DIAGNOSIS — Z74.09 STIFFNESS DUE TO IMMOBILITY: ICD-10-CM

## 2023-04-26 PROCEDURE — 97140 MANUAL THERAPY 1/> REGIONS: CPT | Mod: PN

## 2023-04-26 PROCEDURE — 97110 THERAPEUTIC EXERCISES: CPT | Mod: PN

## 2023-05-08 ENCOUNTER — TELEPHONE (OUTPATIENT)
Dept: ADMINISTRATIVE | Facility: CLINIC | Age: 73
End: 2023-05-08
Payer: MEDICARE

## 2023-05-09 ENCOUNTER — OFFICE VISIT (OUTPATIENT)
Dept: FAMILY MEDICINE | Facility: CLINIC | Age: 73
End: 2023-05-09
Payer: MEDICARE

## 2023-05-09 VITALS
BODY MASS INDEX: 27.7 KG/M2 | HEART RATE: 82 BPM | OXYGEN SATURATION: 97 % | HEIGHT: 62 IN | DIASTOLIC BLOOD PRESSURE: 70 MMHG | WEIGHT: 150.56 LBS | SYSTOLIC BLOOD PRESSURE: 128 MMHG

## 2023-05-09 DIAGNOSIS — E66.3 OVERWEIGHT (BMI 25.0-29.9): ICD-10-CM

## 2023-05-09 DIAGNOSIS — I70.0 AORTIC ATHEROSCLEROSIS: ICD-10-CM

## 2023-05-09 DIAGNOSIS — Z00.00 ENCOUNTER FOR PREVENTIVE HEALTH EXAMINATION: Primary | ICD-10-CM

## 2023-05-09 DIAGNOSIS — D32.9 MENINGIOMA: ICD-10-CM

## 2023-05-09 DIAGNOSIS — I10 ESSENTIAL HYPERTENSION: ICD-10-CM

## 2023-05-09 PROCEDURE — 1125F AMNT PAIN NOTED PAIN PRSNT: CPT | Mod: CPTII,S$GLB,, | Performed by: NURSE PRACTITIONER

## 2023-05-09 PROCEDURE — 1160F PR REVIEW ALL MEDS BY PRESCRIBER/CLIN PHARMACIST DOCUMENTED: ICD-10-PCS | Mod: CPTII,S$GLB,, | Performed by: NURSE PRACTITIONER

## 2023-05-09 PROCEDURE — G0439 PR MEDICARE ANNUAL WELLNESS SUBSEQUENT VISIT: ICD-10-PCS | Mod: S$GLB,,, | Performed by: NURSE PRACTITIONER

## 2023-05-09 PROCEDURE — 3074F SYST BP LT 130 MM HG: CPT | Mod: CPTII,S$GLB,, | Performed by: NURSE PRACTITIONER

## 2023-05-09 PROCEDURE — 99999 PR PBB SHADOW E&M-EST. PATIENT-LVL IV: ICD-10-PCS | Mod: PBBFAC,,, | Performed by: NURSE PRACTITIONER

## 2023-05-09 PROCEDURE — 1101F PT FALLS ASSESS-DOCD LE1/YR: CPT | Mod: CPTII,S$GLB,, | Performed by: NURSE PRACTITIONER

## 2023-05-09 PROCEDURE — 3288F FALL RISK ASSESSMENT DOCD: CPT | Mod: CPTII,S$GLB,, | Performed by: NURSE PRACTITIONER

## 2023-05-09 PROCEDURE — G0439 PPPS, SUBSEQ VISIT: HCPCS | Mod: S$GLB,,, | Performed by: NURSE PRACTITIONER

## 2023-05-09 PROCEDURE — 3078F PR MOST RECENT DIASTOLIC BLOOD PRESSURE < 80 MM HG: ICD-10-PCS | Mod: CPTII,S$GLB,, | Performed by: NURSE PRACTITIONER

## 2023-05-09 PROCEDURE — 1160F RVW MEDS BY RX/DR IN RCRD: CPT | Mod: CPTII,S$GLB,, | Performed by: NURSE PRACTITIONER

## 2023-05-09 PROCEDURE — 1170F PR FUNCTIONAL STATUS ASSESSED: ICD-10-PCS | Mod: CPTII,S$GLB,, | Performed by: NURSE PRACTITIONER

## 2023-05-09 PROCEDURE — 1125F PR PAIN SEVERITY QUANTIFIED, PAIN PRESENT: ICD-10-PCS | Mod: CPTII,S$GLB,, | Performed by: NURSE PRACTITIONER

## 2023-05-09 PROCEDURE — 99999 PR PBB SHADOW E&M-EST. PATIENT-LVL IV: CPT | Mod: PBBFAC,,, | Performed by: NURSE PRACTITIONER

## 2023-05-09 PROCEDURE — 1159F MED LIST DOCD IN RCRD: CPT | Mod: CPTII,S$GLB,, | Performed by: NURSE PRACTITIONER

## 2023-05-09 PROCEDURE — 1159F PR MEDICATION LIST DOCUMENTED IN MEDICAL RECORD: ICD-10-PCS | Mod: CPTII,S$GLB,, | Performed by: NURSE PRACTITIONER

## 2023-05-09 PROCEDURE — 3288F PR FALLS RISK ASSESSMENT DOCUMENTED: ICD-10-PCS | Mod: CPTII,S$GLB,, | Performed by: NURSE PRACTITIONER

## 2023-05-09 PROCEDURE — 1101F PR PT FALLS ASSESS DOC 0-1 FALLS W/OUT INJ PAST YR: ICD-10-PCS | Mod: CPTII,S$GLB,, | Performed by: NURSE PRACTITIONER

## 2023-05-09 PROCEDURE — 3078F DIAST BP <80 MM HG: CPT | Mod: CPTII,S$GLB,, | Performed by: NURSE PRACTITIONER

## 2023-05-09 PROCEDURE — 3074F PR MOST RECENT SYSTOLIC BLOOD PRESSURE < 130 MM HG: ICD-10-PCS | Mod: CPTII,S$GLB,, | Performed by: NURSE PRACTITIONER

## 2023-05-09 PROCEDURE — 1170F FXNL STATUS ASSESSED: CPT | Mod: CPTII,S$GLB,, | Performed by: NURSE PRACTITIONER

## 2023-05-09 NOTE — PROGRESS NOTES
"  Julisa Jensen presented for a  Medicare AWV and comprehensive Health Risk Assessment today. The following components were reviewed and updated:    Medical history  Family History  Social history  Allergies and Current Medications  Health Risk Assessment  Health Maintenance  Care Team         ** See Completed Assessments for Annual Wellness Visit within the encounter summary.**         The following assessments were completed:  Living Situation  CAGE  Depression Screening  Timed Get Up and Go  Whisper Test  Cognitive Function Screening      Nutrition Screening  ADL Screening  PAQ Screening        Vitals:    05/09/23 1014   BP: 128/70   BP Location: Right arm   Patient Position: Sitting   BP Method: Medium (Manual)   Pulse: 82   SpO2: 97%   Weight: 68.3 kg (150 lb 9.2 oz)   Height: 5' 2" (1.575 m)     Body mass index is 27.54 kg/m².    Physical Exam  Vitals reviewed.   Constitutional:       General: She is awake. She is not in acute distress.     Appearance: Normal appearance. She is well-developed, well-groomed and overweight.   HENT:      Head: Normocephalic.   Eyes:      General:         Right eye: No discharge.         Left eye: No discharge.   Cardiovascular:      Rate and Rhythm: Normal rate.   Pulmonary:      Effort: Pulmonary effort is normal. No respiratory distress.   Abdominal:      General: There is no distension.   Musculoskeletal:      Right lower leg: No edema.      Left lower leg: No edema.   Skin:     General: Skin is warm and dry.      Coloration: Skin is not pale.   Neurological:      Mental Status: She is alert and oriented to person, place, and time.      Coordination: Coordination normal.      Gait: Gait normal.   Psychiatric:         Attention and Perception: Attention normal.         Mood and Affect: Mood and affect normal.         Speech: Speech normal.         Behavior: Behavior normal. Behavior is cooperative.         Thought Content: Thought content normal.         Cognition and Memory: " Cognition normal.           Diagnoses and health risks identified today and associated recommendations/orders:    1. Encounter for preventive health examination    2. Aortic atherosclerosis  Chronic; stable. As seen on previous imaging. Follow up with PCP.    3. Meningioma  Chronic; stable. Follow up with PCP.    4. Essential hypertension  Chronic; stable on medication. Follow up with PCP.    5. Overweight (BMI 25.0-29.9)  Continue to eat a low salt/low fat diet and discussed importance of engaging in physical activity at least 5x/week for a minimum of 30 min/day.      Provided Julisa with a 5-10 year written screening schedule and personal prevention plan. Recommendations were developed using the USPSTF age appropriate recommendations. Education, counseling, and referrals were provided as needed. After Visit Summary given to patient which includes a list of additional screenings/tests needed.    Follow up for your next annual wellness visit.    Elizabeth Dickens NP      Advance Care Planning     I offered to discuss advanced care planning, including how to pick a person who would make decisions for you if you were unable to make them for yourself, called a health care power of , and what kind of decisions you might make such as use of life sustaining treatments such as ventilators and tube feeding when faced with a life limiting illness recorded on a living will that they will need to know. (How you want to be cared for as you near the end of your natural life)     X Patient is interested in learning more about how to make advanced directives. I provided them paperwork and offered to discuss this with them.

## 2023-05-16 ENCOUNTER — LAB VISIT (OUTPATIENT)
Dept: LAB | Facility: HOSPITAL | Age: 73
End: 2023-05-16
Attending: INTERNAL MEDICINE
Payer: MEDICARE

## 2023-05-16 ENCOUNTER — PATIENT OUTREACH (OUTPATIENT)
Dept: ADMINISTRATIVE | Facility: HOSPITAL | Age: 73
End: 2023-05-16
Payer: MEDICARE

## 2023-05-16 DIAGNOSIS — Z79.899 MEDICATION MANAGEMENT: ICD-10-CM

## 2023-05-16 DIAGNOSIS — I70.0 AORTIC ATHEROSCLEROSIS: ICD-10-CM

## 2023-05-16 DIAGNOSIS — I10 ESSENTIAL HYPERTENSION: ICD-10-CM

## 2023-05-16 DIAGNOSIS — R73.9 HYPERGLYCEMIA: ICD-10-CM

## 2023-05-16 DIAGNOSIS — M25.511 ACUTE PAIN OF RIGHT SHOULDER: ICD-10-CM

## 2023-05-16 LAB
ALBUMIN SERPL BCP-MCNC: 3.9 G/DL (ref 3.5–5.2)
ALP SERPL-CCNC: 85 U/L (ref 55–135)
ALT SERPL W/O P-5'-P-CCNC: 26 U/L (ref 10–44)
ANION GAP SERPL CALC-SCNC: 9 MMOL/L (ref 8–16)
AST SERPL-CCNC: 24 U/L (ref 10–40)
BASOPHILS # BLD AUTO: 0.07 K/UL (ref 0–0.2)
BASOPHILS NFR BLD: 1.2 % (ref 0–1.9)
BILIRUB SERPL-MCNC: 0.5 MG/DL (ref 0.1–1)
BUN SERPL-MCNC: 11 MG/DL (ref 8–23)
CALCIUM SERPL-MCNC: 8.5 MG/DL (ref 8.7–10.5)
CHLORIDE SERPL-SCNC: 106 MMOL/L (ref 95–110)
CHOLEST SERPL-MCNC: 140 MG/DL (ref 120–199)
CHOLEST/HDLC SERPL: 3.3 {RATIO} (ref 2–5)
CO2 SERPL-SCNC: 27 MMOL/L (ref 23–29)
CREAT SERPL-MCNC: 0.7 MG/DL (ref 0.5–1.4)
DIFFERENTIAL METHOD: ABNORMAL
EOSINOPHIL # BLD AUTO: 0.1 K/UL (ref 0–0.5)
EOSINOPHIL NFR BLD: 1.9 % (ref 0–8)
ERYTHROCYTE [DISTWIDTH] IN BLOOD BY AUTOMATED COUNT: 13.2 % (ref 11.5–14.5)
EST. GFR  (NO RACE VARIABLE): >60 ML/MIN/1.73 M^2
ESTIMATED AVG GLUCOSE: 100 MG/DL (ref 68–131)
GLUCOSE SERPL-MCNC: 86 MG/DL (ref 70–110)
HBA1C MFR BLD: 5.1 % (ref 4–5.6)
HCT VFR BLD AUTO: 38.9 % (ref 37–48.5)
HDLC SERPL-MCNC: 43 MG/DL (ref 40–75)
HDLC SERPL: 30.7 % (ref 20–50)
HGB BLD-MCNC: 12.3 G/DL (ref 12–16)
IMM GRANULOCYTES # BLD AUTO: 0.01 K/UL (ref 0–0.04)
IMM GRANULOCYTES NFR BLD AUTO: 0.2 % (ref 0–0.5)
LDLC SERPL CALC-MCNC: 77.2 MG/DL (ref 63–159)
LYMPHOCYTES # BLD AUTO: 2.8 K/UL (ref 1–4.8)
LYMPHOCYTES NFR BLD: 49 % (ref 18–48)
MCH RBC QN AUTO: 28.1 PG (ref 27–31)
MCHC RBC AUTO-ENTMCNC: 31.6 G/DL (ref 32–36)
MCV RBC AUTO: 89 FL (ref 82–98)
MONOCYTES # BLD AUTO: 0.4 K/UL (ref 0.3–1)
MONOCYTES NFR BLD: 6.6 % (ref 4–15)
NEUTROPHILS # BLD AUTO: 2.4 K/UL (ref 1.8–7.7)
NEUTROPHILS NFR BLD: 41.1 % (ref 38–73)
NONHDLC SERPL-MCNC: 97 MG/DL
NRBC BLD-RTO: 0 /100 WBC
PLATELET # BLD AUTO: 326 K/UL (ref 150–450)
PMV BLD AUTO: 11.1 FL (ref 9.2–12.9)
POTASSIUM SERPL-SCNC: 3.6 MMOL/L (ref 3.5–5.1)
PROT SERPL-MCNC: 7.5 G/DL (ref 6–8.4)
RBC # BLD AUTO: 4.38 M/UL (ref 4–5.4)
SODIUM SERPL-SCNC: 142 MMOL/L (ref 136–145)
TRIGL SERPL-MCNC: 99 MG/DL (ref 30–150)
TSH SERPL DL<=0.005 MIU/L-ACNC: 2.39 UIU/ML (ref 0.4–4)
WBC # BLD AUTO: 5.75 K/UL (ref 3.9–12.7)

## 2023-05-16 PROCEDURE — 83036 HEMOGLOBIN GLYCOSYLATED A1C: CPT | Performed by: INTERNAL MEDICINE

## 2023-05-16 PROCEDURE — 80061 LIPID PANEL: CPT | Performed by: INTERNAL MEDICINE

## 2023-05-16 PROCEDURE — 85025 COMPLETE CBC W/AUTO DIFF WBC: CPT | Performed by: INTERNAL MEDICINE

## 2023-05-16 PROCEDURE — 84443 ASSAY THYROID STIM HORMONE: CPT | Performed by: INTERNAL MEDICINE

## 2023-05-16 PROCEDURE — 36415 COLL VENOUS BLD VENIPUNCTURE: CPT | Mod: PO | Performed by: INTERNAL MEDICINE

## 2023-05-16 PROCEDURE — 80053 COMPREHEN METABOLIC PANEL: CPT | Performed by: INTERNAL MEDICINE

## 2023-05-19 ENCOUNTER — OFFICE VISIT (OUTPATIENT)
Dept: URGENT CARE | Facility: CLINIC | Age: 73
End: 2023-05-19
Payer: MEDICARE

## 2023-05-19 ENCOUNTER — PATIENT MESSAGE (OUTPATIENT)
Dept: FAMILY MEDICINE | Facility: CLINIC | Age: 73
End: 2023-05-19
Payer: MEDICARE

## 2023-05-19 VITALS
BODY MASS INDEX: 27.6 KG/M2 | RESPIRATION RATE: 19 BRPM | TEMPERATURE: 98 F | OXYGEN SATURATION: 98 % | HEIGHT: 62 IN | HEART RATE: 69 BPM | DIASTOLIC BLOOD PRESSURE: 78 MMHG | SYSTOLIC BLOOD PRESSURE: 131 MMHG | WEIGHT: 150 LBS

## 2023-05-19 DIAGNOSIS — N12 PYELONEPHRITIS: Primary | ICD-10-CM

## 2023-05-19 DIAGNOSIS — R10.9 RIGHT FLANK PAIN: ICD-10-CM

## 2023-05-19 DIAGNOSIS — R10.9 RIGHT SIDED ABDOMINAL PAIN: ICD-10-CM

## 2023-05-19 LAB
BILIRUB UR QL STRIP: NEGATIVE
GLUCOSE UR QL STRIP: NEGATIVE
KETONES UR QL STRIP: NEGATIVE
LEUKOCYTE ESTERASE UR QL STRIP: NEGATIVE
PH, POC UA: 5.5 (ref 5–8)
POC BLOOD, URINE: POSITIVE
POC NITRATES, URINE: NEGATIVE
PROT UR QL STRIP: NEGATIVE
SP GR UR STRIP: 1.01 (ref 1–1.03)
UROBILINOGEN UR STRIP-ACNC: ABNORMAL (ref 0.1–1.1)

## 2023-05-19 PROCEDURE — 96372 PR INJECTION,THERAP/PROPH/DIAG2ST, IM OR SUBCUT: ICD-10-PCS | Mod: S$GLB,,, | Performed by: NURSE PRACTITIONER

## 2023-05-19 PROCEDURE — 81003 URINALYSIS AUTO W/O SCOPE: CPT | Mod: QW,S$GLB,, | Performed by: NURSE PRACTITIONER

## 2023-05-19 PROCEDURE — 99213 PR OFFICE/OUTPT VISIT, EST, LEVL III, 20-29 MIN: ICD-10-PCS | Mod: 25,S$GLB,, | Performed by: NURSE PRACTITIONER

## 2023-05-19 PROCEDURE — 81003 POCT URINALYSIS, DIPSTICK, AUTOMATED, W/O SCOPE: ICD-10-PCS | Mod: QW,S$GLB,, | Performed by: NURSE PRACTITIONER

## 2023-05-19 PROCEDURE — 99213 OFFICE O/P EST LOW 20 MIN: CPT | Mod: 25,S$GLB,, | Performed by: NURSE PRACTITIONER

## 2023-05-19 PROCEDURE — 96372 THER/PROPH/DIAG INJ SC/IM: CPT | Mod: S$GLB,,, | Performed by: NURSE PRACTITIONER

## 2023-05-19 PROCEDURE — 87086 URINE CULTURE/COLONY COUNT: CPT | Performed by: NURSE PRACTITIONER

## 2023-05-19 RX ORDER — KETOROLAC TROMETHAMINE 30 MG/ML
30 INJECTION, SOLUTION INTRAMUSCULAR; INTRAVENOUS
Status: COMPLETED | OUTPATIENT
Start: 2023-05-19 | End: 2023-05-19

## 2023-05-19 RX ORDER — CEPHALEXIN 500 MG/1
500 CAPSULE ORAL 4 TIMES DAILY
Qty: 40 CAPSULE | Refills: 0 | Status: SHIPPED | OUTPATIENT
Start: 2023-05-19 | End: 2023-05-29

## 2023-05-19 RX ORDER — CEFTRIAXONE 1 G/1
1 INJECTION, POWDER, FOR SOLUTION INTRAMUSCULAR; INTRAVENOUS
Status: COMPLETED | OUTPATIENT
Start: 2023-05-19 | End: 2023-05-19

## 2023-05-19 RX ADMIN — CEFTRIAXONE 1 G: 1 INJECTION, POWDER, FOR SOLUTION INTRAMUSCULAR; INTRAVENOUS at 06:05

## 2023-05-19 RX ADMIN — KETOROLAC TROMETHAMINE 30 MG: 30 INJECTION, SOLUTION INTRAMUSCULAR; INTRAVENOUS at 06:05

## 2023-05-19 NOTE — PROGRESS NOTES
"Subjective:      Patient ID: Julisa Jensen is a 73 y.o. female.    Vitals:  height is 5' 2" (1.575 m) and weight is 68 kg (150 lb). Her temperature is 98.2 °F (36.8 °C). Her blood pressure is 131/78 and her pulse is 69. Her respiration is 19 and oxygen saturation is 98%.     Chief Complaint: Flank Pain      Provider note begins below:    Pt is a 72 yo female presenting with her daughter with c/o right flank pain and darker urine.  Onset of symptoms was 3 days ago.    Flank Pain  This is a new problem. The problem occurs constantly. The problem has been gradually worsening since onset. The quality of the pain is described as aching and shooting. The pain radiates to the right thigh and right foot. The pain is at a severity of 8/10. The pain is moderate. The pain is The same all the time. The symptoms are aggravated by sitting, stress and urinating. Stiffness is present All day. Pertinent negatives include no abdominal pain, bladder incontinence, bowel incontinence, chest pain, dysuria, fever, headaches, numbness, paresis, paresthesias, pelvic pain, perianal numbness, tingling, weakness or weight loss. She has tried NSAIDs for the symptoms. The treatment provided mild relief.     Constitution: Negative for chills, fatigue and fever.   Cardiovascular:  Negative for chest pain.   Respiratory:  Negative for shortness of breath.    Gastrointestinal:  Negative for abdominal pain, nausea, vomiting, diarrhea and bowel incontinence.   Genitourinary:  Positive for frequency (and darker urine) and flank pain (right). Negative for dysuria, urgency, bladder incontinence, hematuria, vaginal discharge, vaginal odor and pelvic pain.   Neurological:  Negative for headaches and numbness.    Objective:     Physical Exam   Constitutional: She is oriented to person, place, and time.   Cardiovascular: Normal rate and regular rhythm.   Pulmonary/Chest: Effort normal and breath sounds normal.   Abdominal: Bowel sounds are normal. She " exhibits no shifting dullness, no distension, no fluid wave, no abdominal bruit, no pulsatile midline mass and no mass. Soft. flat abdomen There is no splenomegaly or hepatomegaly. No signs of injury. There is no abdominal tenderness. There is right CVA tenderness. There is no rebound, no guarding, no tenderness at McBurney's point, negative Herrera's sign, no left CVA tenderness, negative Rovsing's sign, negative psoas sign and negative obturator sign.   Neurological: She is alert and oriented to person, place, and time.   Skin: Skin is warm and dry.   Vitals reviewed.    Assessment:     1. Pyelonephritis    2. Right sided abdominal pain    3. Right flank pain        Plan:       Pyelonephritis  -     cefTRIAXone injection 1 g  -     cephALEXin (KEFLEX) 500 MG capsule; Take 1 capsule (500 mg total) by mouth 4 (four) times daily. for 10 days  Dispense: 40 capsule; Refill: 0    Right sided abdominal pain  -     POCT Urinalysis, Dipstick, Automated, W/O Scope    Right flank pain  -     ketorolac injection 30 mg  -     Urine culture      Patient Instructions     Right sided abdominal pain  -     POCT Urinalysis, Dipstick, Automated, W/O Scope  -     Urine culture - It will take 3-5 days to result. We will call you with the result.     Right flank pain  -     ketorolac injection 30 mg - given in clinic @ 6:40 pm    Pyelonephritis  -     cefTRIAXone injection 1 g - given in clinic @ 6:40 pm    -     cephALEXin (KEFLEX) 500 MG capsule; Take 1 capsule (500 mg total) by mouth 4 (four) times daily. for 10 days  Dispense: 40 capsule; Refill: 0    ED Precautions   If your symptoms worsen or fail to improve you should go to Emergency Department.         Cranberry juice may help. Get the 100% cranberry juice and mix 4 oz of juice with 4 oz of water and drink this 8 oz glass of liquid once a day.     Increase water intake to at least 8-10 glasses/day.    Avoid caffeine, alcohol, or spicy foods as they irritate the bladder.       Follow up with PCP if symptoms worsen or do not resolve fully.

## 2023-05-19 NOTE — PATIENT INSTRUCTIONS
Right sided abdominal pain  -     POCT Urinalysis, Dipstick, Automated, W/O Scope  -     Urine culture - It will take 3-5 days to result. We will call you with the result.     Right flank pain  -     ketorolac injection 30 mg - given in clinic @ 6:40 pm    Pyelonephritis  -     cefTRIAXone injection 1 g - given in clinic @ 6:40 pm    -     cephALEXin (KEFLEX) 500 MG capsule; Take 1 capsule (500 mg total) by mouth 4 (four) times daily. for 10 days  Dispense: 40 capsule; Refill: 0    ED Precautions   If your symptoms worsen or fail to improve you should go to Emergency Department.         Cranberry juice may help. Get the 100% cranberry juice and mix 4 oz of juice with 4 oz of water and drink this 8 oz glass of liquid once a day.     Increase water intake to at least 8-10 glasses/day.    Avoid caffeine, alcohol, or spicy foods as they irritate the bladder.      Follow up with PCP if symptoms worsen or do not resolve fully.

## 2023-05-20 ENCOUNTER — NURSE TRIAGE (OUTPATIENT)
Dept: ADMINISTRATIVE | Facility: CLINIC | Age: 73
End: 2023-05-20
Payer: MEDICARE

## 2023-05-20 ENCOUNTER — PATIENT MESSAGE (OUTPATIENT)
Dept: FAMILY MEDICINE | Facility: CLINIC | Age: 73
End: 2023-05-20
Payer: MEDICARE

## 2023-05-20 NOTE — TELEPHONE ENCOUNTER
Pharmacy calling about received RX for Keflex and she is PCN allergic and asking should they fill this rx. Pt received shot of ceftriaxone in the UC and no reactions so provider contacted. Cadet NP and she said that it is safe to fill. I called pharmacy back with information to ok to fill medication        Reason for Disposition   [1] Caller has URGENT medicine question about med that PCP or specialist prescribed AND [2] triager unable to answer question    Protocols used: Medication Question Call-A-AH

## 2023-05-22 LAB — BACTERIA UR CULT: NO GROWTH

## 2023-05-23 ENCOUNTER — TELEPHONE (OUTPATIENT)
Dept: URGENT CARE | Facility: CLINIC | Age: 73
End: 2023-05-23
Payer: MEDICARE

## 2023-05-24 NOTE — TELEPHONE ENCOUNTER
Spoke to daughter. Reports negative urine culture results. Daughter reports that symptoms has resolved. Instructed patient to discontinue Keflex as prescribed due to negative urine culture result.

## 2023-06-16 ENCOUNTER — PATIENT MESSAGE (OUTPATIENT)
Dept: PODIATRY | Facility: CLINIC | Age: 73
End: 2023-06-16
Payer: MEDICARE

## 2023-08-11 ENCOUNTER — OFFICE VISIT (OUTPATIENT)
Dept: URGENT CARE | Facility: CLINIC | Age: 73
End: 2023-08-11
Payer: MEDICARE

## 2023-08-11 VITALS
SYSTOLIC BLOOD PRESSURE: 142 MMHG | HEIGHT: 62 IN | TEMPERATURE: 98 F | WEIGHT: 150 LBS | DIASTOLIC BLOOD PRESSURE: 87 MMHG | BODY MASS INDEX: 27.6 KG/M2 | RESPIRATION RATE: 20 BRPM | HEART RATE: 83 BPM | OXYGEN SATURATION: 98 %
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
## 2023-08-11 DIAGNOSIS — L03.012 CELLULITIS OF FINGER OF LEFT HAND: ICD-10-CM

## 2023-08-11 DIAGNOSIS — M79.89 SWELLING OF LEFT HAND: ICD-10-CM

## 2023-08-11 DIAGNOSIS — S60.562A INSECT BITE OF LEFT HAND, INITIAL ENCOUNTER: Primary | ICD-10-CM

## 2023-08-11 PROCEDURE — 99214 OFFICE O/P EST MOD 30 MIN: CPT | Mod: S$GLB,,,

## 2023-08-11 PROCEDURE — 99214 PR OFFICE/OUTPT VISIT, EST, LEVL IV, 30-39 MIN: ICD-10-PCS | Mod: S$GLB,,,

## 2023-08-11 RX ORDER — CETIRIZINE HYDROCHLORIDE 10 MG/1
10 TABLET ORAL DAILY
Qty: 30 TABLET | Refills: 0 | Status: SHIPPED | OUTPATIENT
Start: 2023-08-11 | End: 2024-02-22

## 2023-08-11 RX ORDER — DOXYCYCLINE 100 MG/1
100 CAPSULE ORAL 2 TIMES DAILY
Qty: 14 CAPSULE | Refills: 0 | Status: SHIPPED | OUTPATIENT
Start: 2023-08-11 | End: 2023-08-18

## 2023-08-11 NOTE — PROGRESS NOTES
"Subjective:      Patient ID: Julisa Jensen is a 73 y.o. female.    Vitals:  height is 5' 2" (1.575 m) and weight is 68 kg (150 lb). Her temperature is 98.1 °F (36.7 °C). Her blood pressure is 142/87 (abnormal) and her pulse is 83. Her respiration is 20 and oxygen saturation is 98%.     Chief Complaint: Insect Bite    73 year old female presents today with a wasp bite on left hand. Redness, Edema, warm to touch. Edema is spreading. Bite occurred on 08/10/2023 around 6pm. Treatments at home includes nothing. States swelling is worsening. Pain with hand movements. She is also having itchiness to the hand. Bug bite was on the left thumb. Denies tingling or numbness. Denies recent injuries or falls.      used for the entire visit.     Insect Bite  This is a new problem. The current episode started yesterday. The problem occurs rarely. The problem has been gradually worsening. Associated symptoms include joint swelling and a rash. Pertinent negatives include no congestion, coughing or headaches. She has tried nothing for the symptoms.       HENT:  Negative for congestion.    Respiratory:  Negative for cough.    Musculoskeletal:  Positive for pain and joint swelling.   Skin:  Positive for rash and erythema.   Neurological:  Negative for headaches, disorientation and altered mental status.   Psychiatric/Behavioral:  Negative for altered mental status and disorientation.       Objective:     Physical Exam   Constitutional: She is oriented to person, place, and time. She appears well-developed.   HENT:   Head: Normocephalic and atraumatic. Head is without abrasion, without contusion and without laceration.   Ears:   Right Ear: External ear normal.   Left Ear: External ear normal.   Nose: Nose normal.   Mouth/Throat: Oropharynx is clear and moist and mucous membranes are normal.   Eyes: Conjunctivae, EOM and lids are normal. Pupils are equal, round, and reactive to light.   Neck: Trachea normal and " phonation normal. Neck supple.   Cardiovascular: Normal rate, regular rhythm and normal heart sounds.   Pulmonary/Chest: Effort normal and breath sounds normal. No stridor. No respiratory distress.   Musculoskeletal: Normal range of motion.         General: Normal range of motion.        Hands:    Neurological: She is alert and oriented to person, place, and time.   Skin: Skin is warm, dry, intact and no rash. Capillary refill takes less than 2 seconds. erythema No abrasion, No burn, No bruising and No ecchymosis         Comments: Swelling noted to the left hand when compared to the right. Pain with making a fist. Decreased  strength due to pain. Radial pulses present bilaterally and are equal. Cap refill < 2 sec.    Psychiatric: Her speech is normal and behavior is normal. Judgment and thought content normal.   Nursing note and vitals reviewed.      Assessment:     1. Insect bite of left hand, initial encounter    2. Swelling of left hand    3. Cellulitis of finger of left hand        Plan:   Strict ED precautions for new or worsening symptoms.     Insect bite of left hand, initial encounter    Swelling of left hand    Cellulitis of finger of left hand  -     cetirizine (ZYRTEC) 10 MG tablet; Take 1 tablet (10 mg total) by mouth once daily.  Dispense: 30 tablet; Refill: 0  -     doxycycline (VIBRAMYCIN) 100 MG Cap; Take 1 capsule (100 mg total) by mouth 2 (two) times daily. for 7 days  Dispense: 14 capsule; Refill: 0                Patient Instructions   - You have been given an antibiotic to treat your condition today.    - Please complete the antibiotic as directed on the bottle.   - Eat with each dose of antibiotic.  - you can take over-the-counter probiotics during and after antibiotic use to help preserve gut karlie and reduce gastrointestinal symptoms.    - Take zyrtec at night to help with swelling and itchiness. Stop taking once symptoms have resolved.     - Rest.    - Drink plenty of fluids.    -  Acetaminophen (tylenol) as directed as needed for fever/pain. Avoid tylenol if you have a history of liver disease.   - Tylenol dosing for adults: [By mouth route, immediate-release form] Dose: 325-1000 mg by mouth every 4-6h as needed; Max: 1 g/4h and 4 g/day from all sources. [By mouth route, extended-release form] Dose: 650-1300 mg Extended Release by mouth every 8h as needed; Max: 4 g/day from all sources.     - You must understand that you have received an Urgent Care treatment only and that you may be released before all of your medical problems are known or treated.   - You, the patient, will arrange for follow up care as instructed.   - If your condition worsens or fails to improve we recommend that you receive another evaluation at the ER immediately or contact your PCP to discuss your concerns or return here.   - Follow up with your PCP or specialty clinic as directed in the next 1-2 weeks if not improved or as needed.  You can call [phone] to schedule an appointment with the appropriate provider.    If your symptoms do not improve or worsen, go to the emergency room immediately.

## 2023-08-11 NOTE — PATIENT INSTRUCTIONS
- You have been given an antibiotic to treat your condition today.    - Please complete the antibiotic as directed on the bottle.   - Eat with each dose of antibiotic.  - you can take over-the-counter probiotics during and after antibiotic use to help preserve gut karlie and reduce gastrointestinal symptoms.    - Take zyrtec at night to help with swelling and itchiness. Stop taking once symptoms have resolved.     - Rest.    - Drink plenty of fluids.    - Acetaminophen (tylenol) as directed as needed for fever/pain. Avoid tylenol if you have a history of liver disease.   - Tylenol dosing for adults: [By mouth route, immediate-release form] Dose: 325-1000 mg by mouth every 4-6h as needed; Max: 1 g/4h and 4 g/day from all sources. [By mouth route, extended-release form] Dose: 650-1300 mg Extended Release by mouth every 8h as needed; Max: 4 g/day from all sources.     - You must understand that you have received an Urgent Care treatment only and that you may be released before all of your medical problems are known or treated.   - You, the patient, will arrange for follow up care as instructed.   - If your condition worsens or fails to improve we recommend that you receive another evaluation at the ER immediately or contact your PCP to discuss your concerns or return here.   - Follow up with your PCP or specialty clinic as directed in the next 1-2 weeks if not improved or as needed.  You can call [phone] to schedule an appointment with the appropriate provider.    If your symptoms do not improve or worsen, go to the emergency room immediately.

## 2023-11-13 DIAGNOSIS — Z23 FLU VACCINE NEED: Primary | ICD-10-CM

## 2023-11-16 ENCOUNTER — CLINICAL SUPPORT (OUTPATIENT)
Dept: FAMILY MEDICINE | Facility: CLINIC | Age: 73
End: 2023-11-16
Payer: MEDICARE

## 2023-11-16 DIAGNOSIS — Z23 INFLUENZA VACCINE NEEDED: Primary | ICD-10-CM

## 2023-11-16 PROCEDURE — G0008 ADMIN INFLUENZA VIRUS VAC: HCPCS | Mod: S$GLB,,, | Performed by: INTERNAL MEDICINE

## 2023-11-16 PROCEDURE — 90694 FLU VACCINE - QUADRIVALENT - ADJUVANTED: ICD-10-PCS | Mod: S$GLB,,, | Performed by: INTERNAL MEDICINE

## 2023-11-16 PROCEDURE — G0008 FLU VACCINE - QUADRIVALENT - ADJUVANTED: ICD-10-PCS | Mod: S$GLB,,, | Performed by: INTERNAL MEDICINE

## 2023-11-16 PROCEDURE — 90694 VACC AIIV4 NO PRSRV 0.5ML IM: CPT | Mod: S$GLB,,, | Performed by: INTERNAL MEDICINE

## 2024-01-25 ENCOUNTER — OFFICE VISIT (OUTPATIENT)
Dept: PRIMARY CARE CLINIC | Facility: CLINIC | Age: 74
End: 2024-01-25
Payer: MEDICARE

## 2024-01-25 VITALS
SYSTOLIC BLOOD PRESSURE: 124 MMHG | HEART RATE: 81 BPM | OXYGEN SATURATION: 95 % | DIASTOLIC BLOOD PRESSURE: 68 MMHG | WEIGHT: 138.88 LBS | BODY MASS INDEX: 25.55 KG/M2 | HEIGHT: 62 IN

## 2024-01-25 DIAGNOSIS — R79.9 ABNORMAL BLOOD CHEMISTRY: ICD-10-CM

## 2024-01-25 DIAGNOSIS — Z00.00 HEALTHCARE MAINTENANCE: ICD-10-CM

## 2024-01-25 DIAGNOSIS — D32.9 MENINGIOMA: ICD-10-CM

## 2024-01-25 DIAGNOSIS — J02.9 PHARYNGITIS, UNSPECIFIED ETIOLOGY: Primary | ICD-10-CM

## 2024-01-25 DIAGNOSIS — I10 ESSENTIAL HYPERTENSION: ICD-10-CM

## 2024-01-25 DIAGNOSIS — I70.0 AORTIC ATHEROSCLEROSIS: ICD-10-CM

## 2024-01-25 DIAGNOSIS — Z13.1 SCREENING FOR DIABETES MELLITUS: ICD-10-CM

## 2024-01-25 DIAGNOSIS — Z91.89 FRACTURE RISK ASSESSMENT SCORE (FRAX) INDICATING GREATER THAN 3% RISK FOR HIP FRACTURE: ICD-10-CM

## 2024-01-25 LAB
CTP QC/QA: YES
CTP QC/QA: YES
S PYO RRNA THROAT QL PROBE: NEGATIVE
SARS-COV-2 RDRP RESP QL NAA+PROBE: NEGATIVE

## 2024-01-25 PROCEDURE — 87635 SARS-COV-2 COVID-19 AMP PRB: CPT | Mod: QW,S$GLB,, | Performed by: INTERNAL MEDICINE

## 2024-01-25 PROCEDURE — 99215 OFFICE O/P EST HI 40 MIN: CPT | Mod: 25,S$GLB,, | Performed by: INTERNAL MEDICINE

## 2024-01-25 PROCEDURE — 87880 STREP A ASSAY W/OPTIC: CPT | Mod: QW,S$GLB,, | Performed by: INTERNAL MEDICINE

## 2024-01-25 PROCEDURE — 96372 THER/PROPH/DIAG INJ SC/IM: CPT | Mod: S$GLB,,, | Performed by: INTERNAL MEDICINE

## 2024-01-25 PROCEDURE — 99999 PR PBB SHADOW E&M-EST. PATIENT-LVL III: CPT | Mod: PBBFAC,,, | Performed by: INTERNAL MEDICINE

## 2024-01-25 RX ORDER — TRIAMCINOLONE ACETONIDE 40 MG/ML
40 INJECTION, SUSPENSION INTRA-ARTICULAR; INTRAMUSCULAR ONCE
Status: COMPLETED | OUTPATIENT
Start: 2024-01-25 | End: 2024-01-25

## 2024-01-25 RX ORDER — DICLOFENAC SODIUM 10 MG/G
2 GEL TOPICAL DAILY
Qty: 150 G | Refills: 2 | Status: SHIPPED | OUTPATIENT
Start: 2024-01-25

## 2024-01-25 RX ORDER — AZITHROMYCIN 250 MG/1
TABLET, FILM COATED ORAL
Qty: 6 TABLET | Refills: 0 | Status: SHIPPED | OUTPATIENT
Start: 2024-01-25 | End: 2024-01-30

## 2024-01-25 RX ORDER — CHLORHEXIDINE GLUCONATE ORAL RINSE 1.2 MG/ML
15 SOLUTION DENTAL 2 TIMES DAILY
Qty: 118 ML | Refills: 2 | Status: SHIPPED | OUTPATIENT
Start: 2024-01-25 | End: 2024-02-08

## 2024-01-25 RX ORDER — HYDROCHLOROTHIAZIDE 12.5 MG/1
12.5 TABLET ORAL DAILY
Qty: 90 TABLET | Refills: 3 | Status: SHIPPED | OUTPATIENT
Start: 2024-01-25 | End: 2024-05-22 | Stop reason: SDUPTHER

## 2024-01-25 RX ADMIN — TRIAMCINOLONE ACETONIDE 40 MG: 40 INJECTION, SUSPENSION INTRA-ARTICULAR; INTRAMUSCULAR at 12:01

## 2024-01-25 NOTE — ASSESSMENT & PLAN NOTE
- Yearly labs-  ordered for next visit  - Colon cancer screening-  colonoscopy in 2022, repeat in 10 years.  - ACP-   - Vaccination-   Due for shingles, RSV, and COVID vaccines.

## 2024-01-25 NOTE — ASSESSMENT & PLAN NOTE
She was on atorvastatin 40 mg daily but no longer taking.   Her last LDL was 77.  Will re-evaluate lipids but statins for prevention will be recommended  with arthrosclerosis.  No history of TIAs or strokes.

## 2024-01-25 NOTE — PROGRESS NOTES
Subjective:       Patient ID: Julisa Jenesn is a 73 y.o. female.    Chief Complaint: Sore Throat, Oral Pain, and Knee Pain      HPI  Julisa Jensen is a 73 y.o. female with  HTN, vit D and B12 deficiency, anemia, and recently treated for vertigo who presents today for Sore Throat, Oral Pain, and Knee Pain    Reports in the last 3 days has been having a sore throat last night was feeling feverish and had trouble sleeping waking up with pain in her throat.  It hurts to swallow and has used honey that alleviates the irritation but feels her symptoms are getting worse.  Nobody close to her with similar symptoms.  Denies headaches, sinus pressure, shortness of breath, or cough.  Has not used any medications for her symptoms.    Still has oral pain associated to irritated gums and prior dental work.  She follows with a different dentist and symptoms have improved but still with remaining discomfort.    Blood pressure has been well controlled on amlodipine and HCTZ.  Does not have a diet and with the holidays gained some weight.  In the past was diagnose with high cholesterol but not on chronic medications.    Has no toxic habits.  She is active but no routine workouts.    ACP- her daughter, Julisa Plaza, speaks for her with her medical appointments in English.  Has no documents for health power-of- living will.  Mammogram- 03/08/2023 (negative)  Colon cancer screening- colonoscopy in 2022 repeat in 10 years  DEXA- 3/8/23 osteopenia, but increase risk of hip fracture on FRAX score.      Health Maintenance:  Health Maintenance   Topic Date Due    Shingles Vaccine (2 of 2) 01/11/2021    High Dose Statin  02/22/2025    DEXA Scan  03/08/2026    Lipid Panel  05/16/2028    Colorectal Cancer Screening  06/23/2030    TETANUS VACCINE  11/16/2030    Hepatitis C Screening  Completed       Review of Systems   Constitutional:  Positive for fatigue. Negative for chills, fever and unexpected weight change.   HENT:  Positive for  sore throat and trouble swallowing. Negative for nasal congestion and postnasal drip.    Eyes: Negative.    Respiratory: Negative.  Negative for cough.    Cardiovascular: Negative.    Gastrointestinal: Negative.    Genitourinary: Negative.  Negative for difficulty urinating.   Musculoskeletal: Negative.    Integumentary:  Negative.   Neurological: Negative.  Negative for headaches.   Psychiatric/Behavioral:  Positive for sleep disturbance.       Past Medical History:   Diagnosis Date    Cataract     COVID-19 03/2021    Hypertension     Stroke        Past Surgical History:   Procedure Laterality Date    CATARACT EXTRACTION W/  INTRAOCULAR LENS IMPLANT Left 8/3/2021    Procedure: EXTRACTION, CATARACT, WITH IOL INSERTION;  Surgeon: Ji Shaikh MD;  Location: [facility] OR;  Service: Ophthalmology;  Laterality: Left;    CHOLECYSTECTOMY      COLONOSCOPY N/A 6/23/2020    Procedure: COLONOSCOPY;  Surgeon: Madelyn Finch MD;  Location: [facility];  Service: Endoscopy;  Laterality: N/A;       Family History   Problem Relation Age of Onset    Cataracts Mother     Heart disease Mother     No Known Problems Father     Diabetes Sister     Stroke Sister     Diabetes Brother     No Known Problems Daughter     No Known Problems Son     Breast cancer Maternal Cousin     Amblyopia Neg Hx     Blindness Neg Hx     Glaucoma Neg Hx     Macular degeneration Neg Hx     Retinal detachment Neg Hx     Strabismus Neg Hx        Social History     Socioeconomic History    Marital status:    Tobacco Use    Smoking status: Never    Smokeless tobacco: Never   Substance and Sexual Activity    Alcohol use: Yes     Comment: occasionally    Drug use: Never    Sexual activity: Not Currently     Partners: Male     Social Determinants of Health     Financial Resource Strain: High Risk (5/9/2023)    Overall Financial Resource Strain (CARDIA)     Difficulty of Paying Living Expenses: Hard   Food Insecurity: Food Insecurity Present (5/9/2023)     Hunger Vital Sign     Worried About Running Out of Food in the Last Year: Sometimes true     Ran Out of Food in the Last Year: Never true   Transportation Needs: No Transportation Needs (5/9/2023)    PRAPARE - Transportation     Lack of Transportation (Medical): No     Lack of Transportation (Non-Medical): No   Physical Activity: Sufficiently Active (5/9/2023)    Exercise Vital Sign     Days of Exercise per Week: 3 days     Minutes of Exercise per Session: 60 min   Stress: Stress Concern Present (5/9/2023)    [employer] - Occupational Stress Questionnaire     Feeling of Stress : To some extent   Social Connections: Moderately Integrated (5/9/2023)    Social Connection and Isolation Panel [NHANES]     Frequency of Communication with Friends and Family: More than three times a week     Frequency of Social Gatherings with Friends and Family: More than three times a week     Attends [employer] Services: More than 4 times per year     Active Member of Clubs or Organizations: No     Attends Club or Organization Meetings: Never     Marital Status:    Housing Stability: Low Risk  (5/9/2023)    Housing Stability Vital Sign     Unable to Pay for Housing in the Last Year: No     Number of Places Lived in the Last Year: 1     Unstable Housing in the Last Year: No       Current Outpatient Medications   Medication Sig Dispense Refill    acetic acid-hydrocortisone (VOSOL-HC) otic solution Place 3 drops into the right ear 2 (two) times daily. for 10 days 10 mL 0    amLODIPine (NORVASC) 5 MG tablet Take 1 tablet (5 mg total) by mouth once daily. 90 tablet 3    aspirin (ECOTRIN) 81 MG EC tablet Take 1 tablet (81 mg total) by mouth once daily. 30 tablet 11    atorvastatin (LIPITOR) 10 MG tablet Take 1 tablet (10 mg total) by mouth once daily. 90 tablet 3    calcium carbonate-vitamin D3 600 mg-20 mcg (800 unit) Chew Take 1 tablet by mouth 2 (two) times a day. 60 tablet 11    cetirizine (ZYRTEC) 10 MG  "tablet Take 1 tablet (10 mg total) by mouth once daily. 30 tablet 0    diclofenac sodium (VOLTAREN) 1 % Gel Apply 2 g topically once daily. (Patient not taking: Reported on 2/22/2024) 150 g 2    hydroCHLOROthiazide (HYDRODIURIL) 12.5 MG Tab Take 1 tablet (12.5 mg total) by mouth once daily. 90 tablet 3    hydroquinone 4 % Crea Apply topically 2 (two) times daily. 46 g 1     No current facility-administered medications for this visit.       Review of patient's allergies indicates:   Allergen Reactions    Penicillins          Objective:       Last 3 sets of Vitals        8/11/2023    11:56 AM 1/25/2024    10:49 AM 2/22/2024    11:35 AM   Vitals - 1 value per visit   SYSTOLIC 142 124 122   DIASTOLIC 87 68 72   Pulse 83 81 69   Temp 98.1 °F (36.7 °C)  98.8 °F (37.1 °C)   Resp 20     SPO2 98 % 95 % 98 %   Weight (lb) 150 138.89 146.61   Weight (kg) 68.04 63 66.5   Height 5' 2" (1.575 m) 5' 2" (1.575 m) 5' 2" (1.575 m)   BMI (Calculated) 27.4 25.4 26.8   Pain Score   Zero   Physical Exam  Constitutional:       General: She is not in acute distress.  HENT:      Head: Normocephalic.      Right Ear: Tympanic membrane, ear canal and external ear normal.      Left Ear: Tympanic membrane, ear canal and external ear normal.      Nose: Nose normal.      Mouth/Throat:      Mouth: Mucous membranes are moist.      Pharynx: Posterior oropharyngeal erythema present.   Eyes:      General: No scleral icterus.     Extraocular Movements: Extraocular movements intact.      Conjunctiva/sclera: Conjunctivae normal.   Neck:      Vascular: No carotid bruit.      Comments: No goiter.  Cardiovascular:      Rate and Rhythm: Normal rate and regular rhythm.      Pulses: Normal pulses.      Heart sounds: Normal heart sounds.   Pulmonary:      Effort: Pulmonary effort is normal.      Breath sounds: Normal breath sounds.   Abdominal:      General: Bowel sounds are normal. There is no distension.      Palpations: Abdomen is soft. There is no mass.      " Tenderness: There is no abdominal tenderness.   Musculoskeletal:         General: No swelling.   Lymphadenopathy:      Cervical: Cervical adenopathy present.   Skin:     General: Skin is warm and dry.   Neurological:      General: No focal deficit present.      Mental Status: She is alert and oriented to person, place, and time.   Psychiatric:         Mood and Affect: Mood normal.         Behavior: Behavior normal.           CBC:  Recent Labs   Lab 03/21/22  0736 09/16/22  0819 05/16/23  0907   WBC 5.90 5.00 5.75   RBC 4.59 4.45 4.38   Hemoglobin 13.1 12.6 12.3   Hematocrit 38.9 37.5 38.9   Platelets 309 319 326   MCV 85 84 89   MCH 28.5 28.3 28.1   MCHC 33.7 33.6 31.6 L     CMP:  Recent Labs   Lab 03/08/21  0333 03/21/22  0736 05/16/23  0907   Glucose 103 97 86   Calcium 8.5 L 9.5 8.5 L   Albumin 3.2 L 3.9 3.9   Total Protein 7.2 8.3 7.5   Sodium 141 140 142   Potassium 4.0 3.6 3.6   CO2 27 28 27   Chloride 105 105 106   BUN 16 13 11   Creatinine 0.7 0.7 0.7   Alkaline Phosphatase 46 L 88 85   ALT 66 H 25 26   AST 56 H 22 24   Total Bilirubin 0.4 0.6 0.5     URINALYSIS:  Recent Labs   Lab 05/19/23  1755   pH, UA 5.5      LIPIDS:  Recent Labs   Lab 03/21/22  0736 09/16/22  0819 05/16/23  0907   TSH 3.603 2.511 2.389   HDL 45 45 43   Cholesterol 174 163 140   Triglycerides 115 74 99   LDL Cholesterol 106.0 103.2 77.2   HDL/Cholesterol Ratio 25.9 27.6 30.7   Non-HDL Cholesterol 129 118 97   Total Cholesterol/HDL Ratio 3.9 3.6 3.3     TSH:  Recent Labs   Lab 03/21/22  0736 09/16/22  0819 05/16/23  0907   TSH 3.603 2.511 2.389       A1C:  Recent Labs   Lab 05/16/23  0907   Hemoglobin A1C 5.1       Imaging:  CT Cardiac Scoring  Narrative: EXAMINATION:  CT CALCIUM SCORING CARDIAC    CLINICAL HISTORY:  CAD screening, intermediate CAD risk, not treadmill candidate;  Encounter for screening for cardiovascular disorders    TECHNIQUE:  Gated,  low dose axial images were performed over the  heart.    COMPARISON:  None.    FINDINGS:  Important information about your scan:    The following information is based on analysis of the coronary arteries only.  Calcium deposits do not correspond directly to the percentage of narrowing of the arteries.  They do correlate directly to the amount of coronary plaque, and to the risk of future coronary disease.  These calcium deposits usually begin to form years before any symptoms develop.  Early detection and modification of risk factors, such as smoking and cholesterol intake, and slow progression of coronary artery disease.    A low score suggests a low likelihood of coronary artery disease, but does not exclude the possibility of significant coronary artery narrowing.  The results should be discussed with your physician taking into account other risk factors such as age, gender, family history, diabetes, smoking or high cholesterol levels.    Should you experience chest pain, difficulty breathing, discomfort radiating into her neck or arm, or discomfort combined with lightheadedness, sweating, fainting or nausea, you should seek prompt medical attention.    Calcium score:    0-10: Very little coronary heart disease risk    11 through 100: Low to moderate coronary heart disease risk    101 through 400: Moderate to high coronary heart disease risk    Greater than 401: High coronary heart disease risk.    SCORE SUMMARY    Your total calcium score is 0    Incidental findings: The heart size is normal.  The aorta and main pulmonary artery are of normal caliber.  The coronary artery origins are conventional, and the system is likely right dominant.  The bones unremarkable the age of patient the liver diffusely diminished in attenuation consistent.  Impression: Your total calcium score is 0.  Very little coronary heart disease risk.    Fatty liver    Electronically signed by: Flores Newberry  Date:    03/21/2023  Time:    10:01      Assessment:       1. Pharyngitis,  unspecified etiology    2. Essential hypertension    3. Fracture Risk Assessment Score (FRAX) indicating greater than 3% risk for hip fracture    4. Healthcare maintenance    5. Aortic atherosclerosis    6. Abnormal blood chemistry    7. Screening for diabetes mellitus    8. Meningioma            Plan:       1. Pharyngitis, unspecified etiology  Comments:  negative rapid COVID and strep test. Has redness, subjective fever,  pain and lymphadenopathy.  Steroid shot an antibiotic given.  Orders:  -     POCT Rapid Strep A  -     POCT COVID-19 Rapid Screening  -     triamcinolone acetonide injection 40 mg  -     azithromycin (Z-ELANA) 250 MG tablet; Take 2 tablets by mouth on day 1; Take 1 tablet by mouth on days 2-5  Dispense: 6 tablet; Refill: 0    2. Essential hypertension  Assessment & Plan:   Controlled on amlodipine and HCTZ.   Continue to monitor with same treatment.   Diet and exercise recommended.    Orders:  -     hydroCHLOROthiazide (HYDRODIURIL) 12.5 MG Tab; Take 1 tablet (12.5 mg total) by mouth once daily.  Dispense: 90 tablet; Refill: 3  -     CBC Auto Differential; Future; Expected date: 01/25/2024  -     Comprehensive Metabolic Panel; Future; Expected date: 01/25/2024  -     Lipid Panel; Future; Expected date: 01/25/2024    3. Fracture Risk Assessment Score (FRAX) indicating greater than 3% risk for hip fracture  Overview:  Last bone density test noted with osteopenia but with FRAX score elevated. Osteoporosis treatment will be  discuss on next visit.      4. Healthcare maintenance  Assessment & Plan:  - Yearly labs-  ordered for next visit  - Colon cancer screening-  colonoscopy in 2022, repeat in 10 years.  - ACP-   - Vaccination-   Due for shingles, RSV, and COVID vaccines.        5. Aortic atherosclerosis  Overview:  Seen on Ct abdf 2/5/20    Assessment & Plan:   She was on atorvastatin 40 mg daily but no longer taking.   Her last LDL was 77.  Will re-evaluate lipids but statins for prevention will be  recommended  with arthrosclerosis.  No history of TIAs or strokes.    Orders:  -     Lipid Panel; Future; Expected date: 01/25/2024    6. Abnormal blood chemistry  -     TSH; Future; Expected date: 01/25/2024    7. Screening for diabetes mellitus  -     Hemoglobin A1C; Future; Expected date: 01/25/2024    8. 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     Assessment & Plan:   Noted on chart review. Asymptomatic.      Other orders  -     diclofenac sodium (VOLTAREN) 1 % Gel; Apply 2 g topically once daily.  Dispense: 150 g; Refill: 2-   As needed for knee pain  -     chlorhexidine (PERIDEX) 0.12 % solution; Use as directed 15 mLs in the mouth or throat 2 (two) times daily. for 14 days  Dispense: 118 mL; Refill: 2-  to help gum/mouth irritation.       Health Maintenance Due   Topic Date Due    RSV Vaccine (Age 60+ and Pregnant patients) (1 - 1-dose 60+ series) Never done    Shingles Vaccine (2 of 2) 01/11/2021    COVID-19 Vaccine (3 - 2023-24 season) 09/01/2023            Return to clinic in 1 months.    Sandra Stafford MD  [facility]  Disclaimer:  This note has been generated using voice-recognition software. There may be grammatical or spelling errors that have been missed during proof-reading

## 2024-02-22 ENCOUNTER — OFFICE VISIT (OUTPATIENT)
Dept: PRIMARY CARE CLINIC | Facility: CLINIC | Age: 74
End: 2024-02-22
Payer: MEDICARE

## 2024-02-22 VITALS
BODY MASS INDEX: 26.98 KG/M2 | TEMPERATURE: 99 F | HEIGHT: 62 IN | OXYGEN SATURATION: 98 % | SYSTOLIC BLOOD PRESSURE: 122 MMHG | HEART RATE: 69 BPM | WEIGHT: 146.63 LBS | DIASTOLIC BLOOD PRESSURE: 72 MMHG

## 2024-02-22 DIAGNOSIS — I10 ESSENTIAL HYPERTENSION: Primary | ICD-10-CM

## 2024-02-22 DIAGNOSIS — Z91.89 FRACTURE RISK ASSESSMENT SCORE (FRAX) INDICATING GREATER THAN 3% RISK FOR HIP FRACTURE: ICD-10-CM

## 2024-02-22 DIAGNOSIS — I70.0 AORTIC ATHEROSCLEROSIS: ICD-10-CM

## 2024-02-22 DIAGNOSIS — H66.91 OTITIS OF RIGHT EAR: ICD-10-CM

## 2024-02-22 DIAGNOSIS — L98.9 SKIN LESION: ICD-10-CM

## 2024-02-22 PROCEDURE — 99215 OFFICE O/P EST HI 40 MIN: CPT | Mod: S$GLB,,, | Performed by: INTERNAL MEDICINE

## 2024-02-22 PROCEDURE — 99999 PR PBB SHADOW E&M-EST. PATIENT-LVL III: CPT | Mod: PBBFAC,,, | Performed by: INTERNAL MEDICINE

## 2024-02-22 RX ORDER — AMLODIPINE BESYLATE 5 MG/1
5 TABLET ORAL DAILY
Qty: 90 TABLET | Refills: 3 | Status: SHIPPED | OUTPATIENT
Start: 2024-02-22 | End: 2024-05-22 | Stop reason: SDUPTHER

## 2024-02-22 RX ORDER — ATORVASTATIN CALCIUM 10 MG/1
10 TABLET, FILM COATED ORAL DAILY
Qty: 90 TABLET | Refills: 3 | Status: SHIPPED | OUTPATIENT
Start: 2024-02-22 | End: 2024-05-22

## 2024-02-22 RX ORDER — HYDROQUINONE 40 MG/G
CREAM TOPICAL 2 TIMES DAILY
Qty: 46 G | Refills: 1 | Status: SHIPPED | OUTPATIENT
Start: 2024-02-22 | End: 2024-03-23

## 2024-02-22 RX ORDER — HYDROCORTISONE AND ACETIC ACID 20.75; 10.375 MG/ML; MG/ML
3 SOLUTION AURICULAR (OTIC) 2 TIMES DAILY
Qty: 10 ML | Refills: 0 | Status: SHIPPED | OUTPATIENT
Start: 2024-02-22 | End: 2024-03-03

## 2024-02-22 NOTE — PROGRESS NOTES
Subjective:       Patient ID: Julisa Jensen is a 73 y.o. female.    Chief Complaint: Follow-up      HPI  Julisa Jensen is a 73 y.o. female with HTN, vit D and B12 deficiency, anemia, and vertigo who presents today for Follow-up    Had DEXA with osteopenia but FRAX score elevated for hip fracture. Osteoporosis treatment could be considered. Reports no family history of osteoporosis, no recent falls. Not taking supplements. Prefers conservative management.    Concern of dark spots in face. Has seen dermatologist in the past, many lesions benign, has 1 chest lesion resected. Tries to protect skin with sunscreen. Has been having right ear discomfort, no fever or vertigo.    Has been having a lower back pain, close to tail bone. Pain is when standing and last for johnson than a minute while getting straight. Denies changes in bowel movement, trouble urinating, Not taking anything for pain, no recent falls.Pain is not all the time.    No toxic habits. She is . Walks for exercise.    - Yearly labs-  ordered for next visit  - Colon cancer screening-  colonoscopy in 2022, repeat in 10 years.  - ACP-  her daughter, Julisa Plaza, speaks for her with her medical appointments in English.  Has no documents for health power-of- living will.  - Vaccination-   Due for shingles, RSV, and COVID vaccines.        Health Maintenance:  Health Maintenance   Topic Date Due    Shingles Vaccine (2 of 2) 01/11/2021    High Dose Statin  02/22/2025    DEXA Scan  03/08/2026    Lipid Panel  05/16/2028    Colorectal Cancer Screening  06/23/2030    TETANUS VACCINE  11/16/2030    Hepatitis C Screening  Completed       Review of Systems   Constitutional: Negative.  Negative for fever and unexpected weight change.   HENT:  Positive for ear pain.    Respiratory: Negative.     Cardiovascular: Negative.    Gastrointestinal: Negative.    Genitourinary:  Negative for difficulty urinating.   Musculoskeletal:  Positive for back pain.    Integumentary:  Positive for mole/lesion. Negative.   Neurological: Negative.    Psychiatric/Behavioral:  Positive for sleep disturbance.       Past Medical History:   Diagnosis Date    Cataract     COVID-19 03/2021    Hypertension     Stroke        Past Surgical History:   Procedure Laterality Date    CATARACT EXTRACTION W/  INTRAOCULAR LENS IMPLANT Left 8/3/2021    Procedure: EXTRACTION, CATARACT, WITH IOL INSERTION;  Surgeon: Ji Shaikh MD;  Location: [facility] OR;  Service: Ophthalmology;  Laterality: Left;    CHOLECYSTECTOMY      COLONOSCOPY N/A 6/23/2020    Procedure: COLONOSCOPY;  Surgeon: Madelyn Finch MD;  Location: [facility] ENDO;  Service: Endoscopy;  Laterality: N/A;       Family History   Problem Relation Age of Onset    Cataracts Mother     Heart disease Mother     No Known Problems Father     Diabetes Sister     Stroke Sister     Diabetes Brother     No Known Problems Daughter     No Known Problems Son     Breast cancer Maternal Cousin     Amblyopia Neg Hx     Blindness Neg Hx     Glaucoma Neg Hx     Macular degeneration Neg Hx     Retinal detachment Neg Hx     Strabismus Neg Hx        Social History     Socioeconomic History    Marital status:    Tobacco Use    Smoking status: Never    Smokeless tobacco: Never   Substance and Sexual Activity    Alcohol use: Yes     Comment: occasionally    Drug use: Never    Sexual activity: Not Currently     Partners: Male     Social Determinants of Health     Financial Resource Strain: High Risk (5/9/2023)    Overall Financial Resource Strain (CARDIA)     Difficulty of Paying Living Expenses: Hard   Food Insecurity: Food Insecurity Present (5/9/2023)    Hunger Vital Sign     Worried About Running Out of Food in the Last Year: Sometimes true     Ran Out of Food in the Last Year: Never true   Transportation Needs: No Transportation Needs (5/9/2023)    PRAPARE - Transportation     Lack of Transportation (Medical): No     Lack of Transportation  (Non-Medical): No   Physical Activity: Sufficiently Active (5/9/2023)    Exercise Vital Sign     Days of Exercise per Week: 3 days     Minutes of Exercise per Session: 60 min   Stress: Stress Concern Present (5/9/2023)    [employer] - Occupational Stress Questionnaire     Feeling of Stress : To some extent   Social Connections: Moderately Integrated (5/9/2023)    Social Connection and Isolation Panel [NHANES]     Frequency of Communication with Friends and Family: More than three times a week     Frequency of Social Gatherings with Friends and Family: More than three times a week     Attends [employer] Services: More than 4 times per year     Active Member of Clubs or Organizations: No     Attends Club or Organization Meetings: Never     Marital Status:    Housing Stability: Low Risk  (5/9/2023)    Housing Stability Vital Sign     Unable to Pay for Housing in the Last Year: No     Number of Places Lived in the Last Year: 1     Unstable Housing in the Last Year: No       Current Outpatient Medications   Medication Sig Dispense Refill    hydroCHLOROthiazide (HYDRODIURIL) 12.5 MG Tab Take 1 tablet (12.5 mg total) by mouth once daily. 90 tablet 3    acetic acid-hydrocortisone (VOSOL-HC) otic solution Place 3 drops into the right ear 2 (two) times daily. for 10 days 10 mL 0    amLODIPine (NORVASC) 5 MG tablet Take 1 tablet (5 mg total) by mouth once daily. 90 tablet 3    aspirin (ECOTRIN) 81 MG EC tablet Take 1 tablet (81 mg total) by mouth once daily. 30 tablet 11    atorvastatin (LIPITOR) 10 MG tablet Take 1 tablet (10 mg total) by mouth once daily. 90 tablet 3    calcium carbonate-vitamin D3 600 mg-20 mcg (800 unit) Chew Take 1 tablet by mouth 2 (two) times a day. 60 tablet 11    cetirizine (ZYRTEC) 10 MG tablet Take 1 tablet (10 mg total) by mouth once daily. 30 tablet 0    diclofenac sodium (VOLTAREN) 1 % Gel Apply 2 g topically once daily. (Patient not taking: Reported on  "2/22/2024) 150 g 2    hydroquinone 4 % Crea Apply topically 2 (two) times daily. 46 g 1     No current facility-administered medications for this visit.       Review of patient's allergies indicates:   Allergen Reactions    Penicillins          Objective:       Last 3 sets of Vitals        8/11/2023    11:56 AM 1/25/2024    10:49 AM 2/22/2024    11:35 AM   Vitals - 1 value per visit   SYSTOLIC 142 124 122   DIASTOLIC 87 68 72   Pulse 83 81 69   Temp 98.1 °F (36.7 °C)  98.8 °F (37.1 °C)   Resp 20     SPO2 98 % 95 % 98 %   Weight (lb) 150 138.89 146.61   Weight (kg) 68.04 63 66.5   Height 5' 2" (1.575 m) 5' 2" (1.575 m) 5' 2" (1.575 m)   BMI (Calculated) 27.4 25.4 26.8   Pain Score   Zero   Physical Exam  Constitutional:       General: She is not in acute distress.     Appearance: Normal appearance.   HENT:      Head: Normocephalic.      Right Ear: Tympanic membrane normal.      Left Ear: Tympanic membrane, ear canal and external ear normal.      Ears:      Comments: External canal white patch on canal.     Nose: Nose normal.      Mouth/Throat:      Mouth: Mucous membranes are moist.   Eyes:      General: No scleral icterus.     Extraocular Movements: Extraocular movements intact.      Conjunctiva/sclera: Conjunctivae normal.   Neck:      Vascular: No carotid bruit.      Comments: No goiter.  Cardiovascular:      Rate and Rhythm: Normal rate and regular rhythm.      Pulses: Normal pulses.      Heart sounds: Normal heart sounds.   Pulmonary:      Effort: Pulmonary effort is normal.      Breath sounds: Normal breath sounds.   Abdominal:      General: Bowel sounds are normal. There is no distension.      Palpations: Abdomen is soft. There is no mass.      Tenderness: There is no abdominal tenderness.   Musculoskeletal:         General: No swelling. Normal range of motion.   Lymphadenopathy:      Cervical: No cervical adenopathy.   Skin:     General: Skin is warm and dry.      Comments: Darkened macula on face, mole " beside nose with benign appearance. Trunk keratotic lesions.   Neurological:      General: No focal deficit present.      Mental Status: She is alert and oriented to person, place, and time.   Psychiatric:         Mood and Affect: Mood normal.         Behavior: Behavior normal.           CBC:  Recent Labs   Lab 03/21/22  0736 09/16/22  0819 05/16/23  0907   WBC 5.90 5.00 5.75   RBC 4.59 4.45 4.38   Hemoglobin 13.1 12.6 12.3   Hematocrit 38.9 37.5 38.9   Platelets 309 319 326   MCV 85 84 89   MCH 28.5 28.3 28.1   MCHC 33.7 33.6 31.6 L     CMP:  Recent Labs   Lab 03/08/21  0333 03/21/22  0736 05/16/23  0907   Glucose 103 97 86   Calcium 8.5 L 9.5 8.5 L   Albumin 3.2 L 3.9 3.9   Total Protein 7.2 8.3 7.5   Sodium 141 140 142   Potassium 4.0 3.6 3.6   CO2 27 28 27   Chloride 105 105 106   BUN 16 13 11   Creatinine 0.7 0.7 0.7   Alkaline Phosphatase 46 L 88 85   ALT 66 H 25 26   AST 56 H 22 24   Total Bilirubin 0.4 0.6 0.5     URINALYSIS:  Recent Labs   Lab 05/19/23  1755   pH, UA 5.5      LIPIDS:  Recent Labs   Lab 03/21/22  0736 09/16/22  0819 05/16/23  0907   TSH 3.603 2.511 2.389   HDL 45 45 43   Cholesterol 174 163 140   Triglycerides 115 74 99   LDL Cholesterol 106.0 103.2 77.2   HDL/Cholesterol Ratio 25.9 27.6 30.7   Non-HDL Cholesterol 129 118 97   Total Cholesterol/HDL Ratio 3.9 3.6 3.3     TSH:  Recent Labs   Lab 03/21/22  0736 09/16/22  0819 05/16/23  0907   TSH 3.603 2.511 2.389       A1C:  Recent Labs   Lab 05/16/23  0907   Hemoglobin A1C 5.1       Imaging:  CT Cardiac Scoring  Narrative: EXAMINATION:  CT CALCIUM SCORING CARDIAC    CLINICAL HISTORY:  CAD screening, intermediate CAD risk, not treadmill candidate;  Encounter for screening for cardiovascular disorders    TECHNIQUE:  Gated,  low dose axial images were performed over the heart.    COMPARISON:  None.    FINDINGS:  Important information about your scan:    The following information is based on analysis of the coronary arteries only.  Calcium  deposits do not correspond directly to the percentage of narrowing of the arteries.  They do correlate directly to the amount of coronary plaque, and to the risk of future coronary disease.  These calcium deposits usually begin to form years before any symptoms develop.  Early detection and modification of risk factors, such as smoking and cholesterol intake, and slow progression of coronary artery disease.    A low score suggests a low likelihood of coronary artery disease, but does not exclude the possibility of significant coronary artery narrowing.  The results should be discussed with your physician taking into account other risk factors such as age, gender, family history, diabetes, smoking or high cholesterol levels.    Should you experience chest pain, difficulty breathing, discomfort radiating into her neck or arm, or discomfort combined with lightheadedness, sweating, fainting or nausea, you should seek prompt medical attention.    Calcium score:    0-10: Very little coronary heart disease risk    11 through 100: Low to moderate coronary heart disease risk    101 through 400: Moderate to high coronary heart disease risk    Greater than 401: High coronary heart disease risk.    SCORE SUMMARY    Your total calcium score is 0    Incidental findings: The heart size is normal.  The aorta and main pulmonary artery are of normal caliber.  The coronary artery origins are conventional, and the system is likely right dominant.  The bones unremarkable the age of patient the liver diffusely diminished in attenuation consistent.  Impression: Your total calcium score is 0.  Very little coronary heart disease risk.    Fatty liver    Electronically signed by: Flores Newberry  Date:    03/21/2023  Time:    10:01      Assessment:       1. Essential hypertension    2. Fracture Risk Assessment Score (FRAX) indicating greater than 3% risk for hip fracture    3. Otitis of right ear    4. Skin lesion    5. Aortic atherosclerosis             Plan:       1. Essential hypertension  Assessment & Plan:   Controlled on amlodipine and HCTZ.   Continue to monitor with same treatment.   Diet and exercise recommended.    Orders:  -     amLODIPine (NORVASC) 5 MG tablet; Take 1 tablet (5 mg total) by mouth once daily.  Dispense: 90 tablet; Refill: 3    2. Fracture Risk Assessment Score (FRAX) indicating greater than 3% risk for hip fracture  Overview:  Last bone density test noted with osteopenia but with FRAX score elevated.     Assessment & Plan:  Was not taking supplements. Prefers conservative management and agrees to check in 1 year to evaluate need of medical treatment if progresses.    Orders:  -     Cancel: DXA Bone Density Axial Skeleton 1 or more sites; Future; Expected date: 03/25/2024  -     calcium carbonate-vitamin D3 600 mg-20 mcg (800 unit) Chew; Take 1 tablet by mouth 2 (two) times a day.  Dispense: 60 tablet; Refill: 11    3. Otitis of right ear  -     acetic acid-hydrocortisone (VOSOL-HC) otic solution; Place 3 drops into the right ear 2 (two) times daily. for 10 days  Dispense: 10 mL; Refill: 0    4. Skin lesion  -     hydroquinone 4 % Crea; Apply topically 2 (two) times daily.  Dispense: 46 g; Refill: 1    5. Aortic atherosclerosis  Overview:  Seen on Ct abdf 2/5/20    Assessment & Plan:  Will start atorvastatin but low dose may reach goal. Follow up labs.    Orders:  -     atorvastatin (LIPITOR) 10 MG tablet; Take 1 tablet (10 mg total) by mouth once daily.  Dispense: 90 tablet; Refill: 3       Health Maintenance Due   Topic Date Due    RSV Vaccine (Age 60+ and Pregnant patients) (1 - 1-dose 60+ series) Never done    Shingles Vaccine (2 of 2) 01/11/2021    COVID-19 Vaccine (3 - 2023-24 season) 09/01/2023        I spent a total of 45 minutes on the day of the visit.  This includes face to face time and non-face to face time preparing to see the patient (eg, review of tests), obtaining and/or reviewing separately obtained history,  documenting clinical information in the electronic or other health record, independently interpreting results and communicating results to the patient/family/caregiver, or care coordinator.       Return to clinic in 3 months.    Sandra Stafford MD  [facility]  Disclaimer:  This note has been generated using voice-recognition software. There may be grammatical or spelling errors that have been missed during proof-reading

## 2024-02-23 NOTE — ASSESSMENT & PLAN NOTE
Controlled on amlodipine and HCTZ.   Continue to monitor with same treatment.   Diet and exercise recommended.

## 2024-02-23 NOTE — ASSESSMENT & PLAN NOTE
Was not taking supplements. Prefers conservative management and agrees to check in 1 year to evaluate need of medical treatment if progresses.

## 2024-02-28 ENCOUNTER — PATIENT MESSAGE (OUTPATIENT)
Dept: PRIMARY CARE CLINIC | Facility: CLINIC | Age: 74
End: 2024-02-28
Payer: MEDICARE

## 2024-04-29 PROBLEM — Z00.00 HEALTHCARE MAINTENANCE: Status: RESOLVED | Noted: 2024-01-25 | Resolved: 2024-04-29

## 2024-05-21 ENCOUNTER — LAB VISIT (OUTPATIENT)
Dept: LAB | Facility: HOSPITAL | Age: 74
End: 2024-05-21
Attending: INTERNAL MEDICINE
Payer: MEDICARE

## 2024-05-21 DIAGNOSIS — R79.9 ABNORMAL BLOOD CHEMISTRY: ICD-10-CM

## 2024-05-21 DIAGNOSIS — I70.0 AORTIC ATHEROSCLEROSIS: ICD-10-CM

## 2024-05-21 DIAGNOSIS — Z13.1 SCREENING FOR DIABETES MELLITUS: ICD-10-CM

## 2024-05-21 DIAGNOSIS — I10 ESSENTIAL HYPERTENSION: ICD-10-CM

## 2024-05-21 LAB
ALBUMIN SERPL BCP-MCNC: 3.7 G/DL (ref 3.5–5.2)
ALP SERPL-CCNC: 84 U/L (ref 55–135)
ALT SERPL W/O P-5'-P-CCNC: 20 U/L (ref 10–44)
ANION GAP SERPL CALC-SCNC: 10 MMOL/L (ref 8–16)
AST SERPL-CCNC: 23 U/L (ref 10–40)
BASOPHILS # BLD AUTO: 0.05 K/UL (ref 0–0.2)
BASOPHILS NFR BLD: 0.8 % (ref 0–1.9)
BILIRUB SERPL-MCNC: 0.3 MG/DL (ref 0.1–1)
BUN SERPL-MCNC: 16 MG/DL (ref 8–23)
CALCIUM SERPL-MCNC: 9.3 MG/DL (ref 8.7–10.5)
CHLORIDE SERPL-SCNC: 107 MMOL/L (ref 95–110)
CHOLEST SERPL-MCNC: 150 MG/DL (ref 120–199)
CHOLEST/HDLC SERPL: 3.5 {RATIO} (ref 2–5)
CO2 SERPL-SCNC: 28 MMOL/L (ref 23–29)
CREAT SERPL-MCNC: 0.7 MG/DL (ref 0.5–1.4)
DIFFERENTIAL METHOD BLD: NORMAL
EOSINOPHIL # BLD AUTO: 0.1 K/UL (ref 0–0.5)
EOSINOPHIL NFR BLD: 1 % (ref 0–8)
ERYTHROCYTE [DISTWIDTH] IN BLOOD BY AUTOMATED COUNT: 12.6 % (ref 11.5–14.5)
EST. GFR  (NO RACE VARIABLE): >60 ML/MIN/1.73 M^2
ESTIMATED AVG GLUCOSE: 103 MG/DL (ref 68–131)
GLUCOSE SERPL-MCNC: 92 MG/DL (ref 70–110)
HBA1C MFR BLD: 5.2 % (ref 4–5.6)
HCT VFR BLD AUTO: 39.3 % (ref 37–48.5)
HDLC SERPL-MCNC: 43 MG/DL (ref 40–75)
HDLC SERPL: 28.7 % (ref 20–50)
HGB BLD-MCNC: 12.6 G/DL (ref 12–16)
IMM GRANULOCYTES # BLD AUTO: 0.02 K/UL (ref 0–0.04)
IMM GRANULOCYTES NFR BLD AUTO: 0.3 % (ref 0–0.5)
LDLC SERPL CALC-MCNC: 85.4 MG/DL (ref 63–159)
LYMPHOCYTES # BLD AUTO: 2.8 K/UL (ref 1–4.8)
LYMPHOCYTES NFR BLD: 46.4 % (ref 18–48)
MCH RBC QN AUTO: 28.6 PG (ref 27–31)
MCHC RBC AUTO-ENTMCNC: 32.1 G/DL (ref 32–36)
MCV RBC AUTO: 89 FL (ref 82–98)
MONOCYTES # BLD AUTO: 0.3 K/UL (ref 0.3–1)
MONOCYTES NFR BLD: 5.5 % (ref 4–15)
NEUTROPHILS # BLD AUTO: 2.7 K/UL (ref 1.8–7.7)
NEUTROPHILS NFR BLD: 46 % (ref 38–73)
NONHDLC SERPL-MCNC: 107 MG/DL
NRBC BLD-RTO: 0 /100 WBC
PLATELET # BLD AUTO: 329 K/UL (ref 150–450)
PMV BLD AUTO: 10.9 FL (ref 9.2–12.9)
POTASSIUM SERPL-SCNC: 4.2 MMOL/L (ref 3.5–5.1)
PROT SERPL-MCNC: 7.6 G/DL (ref 6–8.4)
RBC # BLD AUTO: 4.41 M/UL (ref 4–5.4)
SODIUM SERPL-SCNC: 145 MMOL/L (ref 136–145)
TRIGL SERPL-MCNC: 108 MG/DL (ref 30–150)
TSH SERPL DL<=0.005 MIU/L-ACNC: 1.79 UIU/ML (ref 0.4–4)
WBC # BLD AUTO: 5.97 K/UL (ref 3.9–12.7)

## 2024-05-21 PROCEDURE — 80061 LIPID PANEL: CPT | Performed by: INTERNAL MEDICINE

## 2024-05-21 PROCEDURE — 83036 HEMOGLOBIN GLYCOSYLATED A1C: CPT | Performed by: INTERNAL MEDICINE

## 2024-05-21 PROCEDURE — 85025 COMPLETE CBC W/AUTO DIFF WBC: CPT | Performed by: INTERNAL MEDICINE

## 2024-05-21 PROCEDURE — 80053 COMPREHEN METABOLIC PANEL: CPT | Performed by: INTERNAL MEDICINE

## 2024-05-21 PROCEDURE — 84443 ASSAY THYROID STIM HORMONE: CPT | Performed by: INTERNAL MEDICINE

## 2024-05-21 PROCEDURE — 36415 COLL VENOUS BLD VENIPUNCTURE: CPT | Mod: PO | Performed by: INTERNAL MEDICINE

## 2024-05-22 ENCOUNTER — OFFICE VISIT (OUTPATIENT)
Dept: PRIMARY CARE CLINIC | Facility: CLINIC | Age: 74
End: 2024-05-22
Payer: MEDICARE

## 2024-05-22 VITALS
BODY MASS INDEX: 27.43 KG/M2 | HEART RATE: 71 BPM | HEIGHT: 62 IN | WEIGHT: 149.06 LBS | DIASTOLIC BLOOD PRESSURE: 72 MMHG | OXYGEN SATURATION: 96 % | SYSTOLIC BLOOD PRESSURE: 120 MMHG

## 2024-05-22 DIAGNOSIS — I83.93 VARICOSE VEINS OF BOTH LOWER EXTREMITIES, UNSPECIFIED WHETHER COMPLICATED: ICD-10-CM

## 2024-05-22 DIAGNOSIS — Z00.00 HEALTHCARE MAINTENANCE: ICD-10-CM

## 2024-05-22 DIAGNOSIS — E78.5 HYPERLIPIDEMIA, UNSPECIFIED HYPERLIPIDEMIA TYPE: ICD-10-CM

## 2024-05-22 DIAGNOSIS — M85.80 OSTEOPENIA, UNSPECIFIED LOCATION: ICD-10-CM

## 2024-05-22 DIAGNOSIS — I70.0 AORTIC ATHEROSCLEROSIS: ICD-10-CM

## 2024-05-22 DIAGNOSIS — I10 ESSENTIAL HYPERTENSION: Primary | ICD-10-CM

## 2024-05-22 PROCEDURE — 3074F SYST BP LT 130 MM HG: CPT | Mod: CPTII,S$GLB,, | Performed by: INTERNAL MEDICINE

## 2024-05-22 PROCEDURE — 99214 OFFICE O/P EST MOD 30 MIN: CPT | Mod: S$GLB,,, | Performed by: INTERNAL MEDICINE

## 2024-05-22 PROCEDURE — 3078F DIAST BP <80 MM HG: CPT | Mod: CPTII,S$GLB,, | Performed by: INTERNAL MEDICINE

## 2024-05-22 PROCEDURE — 1160F RVW MEDS BY RX/DR IN RCRD: CPT | Mod: CPTII,S$GLB,, | Performed by: INTERNAL MEDICINE

## 2024-05-22 PROCEDURE — 99999 PR PBB SHADOW E&M-EST. PATIENT-LVL IV: CPT | Mod: PBBFAC,,, | Performed by: INTERNAL MEDICINE

## 2024-05-22 PROCEDURE — 1159F MED LIST DOCD IN RCRD: CPT | Mod: CPTII,S$GLB,, | Performed by: INTERNAL MEDICINE

## 2024-05-22 PROCEDURE — 1101F PT FALLS ASSESS-DOCD LE1/YR: CPT | Mod: CPTII,S$GLB,, | Performed by: INTERNAL MEDICINE

## 2024-05-22 PROCEDURE — 3288F FALL RISK ASSESSMENT DOCD: CPT | Mod: CPTII,S$GLB,, | Performed by: INTERNAL MEDICINE

## 2024-05-22 RX ORDER — HYDROCHLOROTHIAZIDE 12.5 MG/1
12.5 TABLET ORAL DAILY
Qty: 90 TABLET | Refills: 3 | Status: SHIPPED | OUTPATIENT
Start: 2024-05-22

## 2024-05-22 RX ORDER — ATORVASTATIN CALCIUM 20 MG/1
20 TABLET, FILM COATED ORAL DAILY
Qty: 90 TABLET | Refills: 3 | Status: SHIPPED | OUTPATIENT
Start: 2024-05-22 | End: 2025-05-22

## 2024-05-22 RX ORDER — AMLODIPINE BESYLATE 5 MG/1
5 TABLET ORAL DAILY
Qty: 90 TABLET | Refills: 3 | Status: SHIPPED | OUTPATIENT
Start: 2024-05-22

## 2024-05-22 NOTE — PROGRESS NOTES
Subjective:       Patient ID: Julisa Jensen is a 74 y.o. female.    Chief Complaint: Hypertension      HPI  Julisa Jensen is a 74 y.o. female with  HTN, vit D and B12 deficiency, anemia, and vertigo  who presents today for Hypertension    Blood pressure has been well controlled.  Taking amlodipine and hydrochlorothiazide but ran out of amlodipine within 5 days ago.  Overall feels well.    Tolerating atorvastatin 10 mg daily but labs showed LDL is increasing and not at goal.  We will increase atorvastatin to 20 mg daily.    Complains of varicose vein discomfort.  Topical lotions, exercise and weight loss recommended.  Good blood pressure and lipid control will help decrease progression.    Has no toxic habits.  Walks and sometimes dances for exercise.  Tries to keep her diet healthy but with exercise not watching portions.    Health Maintenance:  Health Maintenance   Topic Date Due    Shingles Vaccine (2 of 2) 01/11/2021    DEXA Scan  03/08/2026    Lipid Panel  05/21/2029    Colonoscopy  06/23/2030    TETANUS VACCINE  11/16/2030    Hepatitis C Screening  Completed       Review of Systems   All other systems reviewed and are negative.     Past Medical History:   Diagnosis Date    Cataract     COVID-19 03/2021    Hypertension     Stroke        Past Surgical History:   Procedure Laterality Date    CATARACT EXTRACTION W/  INTRAOCULAR LENS IMPLANT Left 8/3/2021    Procedure: EXTRACTION, CATARACT, WITH IOL INSERTION;  Surgeon: Ji Shaikh MD;  Location: [facility] OR;  Service: Ophthalmology;  Laterality: Left;    CHOLECYSTECTOMY      COLONOSCOPY N/A 6/23/2020    Procedure: COLONOSCOPY;  Surgeon: Madelyn Finch MD;  Location: [facility];  Service: Endoscopy;  Laterality: N/A;       Family History   Problem Relation Name Age of Onset    Cataracts Mother      Heart disease Mother      No Known Problems Father      Diabetes Sister      Stroke Sister x1     Diabetes Brother      No Known Problems Daughter x1     No  Known Problems Son x2     Breast cancer Maternal Cousin 2nd maternal     Amblyopia Neg Hx      Blindness Neg Hx      Glaucoma Neg Hx      Macular degeneration Neg Hx      Retinal detachment Neg Hx      Strabismus Neg Hx         Social History     Socioeconomic History    Marital status:    Tobacco Use    Smoking status: Never    Smokeless tobacco: Never   Substance and Sexual Activity    Alcohol use: Yes     Comment: occasionally    Drug use: Never    Sexual activity: Not Currently     Partners: Male     Social Determinants of Health     Financial Resource Strain: High Risk (5/9/2023)    Overall Financial Resource Strain (CARDIA)     Difficulty of Paying Living Expenses: Hard   Food Insecurity: Food Insecurity Present (5/9/2023)    Hunger Vital Sign     Worried About Running Out of Food in the Last Year: Sometimes true     Ran Out of Food in the Last Year: Never true   Transportation Needs: No Transportation Needs (5/9/2023)    PRAPARE - Transportation     Lack of Transportation (Medical): No     Lack of Transportation (Non-Medical): No   Physical Activity: Sufficiently Active (5/9/2023)    Exercise Vital Sign     Days of Exercise per Week: 3 days     Minutes of Exercise per Session: 60 min   Stress: Stress Concern Present (5/9/2023)    [employer] [place] [employer] - Occupational Stress Questionnaire     Feeling of Stress : To some extent   Housing Stability: Low Risk  (5/9/2023)    Housing Stability Vital Sign     Unable to Pay for Housing in the Last Year: No     Number of Places Lived in the Last Year: 1     Unstable Housing in the Last Year: No       Current Outpatient Medications   Medication Sig Dispense Refill    aspirin (ECOTRIN) 81 MG EC tablet Take 1 tablet (81 mg total) by mouth once daily. 30 tablet 11    diclofenac sodium (VOLTAREN) 1 % Gel Apply 2 g topically once daily. 150 g 2    amLODIPine (NORVASC) 5 MG tablet Take 1 tablet (5 mg total) by mouth once daily. 90 tablet 3     "atorvastatin (LIPITOR) 20 MG tablet Take 1 tablet (20 mg total) by mouth once daily. 90 tablet 3    calcium carbonate-vitamin D3 600 mg-20 mcg (800 unit) Chew Take 1 tablet by mouth 2 (two) times a day. 60 tablet 11    hydroCHLOROthiazide (HYDRODIURIL) 12.5 MG Tab Take 1 tablet (12.5 mg total) by mouth once daily. 90 tablet 3    magnesium glycinate 100 mg Tab Take 1 tablet by mouth once. for 1 dose 90 tablet 2     No current facility-administered medications for this visit.       Review of patient's allergies indicates:   Allergen Reactions    Penicillins          Objective:       Last 3 sets of Vitals        1/25/2024    10:49 AM 2/22/2024    11:35 AM 5/22/2024    10:28 AM   Vitals - 1 value per visit   SYSTOLIC 124 122 120   DIASTOLIC 68 72 72   Pulse 81 69 71   Temp  98.8 °F (37.1 °C)    SPO2 95 % 98 % 96 %   Weight (lb) 138.89 146.61 149.03   Weight (kg) 63 66.5 67.6   Height 5' 2" (1.575 m) 5' 2" (1.575 m) 5' 2" (1.575 m)   BMI (Calculated) 25.4 26.8 27.3   Pain Score  Zero    Physical Exam  Constitutional:       General: She is not in acute distress.  HENT:      Head: Normocephalic.      Right Ear: Tympanic membrane, ear canal and external ear normal.      Left Ear: Tympanic membrane, ear canal and external ear normal.      Nose: Nose normal.      Mouth/Throat:      Mouth: Mucous membranes are moist.   Eyes:      General: No scleral icterus.     Extraocular Movements: Extraocular movements intact.      Conjunctiva/sclera: Conjunctivae normal.   Neck:      Vascular: No carotid bruit.      Comments: No goiter.  Cardiovascular:      Rate and Rhythm: Normal rate and regular rhythm.      Pulses: Normal pulses.      Heart sounds: Normal heart sounds.      Comments: Varicose veins medially below the knee, without erythema or induration.  Pulmonary:      Effort: Pulmonary effort is normal.      Breath sounds: Normal breath sounds.   Abdominal:      General: Bowel sounds are normal. There is no distension.      " Palpations: Abdomen is soft. There is no mass.      Tenderness: There is no abdominal tenderness.   Musculoskeletal:         General: No swelling.   Lymphadenopathy:      Cervical: No cervical adenopathy.   Skin:     General: Skin is warm and dry.   Neurological:      General: No focal deficit present.      Mental Status: She is alert and oriented to person, place, and time.   Psychiatric:         Mood and Affect: Mood normal.         Behavior: Behavior normal.           CBC:  Recent Labs   Lab 09/16/22  0819 05/16/23  0907 05/21/24  1034   WBC 5.00 5.75 5.97   RBC 4.45 4.38 4.41   Hemoglobin 12.6 12.3 12.6   Hematocrit 37.5 38.9 39.3   Platelets 319 326 329   MCV 84 89 89   MCH 28.3 28.1 28.6   MCHC 33.6 31.6 L 32.1     CMP:  Recent Labs   Lab 03/21/22  0736 05/16/23  0907 05/21/24  1034   Glucose 97 86 92   Calcium 9.5 8.5 L 9.3   Albumin 3.9 3.9 3.7   Total Protein 8.3 7.5 7.6   Sodium 140 142 145   Potassium 3.6 3.6 4.2   CO2 28 27 28   Chloride 105 106 107   BUN 13 11 16   Creatinine 0.7 0.7 0.7   Alkaline Phosphatase 88 85 84   ALT 25 26 20   AST 22 24 23   Total Bilirubin 0.6 0.5 0.3     URINALYSIS:  Recent Labs   Lab 05/19/23  1755   pH, UA 5.5      LIPIDS:  Recent Labs   Lab 09/16/22  0819 05/16/23  0907 05/21/24  1034   TSH 2.511 2.389 1.789   HDL 45 43 43   Cholesterol 163 140 150   Triglycerides 74 99 108   LDL Cholesterol 103.2 77.2 85.4   HDL/Cholesterol Ratio 27.6 30.7 28.7   Non-HDL Cholesterol 118 97 107   Total Cholesterol/HDL Ratio 3.6 3.3 3.5     TSH:  Recent Labs   Lab 09/16/22  0819 05/16/23  0907 05/21/24  1034   TSH 2.511 2.389 1.789       A1C:  Recent Labs   Lab 05/16/23  0907 05/21/24  1034   Hemoglobin A1C 5.1 5.2       Imaging:  CT Cardiac Scoring  Narrative: EXAMINATION:  CT CALCIUM SCORING CARDIAC    CLINICAL HISTORY:  CAD screening, intermediate CAD risk, not treadmill candidate;  Encounter for screening for cardiovascular disorders    TECHNIQUE:  Gated,  low dose axial images were  performed over the heart.    COMPARISON:  None.    FINDINGS:  Important information about your scan:    The following information is based on analysis of the coronary arteries only.  Calcium deposits do not correspond directly to the percentage of narrowing of the arteries.  They do correlate directly to the amount of coronary plaque, and to the risk of future coronary disease.  These calcium deposits usually begin to form years before any symptoms develop.  Early detection and modification of risk factors, such as smoking and cholesterol intake, and slow progression of coronary artery disease.    A low score suggests a low likelihood of coronary artery disease, but does not exclude the possibility of significant coronary artery narrowing.  The results should be discussed with your physician taking into account other risk factors such as age, gender, family history, diabetes, smoking or high cholesterol levels.    Should you experience chest pain, difficulty breathing, discomfort radiating into her neck or arm, or discomfort combined with lightheadedness, sweating, fainting or nausea, you should seek prompt medical attention.    Calcium score:    0-10: Very little coronary heart disease risk    11 through 100: Low to moderate coronary heart disease risk    101 through 400: Moderate to high coronary heart disease risk    Greater than 401: High coronary heart disease risk.    SCORE SUMMARY    Your total calcium score is 0    Incidental findings: The heart size is normal.  The aorta and main pulmonary artery are of normal caliber.  The coronary artery origins are conventional, and the system is likely right dominant.  The bones unremarkable the age of patient the liver diffusely diminished in attenuation consistent.  Impression: Your total calcium score is 0.  Very little coronary heart disease risk.    Fatty liver    Electronically signed by: Flores Newberry  Date:    03/21/2023  Time:    10:01      Assessment:       1.  Essential hypertension    2. Hyperlipidemia, unspecified hyperlipidemia type    3. Aortic atherosclerosis    4. Varicose veins of both lower extremities, unspecified whether complicated    5. Osteopenia, unspecified location    6. Healthcare maintenance            Plan:       1. Essential hypertension  Assessment & Plan:   Controlled on amlodipine and HCTZ.   Continue to monitor with same treatment.   Diet and exercise recommended.    Orders:  -     amLODIPine (NORVASC) 5 MG tablet; Take 1 tablet (5 mg total) by mouth once daily.  Dispense: 90 tablet; Refill: 3  -     hydroCHLOROthiazide (HYDRODIURIL) 12.5 MG Tab; Take 1 tablet (12.5 mg total) by mouth once daily.  Dispense: 90 tablet; Refill: 3    2. Hyperlipidemia, unspecified hyperlipidemia type  -     Comprehensive Metabolic Panel; Future; Expected date: 05/22/2024  -     Lipid Panel; Future; Expected date: 05/22/2024    3. Aortic atherosclerosis  Overview:  Seen on Ct abdf 2/5/20    Assessment & Plan:  On atorvastatin but LDL not at goal.  Increasing atorvastatin.  Follow up labs.    Orders:  -     atorvastatin (LIPITOR) 20 MG tablet; Take 1 tablet (20 mg total) by mouth once daily.  Dispense: 90 tablet; Refill: 3    4. Varicose veins of both lower extremities, unspecified whether complicated  Assessment & Plan:  Compression stockings, low-sodium diet, weight control, and exercise will help.      5. Osteopenia, unspecified location  Overview:  Last bone density test noted with osteopenia but with FRAX score elevated.     Orders:  -     calcium carbonate-vitamin D3 600 mg-20 mcg (800 unit) Chew; Take 1 tablet by mouth 2 (two) times a day.  Dispense: 60 tablet; Refill: 11    6. Healthcare maintenance  Assessment & Plan:  - Yearly labs-  05/21/2024  - mammogram- 02/16/2023  - Colon cancer screening-  colonoscopy in 2022, repeat in 10 years.  - ACP- documents were given but needs to finish completing them.  - Vaccination-   Due for shingles, RSV, and COVID  vaccines.        Other orders  -     magnesium glycinate 100 mg Tab; Take 1 tablet by mouth once. for 1 dose  Dispense: 90 tablet; Refill: 2       Health Maintenance Due   Topic Date Due    RSV Vaccine (Age 60+ and Pregnant patients) (1 - 1-dose 60+ series) Never done    Shingles Vaccine (2 of 2) 01/11/2021    COVID-19 Vaccine (3 - 2023-24 season) 09/01/2023        This includes face to face time and non-face to face time preparing to see the patient (eg, review of tests), obtaining and/or reviewing separately obtained history, documenting clinical information in the electronic or other health record, independently interpreting results and communicating results to the patient/family/caregiver, or care coordinator.       Return to clinic in 3 months.  WS 2    Sandra Stafford MD  [facility]  Disclaimer:  This note has been generated using voice-recognition software. There may be grammatical or spelling errors that have been missed during proof-reading

## 2024-05-22 NOTE — ASSESSMENT & PLAN NOTE
- Yearly labs-  05/21/2024  - mammogram- 02/16/2023  - Colon cancer screening-  colonoscopy in 2022, repeat in 10 years.  - ACP- documents were given but needs to finish completing them.  - Vaccination-   Due for shingles, RSV, and COVID vaccines.

## 2024-05-28 DIAGNOSIS — Z00.00 ENCOUNTER FOR MEDICARE ANNUAL WELLNESS EXAM: ICD-10-CM

## 2024-08-12 ENCOUNTER — PATIENT OUTREACH (OUTPATIENT)
Dept: ADMINISTRATIVE | Facility: OTHER | Age: 74
End: 2024-08-12
Payer: MEDICARE

## 2024-08-12 NOTE — PROGRESS NOTES
CHW - Outreach Attempt    Community Health Worker left a In Basket message for 1st attempt to contact patient regarding: SDOH  Community Health Worker to attempt to contact patient on: 8/26  CHW - Initial Contact    This Community Health Worker completed verified updated the Social Determinant of Health questionnaire with patient during clinic visit today.    Pt identified barriers of most importance are: Food Assistance   Referrals to community agencies completed with patient/caregiver consent outside of [facility] include:  [facility] SNAP Food Program, [place] [employer] Supplemental Food Program  Referrals were put through [facility] - no: none  Support and Services: CHW assisted pt with the creating an email to complete a SNAP Application (pt had to leave and the application will be continued on 8/19). CHW assisted pt with the completion of an application for senior food box, after application process explained to the pt  Other information discussed the patient needs / wants help with: Pt stated that she does not work and her  is the only provider who works at a school. Pt stated that she has an (17 y/o) girl living with her but she does not have an income.  Follow up required: Yes, In-Person Visit 8/19 9 AM      SNAP LOGIN  E: [email or web address]  U: [email or web address]  P: [email or web address]  PIN: [id number]    [employer] EMAIL LOGIN  E: [email or web address]  P: [email or web address]

## 2024-08-13 ENCOUNTER — TELEPHONE (OUTPATIENT)
Dept: FAMILY MEDICINE | Facility: CLINIC | Age: 74
End: 2024-08-13
Payer: MEDICARE

## 2024-08-16 ENCOUNTER — TELEPHONE (OUTPATIENT)
Dept: OPHTHALMOLOGY | Facility: CLINIC | Age: 74
End: 2024-08-16
Payer: MEDICARE

## 2024-08-16 ENCOUNTER — OFFICE VISIT (OUTPATIENT)
Dept: FAMILY MEDICINE | Facility: CLINIC | Age: 74
End: 2024-08-16
Payer: MEDICARE

## 2024-08-16 VITALS
HEIGHT: 62 IN | DIASTOLIC BLOOD PRESSURE: 70 MMHG | WEIGHT: 149.5 LBS | BODY MASS INDEX: 27.51 KG/M2 | SYSTOLIC BLOOD PRESSURE: 90 MMHG | OXYGEN SATURATION: 98 % | HEART RATE: 64 BPM

## 2024-08-16 DIAGNOSIS — I70.0 AORTIC ATHEROSCLEROSIS: ICD-10-CM

## 2024-08-16 DIAGNOSIS — Z00.00 ENCOUNTER FOR PREVENTIVE HEALTH EXAMINATION: Primary | ICD-10-CM

## 2024-08-16 DIAGNOSIS — E66.3 OVERWEIGHT (BMI 25.0-29.9): ICD-10-CM

## 2024-08-16 DIAGNOSIS — D32.9 MENINGIOMA: ICD-10-CM

## 2024-08-16 DIAGNOSIS — I10 ESSENTIAL HYPERTENSION: ICD-10-CM

## 2024-08-16 DIAGNOSIS — Z01.00 ROUTINE EYE EXAM: ICD-10-CM

## 2024-08-16 PROBLEM — M75.122 COMPLETE TEAR OF LEFT ROTATOR CUFF: Status: RESOLVED | Noted: 2018-10-11 | Resolved: 2024-08-16

## 2024-08-16 PROCEDURE — 99999 PR PBB SHADOW E&M-EST. PATIENT-LVL IV: CPT | Mod: PBBFAC,,, | Performed by: NURSE PRACTITIONER

## 2024-08-16 NOTE — TELEPHONE ENCOUNTER
Spoke to the daughter and she said she was not with her mom at the time and she would call back to set appt up

## 2024-08-16 NOTE — PATIENT INSTRUCTIONS
Counseling and Referral of Other Preventative  (Italic type indicates deductible and co-insurance are waived)    Patient Name: Julisa Jensen  Today's Date: 8/16/2024    Health Maintenance       Date Due Completion Date    RSV Vaccine (Age 60+ and Pregnant patients) (1 - 1-dose 60+ series) Never done ---    Shingles Vaccine (2 of 2) 01/11/2021 11/16/2020    COVID-19 Vaccine (3 - 2023-24 season) 09/01/2023 7/9/2021    Influenza Vaccine (1) 09/01/2024 11/16/2023    DEXA Scan 03/08/2026 3/8/2023    Lipid Panel 05/21/2029 5/21/2024    Colonoscopy 06/23/2030 6/23/2020    TETANUS VACCINE 11/16/2030 11/16/2020        Orders Placed This Encounter   Procedures    Ambulatory referral/consult to Ophthalmology     The following information is provided to all patients.  This information is to help you find resources for any of the problems found today that may be affecting your health:                  Living healthy guide: www.[facility].[place].gov      Understanding Diabetes: www.diabetes.org      Eating healthy: www.cdc.gov/healthyweight      CDC home safety checklist: www.cdc.gov/steadi/patient.html      Agency on Aging: www.goea.[place].gov      Alcoholics anonymous (AA): www.aa.org      Physical Activity: www.elvira.nih.gov/tv9jjaf      Tobacco use: www.quitwithusla.org

## 2024-08-19 ENCOUNTER — PATIENT OUTREACH (OUTPATIENT)
Dept: ADMINISTRATIVE | Facility: OTHER | Age: 74
End: 2024-08-19
Payer: MEDICARE

## 2024-08-19 NOTE — PROGRESS NOTES
CHW - Follow Up    This Community Health Worker completed a follow up visit with  daughter  via telephone today.  Pt/Caregiver reported: Pts daughter called-in to change pts appt to tomorrow at the same time  Community Health Worker provided: CHW confirmed appt  Follow up required: Yes, In-Person 9 AM  Follow-up Outreach - Due: 8/20/2024

## 2024-08-20 ENCOUNTER — HOSPITAL ENCOUNTER (OUTPATIENT)
Dept: RADIOLOGY | Facility: HOSPITAL | Age: 74
Discharge: HOME | End: 2024-08-20
Attending: FAMILY MEDICINE
Payer: MEDICARE

## 2024-08-20 ENCOUNTER — PATIENT OUTREACH (OUTPATIENT)
Dept: ADMINISTRATIVE | Facility: OTHER | Age: 74
End: 2024-08-20
Payer: MEDICARE

## 2024-08-20 ENCOUNTER — OFFICE VISIT (OUTPATIENT)
Dept: FAMILY MEDICINE | Facility: CLINIC | Age: 74
End: 2024-08-20
Payer: MEDICARE

## 2024-08-20 VITALS
HEIGHT: 62 IN | DIASTOLIC BLOOD PRESSURE: 80 MMHG | HEART RATE: 72 BPM | WEIGHT: 147.69 LBS | SYSTOLIC BLOOD PRESSURE: 142 MMHG | BODY MASS INDEX: 27.18 KG/M2 | OXYGEN SATURATION: 99 %

## 2024-08-20 DIAGNOSIS — S80.01XA CONTUSION OF RIGHT KNEE, INITIAL ENCOUNTER: Primary | ICD-10-CM

## 2024-08-20 DIAGNOSIS — S80.01XA CONTUSION OF RIGHT KNEE, INITIAL ENCOUNTER: ICD-10-CM

## 2024-08-20 PROCEDURE — 99214 OFFICE O/P EST MOD 30 MIN: CPT | Mod: S$GLB,,, | Performed by: FAMILY MEDICINE

## 2024-08-20 PROCEDURE — 1125F AMNT PAIN NOTED PAIN PRSNT: CPT | Mod: CPTII,S$GLB,, | Performed by: FAMILY MEDICINE

## 2024-08-20 PROCEDURE — 1160F RVW MEDS BY RX/DR IN RCRD: CPT | Mod: CPTII,S$GLB,, | Performed by: FAMILY MEDICINE

## 2024-08-20 PROCEDURE — 1159F MED LIST DOCD IN RCRD: CPT | Mod: CPTII,S$GLB,, | Performed by: FAMILY MEDICINE

## 2024-08-20 PROCEDURE — 73562 X-RAY EXAM OF KNEE 3: CPT | Mod: 26,RT,, | Performed by: RADIOLOGY

## 2024-08-20 PROCEDURE — 3079F DIAST BP 80-89 MM HG: CPT | Mod: CPTII,S$GLB,, | Performed by: FAMILY MEDICINE

## 2024-08-20 PROCEDURE — 1101F PT FALLS ASSESS-DOCD LE1/YR: CPT | Mod: CPTII,S$GLB,, | Performed by: FAMILY MEDICINE

## 2024-08-20 PROCEDURE — 3077F SYST BP >= 140 MM HG: CPT | Mod: CPTII,S$GLB,, | Performed by: FAMILY MEDICINE

## 2024-08-20 PROCEDURE — 3288F FALL RISK ASSESSMENT DOCD: CPT | Mod: CPTII,S$GLB,, | Performed by: FAMILY MEDICINE

## 2024-08-20 PROCEDURE — 73562 X-RAY EXAM OF KNEE 3: CPT | Mod: TC,FY,RT

## 2024-08-20 PROCEDURE — 99999 PR PBB SHADOW E&M-EST. PATIENT-LVL III: CPT | Mod: PBBFAC,,, | Performed by: FAMILY MEDICINE

## 2024-08-20 RX ORDER — MELOXICAM 15 MG/1
15 TABLET ORAL DAILY
Qty: 10 TABLET | Refills: 0 | Status: SHIPPED | OUTPATIENT
Start: 2024-08-20 | End: 2024-08-30

## 2024-08-20 NOTE — PROGRESS NOTES
Subjective     Patient ID: Julisa Jensen is a 74 y.o. female.    Chief Complaint: Fall and Leg Pain    74 years old female came to the clinic after recent fall in the parking lot.  Patient denies syncope.  Patient hit a small wall.  Patient pain around the right knee.  Patient was able to walk.    Fall  The accident occurred 1 to 3 hours ago. The fall occurred while standing. She fell from a height of 1 to 2 ft. She landed on [place]. There was no blood loss. The point of impact was the right knee. The pain is present in the right knee. The pain is at a severity of 7/10. The pain is moderate. The symptoms are aggravated by ambulation and movement. Pertinent negatives include no nausea, numbness, tingling or vomiting. She has tried rest for the symptoms. The treatment provided no relief.   Knee Pain   The incident occurred 1 to 3 hours ago. The incident occurred in the street. The injury mechanism was a fall. The pain is present in the right knee. The pain is at a severity of 7/10. The pain is moderate. The pain has been Constant since onset. Pertinent negatives include no numbness or tingling. The symptoms are aggravated by movement, palpation and weight bearing. She has tried rest for the symptoms. The treatment provided no relief.     Review of Systems   Constitutional: Negative.    HENT: Negative.     Eyes: Negative.    Respiratory: Negative.     Gastrointestinal: Negative.  Negative for nausea and vomiting.   Genitourinary: Negative.    Musculoskeletal: Negative.  Positive for leg pain.   Neurological: Negative.  Negative for tingling and numbness.   Psychiatric/Behavioral: Negative.            Objective     Physical Exam  Constitutional:       General: She is not in acute distress.     Appearance: She is well-developed. She is not diaphoretic.   HENT:      Head: Normocephalic and atraumatic.      Right Ear: External ear normal.      Left Ear: External ear normal.      Nose: Nose normal.      Mouth/Throat:       Pharynx: No oropharyngeal exudate.   Eyes:      General: No scleral icterus.        Right eye: No discharge.         Left eye: No discharge.      Conjunctiva/sclera: Conjunctivae normal.      Pupils: Pupils are equal, round, and reactive to light.   Neck:      Thyroid: No thyromegaly.      Vascular: No JVD.      Trachea: No tracheal deviation.   Cardiovascular:      Rate and Rhythm: Normal rate and regular rhythm.      Heart sounds: Normal heart sounds. No murmur heard.     No friction rub. No gallop.   Pulmonary:      Effort: Pulmonary effort is normal. No respiratory distress.      Breath sounds: Normal breath sounds. No stridor. No wheezing or rales.   Chest:      Chest wall: No tenderness.   Abdominal:      General: Bowel sounds are normal. There is no distension.      Palpations: Abdomen is soft. There is no mass.      Tenderness: There is no abdominal tenderness. There is no guarding or rebound.   Musculoskeletal:         General: Normal range of motion.      Cervical back: Normal range of motion and neck supple.      Right knee: No swelling, effusion, erythema or ecchymosis. Normal range of motion. Tenderness present over the medial joint line.   Lymphadenopathy:      Cervical: No cervical adenopathy.   Skin:     General: Skin is warm and dry.      Coloration: Skin is not pale.      Findings: No erythema or rash.   Neurological:      Mental Status: She is alert and oriented to person, place, and time.      Cranial Nerves: No cranial nerve deficit.      Motor: No abnormal muscle tone.      Coordination: Coordination normal.      Deep Tendon Reflexes: Reflexes are normal and symmetric.   Psychiatric:         Behavior: Behavior normal.         Thought Content: Thought content normal.         Judgment: Judgment normal.            Assessment and Plan     1. Contusion of right knee, initial encounter  -     meloxicam (MOBIC) 15 MG tablet; Take 1 tablet (15 mg total) by mouth once daily. for 10 days  Dispense: 10  tablet; Refill: 0  -     X-Ray Knee 3 View Right; Future; Expected date: 08/20/2024        I spent a total of 30 minutes on the day of the visit.This includes face to face time and non-face to face time preparing to see the patient (eg, review of tests), obtaining and/or reviewing separately obtained history, documenting clinical information in the electronic or other health record, independently interpreting results and communicating results to the patient/family/caregiver, or care coordinator.          Follow up if symptoms worsen or fail to improve.

## 2024-08-20 NOTE — PROGRESS NOTES
CHW - Follow Up    This Community Health Worker completed a follow up visit with patient during clinic visit today.  Pt/Caregiver reported: Pt came in-person to finish her SNAP Application. Pt came along with her spouse and he has an upcoming surgery and will not be working, therefore there will be no income in the home soon.  Community Health Worker provided: CHW assisted the pt and her spouse with the completion of a SNAP Food Application and after application process has been explained and provided to the pt.  Follow up required: Yes,   Follow-up Outreach - Due: 8/20/2024

## 2024-08-22 ENCOUNTER — OFFICE VISIT (OUTPATIENT)
Dept: PRIMARY CARE CLINIC | Facility: CLINIC | Age: 74
End: 2024-08-22
Payer: MEDICARE

## 2024-08-22 VITALS
HEIGHT: 62 IN | SYSTOLIC BLOOD PRESSURE: 143 MMHG | HEART RATE: 71 BPM | WEIGHT: 151.25 LBS | BODY MASS INDEX: 27.83 KG/M2 | DIASTOLIC BLOOD PRESSURE: 82 MMHG | OXYGEN SATURATION: 98 %

## 2024-08-22 DIAGNOSIS — Z00.00 HEALTHCARE MAINTENANCE: ICD-10-CM

## 2024-08-22 DIAGNOSIS — E78.5 HYPERLIPIDEMIA, UNSPECIFIED HYPERLIPIDEMIA TYPE: ICD-10-CM

## 2024-08-22 DIAGNOSIS — S80.01XD CONTUSION OF RIGHT KNEE, SUBSEQUENT ENCOUNTER: ICD-10-CM

## 2024-08-22 DIAGNOSIS — E66.3 OVERWEIGHT (BMI 25.0-29.9): ICD-10-CM

## 2024-08-22 DIAGNOSIS — R79.9 ABNORMAL BLOOD CHEMISTRY: ICD-10-CM

## 2024-08-22 DIAGNOSIS — I10 ESSENTIAL HYPERTENSION: Primary | ICD-10-CM

## 2024-08-22 PROCEDURE — 99999 PR PBB SHADOW E&M-EST. PATIENT-LVL III: CPT | Mod: PBBFAC,,, | Performed by: INTERNAL MEDICINE

## 2024-08-22 NOTE — ASSESSMENT & PLAN NOTE
- Yearly labs-  05/21/2024  - mammogram- 03/16/2023  - Colon cancer screening-  colonoscopy in 2022, repeat in 10 years.  - ACP- documents were given but needs to finish completing them.  - Vaccination-   Due for shingles, RSV, and COVID vaccines.

## 2024-08-22 NOTE — ASSESSMENT & PLAN NOTE
Blood pressure mildly elevated.  Did not take her medications today but also noted occasional elevation of blood pressure.  Will have follow-up blood pressure with nurse.  Continue to monitor with same treatment. Diet and exercise recommended.

## 2024-08-22 NOTE — ASSESSMENT & PLAN NOTE
Has gained a few lb.  Possibly associated to elevated blood pressure.  5-10% weight loss recommended with healthy diet and exercise.

## 2024-08-22 NOTE — PROGRESS NOTES
Subjective:       Patient ID: Julisa Jensen is a 74 y.o. female.    Chief Complaint: Hypertension      HPI  Julisa Jensen is a 74 y.o. female with  HTN, vit D and B12 deficiency, anemia, and vertigo who presents today for Hypertension    Reports had a fall 2 days ago when she tripped on a concrete parking block.  She hit her knee and was seen by family medicine.  Found stable, x-ray order and noted with no fracture or injury, meloxicam prescribed but has not tried it yet.  Pain is better and has no limitations with ambulation.    Blood pressure has been elevated but feels is due to stress.  Previous blood pressure was associated to her fall.  Occasionally will have blood pressure check at [facility] and noted to be elevated and advised to increase her hydrochlorothiazide as she was taking it before.  Previous to this event her blood pressure was normal to low and having occasional dizziness.  She will monitor her blood pressure at home and come to see nurse for a blood pressure check.    Has been under stress, with poor sleep, associated to her 's discomfort with urinary retention and needing a catheter.  Has not been able to work and has some financial concerns.    Reports compliance and tolerating her medications.  No recent labs, previous where within good limits.    Has no toxic habits.  Was walking for exercise but lately not as much.  Gained a few lb and can optimize her diet to decrease carbs.    Advance Care Planning     Date: 08/23/2024  Patient did not wish to name a surrogate decision maker or provide an Advance Care Plan.  She has documents at home and will bring copy.        Health Maintenance:  Health Maintenance   Topic Date Due    Shingles Vaccine (2 of 2) 01/11/2021    DEXA Scan  03/08/2026    Lipid Panel  05/21/2029    Colonoscopy  06/23/2030    TETANUS VACCINE  11/16/2030    Hepatitis C Screening  Completed       Review of Systems   Constitutional: Negative.  Negative for fever and  unexpected weight change.   HENT: Negative.     Eyes:  Positive for pain.   Respiratory: Negative.     Cardiovascular: Negative.    Gastrointestinal: Negative.    Genitourinary:  Negative for difficulty urinating.   Musculoskeletal:  Positive for arthralgias.   Integumentary:  Negative.   Neurological: Negative.    Psychiatric/Behavioral:  Positive for sleep disturbance.       Past Medical History:   Diagnosis Date    Cataract     COVID-19 03/2021    Hypertension     Stroke        Past Surgical History:   Procedure Laterality Date    CATARACT EXTRACTION W/  INTRAOCULAR LENS IMPLANT Left 8/3/2021    Procedure: EXTRACTION, CATARACT, WITH IOL INSERTION;  Surgeon: Ji Shaikh MD;  Location: [facility] OR;  Service: Ophthalmology;  Laterality: Left;    CHOLECYSTECTOMY      COLONOSCOPY N/A 6/23/2020    Procedure: COLONOSCOPY;  Surgeon: Madelyn Finch MD;  Location: [facility] ENDO;  Service: Endoscopy;  Laterality: N/A;       Family History   Problem Relation Name Age of Onset    Cataracts Mother      Heart disease Mother      No Known Problems Father      Diabetes Sister      Stroke Sister x1     Diabetes Brother      No Known Problems Daughter x1     No Known Problems Son x2     Breast cancer Maternal Cousin 2nd maternal     Amblyopia Neg Hx      Blindness Neg Hx      Glaucoma Neg Hx      Macular degeneration Neg Hx      Retinal detachment Neg Hx      Strabismus Neg Hx         Social History     Socioeconomic History    Marital status:    Tobacco Use    Smoking status: Never    Smokeless tobacco: Never   Substance and Sexual Activity    Alcohol use: Not Currently     Comment: occasionally    Drug use: Never    Sexual activity: Not Currently     Partners: Male     Social Determinants of Health     Financial Resource Strain: High Risk (8/16/2024)    Overall Financial Resource Strain (CARDIA)     Difficulty of Paying Living Expenses: Hard   Food Insecurity: Food Insecurity Present (8/16/2024)    Hunger Vital Sign      Worried About Running Out of Food in the Last Year: Sometimes true     Ran Out of Food in the Last Year: Never true   Transportation Needs: No Transportation Needs (8/16/2024)    PRAPARE - Transportation     Lack of Transportation (Medical): No     Lack of Transportation (Non-Medical): No   Physical Activity: Sufficiently Active (8/16/2024)    Exercise Vital Sign     Days of Exercise per Week: 3 days     Minutes of Exercise per Session: 60 min   Stress: Stress Concern Present (8/16/2024)    [employer] - Occupational Stress Questionnaire     Feeling of Stress : To some extent   Housing Stability: Low Risk  (8/16/2024)    Housing Stability Vital Sign     Unable to Pay for Housing in the Last Year: No     Homeless in the Last Year: No       Current Outpatient Medications   Medication Sig Dispense Refill    amLODIPine (NORVASC) 5 MG tablet Take 1 tablet (5 mg total) by mouth once daily. 90 tablet 3    atorvastatin (LIPITOR) 20 MG tablet Take 1 tablet (20 mg total) by mouth once daily. 90 tablet 3    calcium carbonate-vitamin D3 600 mg-20 mcg (800 unit) Chew Take 1 tablet by mouth 2 (two) times a day. 60 tablet 11    diclofenac sodium (VOLTAREN) 1 % Gel Apply 2 g topically once daily. 150 g 2    hydroCHLOROthiazide (HYDRODIURIL) 12.5 MG Tab Take 1 tablet (12.5 mg total) by mouth once daily. 90 tablet 3    meloxicam (MOBIC) 15 MG tablet Take 1 tablet (15 mg total) by mouth once daily. for 10 days 10 tablet 0    aspirin (ECOTRIN) 81 MG EC tablet Take 1 tablet (81 mg total) by mouth once daily. 30 tablet 11     No current facility-administered medications for this visit.       Review of patient's allergies indicates:   Allergen Reactions    Penicillins          Objective:       Last 3 sets of Vitals        8/16/2024     9:33 AM 8/20/2024    10:44 AM 8/22/2024    10:32 AM   Vitals - 1 value per visit   SYSTOLIC 90 142 143   DIASTOLIC 70 80 82   Pulse 64 72 71   SPO2 98 % 99 % 98 %   Weight  "(lb) 149.47 147.71 151.24   Weight (kg) 67.8 67 68.6   Height 5' 2" (1.575 m) 5' 2" (1.575 m) 5' 2" (1.575 m)   BMI (Calculated) 27.3 27 27.7   Pain Score Zero Seven Zero   Physical Exam  Constitutional:       General: She is not in acute distress.  HENT:      Head: Normocephalic.      Right Ear: Tympanic membrane, ear canal and external ear normal.      Left Ear: Tympanic membrane, ear canal and external ear normal.      Nose: Nose normal.      Mouth/Throat:      Mouth: Mucous membranes are moist.   Eyes:      General: No scleral icterus.     Extraocular Movements: Extraocular movements intact.      Conjunctiva/sclera: Conjunctivae normal.   Neck:      Vascular: No carotid bruit.      Comments: No goiter.  Cardiovascular:      Rate and Rhythm: Normal rate and regular rhythm.      Pulses: Normal pulses.      Heart sounds: Normal heart sounds.   Pulmonary:      Effort: Pulmonary effort is normal.      Breath sounds: Normal breath sounds.   Abdominal:      General: Bowel sounds are normal. There is no distension.      Palpations: Abdomen is soft. There is no mass.      Tenderness: There is no abdominal tenderness.   Musculoskeletal:      Comments: Slight swelling of the right knee with healing bruise.  Good range of motion with slight crepitance on both knees   Lymphadenopathy:      Cervical: No cervical adenopathy.   Skin:     General: Skin is warm and dry.      Findings: Bruising (Bruise on right knee) present.   Neurological:      General: No focal deficit present.      Mental Status: She is alert and oriented to person, place, and time.   Psychiatric:         Mood and Affect: Mood normal.         Behavior: Behavior normal.           CBC:  Recent Labs   Lab 09/16/22  0819 05/16/23  0907 05/21/24  1034   WBC 5.00 5.75 5.97   RBC 4.45 4.38 4.41   Hemoglobin 12.6 12.3 12.6   Hematocrit 37.5 38.9 39.3   Platelets 319 326 329   MCV 84 89 89   MCH 28.3 28.1 28.6   MCHC 33.6 31.6 L 32.1     CMP:  Recent Labs   Lab " 03/21/22  0736 05/16/23  0907 05/21/24  1034   Glucose 97 86 92   Calcium 9.5 8.5 L 9.3   Albumin 3.9 3.9 3.7   Total Protein 8.3 7.5 7.6   Sodium 140 142 145   Potassium 3.6 3.6 4.2   CO2 28 27 28   Chloride 105 106 107   BUN 13 11 16   Creatinine 0.7 0.7 0.7   Alkaline Phosphatase 88 85 84   ALT 25 26 20   AST 22 24 23   Total Bilirubin 0.6 0.5 0.3     URINALYSIS:  Recent Labs   Lab 05/19/23  1755   pH, UA 5.5      LIPIDS:  Recent Labs   Lab 09/16/22  0819 05/16/23  0907 05/21/24  1034   TSH 2.511 2.389 1.789   HDL 45 43 43   Cholesterol 163 140 150   Triglycerides 74 99 108   LDL Cholesterol 103.2 77.2 85.4   HDL/Cholesterol Ratio 27.6 30.7 28.7   Non-HDL Cholesterol 118 97 107   Total Cholesterol/HDL Ratio 3.6 3.3 3.5     TSH:  Recent Labs   Lab 09/16/22  0819 05/16/23  0907 05/21/24  1034   TSH 2.511 2.389 1.789       A1C:  Recent Labs   Lab 05/16/23  0907 05/21/24  1034   Hemoglobin A1C 5.1 5.2       Imaging:  X-Ray Knee 3 View Right  Narrative: EXAMINATION:  XR KNEE 3 VIEW RIGHT    CLINICAL HISTORY:  Contusion of right knee, initial encounter    TECHNIQUE:  AP, lateral, and Merchant views of the right knee were performed.    COMPARISON:  08/09/2019    FINDINGS:  Joint space is narrowed medially with degenerative change.  No joint effusion.  Impression: See above    Electronically signed by: Jean De La Cruz MD  Date:    08/20/2024  Time:    13:23      Assessment:       1. Essential hypertension    2. Hyperlipidemia, unspecified hyperlipidemia type    3. Overweight (BMI 25.0-29.9)    4. Contusion of right knee, subsequent encounter    5. Abnormal blood chemistry    6. Healthcare maintenance            Plan:       1. Essential hypertension  Overview:  On amlodipine 5 mg daily and hydrochlorothiazide 12.5 mg day    Assessment & Plan:  Blood pressure mildly elevated.  Did not take her medications today but also noted occasional elevation of blood pressure.  Will have follow-up blood pressure with  nurse.  Continue to monitor with same treatment. Diet and exercise recommended.    Orders:  -     CBC Auto Differential; Future; Expected date: 08/22/2024  -     Comprehensive Metabolic Panel; Future; Expected date: 08/22/2024  -     Lipid Panel; Future; Expected date: 08/22/2024  -     TSH; Future; Expected date: 08/22/2024    2. Hyperlipidemia, unspecified hyperlipidemia type  -     Lipid Panel; Future; Expected date: 08/22/2024    3. Overweight (BMI 25.0-29.9)  Overview:  BMI 27.26 kg/m2 as of 2/15/2022    Assessment & Plan:  Has gained a few lb.  Possibly associated to elevated blood pressure.  5-10% weight loss recommended with healthy diet and exercise.      4. Contusion of right knee, subsequent encounter  Comments:  Noted mild bruising but no other injuries.  Ambulating without difficulty.  Pain appears controlled.  Observe.    5. Abnormal blood chemistry  -     Hemoglobin A1C; Future; Expected date: 08/22/2024    6. Healthcare maintenance  Assessment & Plan:  - Yearly labs-  05/21/2024  - mammogram- 02/16/2023  - Colon cancer screening-  colonoscopy in 2022, repeat in 10 years.  - ACP- documents were given but needs to finish completing them.  - Vaccination-   Due for shingles, RSV, and COVID vaccines.           Health Maintenance Due   Topic Date Due    RSV Vaccine (Age 60+ and Pregnant patients) (1 - 1-dose 60+ series) Never done    Shingles Vaccine (2 of 2) 01/11/2021    COVID-19 Vaccine (3 - 2023-24 season) 09/01/2023        This includes face to face time and non-face to face time preparing to see the patient (eg, review of tests), obtaining and/or reviewing separately obtained history, documenting clinical information in the electronic or other health record, independently interpreting results and communicating results to the patient/family/caregiver, or care coordinator.       Return to clinic in 1 months.  WS 3    Sandra Stafford MD  [facility]  Disclaimer:  This note has been generated using  voice-recognition software. There may be grammatical or spelling errors that have been missed during proof-reading

## 2024-08-26 ENCOUNTER — PATIENT OUTREACH (OUTPATIENT)
Dept: ADMINISTRATIVE | Facility: OTHER | Age: 74
End: 2024-08-26
Payer: MEDICARE

## 2024-08-26 NOTE — PROGRESS NOTES
CHW - Case Closure    This Community Health Worker sent CC to patient by mail today.   Pt/Caregiver reported: All needs have been addressed no other needs identified at this time. Case Closed  Pt/Caregiver denied any additional needs at this time and agrees with episode closure at this time.  Provided patient with Community Health Worker's contact information and encouraged him/her to contact this Community Health Worker if additional needs arise.

## 2024-09-06 ENCOUNTER — CLINICAL SUPPORT (OUTPATIENT)
Dept: PRIMARY CARE CLINIC | Facility: CLINIC | Age: 74
End: 2024-09-06
Payer: MEDICARE

## 2024-09-06 VITALS
DIASTOLIC BLOOD PRESSURE: 82 MMHG | HEART RATE: 72 BPM | SYSTOLIC BLOOD PRESSURE: 126 MMHG | OXYGEN SATURATION: 98 % | BODY MASS INDEX: 27.22 KG/M2 | WEIGHT: 148.81 LBS

## 2024-09-06 DIAGNOSIS — I10 ESSENTIAL HYPERTENSION: Primary | ICD-10-CM

## 2024-09-06 PROCEDURE — 99999 PR PBB SHADOW E&M-EST. PATIENT-LVL II: CPT | Mod: PBBFAC,,,

## 2024-09-06 NOTE — PROGRESS NOTES
Patient presented today for a nurse visit to check blood pressure. Pt's allergies, medications, medical history, and falls verified. Vital signs obtained. Blood pressure reading today was 126/82. All readings will be sent to Dr Stafford for review. Education provided on adequate hydration and cardiac diet. AVS given to patient. Patient also c/o of pain to right lower abdomen close to hip that radiates around flank. Dr Stafford was in with a patient so nurse spoke to Dr Love about patient. Dr Love came into room and spoke to patient about the pain. Dr love explained to patient that most likely musculoskeletal as patient had a bowel movement this morning. Patient verbalized that pain was worse after coffee this morning. Dr Love advised patient to take advil for the pain and if pain gets worse to or has nausea or vomiting to go to urgent care or the emergency room. Patient advised to call nurse line if needed over the weekend.

## 2024-10-23 ENCOUNTER — OFFICE VISIT (OUTPATIENT)
Dept: URGENT CARE | Facility: CLINIC | Age: 74
End: 2024-10-23
Payer: MEDICARE

## 2024-10-23 VITALS
DIASTOLIC BLOOD PRESSURE: 88 MMHG | HEIGHT: 62 IN | TEMPERATURE: 98 F | WEIGHT: 148.81 LBS | HEART RATE: 64 BPM | BODY MASS INDEX: 27.38 KG/M2 | RESPIRATION RATE: 20 BRPM | SYSTOLIC BLOOD PRESSURE: 168 MMHG | OXYGEN SATURATION: 96 %
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
## 2024-10-23 DIAGNOSIS — S80.811A ABRASION OF RIGHT LOWER LEG, INITIAL ENCOUNTER: ICD-10-CM

## 2024-10-23 DIAGNOSIS — S80.11XA CONTUSION OF RIGHT LOWER LEG, INITIAL ENCOUNTER: ICD-10-CM

## 2024-10-23 PROCEDURE — 99213 OFFICE O/P EST LOW 20 MIN: CPT | Mod: S$GLB,,,

## 2024-10-23 RX ORDER — MUPIROCIN 20 MG/G
OINTMENT TOPICAL 2 TIMES DAILY
Qty: 30 G | Refills: 0 | Status: SHIPPED | OUTPATIENT
Start: 2024-10-23

## 2024-10-23 RX ORDER — IBUPROFEN 800 MG/1
800 TABLET ORAL EVERY 6 HOURS PRN
Qty: 45 TABLET | Refills: 0 | Status: SHIPPED | OUTPATIENT
Start: 2024-10-23

## 2024-10-24 ENCOUNTER — TELEPHONE (OUTPATIENT)
Dept: PRIMARY CARE CLINIC | Facility: CLINIC | Age: 74
End: 2024-10-24
Payer: MEDICARE

## 2024-10-24 ENCOUNTER — HOSPITAL ENCOUNTER (OUTPATIENT)
Dept: RADIOLOGY | Facility: HOSPITAL | Age: 74
Discharge: HOME | End: 2024-10-24
Attending: INTERNAL MEDICINE
Payer: MEDICARE

## 2024-10-24 ENCOUNTER — TELEPHONE (OUTPATIENT)
Dept: URGENT CARE | Facility: CLINIC | Age: 74
End: 2024-10-24
Payer: MEDICARE

## 2024-10-24 DIAGNOSIS — Z12.31 ENCOUNTER FOR SCREENING MAMMOGRAM FOR BREAST CANCER: ICD-10-CM

## 2024-10-24 PROCEDURE — 77063 BREAST TOMOSYNTHESIS BI: CPT | Mod: TC

## 2024-10-24 PROCEDURE — 77067 SCR MAMMO BI INCL CAD: CPT | Mod: TC

## 2024-10-24 NOTE — PROGRESS NOTES
"Subjective:      Patient ID: Julisa Jensen is a 74 y.o. female.    Vitals:  height is 5' 2" (1.575 m) and weight is 67.5 kg (148 lb 13 oz). Her oral temperature is 98.3 °F (36.8 °C). Her blood pressure is 168/88 (abnormal) and her pulse is 64. Her respiration is 20 and oxygen saturation is 96%.     Chief Complaint: Leg Pain    74-year-old female presents to the clinic today with chief complaint of right leg pain specifically her right lower leg/shin area. Symptoms started 3 days ago ago after she fell and hit her leg on cement steps and have worsened.  Denies hitting head or LOC. Denies any previous surgeries or injuries on affected area. Patient has not taken any medication.  Pain is constant 7/10 throbbing pain that radiates up her leg with associated bruising, bleeding, swelling, and erythema. Patient notes weight bearing makes it worse and rest improves symptoms.  Denies numbness or tingling.    was sued for visit #[id number].       Leg Pain   The incident occurred 3 to 5 days ago. [facility] The injury mechanism was a fall. The pain is present in the right leg. The pain is at a severity of 6/10. The pain is mild. The pain has been Constant since onset. Pertinent negatives include no inability to bear weight, loss of motion, loss of sensation, muscle weakness, numbness or tingling. She reports no foreign bodies present. The symptoms are aggravated by movement and weight bearing. She has tried nothing for the symptoms.       Constitution: Negative for activity change, chills and fever.   HENT:  Negative for ear pain and sore throat.    Neck: Negative for neck pain.   Cardiovascular:  Negative for chest pain.   Eyes:  Negative for eye pain.   Respiratory:  Negative for shortness of breath.    Gastrointestinal:  Negative for abdominal pain, nausea, vomiting and diarrhea.   Genitourinary:  Negative for dysuria.   Musculoskeletal:  Positive for pain. Negative for muscle ache. "   Skin:  Positive for wound. Negative for rash and erythema.   Allergic/Immunologic: Negative for environmental allergies and seasonal allergies.   Neurological:  Negative for dizziness, headaches and numbness.   Psychiatric/Behavioral:  Negative for nervous/anxious. The patient is not nervous/anxious.       Objective:     Physical Exam   Constitutional: She is oriented to person, place, and time. She appears well-developed.   HENT:   Head: Normocephalic and atraumatic. Head is without abrasion, without contusion and without laceration.   Ears:   Right Ear: External ear normal.   Left Ear: External ear normal.   Nose: Nose normal.   Mouth/Throat: Oropharynx is clear and moist and mucous membranes are normal.   Eyes: Conjunctivae, EOM and lids are normal. Pupils are equal, round, and reactive to light.   Neck: Trachea normal and phonation normal. Neck supple.   Cardiovascular: Normal rate.   Pulmonary/Chest: Effort normal. No respiratory distress.   Musculoskeletal: Normal range of motion.         General: Normal range of motion.   Neurological: She is alert and oriented to person, place, and time.   Skin: Skin is warm, dry, intact and no rash. Capillary refill takes less than 2 seconds. No abrasion, No burn, No bruising, No erythema and No ecchymosis        Psychiatric: Her speech is normal and behavior is normal. Judgment and thought content normal.   Nursing note and vitals reviewed.      Assessment:     1. Fall, initial encounter    2. Abrasion of right lower leg, initial encounter    3. Contusion of right lower leg, initial encounter        Plan:       Fall, initial encounter  -     XR TIBIA FIBULA 2 VIEW RIGHT; Future; Expected date: 10/23/2024    Abrasion of right lower leg, initial encounter  -     mupirocin (BACTROBAN) 2 % ointment; Apply topically 2 (two) times daily.  Dispense: 30 g; Refill: 0    Contusion of right lower leg, initial encounter  -     XR TIBIA FIBULA 2 VIEW RIGHT; Future; Expected date:  10/23/2024  -     ibuprofen (ADVIL,MOTRIN) 800 MG tablet; Take 1 tablet (800 mg total) by mouth every 6 (six) hours as needed for Pain.  Dispense: 45 tablet; Refill: 0        We had shared decision making for patient's treatment. We discussed side effects/alternatives/benefits/risk and patient would like to proceed with treatment plan. We also discussed other OTC treatment recommendations. Patient was counseled, explained with the test results meaning, expected course, and answered all of questions. Patient can take OTC Acetaminophen (Tylenol) and/or Ibuprofen (Motrin) as needed for pain relief and/or fever relief. Continue to drink plenty of fluids. Follow up with PCP in the next 1-2 weeks as needed.  Gave patient strict ER/urgent care precautions in case symptoms worsen or if any new concerns arise.

## 2024-10-24 NOTE — PATIENT INSTRUCTIONS
Please drink plenty of fluids.  Please get plenty of rest.  Please return here or go to the Emergency Department for any concerns or worsening of condition.  If you were prescribed a narcotic medication, do not drive or operate heavy equipment or machinery while taking these medications.  Clean wound twice a d ay and apply topical mupirocin ointment to area twice a day.   Return back to clinic tomorrow between 8 and 2 for x-ray and request that provider discusses results with you.   If you were not prescribed an anti-inflammatory medication, and if you do not have any history of stomach/intestinal ulcers, or kidney disease, or are not taking a blood thinner such as Coumadin, Plavix, Pradaxa, Eloquis, or Xaralta for example, it is OK to take over the counter Ibuprofen or Advil or Motrin or Aleve as directed.  Do not take these medications on an empty stomach.  Rest, ice, compression and elevation to the affected joint or limb as needed.  Please follow up with your primary care doctor or specialist as needed.    If you  smoke, please stop smoking.

## 2024-10-24 NOTE — TELEPHONE ENCOUNTER
Spoke to patient via  and pt reports that bp last night was 168/88. Pt reports that has been high all week long. Pt wants to see Dr Stafford and only Dr. Stafford to quickly discuss medication changes. Message sent to Dr Stafford for approval on 20 minute appt tomorrow. Dr Stafford gave approval for the appt. Scheduled appt for tomorrow at 1:40pm with Dr Stafford. Called via  to inform pt of appt. Pt confirmed will be there just needs to be out by 3pm.

## 2024-10-24 NOTE — TELEPHONE ENCOUNTER
----- Message from Nuzhat sent at 10/24/2024  2:56 PM CDT -----  Contact: Self/[phone]  Caller is requesting an earlier appointment then we can schedule.  Caller is requesting a message be sent to the provider.    If this is for urgent care symptoms, did you offer other providers at this location, providers at other locations, or [facility]? (yes, no, n/a):  n/a    If this is for the patients physical, did you offer to schedule next available and put on wait list, or to see NP or PA for their physical?  (yes, no, n/a):  n/a    When is the next available appointment with their provider:  12/2    Reason for the appointment:  elevated blood pressure     Patient preference of timeframe to be scheduled:  today     Would the patient like a call back, or a response through their [facility] portal?:   call back     Comments:  Symptom: High Blood Pressure - Caller Reports  Outcome: Schedule a same-day appointment or talk to a nurse or provider within 1 hour.  Reason: Caller denied all higher acuity questions    The caller accepted this outcome.    Spoke with patient via  could not be connected to on call nurse   Quality 226: Preventive Care And Screening: Tobacco Use: Screening And Cessation Intervention: Patient screened for tobacco use and is an ex/non-smoker Quality 110: Preventive Care And Screening: Influenza Immunization: Influenza immunization was not ordered or administered, reason not given Detail Level: Detailed Quality 431: Preventive Care And Screening: Unhealthy Alcohol Use - Screening: Patient not identified as an unhealthy alcohol user when screened for unhealthy alcohol use using a systematic screening method Quality 111:Pneumonia Vaccination Status For Older Adults: Patient did not receive any pneumococcal conjugate or polysaccharide vaccine on or after their 60th birthday and before the end of the measurement period Quality 130: Documentation Of Current Medications In The Medical Record: Current Medications Documented

## 2024-10-24 NOTE — TELEPHONE ENCOUNTER
Patient came to clinic for x-ray of leg that was ordered yesterday. Results show no fractures or dislocations. Gave patient results while in clinic. All questions answered. No change in previous treatment plan.     XR TIBIA FIBULA 2 VIEW RIGHT    Result Date: 10/24/2024  EXAMINATION: XR TIBIA FIBULA 2 VIEW RIGHT CLINICAL HISTORY: Unspecified fall, initial encounter TECHNIQUE: AP and lateral views of the right tibia and fibula were performed. COMPARISON: None. FINDINGS: Two views right tibia fibula. There is osteopenia.  There are degenerative changes of the knee noting medial compartmental narrowing.  The ankle mortise is intact.  There is vascular calcification.  There are degenerative changes of the calcaneus.     1. No convincing acute displaced fracture or dislocation of the tibia or fibula. Electronically signed by: Antonio Laguerre MD Date:    10/24/2024 Time:    12:24

## 2024-10-25 ENCOUNTER — OFFICE VISIT (OUTPATIENT)
Dept: PRIMARY CARE CLINIC | Facility: CLINIC | Age: 74
End: 2024-10-25
Payer: MEDICARE

## 2024-10-25 VITALS
OXYGEN SATURATION: 96 % | SYSTOLIC BLOOD PRESSURE: 138 MMHG | HEIGHT: 62 IN | WEIGHT: 148.94 LBS | HEART RATE: 58 BPM | BODY MASS INDEX: 27.41 KG/M2 | DIASTOLIC BLOOD PRESSURE: 66 MMHG

## 2024-10-25 DIAGNOSIS — I10 ESSENTIAL HYPERTENSION: Primary | ICD-10-CM

## 2024-10-25 DIAGNOSIS — E78.49 OTHER HYPERLIPIDEMIA: ICD-10-CM

## 2024-10-25 PROCEDURE — 99999 PR PBB SHADOW E&M-EST. PATIENT-LVL III: CPT | Mod: PBBFAC,,, | Performed by: INTERNAL MEDICINE

## 2024-10-25 NOTE — PROGRESS NOTES
Subjective:       Patient ID: Julisa Jensen is a 74 y.o. female.    Chief Complaint: Hypertension      Hypertension      Julisa Jensen is a 74 y.o. female with  HTN, vit D and B12 deficiency, anemia, and vertigo who presents today for Hypertension    Reports she had a fall and hit her right leg needing to go to the urgent care. Her blood pressure was found elevated and advised to follow with PCP. Ocassionally has cheked her blood pressure at the pharmacy and noted also high. She has checked her blood pressure at home and noted similar readings. She was taking ibuprofen 800mg for pain. The pain has improved and last time she took the ibuprofen was the day before yesterday.    Denies headaches, chest pain, palpitations, leg swelling, or lightheadedness.  She tries to keep her diet healthy.  Weight has been stable.  She has been under stress with her 's medical condition.    Her leg has been healing well, with decreased swelling and resolving bruising.  Now scabbed and less painful.  Had an x-ray which was negative for fracture.    Medications were reviewed and is not clear she is taking her cholesterol medication.  Thinks it was causing nausea.  Of her to change to a different cholesterol medication but will try to take it after supper.    Health Maintenance:  Health Maintenance   Topic Date Due    Shingles Vaccine (2 of 2) 01/11/2021    DEXA Scan  03/08/2026    Lipid Panel  05/21/2029    Colonoscopy  06/23/2030    TETANUS VACCINE  11/16/2030    Hepatitis C Screening  Completed       Review of Systems   Constitutional: Negative.  Negative for fever and unexpected weight change.   HENT: Negative.     Respiratory: Negative.     Cardiovascular: Negative.    Gastrointestinal:  Positive for nausea.   Genitourinary:  Negative for difficulty urinating.   Musculoskeletal: Negative.  Positive for leg pain.   Integumentary:  Negative.   Neurological: Negative.    Psychiatric/Behavioral:  The patient is  nervous/anxious.       Past Medical History:   Diagnosis Date    Cataract     COVID-19 03/2021    Hypertension     Stroke        Past Surgical History:   Procedure Laterality Date    CATARACT EXTRACTION W/  INTRAOCULAR LENS IMPLANT Left 8/3/2021    Procedure: EXTRACTION, CATARACT, WITH IOL INSERTION;  Surgeon: Ji Shaikh MD;  Location: [facility] OR;  Service: Ophthalmology;  Laterality: Left;    CHOLECYSTECTOMY      COLONOSCOPY N/A 6/23/2020    Procedure: COLONOSCOPY;  Surgeon: Madelyn Finch MD;  Location: [facility] ENDO;  Service: Endoscopy;  Laterality: N/A;       Family History   Problem Relation Name Age of Onset    Cataracts Mother      Heart disease Mother      No Known Problems Father      Diabetes Sister      Stroke Sister x1     Diabetes Brother      No Known Problems Daughter x1     No Known Problems Son x2     Breast cancer Maternal Cousin 2nd maternal     Amblyopia Neg Hx      Blindness Neg Hx      Glaucoma Neg Hx      Macular degeneration Neg Hx      Retinal detachment Neg Hx      Strabismus Neg Hx         Social History     Socioeconomic History    Marital status:    Tobacco Use    Smoking status: Never    Smokeless tobacco: Never   Substance and Sexual Activity    Alcohol use: Not Currently     Comment: occasionally    Drug use: Never    Sexual activity: Not Currently     Partners: Male     Social Drivers of Health     Financial Resource Strain: High Risk (8/16/2024)    Overall Financial Resource Strain (CARDIA)     Difficulty of Paying Living Expenses: Hard   Food Insecurity: Food Insecurity Present (8/16/2024)    Hunger Vital Sign     Worried About Running Out of Food in the Last Year: Sometimes true     Ran Out of Food in the Last Year: Never true   Transportation Needs: No Transportation Needs (8/16/2024)    PRAPARE - Transportation     Lack of Transportation (Medical): No     Lack of Transportation (Non-Medical): No   Physical Activity: Sufficiently Active (8/16/2024)    Exercise  "Vital Sign     Days of Exercise per Week: 3 days     Minutes of Exercise per Session: 60 min   Stress: Stress Concern Present (8/16/2024)    [employer] - Occupational Stress Questionnaire     Feeling of Stress : To some extent   Housing Stability: Low Risk  (8/16/2024)    Housing Stability Vital Sign     Unable to Pay for Housing in the Last Year: No     Homeless in the Last Year: No       Current Outpatient Medications   Medication Sig Dispense Refill    amLODIPine (NORVASC) 5 MG tablet Take 1 tablet (5 mg total) by mouth once daily. 90 tablet 3    aspirin (ECOTRIN) 81 MG EC tablet Take 1 tablet (81 mg total) by mouth once daily. 30 tablet 11    calcium carbonate-vitamin D3 600 mg-20 mcg (800 unit) Chew Take 1 tablet by mouth 2 (two) times a day. 60 tablet 11    hydroCHLOROthiazide (HYDRODIURIL) 12.5 MG Tab Take 1 tablet (12.5 mg total) by mouth once daily. 90 tablet 3    mupirocin (BACTROBAN) 2 % ointment Apply topically 2 (two) times daily. 30 g 0    atorvastatin (LIPITOR) 20 MG tablet Take 1 tablet (20 mg total) by mouth once daily. (Patient not taking: Reported on 10/25/2024) 90 tablet 3    diclofenac sodium (VOLTAREN) 1 % Gel Apply 2 g topically once daily. (Patient not taking: Reported on 10/25/2024) 150 g 2    ibuprofen (ADVIL,MOTRIN) 800 MG tablet Take 1 tablet (800 mg total) by mouth every 6 (six) hours as needed for Pain. (Patient not taking: Reported on 10/25/2024) 45 tablet 0     No current facility-administered medications for this visit.       Review of patient's allergies indicates:   Allergen Reactions    Penicillins          Objective:       Last 3 sets of Vitals        9/6/2024    10:24 AM 10/23/2024     7:03 PM 10/25/2024    10:00 AM   Vitals - 1 value per visit   SYSTOLIC 126 168 138   DIASTOLIC 82 88 66   Pulse 72 64 58   Temp  98.3 °F (36.8 °C)    Resp  20    SPO2 98 % 96 % 96 %   Weight (lb) 148.81 148.81 148.92   Weight (kg) 67.5 67.5 67.55   Height  5' 2" " "(1.575 m) 5' 2" (1.575 m)   BMI (Calculated)  27.2 27.2   Pain Score Five Seven Zero   Physical Exam  Constitutional:       General: She is not in acute distress.     Appearance: Normal appearance.   Eyes:      General: No scleral icterus.     Extraocular Movements: Extraocular movements intact.      Conjunctiva/sclera: Conjunctivae normal.   Neck:      Comments: No goiter.  Cardiovascular:      Rate and Rhythm: Normal rate and regular rhythm.      Pulses: Normal pulses.      Heart sounds: Normal heart sounds.   Pulmonary:      Effort: Pulmonary effort is normal.      Breath sounds: Normal breath sounds.   Abdominal:      General: Bowel sounds are normal. There is no distension.      Palpations: Abdomen is soft.      Tenderness: There is no abdominal tenderness.   Musculoskeletal:         General: No swelling. Normal range of motion.   Lymphadenopathy:      Cervical: No cervical adenopathy.   Skin:     General: Skin is warm and dry.      Comments: Right pretibial scabbed and healing abrasion, almost resolved bruising.   Neurological:      General: No focal deficit present.      Mental Status: She is alert and oriented to person, place, and time.   Psychiatric:         Mood and Affect: Mood normal.         Behavior: Behavior normal.           CBC:  Recent Labs   Lab 09/16/22  0819 05/16/23  0907 05/21/24  1034   WBC 5.00 5.75 5.97   RBC 4.45 4.38 4.41   Hemoglobin 12.6 12.3 12.6   Hematocrit 37.5 38.9 39.3   Platelets 319 326 329   MCV 84 89 89   MCH 28.3 28.1 28.6   MCHC 33.6 31.6 L 32.1     CMP:  Recent Labs   Lab 03/21/22  0736 05/16/23  0907 05/21/24  1034   Glucose 97 86 92   Calcium 9.5 8.5 L 9.3   Albumin 3.9 3.9 3.7   Total Protein 8.3 7.5 7.6   Sodium 140 142 145   Potassium 3.6 3.6 4.2   CO2 28 27 28   Chloride 105 106 107   BUN 13 11 16   Creatinine 0.7 0.7 0.7   Alkaline Phosphatase 88 85 84   ALT 25 26 20   AST 22 24 23   Total Bilirubin 0.6 0.5 0.3     URINALYSIS:  Recent Labs   Lab 05/19/23  1755   pH, " UA 5.5      LIPIDS:  Recent Labs   Lab 09/16/22  0819 05/16/23  0907 05/21/24  1034   TSH 2.511 2.389 1.789   HDL 45 43 43   Cholesterol 163 140 150   Triglycerides 74 99 108   LDL Cholesterol 103.2 77.2 85.4   HDL/Cholesterol Ratio 27.6 30.7 28.7   Non-HDL Cholesterol 118 97 107   Total Cholesterol/HDL Ratio 3.6 3.3 3.5     TSH:  Recent Labs   Lab 09/16/22  0819 05/16/23  0907 05/21/24  1034   TSH 2.511 2.389 1.789       A1C:  Recent Labs   Lab 05/16/23  0907 05/21/24  1034   Hemoglobin A1C 5.1 5.2       Imaging:  XR TIBIA FIBULA 2 VIEW RIGHT  Narrative: EXAMINATION:  XR TIBIA FIBULA 2 VIEW RIGHT    CLINICAL HISTORY:  Unspecified fall, initial encounter    TECHNIQUE:  AP and lateral views of the right tibia and fibula were performed.    COMPARISON:  None.    FINDINGS:  Two views right tibia fibula.    There is osteopenia.  There are degenerative changes of the knee noting medial compartmental narrowing.  The ankle mortise is intact.  There is vascular calcification.  There are degenerative changes of the calcaneus.  Impression: 1. No convincing acute displaced fracture or dislocation of the tibia or fibula.    Electronically signed by: Antonio Laguerre MD  Date:    10/24/2024  Time:    12:24      Assessment:       1. Essential hypertension    2. Hyperlipidemia, unspecified hyperlipidemia type            Plan:       1. Essential hypertension  Overview:  On amlodipine 5 mg daily and hydrochlorothiazide 12.5 mg day      2. Hyperlipidemia, unspecified hyperlipidemia type       Health Maintenance Due   Topic Date Due    Shingles Vaccine (2 of 2) 01/11/2021    RSV Vaccine (Age 60+ and Pregnant patients) (1 - 1-dose 75+ series) Never done    Influenza Vaccine (1) 09/01/2024    COVID-19 Vaccine (3 - 2024-25 season) 09/01/2024        I spent a total of 30 minutes on the day of the visit.This includes face to face time and non-face to face time preparing to see the patient (eg, review of tests), obtaining and/or reviewing  separately obtained history, documenting clinical information in the electronic or other health record, independently interpreting results and communicating results to the patient/family/caregiver, or care coordinator.     RETURN TO CLINIC IN: 2-3 WEEKS    FOR NEXT VISIT: BLOOD PRESSURE MONITORING, ADVANCE DIRECTIVES, and MEDICATION MONITORING     Sandra Stafford MD  [facility]  Disclaimer:  This note has been generated using voice-recognition software. There may be grammatical or spelling errors that have been missed during proof-reading

## 2024-10-25 NOTE — ASSESSMENT & PLAN NOTE
Reports some nausea with the atorvastatin.  Prefers to keep the medication and tried to take it after a meal.    LDL not at goal for her noted arthrosclerosis.

## 2024-10-25 NOTE — ASSESSMENT & PLAN NOTE
Today her blood pressure looks good.   High blood pressure possibly associated to anti-inflammatories but reports other times have been noted increased.    She used to be on hydrochlorothiazide 25 mg daily but decreased due to low normal blood pressures.    She will continue to monitor her blood pressure twice a day and if blood pressure increases over 160 she will take extra HCTZ and notify us.   We will re-evaluate in 2 weeks.

## 2024-10-29 ENCOUNTER — PATIENT MESSAGE (OUTPATIENT)
Dept: PRIMARY CARE CLINIC | Facility: CLINIC | Age: 74
End: 2024-10-29
Payer: MEDICARE

## 2024-11-01 ENCOUNTER — TELEPHONE (OUTPATIENT)
Dept: OPTOMETRY | Facility: CLINIC | Age: 74
End: 2024-11-01
Payer: MEDICARE

## 2024-11-15 ENCOUNTER — TELEPHONE (OUTPATIENT)
Dept: OPTOMETRY | Facility: CLINIC | Age: 74
End: 2024-11-15
Payer: MEDICARE

## 2024-11-18 ENCOUNTER — OFFICE VISIT (OUTPATIENT)
Dept: OPTOMETRY | Facility: CLINIC | Age: 74
End: 2024-11-18
Payer: MEDICARE

## 2024-11-18 DIAGNOSIS — Z96.1 PSEUDOPHAKIA OF LEFT EYE: ICD-10-CM

## 2024-11-18 DIAGNOSIS — I10 ESSENTIAL HYPERTENSION: ICD-10-CM

## 2024-11-18 DIAGNOSIS — H02.051 TRICHIASIS OF RIGHT UPPER EYELID: ICD-10-CM

## 2024-11-18 DIAGNOSIS — H25.11 NS (NUCLEAR SCLEROSIS), RIGHT: ICD-10-CM

## 2024-11-18 DIAGNOSIS — H40.013 OAG (OPEN ANGLE GLAUCOMA) SUSPECT, LOW RISK, BILATERAL: ICD-10-CM

## 2024-11-18 DIAGNOSIS — Z01.00 ROUTINE EYE EXAM: ICD-10-CM

## 2024-11-18 DIAGNOSIS — H26.492 PCO (POSTERIOR CAPSULAR OPACIFICATION), LEFT: Primary | ICD-10-CM

## 2024-11-18 DIAGNOSIS — H04.123 DRY EYE SYNDROME, BILATERAL: ICD-10-CM

## 2024-11-18 PROCEDURE — 1100F PTFALLS ASSESS-DOCD GE2>/YR: CPT | Mod: CPTII,S$GLB,, | Performed by: OPTOMETRIST

## 2024-11-18 PROCEDURE — 1159F MED LIST DOCD IN RCRD: CPT | Mod: CPTII,S$GLB,, | Performed by: OPTOMETRIST

## 2024-11-18 PROCEDURE — 1126F AMNT PAIN NOTED NONE PRSNT: CPT | Mod: CPTII,S$GLB,, | Performed by: OPTOMETRIST

## 2024-11-18 PROCEDURE — 99999 PR PBB SHADOW E&M-EST. PATIENT-LVL II: CPT | Mod: PBBFAC,,, | Performed by: OPTOMETRIST

## 2024-11-18 PROCEDURE — G2211 COMPLEX E/M VISIT ADD ON: HCPCS | Mod: S$GLB,,, | Performed by: OPTOMETRIST

## 2024-11-18 PROCEDURE — 99204 OFFICE O/P NEW MOD 45 MIN: CPT | Mod: S$GLB,,, | Performed by: OPTOMETRIST

## 2024-11-18 PROCEDURE — 3288F FALL RISK ASSESSMENT DOCD: CPT | Mod: CPTII,S$GLB,, | Performed by: OPTOMETRIST

## 2024-11-18 RX ORDER — CYCLOSPORINE 0.5 MG/ML
1 EMULSION OPHTHALMIC 2 TIMES DAILY
Qty: 60 EACH | Refills: 11 | Status: SHIPPED | OUTPATIENT
Start: 2024-11-18 | End: 2025-11-18

## 2024-11-19 ENCOUNTER — PATIENT MESSAGE (OUTPATIENT)
Dept: OPTOMETRY | Facility: CLINIC | Age: 74
End: 2024-11-19
Payer: MEDICARE

## 2024-11-19 PROBLEM — H25.12 NUCLEAR SCLEROTIC CATARACT OF LEFT EYE: Status: RESOLVED | Noted: 2021-08-03 | Resolved: 2024-11-19

## 2024-11-19 NOTE — PROGRESS NOTES
HPI    BESSY: 5/30/2022 - Dr. Shaikh     CC: Pt is here today for a routine eye exam. Pt states that she has   difficulty with near vision and her eyes often tear.     (-)Dryness  (-)Burning  (-)Itchiness  (+)Tearing  (+)Flashes  (+)Floaters   (+)Photophobia  (-)Eye Pain      Diabetic: no    Contact Lens: no    Eye Meds: none    PD: 60.0    Last edited by Gin Butt MA on 11/18/2024  3:23 PM.            Assessment /Plan     For exam results, see Encounter Report.    PCO (posterior capsular opacification), left   Consult for YAG OS    Pseudophakia of left eye  NS (nuclear sclerosis), right   Monitor    OAG (open angle glaucoma) suspect, low risk, bilateral   Neg Fhx.  IOP high teens  C/d 0.75 OD, OS.    Pachy 523 OD, 529 OS.   6/17/2021 OCT OD WNL OS borderline NS  6/17/2021 HVF OD low reliability WNL, OS low reliability, inf arcuate    Repeat OCT/HVF next visit           Essential hypertension   No retinopathy, monitor yearly    Trichiasis of right upper eyelid   Epilated lash upper lid today    Dry eye syndrome, bilateral  Start   cycloSPORINE (RESTASIS) 0.05 % ophthalmic emulsion; Place 1 drop into both eyes 2 (two) times daily.  Dispense: 60 each; Refill: 11             RTC 1 year, sooner PRN

## 2024-12-27 ENCOUNTER — PATIENT MESSAGE (OUTPATIENT)
Dept: OPTOMETRY | Facility: CLINIC | Age: 74
End: 2024-12-27
Payer: MEDICARE

## 2025-01-13 DIAGNOSIS — I10 ESSENTIAL HYPERTENSION: ICD-10-CM

## 2025-01-13 RX ORDER — AMLODIPINE BESYLATE 5 MG/1
5 TABLET ORAL DAILY
Qty: 90 TABLET | Refills: 3 | Status: SHIPPED | OUTPATIENT
Start: 2025-01-13

## 2025-01-13 RX ORDER — HYDROCHLOROTHIAZIDE 12.5 MG/1
12.5 TABLET ORAL DAILY
Qty: 90 TABLET | Refills: 3 | Status: SHIPPED | OUTPATIENT
Start: 2025-01-13 | End: 2025-01-25

## 2025-01-25 ENCOUNTER — OFFICE VISIT (OUTPATIENT)
Dept: PRIMARY CARE CLINIC | Facility: CLINIC | Age: 75
End: 2025-01-25
Payer: MEDICARE

## 2025-01-25 ENCOUNTER — TELEPHONE (OUTPATIENT)
Dept: PRIMARY CARE CLINIC | Facility: CLINIC | Age: 75
End: 2025-01-25
Payer: MEDICARE

## 2025-01-25 VITALS
HEIGHT: 62 IN | HEART RATE: 61 BPM | WEIGHT: 148.38 LBS | SYSTOLIC BLOOD PRESSURE: 126 MMHG | BODY MASS INDEX: 27.3 KG/M2 | OXYGEN SATURATION: 97 % | DIASTOLIC BLOOD PRESSURE: 84 MMHG

## 2025-01-25 DIAGNOSIS — H53.8 BLURRY VISION: ICD-10-CM

## 2025-01-25 DIAGNOSIS — I10 ESSENTIAL HYPERTENSION: Primary | ICD-10-CM

## 2025-01-25 DIAGNOSIS — E78.49 OTHER HYPERLIPIDEMIA: ICD-10-CM

## 2025-01-25 DIAGNOSIS — Z00.00 HEALTHCARE MAINTENANCE: ICD-10-CM

## 2025-01-25 DIAGNOSIS — D32.9 MENINGIOMA: ICD-10-CM

## 2025-01-25 DIAGNOSIS — R79.9 ABNORMAL BLOOD CHEMISTRY: ICD-10-CM

## 2025-01-25 PROCEDURE — 3074F SYST BP LT 130 MM HG: CPT | Mod: CPTII,S$GLB,, | Performed by: INTERNAL MEDICINE

## 2025-01-25 PROCEDURE — 1160F RVW MEDS BY RX/DR IN RCRD: CPT | Mod: CPTII,S$GLB,, | Performed by: INTERNAL MEDICINE

## 2025-01-25 PROCEDURE — 3079F DIAST BP 80-89 MM HG: CPT | Mod: CPTII,S$GLB,, | Performed by: INTERNAL MEDICINE

## 2025-01-25 PROCEDURE — 99215 OFFICE O/P EST HI 40 MIN: CPT | Mod: 25,S$GLB,, | Performed by: INTERNAL MEDICINE

## 2025-01-25 PROCEDURE — G0008 ADMIN INFLUENZA VIRUS VAC: HCPCS | Mod: S$GLB,,, | Performed by: INTERNAL MEDICINE

## 2025-01-25 PROCEDURE — 1126F AMNT PAIN NOTED NONE PRSNT: CPT | Mod: CPTII,S$GLB,, | Performed by: INTERNAL MEDICINE

## 2025-01-25 PROCEDURE — 99999 PR PBB SHADOW E&M-EST. PATIENT-LVL IV: CPT | Mod: PBBFAC,,, | Performed by: INTERNAL MEDICINE

## 2025-01-25 PROCEDURE — 90653 IIV ADJUVANT VACCINE IM: CPT | Mod: S$GLB,,, | Performed by: INTERNAL MEDICINE

## 2025-01-25 PROCEDURE — 1159F MED LIST DOCD IN RCRD: CPT | Mod: CPTII,S$GLB,, | Performed by: INTERNAL MEDICINE

## 2025-01-25 RX ORDER — HYDROCHLOROTHIAZIDE 25 MG/1
25 TABLET ORAL DAILY
Qty: 30 TABLET | Refills: 6 | Status: SHIPPED | OUTPATIENT
Start: 2025-01-25

## 2025-01-25 RX ORDER — FLUTICASONE PROPIONATE 50 MCG
2 SPRAY, SUSPENSION (ML) NASAL DAILY
Qty: 16 G | Refills: 3 | Status: SHIPPED | OUTPATIENT
Start: 2025-01-25

## 2025-01-25 NOTE — ASSESSMENT & PLAN NOTE
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  - The mass remained stable, with no growth or movement since its discovery.  - Currently, the patient is asymptomatic, with no headaches or focal signs present.  - Continuing the watchful waiting approach, but advised the patient to seek immediate medical attention if experiencing severe headaches.

## 2025-01-25 NOTE — ASSESSMENT & PLAN NOTE
- Yearly labs-  05/21/2024  - mammogram- 10/24/24  - Colon cancer screening-  colonoscopy in 2022, repeat in 10 years.  - ACP- documents were given but needs to finish completing them.  - Vaccination-   Due for shingles, RSV, and COVID vaccines.

## 2025-01-25 NOTE — PROGRESS NOTES
You let low loading good evening Subjective:       Patient ID: Julisa Jensen is a 74 y.o. female.    Chief Complaint: Hypertension      HPI  Julisa Jensen is a 74 y.o. female with   HTN, vit D and B12 deficiency, anemia, and vertigo who presents today for Hypertension    Julisa presents today for blood pressure follow up.    Reports concerned with elevated blood pressure.  Since she received her last dispensed HCTZ has been noticing ear discomfort and pulsation.  Notices when she feels the your pulsation her blood pressure is elevated.  Brings a few systolic blood pressure readings from home and have been between 129-176 with multiple in the 140s. Notices that the tablets do not look the same as the previously dispensed that worked for her. Apparently are made by different pharmaceuticals. Reports feels the dose has not been controlling her blood pressure well. In the past used to take 25mg with no trouble (reduced when BP was on low side).     She reports compliance with all blood pressure medications. Her sister recommended water with cinnamon and finds it helps.    She reports a worsening productive cough for 4-5 days with congestion and minimal chest tightness. The cough is frequent with prolonged episodes, particularly in the morning. She has been using OTC expectorant for symptom relief. She denies fever, headache, or sore throat.    She has a history of stroke and meningioma, which was discovered during stroke evaluation. The meningioma has remained stable without growth per her doctor's assessment. She was advised to seek immediate medical attention if she experiences severe headache. She also has a history of pneumonia requiring hospitalization.    She is a non-smoker and is not currently employed.    She is due for the Flu vaccine.      ROS:  General: -fever, -chills, -fatigue, -weight gain, -weight loss  Eyes: -vision changes, -redness, -discharge  ENT: -ear pain, -nasal congestion, -sore  throat  Cardiovascular: -chest pain, -palpitations, -lower extremity edema  Respiratory: +cough, -shortness of breath, +chest congestion  Gastrointestinal: -abdominal pain, -nausea, -vomiting, -diarrhea, -constipation, -blood in stool  Genitourinary: -dysuria, -hematuria, -frequency  Musculoskeletal: -joint pain, -muscle pain  Skin: -rash, -lesion  Neurological: -headache, -dizziness, -numbness, -tingling  Psychiatric: -anxiety, -depression, -sleep difficulty          Past Medical History:   Diagnosis Date    Cataract     COVID-19 03/2021    Hypertension     Stroke        Past Surgical History:   Procedure Laterality Date    CATARACT EXTRACTION W/  INTRAOCULAR LENS IMPLANT Left 8/3/2021    Procedure: EXTRACTION, CATARACT, WITH IOL INSERTION;  Surgeon: Ji Shaikh MD;  Location: [facility];  Service: Ophthalmology;  Laterality: Left;    CHOLECYSTECTOMY      COLONOSCOPY N/A 6/23/2020    Procedure: COLONOSCOPY;  Surgeon: Madelyn Finch MD;  Location: [facility];  Service: Endoscopy;  Laterality: N/A;       Family History   Problem Relation Name Age of Onset    Cataracts Mother      Heart disease Mother      No Known Problems Father      Diabetes Sister      Stroke Sister x1     Diabetes Brother      No Known Problems Daughter x1     No Known Problems Son x2     Breast cancer Maternal Cousin 2nd maternal     Amblyopia Neg Hx      Blindness Neg Hx      Glaucoma Neg Hx      Macular degeneration Neg Hx      Retinal detachment Neg Hx      Strabismus Neg Hx         Social History     Socioeconomic History    Marital status:    Tobacco Use    Smoking status: Never    Smokeless tobacco: Never   Substance and Sexual Activity    Alcohol use: Not Currently     Comment: occasionally    Drug use: Never    Sexual activity: Not Currently     Partners: Male     Social Drivers of Health     Financial Resource Strain: High Risk (8/16/2024)    Overall Financial Resource Strain (CARDIA)     Difficulty of Paying Living  Expenses: Hard   Food Insecurity: Food Insecurity Present (8/16/2024)    Hunger Vital Sign     Worried About Running Out of Food in the Last Year: Sometimes true     Ran Out of Food in the Last Year: Never true   Transportation Needs: No Transportation Needs (8/16/2024)    PRAPARE - Transportation     Lack of Transportation (Medical): No     Lack of Transportation (Non-Medical): No   Physical Activity: Sufficiently Active (8/16/2024)    Exercise Vital Sign     Days of Exercise per Week: 3 days     Minutes of Exercise per Session: 60 min   Stress: Stress Concern Present (8/16/2024)    [place] citizen [place] [employer] Occup[employer] - Occupational Stress Questionnaire     Feeling of Stress : To some extent   Housing Stability: Low Risk  (8/16/2024)    Housing Stability Vital Sign     Unable to Pay for Housing in the Last Year: No     Homeless in the Last Year: No       Current Outpatient Medications   Medication Sig Dispense Refill    amLODIPine (NORVASC) 5 MG tablet Take 1 tablet (5 mg total) by mouth once daily. 90 tablet 3    aspirin (ECOTRIN) 81 MG EC tablet Take 1 tablet (81 mg total) by mouth once daily. 30 tablet 11    atorvastatin (LIPITOR) 20 MG tablet Take 1 tablet (20 mg total) by mouth once daily. 90 tablet 3    calcium carbonate-vitamin D3 600 mg-20 mcg (800 unit) Chew Take 1 tablet by mouth 2 (two) times a day. (Patient not taking: Reported on 1/25/2025) 60 tablet 11    cycloSPORINE (RESTASIS) 0.05 % ophthalmic emulsion Place 1 drop into both eyes 2 (two) times daily. (Patient not taking: Reported on 1/25/2025) 60 each 11    diclofenac sodium (VOLTAREN) 1 % Gel Apply 2 g topically once daily. (Patient not taking: Reported on 1/25/2025) 150 g 2    fluticasone propionate (FLONASE) 50 mcg/actuation nasal spray 2 sprays (100 mcg total) by Each Nostril route once daily. 16 g 3    hydroCHLOROthiazide (HYDRODIURIL) 25 MG tablet Take 1 tablet (25 mg total) by mouth once daily. 30 tablet 6    ibuprofen (ADVIL,MOTRIN)  "800 MG tablet Take 1 tablet (800 mg total) by mouth every 6 (six) hours as needed for Pain. (Patient not taking: Reported on 10/25/2024) 45 tablet 0    lifitegrast (XIIDRA) 5 % Dpet Apply 1 drop to eye every 12 (twelve) hours. (Patient not taking: Reported on 1/25/2025) 180 each 4    mupirocin (BACTROBAN) 2 % ointment Apply topically 2 (two) times daily. (Patient not taking: Reported on 1/25/2025) 30 g 0     No current facility-administered medications for this visit.       Review of patient's allergies indicates:   Allergen Reactions    Penicillins          Objective:       Last 3 sets of Vitals        10/25/2024    10:00 AM 11/18/2024     3:23 PM 1/25/2025    11:07 AM   Vitals - 1 value per visit   SYSTOLIC 138  126   DIASTOLIC 66  84   Pulse 58  61   SPO2 96 %  97 %   Weight (lb) 148.92  148.37   Weight (kg) 67.55  67.3   Height 5' 2" (1.575 m)  5' 2" (1.575 m)   BMI (Calculated) 27.2  27.1   Pain Score Zero Zero Zero   Physical Exam  Constitutional:       General: She is not in acute distress.  HENT:      Head: Normocephalic.      Right Ear: Tympanic membrane, ear canal and external ear normal.      Left Ear: Tympanic membrane, ear canal and external ear normal.      Nose: Nose normal.      Mouth/Throat:      Mouth: Mucous membranes are moist.   Eyes:      General: No scleral icterus.     Extraocular Movements: Extraocular movements intact.      Conjunctiva/sclera: Conjunctivae normal.   Neck:      Vascular: No carotid bruit.      Comments: No goiter.  Cardiovascular:      Rate and Rhythm: Normal rate and regular rhythm.      Pulses: Normal pulses.      Heart sounds: Normal heart sounds.   Pulmonary:      Effort: Pulmonary effort is normal.      Breath sounds: Normal breath sounds.   Abdominal:      General: Bowel sounds are normal. There is no distension.      Palpations: Abdomen is soft. There is no mass.      Tenderness: There is no abdominal tenderness.   Musculoskeletal:         General: No swelling. "   Lymphadenopathy:      Cervical: No cervical adenopathy.   Skin:     General: Skin is warm and dry.   Neurological:      General: No focal deficit present.      Mental Status: She is alert and oriented to person, place, and time.   Psychiatric:         Mood and Affect: Mood normal.         Behavior: Behavior normal.           CBC:  Recent Labs   Lab 09/16/22  0819 05/16/23  0907 05/21/24  1034   WBC 5.00 5.75 5.97   RBC 4.45 4.38 4.41   Hemoglobin 12.6 12.3 12.6   Hematocrit 37.5 38.9 39.3   Platelets 319 326 329   MCV 84 89 89   MCH 28.3 28.1 28.6   MCHC 33.6 31.6 L 32.1     CMP:  Recent Labs   Lab 03/21/22  0736 05/16/23  0907 05/21/24  1034   Glucose 97 86 92   Calcium 9.5 8.5 L 9.3   Albumin 3.9 3.9 3.7   Total Protein 8.3 7.5 7.6   Sodium 140 142 145   Potassium 3.6 3.6 4.2   CO2 28 27 28   Chloride 105 106 107   BUN 13 11 16   Creatinine 0.7 0.7 0.7   Alkaline Phosphatase 88 85 84   ALT 25 26 20   AST 22 24 23   Total Bilirubin 0.6 0.5 0.3     URINALYSIS:  Recent Labs   Lab 05/19/23  1755   pH, UA 5.5      LIPIDS:  Recent Labs   Lab 09/16/22  0819 05/16/23  0907 05/21/24  1034   TSH 2.511 2.389 1.789   HDL 45 43 43   Cholesterol 163 140 150   Triglycerides 74 99 108   LDL Cholesterol 103.2 77.2 85.4   HDL/Cholesterol Ratio 27.6 30.7 28.7   Non-HDL Cholesterol 118 97 107   Total Cholesterol/HDL Ratio 3.6 3.3 3.5     TSH:  Recent Labs   Lab 09/16/22  0819 05/16/23  0907 05/21/24  1034   TSH 2.511 2.389 1.789       A1C:  Recent Labs   Lab 05/16/23  0907 05/21/24  1034   Hemoglobin A1C 5.1 5.2       Imaging:  Mammo Digital Screening Bilat w/ Kirby  Narrative: Facility:  [facility] [address]  [place] [id number] [phone]    Name: Julisa Jensen    MRN: [id number]    Result:  Mammo Digital Screening Bilat w/ Kirby    History:  Patient is 74 y.o. and is seen for a screening mammogram.    Films Compared:  Prior images (if available) were compared.     Findings:  This procedure  was performed using tomosynthesis.   Computer-aided detection was utilized in the interpretation of this   examination.    There are scattered areas of fibroglandular density. There is no evidence   of suspicious masses, microcalcifications or architectural distortion.  Impression:    No mammographic evidence of malignancy.    BI-RADS Category 1: Negative    Recommendation:  Routine screening mammogram in 1 year is recommended.    Your estimated lifetime risk of breast cancer (to age 85) based on   Tyrer-Cuzick risk assessment model is 1.45%.  According to the American   Cancer Society, patients with a lifetime breast cancer risk of 20% or   higher might benefit from supplemental screening tests, such as screening   breast MRI.    Elan Mary MD      Assessment:       1. Essential hypertension    2. Other hyperlipidemia    3. Blurry vision    4. Meningioma    5. Abnormal blood chemistry    6. Healthcare maintenance            Plan:       1. Essential hypertension  Overview:  On amlodipine 5 mg daily and hydrochlorothiazide 12.5 mg day    Assessment & Plan:  - Increased hydrochlorothiazide from 12.5 mg to 25 mg daily for blood pressure management.  - Current blood pressure measured at 136/74.  - Julisa reports feeling a 'BOOM' sensation in her ear when blood pressure is high.  - Instructed to take half the dose if pressure drops too much.  - Ordered a blood pressure check with nurse in 2 weeks.    Orders:  -     hydroCHLOROthiazide (HYDRODIURIL) 25 MG tablet; Take 1 tablet (25 mg total) by mouth once daily.  Dispense: 30 tablet; Refill: 6  -     CBC Auto Differential; Future; Expected date: 01/25/2025  -     Comprehensive Metabolic Panel; Future; Expected date: 01/25/2025  -     Lipid Panel; Future; Expected date: 01/25/2025  -     TSH; Future; Expected date: 01/25/2025    2. Other hyperlipidemia  Overview:  On atorvastatin 20 mg nightly.    Assessment & Plan:  LDL not at goal for her noted arthrosclerosis and  history of stroke.  Follow labs.    Orders:  -     Lipid Panel; Future; Expected date: 01/25/2025    3. Blurry vision  Comments:  Reports broken eye glasses and blurry vision.  Orders:  -     Ambulatory referral/consult to Optometry; Future; Expected date: 01/25/2025    4. 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     Assessment & Plan:  - Monitored the patient's history of a benign meningioma in the brain, discovered during a previous stroke.  - The mass remained stable, with no growth or movement since its discovery.  - Currently, the patient is asymptomatic, with no headaches or focal signs present.  - Continuing the watchful waiting approach, but advised the patient to seek immediate medical attention if experiencing severe headaches.        5. Abnormal blood chemistry  -     Hemoglobin A1C; Future; Expected date: 01/25/2025    6. Healthcare maintenance  Assessment & Plan:  - Yearly labs-  05/21/2024  - mammogram- 10/24/24  - Colon cancer screening-  colonoscopy in 2022, repeat in 10 years.  - ACP- documents were given but needs to finish completing them.  - Vaccination-   Due for shingles, RSV, and COVID vaccines.      Orders:  -     influenza (adjuvanted) (Fluad) 45 mcg/0.5 mL IM vaccine (> or = 64 yo) 0.5 mL    Other orders  -     fluticasone propionate (FLONASE) 50 mcg/actuation nasal spray; 2 sprays (100 mcg total) by Each Nostril route once daily.  Dispense: 16 g; Refill: 3              Health Maintenance Due   Topic Date Due    Shingles Vaccine (2 of 2) 01/11/2021    RSV Vaccine (Age 60+ and Pregnant patients) (1 - 1-dose 75+ series) Never done    COVID-19 Vaccine (3 - 2024-25 season) 09/01/2024        I spent a total of 40 minutes on the day of the visit.This includes face to face time and non-face to face time preparing to see the patient (eg, review of tests), obtaining and/or reviewing separately obtained history, documenting clinical information in the electronic or  other health record, independently interpreting results and communicating results to the patient/family/caregiver, or care coordinator.     RETURN TO CLINIC IN: 2-3 WEEKS    FOR NEXT VISIT: BLOOD PRESSURE MONITORING, REVIEW LABS, MEDICATION MONITORING, and CARE GAPS       Sandra Stafford MD  [facility]  Disclaimer:  This note has been generated using voice-recognition software. There may be grammatical or spelling errors that have been missed during proof-reading

## 2025-01-25 NOTE — TELEPHONE ENCOUNTER
----- Message from Med Assistant Verma sent at 1/25/2025 11:39 AM CST -----  Hello  I have saved the time slot on February 6th at 11 to have pt come in for a b/p check in two weeks

## 2025-01-25 NOTE — ASSESSMENT & PLAN NOTE
- Increased hydrochlorothiazide from 12.5 mg to 25 mg daily for blood pressure management.  - Current blood pressure measured at 136/74.  - Julisa reports feeling a 'BOOM' sensation in her ear when blood pressure is high.  - Instructed to take half the dose if pressure drops too much.  - Ordered a blood pressure check with nurse in 2 weeks.

## 2025-02-10 ENCOUNTER — OFFICE VISIT (OUTPATIENT)
Dept: OPHTHALMOLOGY | Facility: CLINIC | Age: 75
End: 2025-02-10
Payer: MEDICARE

## 2025-02-10 DIAGNOSIS — H25.11 NUCLEAR SCLEROSIS OF RIGHT EYE: ICD-10-CM

## 2025-02-10 DIAGNOSIS — H26.492 PCO (POSTERIOR CAPSULAR OPACIFICATION), LEFT: Primary | ICD-10-CM

## 2025-02-10 DIAGNOSIS — Z96.1 PSEUDOPHAKIA: ICD-10-CM

## 2025-02-10 DIAGNOSIS — H40.013 OAG (OPEN ANGLE GLAUCOMA) SUSPECT, LOW RISK, BILATERAL: ICD-10-CM

## 2025-02-10 PROCEDURE — 92014 COMPRE OPH EXAM EST PT 1/>: CPT | Mod: 57,S$GLB,, | Performed by: OPHTHALMOLOGY

## 2025-02-10 PROCEDURE — 99999 PR PBB SHADOW E&M-EST. PATIENT-LVL II: CPT | Mod: PBBFAC,,, | Performed by: OPHTHALMOLOGY

## 2025-02-10 PROCEDURE — 1160F RVW MEDS BY RX/DR IN RCRD: CPT | Mod: CPTII,S$GLB,, | Performed by: OPHTHALMOLOGY

## 2025-02-10 PROCEDURE — 1159F MED LIST DOCD IN RCRD: CPT | Mod: CPTII,S$GLB,, | Performed by: OPHTHALMOLOGY

## 2025-02-10 PROCEDURE — 1126F AMNT PAIN NOTED NONE PRSNT: CPT | Mod: CPTII,S$GLB,, | Performed by: OPHTHALMOLOGY

## 2025-02-10 PROCEDURE — 66821 AFTER CATARACT LASER SURGERY: CPT | Mod: LT,S$GLB,, | Performed by: OPHTHALMOLOGY

## 2025-02-10 PROCEDURE — 1101F PT FALLS ASSESS-DOCD LE1/YR: CPT | Mod: CPTII,S$GLB,, | Performed by: OPHTHALMOLOGY

## 2025-02-10 PROCEDURE — 3288F FALL RISK ASSESSMENT DOCD: CPT | Mod: CPTII,S$GLB,, | Performed by: OPHTHALMOLOGY

## 2025-02-10 NOTE — PROGRESS NOTES
Subjective:       Patient ID: Julisa Jensen is a 74 y.o. female.    Chief Complaint: Procedure    HPI    Here for yag cap per Dr Pena     Eye meds: None    74 year old female states she had pciol OS in the past and has been seeing   very blurry in the left eye. Pt had an appt with Dr Pena which she   recommended she have yag cap OS. Denies flashes, floaters or diplopia   Last edited by Lashonda Méndez on 2/10/2025  3:50 PM.             Assessment:       1. PCO (posterior capsular opacification), left    2. Nuclear sclerosis of right eye    3. OAG (open angle glaucoma) suspect, low risk, bilateral    4. Pseudophakia        Plan:       Visually significant  PCO OS- Pt. Wants Laser.  Cataract OD- Not visually significant.    Glaucoma suspect OU-IOP is acceptable OS & ON appears stable OS.        YAG CAP left eye (OS) today. TE=   108 mj, Avg. 2.0 mj, 54 pulses.  PF taper OS.  RTC 2-3 wks.    YAG laser Capsulotomy Procedure Note:  Informed consent was obtained and correct eye was verified with patient.   One drop of topical Proparacaine 1% was instilled.  YAG laser applied to posterior capsule in cruciate pattern.  One drop of Apraclonidine 0.5% and 1 drop of Pred Acetate 1% applied to eye after laser.  Pt tolerated procedure well. No complications.  Follow up in 2-3 weeks.

## 2025-02-11 ENCOUNTER — OFFICE VISIT (OUTPATIENT)
Dept: URGENT CARE | Facility: CLINIC | Age: 75
End: 2025-02-11
Payer: MEDICARE

## 2025-02-11 VITALS
SYSTOLIC BLOOD PRESSURE: 152 MMHG | TEMPERATURE: 98 F | HEART RATE: 73 BPM | WEIGHT: 148 LBS | DIASTOLIC BLOOD PRESSURE: 71 MMHG | RESPIRATION RATE: 20 BRPM | OXYGEN SATURATION: 98 % | HEIGHT: 62 IN | BODY MASS INDEX: 27.23 KG/M2

## 2025-02-11 DIAGNOSIS — R10.9 ABDOMINAL PAIN, UNSPECIFIED ABDOMINAL LOCATION: ICD-10-CM

## 2025-02-11 DIAGNOSIS — R10.32 LLQ PAIN: ICD-10-CM

## 2025-02-11 DIAGNOSIS — K59.00 CONSTIPATION, UNSPECIFIED CONSTIPATION TYPE: Primary | ICD-10-CM

## 2025-02-11 LAB
BILIRUBIN, UA POC OHS: NEGATIVE
BLOOD, UA POC OHS: ABNORMAL
CLARITY, UA POC OHS: CLEAR
COLOR, UA POC OHS: YELLOW
GLUCOSE, UA POC OHS: NEGATIVE
KETONES, UA POC OHS: NEGATIVE
LEUKOCYTES, UA POC OHS: NEGATIVE
NITRITE, UA POC OHS: NEGATIVE
PH, UA POC OHS: 6.5
PROTEIN, UA POC OHS: NEGATIVE
SPECIFIC GRAVITY, UA POC OHS: <=1.005
UROBILINOGEN, UA POC OHS: 0.2

## 2025-02-11 PROCEDURE — 99214 OFFICE O/P EST MOD 30 MIN: CPT | Mod: S$GLB,,,

## 2025-02-11 PROCEDURE — 81003 URINALYSIS AUTO W/O SCOPE: CPT | Mod: QW,S$GLB,,

## 2025-02-11 PROCEDURE — 74019 RADEX ABDOMEN 2 VIEWS: CPT | Mod: FY,S$GLB,, | Performed by: RADIOLOGY

## 2025-02-11 RX ORDER — DOCUSATE SODIUM 100 MG/1
100 CAPSULE, LIQUID FILLED ORAL 2 TIMES DAILY
Qty: 14 CAPSULE | Refills: 0 | Status: SHIPPED | OUTPATIENT
Start: 2025-02-11 | End: 2025-02-11 | Stop reason: CLARIF

## 2025-02-11 RX ORDER — DOCUSATE SODIUM 100 MG/1
100 CAPSULE, LIQUID FILLED ORAL 2 TIMES DAILY
Qty: 14 CAPSULE | Refills: 0 | Status: SHIPPED | OUTPATIENT
Start: 2025-02-11 | End: 2025-02-18

## 2025-02-11 NOTE — PROGRESS NOTES
"Subjective:      Patient ID: Julisa Jensen is a 74 y.o. female.    Vitals:  height is 5' 2" (1.575 m) and weight is 67.1 kg (148 lb). Her oral temperature is 98.2 °F (36.8 °C). Her blood pressure is 152/71 (abnormal) and her pulse is 73. Her respiration is 20 and oxygen saturation is 98%.     Chief Complaint: Abdominal Pain    Pt is a 75 yo female with PMHx of HTN, HLD. She is presenting with LLQ ABD pain.  Onset of symptoms was 2 days ago, but now worsening and more prominent with movement. She is also having looser stool. Denies any urinary symptoms such as dysuria, hematuria, flank pain, fever, chills, myalgia, urgency, frequency. Pt reports using no OTC tx. Denies recent ABD surgery.    Abdominal Pain  This is a new problem. The current episode started in the past 7 days. The problem occurs constantly. The problem has been gradually worsening. The pain is located in the generalized abdominal region and left flank. The pain is at a severity of 7/10 (While walking or laying down). The pain is moderate. The quality of the pain is aching and sharp. The abdominal pain does not radiate. Pertinent negatives include no constipation, diarrhea, dysuria, fever, frequency, headaches, hematuria, myalgias, nausea or vomiting. Associated symptoms comments: Loose stool. The pain is aggravated by movement. The pain is relieved by Nothing. She has tried nothing for the symptoms.       Constitution: Negative for activity change, appetite change, chills and fever.   HENT:  Negative for ear pain, congestion, postnasal drip, sinus pain, sinus pressure and sore throat.    Neck: Negative for neck pain.   Cardiovascular:  Negative for chest pain and sob on exertion.   Eyes:  Negative for eye trauma, eye discharge, eye itching, eye redness, photophobia and blurred vision.   Respiratory:  Negative for cough, shortness of breath, wheezing and asthma.    Gastrointestinal:  Positive for abdominal pain. Negative for nausea, vomiting, " constipation and diarrhea.   Genitourinary:  Negative for dysuria, frequency, urgency, urine decreased and hematuria.   Musculoskeletal:  Negative for pain and muscle ache.   Skin:  Negative for color change, rash and hives.   Allergic/Immunologic: Negative for seasonal allergies, asthma, hives and sneezing.   Neurological:  Negative for dizziness, light-headedness, headaches and altered mental status.   Psychiatric/Behavioral:  Negative for altered mental status and confusion.       Objective:     Physical Exam   Constitutional: She is oriented to person, place, and time. She appears well-developed.      Comments:Pt sitting erect on examination table. No acute respiratory distress, no use of accessory muscles, no notice of nasal flaring.        HENT:   Head: Normocephalic and atraumatic.   Ears:   Right Ear: External ear normal.   Left Ear: External ear normal.   Nose: Nose normal.   Mouth/Throat: Mucous membranes are normal.   Eyes: Conjunctivae and lids are normal.   Neck: Trachea normal. Neck supple.   Cardiovascular: Normal rate, regular rhythm and normal heart sounds.   Pulmonary/Chest: Effort normal and breath sounds normal. No respiratory distress.   Abdominal: Normal appearance and bowel sounds are normal. She exhibits no distension and no mass. Soft. There is abdominal tenderness in the left lower quadrant. There is no left CVA tenderness and no right CVA tenderness.   Musculoskeletal: Normal range of motion.         General: Normal range of motion.   Neurological: She is alert and oriented to person, place, and time. She has normal strength.   Skin: Skin is warm, dry, intact, not diaphoretic and not pale.   Psychiatric: Her speech is normal and behavior is normal. Judgment and thought content normal.   Nursing note and vitals reviewed.    Results for orders placed or performed in visit on 02/11/25   POCT Urinalysis(Instrument)    Collection Time: 02/11/25  8:38 AM   Result Value Ref Range    Color, POC UA  Yellow Yellow, Straw, Colorless    Clarity, POC UA Clear Clear    Glucose, POC UA Negative Negative    Bilirubin, POC UA Negative Negative    Ketones, POC UA Negative Negative    Spec Grav POC UA <=1.005 1.005 - 1.030    Blood, POC UA Trace-lysed (A) Negative    pH, POC UA 6.5 5.0 - 8.0    Protein, POC UA Negative Negative    Urobilinogen, POC UA 0.2 <=1.0    Nitrite, POC UA Negative Negative    WBC, POC UA Negative Negative     XR ABDOMEN FLAT AND ERECT    Result Date: 2/11/2025  EXAMINATION: XR ABDOMEN FLAT AND ERECT CLINICAL HISTORY: Unspecified abdominal pain TECHNIQUE: Flat and erect AP views of the abdomen were performed. COMPARISON: None FINDINGS: Supine and erect films of the abdomen show no evidence of free air.  Intestinal gas pattern is normal.  No masses or abnormal calcifications seen.  Surgical clips are seen in the gallbladder fossa.  Degenerative changes seen in the lumbar spine.     See above Electronically signed by: Jean De La Cruz MD Date:    02/11/2025 Time:    09:08     Assessment:     1. Constipation, unspecified constipation type    2. Abdominal pain, unspecified abdominal location    3. LLQ pain        Plan:   I have reviewed the patient chart and pertinent past imaging/labs.      Constipation, unspecified constipation type  -     docusate sodium (COLACE) 100 MG capsule; Take 1 capsule (100 mg total) by mouth 2 (two) times daily. for 7 days  Dispense: 14 capsule; Refill: 0  -     psyllium (HYDROCIL) packet; Take 1 packet by mouth once daily. for 7 days  Dispense: 7 packet; Refill: 0    Abdominal pain, unspecified abdominal location  -     POCT Urinalysis(Instrument)  -     XR ABDOMEN FLAT AND ERECT; Future; Expected date: 02/11/2025    LLQ pain

## 2025-02-11 NOTE — PATIENT INSTRUCTIONS
Estreñimiento: Miralax diariamente. Ablandador de heces 1-2 veces al día.   Aumentar el consumo de fibra: semillas de chía, almendras, frutas, verduras, nueces, cereales integrales.  Aumentar la ingesta de líquidos  Regrese aquí o vaya al Departamento de Emergencias si tiene alguna inquietud o empeora yañez condición.  Si le recetaron antibióticos, llévelos hasta el final.  Si le recetaron un medicamento narcótico, no conduzca ni opere equipos o maquinaria pesada mientras donald estos medicamentos.  Annie un seguimiento con yañez médico de atención primaria o especialista según sea necesario.    Si fumas, sangeeta de hacerlo.

## 2025-03-05 ENCOUNTER — RESULTS FOLLOW-UP (OUTPATIENT)
Dept: PRIMARY CARE CLINIC | Facility: CLINIC | Age: 75
End: 2025-03-05
Payer: MEDICARE

## 2025-03-05 ENCOUNTER — LAB VISIT (OUTPATIENT)
Dept: LAB | Facility: HOSPITAL | Age: 75
End: 2025-03-05
Attending: INTERNAL MEDICINE
Payer: MEDICARE

## 2025-03-05 DIAGNOSIS — I10 ESSENTIAL HYPERTENSION: ICD-10-CM

## 2025-03-05 DIAGNOSIS — R79.9 ABNORMAL BLOOD CHEMISTRY: ICD-10-CM

## 2025-03-05 DIAGNOSIS — E78.49 OTHER HYPERLIPIDEMIA: ICD-10-CM

## 2025-03-05 LAB
ALBUMIN SERPL BCP-MCNC: 3.8 G/DL (ref 3.5–5.2)
ALP SERPL-CCNC: 82 U/L (ref 40–150)
ALT SERPL W/O P-5'-P-CCNC: 24 U/L (ref 10–44)
ANION GAP SERPL CALC-SCNC: 10 MMOL/L (ref 8–16)
AST SERPL-CCNC: 38 U/L (ref 10–40)
BASOPHILS # BLD AUTO: 0.05 K/UL (ref 0–0.2)
BASOPHILS NFR BLD: 0.8 % (ref 0–1.9)
BILIRUB SERPL-MCNC: 0.3 MG/DL (ref 0.1–1)
BUN SERPL-MCNC: 14 MG/DL (ref 8–23)
CALCIUM SERPL-MCNC: 9 MG/DL (ref 8.7–10.5)
CHLORIDE SERPL-SCNC: 106 MMOL/L (ref 95–110)
CHOLEST SERPL-MCNC: 128 MG/DL (ref 120–199)
CHOLEST/HDLC SERPL: 2.8 {RATIO} (ref 2–5)
CO2 SERPL-SCNC: 26 MMOL/L (ref 23–29)
CREAT SERPL-MCNC: 0.7 MG/DL (ref 0.5–1.4)
DIFFERENTIAL METHOD BLD: NORMAL
EOSINOPHIL # BLD AUTO: 0.1 K/UL (ref 0–0.5)
EOSINOPHIL NFR BLD: 1.4 % (ref 0–8)
ERYTHROCYTE [DISTWIDTH] IN BLOOD BY AUTOMATED COUNT: 13 % (ref 11.5–14.5)
EST. GFR  (NO RACE VARIABLE): >60 ML/MIN/1.73 M^2
ESTIMATED AVG GLUCOSE: 105 MG/DL (ref 68–131)
GLUCOSE SERPL-MCNC: 93 MG/DL (ref 70–110)
HBA1C MFR BLD: 5.3 % (ref 4–5.6)
HCT VFR BLD AUTO: 37.4 % (ref 37–48.5)
HDLC SERPL-MCNC: 45 MG/DL (ref 40–75)
HDLC SERPL: 35.2 % (ref 20–50)
HGB BLD-MCNC: 12.2 G/DL (ref 12–16)
IMM GRANULOCYTES # BLD AUTO: 0.02 K/UL (ref 0–0.04)
IMM GRANULOCYTES NFR BLD AUTO: 0.3 % (ref 0–0.5)
LDLC SERPL CALC-MCNC: 69.2 MG/DL (ref 63–159)
LYMPHOCYTES # BLD AUTO: 2.5 K/UL (ref 1–4.8)
LYMPHOCYTES NFR BLD: 42.2 % (ref 18–48)
MCH RBC QN AUTO: 28.5 PG (ref 27–31)
MCHC RBC AUTO-ENTMCNC: 32.6 G/DL (ref 32–36)
MCV RBC AUTO: 87 FL (ref 82–98)
MONOCYTES # BLD AUTO: 0.4 K/UL (ref 0.3–1)
MONOCYTES NFR BLD: 7.1 % (ref 4–15)
NEUTROPHILS # BLD AUTO: 2.9 K/UL (ref 1.8–7.7)
NEUTROPHILS NFR BLD: 48.2 % (ref 38–73)
NONHDLC SERPL-MCNC: 83 MG/DL
NRBC BLD-RTO: 0 /100 WBC
PLATELET # BLD AUTO: 308 K/UL (ref 150–450)
PMV BLD AUTO: 11.1 FL (ref 9.2–12.9)
POTASSIUM SERPL-SCNC: 3.5 MMOL/L (ref 3.5–5.1)
PROT SERPL-MCNC: 7.7 G/DL (ref 6–8.4)
RBC # BLD AUTO: 4.28 M/UL (ref 4–5.4)
SODIUM SERPL-SCNC: 142 MMOL/L (ref 136–145)
TRIGL SERPL-MCNC: 69 MG/DL (ref 30–150)
TSH SERPL DL<=0.005 MIU/L-ACNC: 2.02 UIU/ML (ref 0.4–4)
WBC # BLD AUTO: 5.92 K/UL (ref 3.9–12.7)

## 2025-03-05 PROCEDURE — 85025 COMPLETE CBC W/AUTO DIFF WBC: CPT | Performed by: INTERNAL MEDICINE

## 2025-03-05 PROCEDURE — 36415 COLL VENOUS BLD VENIPUNCTURE: CPT | Mod: PO | Performed by: INTERNAL MEDICINE

## 2025-03-05 PROCEDURE — 83036 HEMOGLOBIN GLYCOSYLATED A1C: CPT | Performed by: INTERNAL MEDICINE

## 2025-03-05 PROCEDURE — 80053 COMPREHEN METABOLIC PANEL: CPT | Performed by: INTERNAL MEDICINE

## 2025-03-05 PROCEDURE — 84443 ASSAY THYROID STIM HORMONE: CPT | Performed by: INTERNAL MEDICINE

## 2025-03-05 PROCEDURE — 80061 LIPID PANEL: CPT | Performed by: INTERNAL MEDICINE

## 2025-03-07 ENCOUNTER — TELEPHONE (OUTPATIENT)
Dept: OPTOMETRY | Facility: CLINIC | Age: 75
End: 2025-03-07
Payer: MEDICARE

## 2025-03-18 ENCOUNTER — PATIENT MESSAGE (OUTPATIENT)
Dept: PRIMARY CARE CLINIC | Facility: CLINIC | Age: 75
End: 2025-03-18

## 2025-03-18 ENCOUNTER — PATIENT MESSAGE (OUTPATIENT)
Dept: OPHTHALMOLOGY | Facility: CLINIC | Age: 75
End: 2025-03-18
Payer: MEDICARE

## 2025-03-18 ENCOUNTER — OFFICE VISIT (OUTPATIENT)
Dept: PRIMARY CARE CLINIC | Facility: CLINIC | Age: 75
End: 2025-03-18
Payer: MEDICARE

## 2025-03-18 VITALS
WEIGHT: 147.81 LBS | BODY MASS INDEX: 27.2 KG/M2 | HEIGHT: 62 IN | OXYGEN SATURATION: 96 % | DIASTOLIC BLOOD PRESSURE: 82 MMHG | HEART RATE: 70 BPM | SYSTOLIC BLOOD PRESSURE: 124 MMHG

## 2025-03-18 DIAGNOSIS — M79.604 LOWER EXTREMITY PAIN, RIGHT: Primary | ICD-10-CM

## 2025-03-18 PROCEDURE — 96372 THER/PROPH/DIAG INJ SC/IM: CPT | Mod: S$GLB,,, | Performed by: INTERNAL MEDICINE

## 2025-03-18 PROCEDURE — 3079F DIAST BP 80-89 MM HG: CPT | Mod: CPTII,S$GLB,, | Performed by: INTERNAL MEDICINE

## 2025-03-18 PROCEDURE — 3288F FALL RISK ASSESSMENT DOCD: CPT | Mod: CPTII,S$GLB,, | Performed by: INTERNAL MEDICINE

## 2025-03-18 PROCEDURE — 1101F PT FALLS ASSESS-DOCD LE1/YR: CPT | Mod: CPTII,S$GLB,, | Performed by: INTERNAL MEDICINE

## 2025-03-18 PROCEDURE — 1160F RVW MEDS BY RX/DR IN RCRD: CPT | Mod: CPTII,S$GLB,, | Performed by: INTERNAL MEDICINE

## 2025-03-18 PROCEDURE — 99999 PR PBB SHADOW E&M-EST. PATIENT-LVL IV: CPT | Mod: PBBFAC,,, | Performed by: INTERNAL MEDICINE

## 2025-03-18 PROCEDURE — 1125F AMNT PAIN NOTED PAIN PRSNT: CPT | Mod: CPTII,S$GLB,, | Performed by: INTERNAL MEDICINE

## 2025-03-18 PROCEDURE — 3074F SYST BP LT 130 MM HG: CPT | Mod: CPTII,S$GLB,, | Performed by: INTERNAL MEDICINE

## 2025-03-18 PROCEDURE — 1159F MED LIST DOCD IN RCRD: CPT | Mod: CPTII,S$GLB,, | Performed by: INTERNAL MEDICINE

## 2025-03-18 PROCEDURE — 99214 OFFICE O/P EST MOD 30 MIN: CPT | Mod: 25,S$GLB,, | Performed by: INTERNAL MEDICINE

## 2025-03-18 RX ORDER — TRIAMCINOLONE ACETONIDE 40 MG/ML
40 INJECTION, SUSPENSION INTRA-ARTICULAR; INTRAMUSCULAR
Status: COMPLETED | OUTPATIENT
Start: 2025-03-18 | End: 2025-03-18

## 2025-03-18 RX ORDER — METHOCARBAMOL 500 MG/1
500 TABLET, FILM COATED ORAL 2 TIMES DAILY PRN
Qty: 30 TABLET | Refills: 1 | Status: SHIPPED | OUTPATIENT
Start: 2025-03-18

## 2025-03-18 RX ORDER — IBUPROFEN 600 MG/1
600 TABLET ORAL 3 TIMES DAILY PRN
Qty: 30 TABLET | Refills: 1 | Status: SHIPPED | OUTPATIENT
Start: 2025-03-18

## 2025-03-18 RX ADMIN — TRIAMCINOLONE ACETONIDE 40 MG: 40 INJECTION, SUSPENSION INTRA-ARTICULAR; INTRAMUSCULAR at 11:03

## 2025-03-19 NOTE — PROGRESS NOTES
Subjective:       Patient ID: Julisa Jensen is a 74 y.o. female.    Chief Complaint: Leg Pain      HPI  Julisa Jensen is a 74 y.o. female with HTN, vit D and B12 deficiency, anemia, and vertigo  who presents today for Leg Pain    uJlisa presents today for leg pain and numbness.    She reports leg pain and numbness that began after extensive walking. She experiences toe numbness and leg weakness, causing her to drag the affected leg while walking. The pain is intermittent, exacerbated by movement, lying down, and prolonged inactivity, with mild improvement when sitting. The pain disrupts her sleep and radiates to her throat and sternal area. She has experienced three falls in the past year, raising concerns about a potential underlying cause.  There is no leg swelling or changes in urine or bowel habits.    She has attempted self-treatment with ice packs and OTC naproxen. She also uses natural remedies including leon tea for circulation and a water-lemon milk mixture. She prefers natural treatments and is cautious about taking prescribed medications without medical consultation.    Recent labs with normal blood cell count.  Metabolic panel within normal limits.  Lipid profile at goal.  A1c and TSH are also normal.      ROS:  General: -fever, -chills, -fatigue, -weight gain, -weight loss  Eyes: -vision changes, -redness, -discharge  ENT: -ear pain, -nasal congestion, +sore throat  Cardiovascular: -chest pain, -palpitations, -lower extremity edema  Respiratory: -cough, -shortness of breath  Gastrointestinal: -abdominal pain, -nausea, -vomiting, -diarrhea, -constipation, -blood in stool  Genitourinary: -dysuria, -hematuria, -frequency  Musculoskeletal: -joint pain, -muscle pain, +pain with movement, +limb pain, +nightime pain, +abnormal gait, +weakness  Skin: -rash, -lesion  Neurological: -headache, -dizziness, +numbness, +tingling  Psychiatric: -anxiety, -depression, -sleep difficulty            Past Medical  History:   Diagnosis Date    Cataract     COVID-19 03/2021    Hypertension     Stroke        Past Surgical History:   Procedure Laterality Date    CATARACT EXTRACTION W/  INTRAOCULAR LENS IMPLANT Left 8/3/2021    Procedure: EXTRACTION, CATARACT, WITH IOL INSERTION;  Surgeon: Ji Shaikh MD;  Location: [facility] OR;  Service: Ophthalmology;  Laterality: Left;    CHOLECYSTECTOMY      COLONOSCOPY N/A 6/23/2020    Procedure: COLONOSCOPY;  Surgeon: Madelyn Finch MD;  Location: [facility] ENDO;  Service: Endoscopy;  Laterality: N/A;       Family History   Problem Relation Name Age of Onset    Cataracts Mother      Heart disease Mother      No Known Problems Father      Diabetes Sister      Stroke Sister x1     Diabetes Brother      No Known Problems Daughter x1     No Known Problems Son x2     Breast cancer Maternal Cousin 2nd maternal     Amblyopia Neg Hx      Blindness Neg Hx      Glaucoma Neg Hx      Macular degeneration Neg Hx      Retinal detachment Neg Hx      Strabismus Neg Hx         Social History     Socioeconomic History    Marital status:    Tobacco Use    Smoking status: Never    Smokeless tobacco: Never   Substance and Sexual Activity    Alcohol use: Not Currently     Comment: occasionally    Drug use: Never    Sexual activity: Not Currently     Partners: Male     Social Drivers of Health     Financial Resource Strain: High Risk (8/16/2024)    Overall Financial Resource Strain (CARDIA)     Difficulty of Paying Living Expenses: Hard   Food Insecurity: Food Insecurity Present (8/16/2024)    Hunger Vital Sign     Worried About Running Out of Food in the Last Year: Sometimes true     Ran Out of Food in the Last Year: Never true   Transportation Needs: No Transportation Needs (8/16/2024)    PRAPARE - Transportation     Lack of Transportation (Medical): No     Lack of Transportation (Non-Medical): No   Physical Activity: Sufficiently Active (8/16/2024)    Exercise Vital Sign     Days of Exercise per  "Week: 3 days     Minutes of Exercise per Session: 60 min   Stress: Stress Concern Present (8/16/2024)    [employer] - Occupational Stress Questionnaire     Feeling of Stress : To some extent   Housing Stability: Unknown (8/16/2024)    Housing Stability Vital Sign     Unable to Pay for Housing in the Last Year: No     Homeless in the Last Year: No       Current Medications[1]    Review of patient's allergies indicates:   Allergen Reactions    Penicillins Other (See Comments)     Mouth and Tongue numbness         Objective:       Last 3 sets of Vitals        2/10/2025     3:50 PM 2/11/2025     8:25 AM 3/18/2025    10:58 AM   Vitals - 1 value per visit   SYSTOLIC  152 124   DIASTOLIC  71 82   Pulse  73 70   Temp  98.2 °F (36.8 °C)    Resp  20    SPO2  98 % 96 %   Weight (lb)  148 147.82   Weight (kg)  67.132 67.05   Height  5' 2" (1.575 m) 5' 2" (1.575 m)   BMI (Calculated)  27.1 27   Pain Score Zero Zero Five   Physical Exam  Constitutional:       General: She is not in acute distress.     Appearance: Normal appearance.   Eyes:      General: No scleral icterus.     Extraocular Movements: Extraocular movements intact.      Conjunctiva/sclera: Conjunctivae normal.   Neck:      Comments: No goiter.  Cardiovascular:      Rate and Rhythm: Normal rate and regular rhythm.      Pulses: Normal pulses.      Heart sounds: Normal heart sounds.   Pulmonary:      Effort: Pulmonary effort is normal.      Breath sounds: Normal breath sounds.   Abdominal:      General: Bowel sounds are normal. There is no distension.      Palpations: Abdomen is soft.   Musculoskeletal:         General: No swelling or tenderness. Normal range of motion.      Comments: Questionable right straight leg, no significant pain with hip rotation.  Normal pulses.   Lymphadenopathy:      Cervical: No cervical adenopathy.   Skin:     General: Skin is warm and dry.   Neurological:      General: No focal deficit present.      Mental " Status: She is alert and oriented to person, place, and time.      Sensory: No sensory deficit.      Motor: No weakness.      Comments: Stands up independently and ambulates with short steps and mild occasional limping without loss of balance.   Psychiatric:         Mood and Affect: Mood normal.         Behavior: Behavior normal.           CBC:  Recent Labs   Lab 05/16/23  0907 05/21/24  1034 03/05/25  0849   WBC 5.75 5.97 5.92   RBC 4.38 4.41 4.28   Hemoglobin 12.3 12.6 12.2   Hematocrit 38.9 39.3 37.4   Platelets 326 329 308   MCV 89 89 87   MCH 28.1 28.6 28.5   MCHC 31.6 L 32.1 32.6     CMP:  Recent Labs   Lab 05/16/23 0907 05/21/24  1034 03/05/25  0849   Glucose 86 92 93   Calcium 8.5 L 9.3 9.0   Albumin 3.9 3.7 3.8   Total Protein 7.5 7.6 7.7   Sodium 142 145 142   Potassium 3.6 4.2 3.5   CO2 27 28 26   Chloride 106 107 106   BUN 11 16 14   Creatinine 0.7 0.7 0.7   Alkaline Phosphatase 85 84 82   ALT 26 20 24   AST 24 23 38   Total Bilirubin 0.5 0.3 0.3     URINALYSIS:  Recent Labs   Lab 02/11/25  0838   Color, POC UA Yellow   Clarity, POC UA Clear   Spec Grav POC UA <=1.005   pH, POC UA 6.5   Nitrite, POC UA Negative   Urobilinogen, POC UA 0.2      LIPIDS:  Recent Labs   Lab 05/16/23 0907 05/21/24  1034 03/05/25  0849   TSH 2.389 1.789 2.020   HDL 43 43 45   Cholesterol 140 150 128   Triglycerides 99 108 69   LDL Cholesterol 77.2 85.4 69.2   HDL/Cholesterol Ratio 30.7 28.7 35.2   Non-HDL Cholesterol 97 107 83   Total Cholesterol/HDL Ratio 3.3 3.5 2.8     TSH:  Recent Labs   Lab 05/16/23  0907 05/21/24  1034 03/05/25  0849   TSH 2.389 1.789 2.020       A1C:  Recent Labs   Lab 05/16/23  0907 05/21/24  1034 03/05/25  0849   Hemoglobin A1C 5.1 5.2 5.3       Imaging:  XR ABDOMEN FLAT AND ERECT  Narrative: EXAMINATION:  XR ABDOMEN FLAT AND ERECT    CLINICAL HISTORY:  Unspecified abdominal pain    TECHNIQUE:  Flat and erect AP views of the abdomen were performed.    COMPARISON:  None    FINDINGS:  Supine and erect  films of the abdomen show no evidence of free air.  Intestinal gas pattern is normal.  No masses or abnormal calcifications seen.  Surgical clips are seen in the gallbladder fossa.  Degenerative changes seen in the lumbar spine.  Impression: See above    Electronically signed by: Jean De La Cruz MD  Date:    02/11/2025  Time:    09:08      Assessment:       1. Lower extremity pain, right            Plan:       1. Lower extremity pain, right  -     triamcinolone acetonide injection 40 mg  -     methocarbamoL (ROBAXIN) 500 MG Tab; Take 1 tablet (500 mg total) by mouth 2 (two) times daily as needed (Spasm).  Dispense: 30 tablet; Refill: 01  -     ibuprofen (ADVIL,MOTRIN) 600 MG tablet; Take 1 tablet (600 mg total) by mouth 3 (three) times daily as needed for Pain.  Dispense: 30 tablet; Refill: 1         - Opted for conservative management with muscle relaxants and anti-inflammatory medication.  - Decided against XR imaging as unlikely to provide additional diagnostic information at this time.  - Reviewed recent lab results, noting normal values for blood count, metabolic panel, and thyroid function.  - Julisa demonstrates good strength in legs but experiences pain with certain movements.  - Assessed the condition as possibly muscular with nerve involvement, considering a pinched nerve due to muscle spasm.  - Discussed potential benefits and side effects of steroid injections for acute pain relief.  - Prescribed muscle relaxants and anti-inflammatory medication.  - Recommend home exercises for sciatica.  - Consider physical therapy if pain persists after medication trial.  - Julisa to perform gentle stretching exercises as demonstrated for sciatic pain relief.  - Julisa to apply heat therapy to the affected area instead of cold.  - Julisa to avoid sleeping on the painful side.  - Started ibuprofen 600 mg tablets: Take 1 tablet 2 times daily with food.  - May increase to 3 times daily if needed for pain.  - Educated on  importance of proper posture and stretching exercises for pain management.      FOLLOW-UP:  - Scheduled a follow-up visit in 3 weeks.  - Follow up in 3 weeks to assess response to treatment.  - Contact the office if symptoms do not improve or worsen within 2-3 weeks.        Health Maintenance Due   Topic Date Due    Shingles Vaccine (2 of 2) 01/11/2021    RSV Vaccine (Age 60+ and Pregnant patients) (1 - 1-dose 75+ series) Never done    COVID-19 Vaccine (3 - 2024-25 season) 09/01/2024        I spent a total of 35 minutes on the day of the visit.This includes face to face time and non-face to face time preparing to see the patient (eg, review of tests), obtaining and/or reviewing separately obtained history, documenting clinical information in the electronic or other health record, independently interpreting results and communicating results to the patient/family/caregiver, or care coordinator.     RETURN TO CLINIC IN: 2-3 WEEKS    FOR NEXT VISIT: MEDICATION MONITORING       Sandra Stafford MD  [facility]  Disclaimer:  This note has been generated using voice-recognition software. There may be grammatical or spelling errors that have been missed during proof-reading           [1]   Current Outpatient Medications   Medication Sig Dispense Refill    amLODIPine (NORVASC) 5 MG tablet Take 1 tablet (5 mg total) by mouth once daily. 90 tablet 3    atorvastatin (LIPITOR) 20 MG tablet Take 1 tablet (20 mg total) by mouth once daily. 90 tablet 3    calcium carbonate-vitamin D3 600 mg-20 mcg (800 unit) Chew Take 1 tablet by mouth 2 (two) times a day. 60 tablet 11    cycloSPORINE (RESTASIS) 0.05 % ophthalmic emulsion Place 1 drop into both eyes 2 (two) times daily. 60 each 11    diclofenac sodium (VOLTAREN) 1 % Gel Apply 2 g topically once daily. 150 g 2    fluticasone propionate (FLONASE) 50 mcg/actuation nasal spray 2 sprays (100 mcg total) by Each Nostril route once daily. 16 g 3    hydroCHLOROthiazide (HYDRODIURIL) 25  MG tablet Take 1 tablet (25 mg total) by mouth once daily. 30 tablet 6    lifitegrast (XIIDRA) 5 % Dpet Apply 1 drop to eye every 12 (twelve) hours. 180 each 4    mupirocin (BACTROBAN) 2 % ointment Apply topically 2 (two) times daily. 30 g 0    aspirin (ECOTRIN) 81 MG EC tablet Take 1 tablet (81 mg total) by mouth once daily. 30 tablet 11    ibuprofen (ADVIL,MOTRIN) 600 MG tablet Take 1 tablet (600 mg total) by mouth 3 (three) times daily as needed for Pain. 30 tablet 1    methocarbamoL (ROBAXIN) 500 MG Tab Take 1 tablet (500 mg total) by mouth 2 (two) times daily as needed (Spasm). 30 tablet 01     No current facility-administered medications for this visit.

## 2025-03-24 ENCOUNTER — OFFICE VISIT (OUTPATIENT)
Dept: OPHTHALMOLOGY | Facility: CLINIC | Age: 75
End: 2025-03-24
Payer: MEDICARE

## 2025-03-24 DIAGNOSIS — H25.11 NUCLEAR SCLEROSIS OF RIGHT EYE: ICD-10-CM

## 2025-03-24 DIAGNOSIS — Z96.1 PSEUDOPHAKIA: ICD-10-CM

## 2025-03-24 DIAGNOSIS — Z98.890 POST-OPERATIVE STATE: Primary | ICD-10-CM

## 2025-03-24 DIAGNOSIS — H40.013 OAG (OPEN ANGLE GLAUCOMA) SUSPECT, LOW RISK, BILATERAL: ICD-10-CM

## 2025-03-24 DIAGNOSIS — H52.7 REFRACTIVE ERROR: ICD-10-CM

## 2025-03-24 PROCEDURE — 99999 PR PBB SHADOW E&M-EST. PATIENT-LVL III: CPT | Mod: PBBFAC,,, | Performed by: OPHTHALMOLOGY

## 2025-03-24 PROCEDURE — 1160F RVW MEDS BY RX/DR IN RCRD: CPT | Mod: CPTII,S$GLB,, | Performed by: OPHTHALMOLOGY

## 2025-03-24 PROCEDURE — 99024 POSTOP FOLLOW-UP VISIT: CPT | Mod: S$GLB,,, | Performed by: OPHTHALMOLOGY

## 2025-03-24 PROCEDURE — 1126F AMNT PAIN NOTED NONE PRSNT: CPT | Mod: CPTII,S$GLB,, | Performed by: OPHTHALMOLOGY

## 2025-03-24 PROCEDURE — 1101F PT FALLS ASSESS-DOCD LE1/YR: CPT | Mod: CPTII,S$GLB,, | Performed by: OPHTHALMOLOGY

## 2025-03-24 PROCEDURE — 1159F MED LIST DOCD IN RCRD: CPT | Mod: CPTII,S$GLB,, | Performed by: OPHTHALMOLOGY

## 2025-03-24 PROCEDURE — 3288F FALL RISK ASSESSMENT DOCD: CPT | Mod: CPTII,S$GLB,, | Performed by: OPHTHALMOLOGY

## 2025-03-24 NOTE — PROGRESS NOTES
Subjective:       Patient ID: Julisa Jensen is a 74 y.o. female.    Chief Complaint: Post-op Evaluation    HPI    DLS: 2/10/2025    Pt here for post YAG CAP OS- 2/10/2025  Pt states she feels like her vision in the OS is better. Pt denies any   floaters, flashes or diplopia. Pt states she broke her current eye   glasses.     Meds:  NO GTTS    Last edited by Cyndi Marr on 3/24/2025 11:15 AM.             Assessment:       1. Post-operative state    2. Nuclear sclerosis of right eye    3. OAG (open angle glaucoma) suspect, low risk, bilateral    4. Refractive error    5. Pseudophakia        Plan:       S/p YAG CAP OS- Doing well.  Cataract OD- Not visually significant.    Glaucoma suspect OU-IOP's are acceptable OU.  RE-Pt wants MRx for readers.        Give MRx for readers.  RTC Dr Pena in 6 mos.

## 2025-04-01 ENCOUNTER — PATIENT MESSAGE (OUTPATIENT)
Dept: OPHTHALMOLOGY | Facility: CLINIC | Age: 75
End: 2025-04-01
Payer: MEDICARE

## 2025-04-17 ENCOUNTER — PATIENT MESSAGE (OUTPATIENT)
Dept: OPHTHALMOLOGY | Facility: CLINIC | Age: 75
End: 2025-04-17
Payer: MEDICARE

## 2025-04-21 ENCOUNTER — PATIENT MESSAGE (OUTPATIENT)
Dept: OPHTHALMOLOGY | Facility: CLINIC | Age: 75
End: 2025-04-21
Payer: MEDICARE

## 2025-04-22 ENCOUNTER — TELEPHONE (OUTPATIENT)
Dept: OPHTHALMOLOGY | Facility: CLINIC | Age: 75
End: 2025-04-22
Payer: MEDICARE

## 2025-04-30 DIAGNOSIS — I70.0 AORTIC ATHEROSCLEROSIS: ICD-10-CM

## 2025-04-30 RX ORDER — ATORVASTATIN CALCIUM 20 MG/1
20 TABLET, FILM COATED ORAL
Qty: 90 TABLET | Refills: 3 | Status: SHIPPED | OUTPATIENT
Start: 2025-04-30

## 2025-05-13 ENCOUNTER — TELEPHONE (OUTPATIENT)
Dept: PRIMARY CARE CLINIC | Facility: CLINIC | Age: 75
End: 2025-05-13
Payer: MEDICARE

## 2025-06-16 ENCOUNTER — OFFICE VISIT (OUTPATIENT)
Dept: PRIMARY CARE CLINIC | Facility: CLINIC | Age: 75
End: 2025-06-16
Payer: MEDICARE

## 2025-06-16 VITALS
WEIGHT: 152.44 LBS | SYSTOLIC BLOOD PRESSURE: 120 MMHG | HEART RATE: 66 BPM | BODY MASS INDEX: 28.05 KG/M2 | HEIGHT: 62 IN | OXYGEN SATURATION: 96 % | DIASTOLIC BLOOD PRESSURE: 68 MMHG

## 2025-06-16 DIAGNOSIS — E78.49 OTHER HYPERLIPIDEMIA: ICD-10-CM

## 2025-06-16 DIAGNOSIS — Z00.00 HEALTHCARE MAINTENANCE: ICD-10-CM

## 2025-06-16 DIAGNOSIS — M79.604 LOWER EXTREMITY PAIN, RIGHT: Primary | ICD-10-CM

## 2025-06-16 DIAGNOSIS — I10 ESSENTIAL HYPERTENSION: ICD-10-CM

## 2025-06-16 PROCEDURE — 1124F ACP DISCUSS-NO DSCNMKR DOCD: CPT | Mod: CPTII,S$GLB,, | Performed by: INTERNAL MEDICINE

## 2025-06-16 PROCEDURE — 3078F DIAST BP <80 MM HG: CPT | Mod: CPTII,S$GLB,, | Performed by: INTERNAL MEDICINE

## 2025-06-16 PROCEDURE — 3074F SYST BP LT 130 MM HG: CPT | Mod: CPTII,S$GLB,, | Performed by: INTERNAL MEDICINE

## 2025-06-16 PROCEDURE — 3288F FALL RISK ASSESSMENT DOCD: CPT | Mod: CPTII,S$GLB,, | Performed by: INTERNAL MEDICINE

## 2025-06-16 PROCEDURE — 1101F PT FALLS ASSESS-DOCD LE1/YR: CPT | Mod: CPTII,S$GLB,, | Performed by: INTERNAL MEDICINE

## 2025-06-16 PROCEDURE — 1160F RVW MEDS BY RX/DR IN RCRD: CPT | Mod: CPTII,S$GLB,, | Performed by: INTERNAL MEDICINE

## 2025-06-16 PROCEDURE — 99999 PR PBB SHADOW E&M-EST. PATIENT-LVL III: CPT | Mod: PBBFAC,,, | Performed by: INTERNAL MEDICINE

## 2025-06-16 PROCEDURE — 1159F MED LIST DOCD IN RCRD: CPT | Mod: CPTII,S$GLB,, | Performed by: INTERNAL MEDICINE

## 2025-06-16 PROCEDURE — 99214 OFFICE O/P EST MOD 30 MIN: CPT | Mod: S$GLB,,, | Performed by: INTERNAL MEDICINE

## 2025-06-16 PROCEDURE — 1126F AMNT PAIN NOTED NONE PRSNT: CPT | Mod: CPTII,S$GLB,, | Performed by: INTERNAL MEDICINE

## 2025-06-16 RX ORDER — HYDROCHLOROTHIAZIDE 25 MG/1
25 TABLET ORAL DAILY
Qty: 90 TABLET | Refills: 3 | Status: SHIPPED | OUTPATIENT
Start: 2025-06-16

## 2025-06-16 NOTE — ASSESSMENT & PLAN NOTE
- Blood pressure initially high but improved to 120/68 after walking.  - Identified medication dosage error with hydrochlorothiazide.  - Adjusted regimen: restarted HCTZ 25 mg (correcting previous pharmacy error of 12.5 mg) and continued amlodipine 5 mg.  - Prescribed hydrochlorothiazide 25 mg for 90 days.

## 2025-06-16 NOTE — PROGRESS NOTES
Subjective:       Patient ID: Julisa Jensen is a 75 y.o. female.    Chief Complaint:  MEDICATION MONITORING      HPI  Julisa Jensen is a 75 y.o. female with HTN, vit D and B12 deficiency, anemia, and vertigo who presents today for  MEDICATION MONITORING    Julisa presents today for follow up of leg pain and fatigue after recent travel.    CURRENT SYMPTOMS:  She reports experiencing fatigue for the past 2 weeks since returning from [facility]. Despite fatigue, she reports sleeping well. She also notes sensitivity to heat.    RECENT TRAVEL HISTORY:  She traveled to [facility] for 12 days, returning 2 weeks ago. Activities included climbing mountains and visiting viewpoints.    MUSCULOSKELETAL:  She reports previous leg pain that improved with walking. She received an injection and medications which provided significant relief. She only took a small amount of the prescribed medications due to symptom improvement. She currently denies leg pain or numbness.    MEDICATIONS:  She continues amlodipine 5 mg and hydrochlorothiazide 25 mg. She uses vitamin D drops as prescribed and occasional nasal spray which provides relief when used.    DIET:  She reports good appetite and maintains a balanced diet.      ROS:  General: -fever, -chills, +fatigue, -weight gain, -weight loss  Eyes: -vision changes, -redness, -discharge  ENT: -ear pain, -nasal congestion, -sore throat  Cardiovascular: -chest pain, -palpitations, -lower extremity edema  Respiratory: -cough, -shortness of breath  Gastrointestinal: -abdominal pain, -nausea, -vomiting, -diarrhea, -constipation, -blood in stool  Genitourinary: -dysuria, -hematuria, -frequency  Musculoskeletal: -joint pain, -muscle pain  Skin: -rash, -lesion  Neurological: -headache, -dizziness, -numbness, -tingling  Psychiatric: -anxiety, -depression, -sleep difficulty          Advance Care Planning     Date: 06/16/2025  Patient was not able to name a surrogate decision maker or provide an Advance  Care Plan.      Past Medical History:   Diagnosis Date    Cataract     COVID-19 03/2021    Hypertension     Stroke        Past Surgical History:   Procedure Laterality Date    CATARACT EXTRACTION W/  INTRAOCULAR LENS IMPLANT Left 8/3/2021    Procedure: EXTRACTION, CATARACT, WITH IOL INSERTION;  Surgeon: Ji Shaikh MD;  Location: [facility] OR;  Service: Ophthalmology;  Laterality: Left;    CHOLECYSTECTOMY      COLONOSCOPY N/A 6/23/2020    Procedure: COLONOSCOPY;  Surgeon: Madelyn Finch MD;  Location: [facility] ENDO;  Service: Endoscopy;  Laterality: N/A;       Family History   Problem Relation Name Age of Onset    Cataracts Mother      Heart disease Mother      No Known Problems Father      Diabetes Sister      Stroke Sister x1     Diabetes Brother      No Known Problems Daughter x1     No Known Problems Son x2     Breast cancer Maternal Cousin 2nd maternal     Amblyopia Neg Hx      Blindness Neg Hx      Glaucoma Neg Hx      Macular degeneration Neg Hx      Retinal detachment Neg Hx      Strabismus Neg Hx         Social History     Socioeconomic History    Marital status:    Tobacco Use    Smoking status: Never    Smokeless tobacco: Never   Substance and Sexual Activity    Alcohol use: Not Currently     Comment: occasionally    Drug use: Never    Sexual activity: Not Currently     Partners: Male     Social Drivers of Health     Financial Resource Strain: High Risk (8/16/2024)    Overall Financial Resource Strain (CARDIA)     Difficulty of Paying Living Expenses: Hard   Food Insecurity: Food Insecurity Present (8/16/2024)    Hunger Vital Sign     Worried About Running Out of Food in the Last Year: Sometimes true     Ran Out of Food in the Last Year: Never true   Transportation Needs: No Transportation Needs (8/16/2024)    PRAPARE - Transportation     Lack of Transportation (Medical): No     Lack of Transportation (Non-Medical): No   Physical Activity: Sufficiently Active (8/16/2024)    Exercise Vital Sign  "    Days of Exercise per Week: 3 days     Minutes of Exercise per Session: 60 min   Stress: Stress Concern Present (8/16/2024)    [employer] - Occupational Stress Questionnaire     Feeling of Stress : To some extent   Housing Stability: Unknown (8/16/2024)    Housing Stability Vital Sign     Unable to Pay for Housing in the Last Year: No     Homeless in the Last Year: No       Current Medications[1]    Review of patient's allergies indicates:   Allergen Reactions    Penicillins Other (See Comments)     Mouth and Tongue numbness         Objective:       Last 3 sets of Vitals        3/18/2025    10:58 AM 3/24/2025    11:14 AM 6/16/2025     8:20 AM   Vitals - 1 value per visit   SYSTOLIC 124  142   DIASTOLIC 82  76   Pulse 70  66   SPO2 96 %  96 %   Weight (lb) 147.82  152.45   Weight (kg) 67.05  69.15   Height 5' 2" (1.575 m)  5' 2" (1.575 m)   BMI (Calculated) 27  27.9   Pain Score Five Zero Zero   Physical Exam  Constitutional:       General: She is not in acute distress.     Appearance: Normal appearance.   Eyes:      General: No scleral icterus.     Extraocular Movements: Extraocular movements intact.      Conjunctiva/sclera: Conjunctivae normal.   Neck:      Comments: No goiter.  Cardiovascular:      Rate and Rhythm: Normal rate and regular rhythm.      Pulses: Normal pulses.      Heart sounds: Normal heart sounds.   Pulmonary:      Effort: Pulmonary effort is normal.      Breath sounds: Normal breath sounds.   Abdominal:      General: Bowel sounds are normal. There is no distension.      Palpations: Abdomen is soft. There is no mass.      Tenderness: There is no abdominal tenderness.   Musculoskeletal:         General: No swelling or tenderness. Normal range of motion.   Lymphadenopathy:      Cervical: No cervical adenopathy.   Skin:     General: Skin is warm and dry.   Neurological:      General: No focal deficit present.      Mental Status: She is alert and oriented to person, " place, and time.   Psychiatric:         Mood and Affect: Mood normal.         Behavior: Behavior normal.           CBC:  Recent Labs   Lab 05/16/23  0907 05/21/24  1034 03/05/25  0849   WBC 5.75 5.97 5.92   RBC 4.38 4.41 4.28   Hemoglobin 12.3 12.6 12.2   Hematocrit 38.9 39.3 37.4   Platelets 326 329 308   MCV 89 89 87   MCH 28.1 28.6 28.5   MCHC 31.6 L 32.1 32.6     CMP:  Recent Labs   Lab 05/16/23  0907 05/21/24  1034 03/05/25  0849   Glucose 86 92 93   Calcium 8.5 L 9.3 9.0   Albumin 3.9 3.7 3.8   Total Protein 7.5 7.6 7.7   Sodium 142 145 142   Potassium 3.6 4.2 3.5   CO2 27 28 26   Chloride 106 107 106   BUN 11 16 14   Creatinine 0.7 0.7 0.7   Alkaline Phosphatase 85 84 82   ALT 26 20 24   AST 24 23 38   Total Bilirubin 0.5 0.3 0.3     URINALYSIS:  Recent Labs   Lab 02/11/25  0838   Color, POC UA Yellow   Clarity, POC UA Clear   Spec Grav POC UA <=1.005   pH, POC UA 6.5   Nitrite, POC UA Negative   Urobilinogen, POC UA 0.2      LIPIDS:  Recent Labs   Lab 05/16/23  0907 05/21/24  1034 03/05/25  0849   TSH 2.389 1.789 2.020   HDL 43 43 45   Cholesterol 140 150 128   Triglycerides 99 108 69   LDL Cholesterol 77.2 85.4 69.2   HDL/Cholesterol Ratio 30.7 28.7 35.2   Non-HDL Cholesterol 97 107 83   Total Cholesterol/HDL Ratio 3.3 3.5 2.8     TSH:  Recent Labs   Lab 05/16/23  0907 05/21/24  1034 03/05/25  0849   TSH 2.389 1.789 2.020       A1C:  Recent Labs   Lab 05/16/23  0907 05/21/24  1034 03/05/25  0849   Hemoglobin A1C 5.1 5.2 5.3       Imaging:  XR ABDOMEN FLAT AND ERECT  Narrative: EXAMINATION:  XR ABDOMEN FLAT AND ERECT    CLINICAL HISTORY:  Unspecified abdominal pain    TECHNIQUE:  Flat and erect AP views of the abdomen were performed.    COMPARISON:  None    FINDINGS:  Supine and erect films of the abdomen show no evidence of free air.  Intestinal gas pattern is normal.  No masses or abnormal calcifications seen.  Surgical clips are seen in the gallbladder fossa.  Degenerative changes seen in the lumbar  spine.  Impression: See above    Electronically signed by: Jean De La Cruz MD  Date:    02/11/2025  Time:    09:08      Assessment:       1. Lower extremity pain, right    2. Essential hypertension    3. Other hyperlipidemia    4. Healthcare maintenance            Plan:       1. Lower extremity pain, right  Comments:  - Julisa reports previous leg pain after walking has resolved following injection and medication administration.  - No current leg pain or paresthesia.    2. Essential hypertension  Overview:  On amlodipine 5 mg daily and hydrochlorothiazide 12.5 mg day    Assessment & Plan:  - Blood pressure initially high but improved to 120/68 after walking.  - Identified medication dosage error with hydrochlorothiazide.  - Adjusted regimen: restarted HCTZ 25 mg (correcting previous pharmacy error of 12.5 mg) and continued amlodipine 5 mg.  - Prescribed hydrochlorothiazide 25 mg for 90 days.    Orders:  -     hydroCHLOROthiazide (HYDRODIURIL) 25 MG tablet; Take 1 tablet (25 mg total) by mouth once daily.  Dispense: 90 tablet; Refill: 3    3. Other hyperlipidemia  Overview:  On atorvastatin 20 mg nightly.    Assessment & Plan:  LDL not at goal for her noted arthrosclerosis and history of stroke.  Follow labs.      4. Healthcare maintenance  Assessment & Plan:  - Yearly labs- 3/5/25  - mammogram- 10/24/24  - Colon cancer screening-  colonoscopy in 2022, repeat in 10 years.  - ACP- documents were given but needs to finish completing them.  - Vaccination-   Due for shingles, RSV, and COVID vaccines.           Assessment & Plan    - Leg pain resolved after previous treatment with injection and medications.  - Fatigue likely due to physical exertion and climate change after recent travel.  - Slightly elevated BP, potentially related to ongoing fatigue.  - Recommend B-complex with higher B12 content (preferably closer to 500 mcg) to address fatigue symptoms.      PERSONAL HISTORY AND GENERAL HEALTH MANAGEMENT:  -  Continue vitamin D3.  - Julisa to maintain a balanced diet and drink plenty of water, especially in hot weather.    FOLLOW-UP:  - Follow up in 6 months (after December, to avoid holiday rush).        Health Maintenance Due   Topic Date Due    Shingles Vaccine (2 of 2) 01/11/2021    RSV Vaccine (Age 60+ and Pregnant patients) (1 - 1-dose 75+ series) Never done    COVID-19 Vaccine (3 - 2024-25 season) 09/01/2024        I spent a total of 30 minutes on the day of the visit.  This includes face to face time and non-face to face time preparing to see the patient (eg, review of tests), obtaining and/or reviewing separately obtained history, documenting clinical information in the electronic or other health record, independently interpreting results and communicating results to the patient/family/caregiver, or care coordinator.     RETURN TO CLINIC IN: 6 MONTHS    FOR NEXT VISIT: REVIEW LABS and MEDICATION MONITORING       Sandra Stafford MD  [facility]  Disclaimer:  This note has been generated using voice-recognition software. There may be grammatical or spelling errors that have been missed during proof-reading           [1]   Current Outpatient Medications   Medication Sig Dispense Refill    amLODIPine (NORVASC) 5 MG tablet Take 1 tablet (5 mg total) by mouth once daily. 90 tablet 3    atorvastatin (LIPITOR) 20 MG tablet TAKE 1 TABLET BY MOUTH EVERY DAY 90 tablet 3    calcium carbonate-vitamin D3 600 mg-20 mcg (800 unit) Chew Take 1 tablet by mouth 2 (two) times a day. 60 tablet 11    cycloSPORINE (RESTASIS) 0.05 % ophthalmic emulsion Place 1 drop into both eyes 2 (two) times daily. 60 each 11    diclofenac sodium (VOLTAREN) 1 % Gel Apply 2 g topically once daily. 150 g 2    fluticasone propionate (FLONASE) 50 mcg/actuation nasal spray 2 sprays (100 mcg total) by Each Nostril route once daily. 16 g 3    lifitegrast (XIIDRA) 5 % Dpet Apply 1 drop to eye every 12 (twelve) hours. 180 each 4    mupirocin (BACTROBAN) 2  % ointment Apply topically 2 (two) times daily. 30 g 0    aspirin (ECOTRIN) 81 MG EC tablet Take 1 tablet (81 mg total) by mouth once daily. 30 tablet 11    hydroCHLOROthiazide (HYDRODIURIL) 25 MG tablet Take 1 tablet (25 mg total) by mouth once daily. 90 tablet 3     No current facility-administered medications for this visit.

## 2025-06-16 NOTE — ASSESSMENT & PLAN NOTE
- Yearly labs- 3/5/25  - mammogram- 10/24/24  - Colon cancer screening-  colonoscopy in 2022, repeat in 10 years.  - ACP- documents were given but needs to finish completing them.  - Vaccination-   Due for shingles, RSV, and COVID vaccines.

## (undated) DEVICE — SOL IRR STRL WATER 500ML

## (undated) DEVICE — BLADE SURG BVL ANG COAX 2.4MM

## (undated) DEVICE — SHEILD & GARTERS FOX METAL EYE

## (undated) DEVICE — SYR SLIP TIP 1CC

## (undated) DEVICE — GLOVE BIOGEL SKINSENSE PI 7.5

## (undated) DEVICE — Device

## (undated) DEVICE — TAPE SURG MICROPORE 1 X10YD

## (undated) DEVICE — SOL BETADINE 5%

## (undated) DEVICE — CASSETTE INFINITI

## (undated) DEVICE — SHIELD EYE METAL FOX 50/BX